# Patient Record
Sex: FEMALE | Race: WHITE | NOT HISPANIC OR LATINO | Employment: OTHER | URBAN - METROPOLITAN AREA
[De-identification: names, ages, dates, MRNs, and addresses within clinical notes are randomized per-mention and may not be internally consistent; named-entity substitution may affect disease eponyms.]

---

## 2017-01-26 ENCOUNTER — TELEPHONE (OUTPATIENT)
Dept: TRANSPLANT | Facility: CLINIC | Age: 77
End: 2017-01-26

## 2017-01-30 ENCOUNTER — TELEPHONE (OUTPATIENT)
Dept: INTERNAL MEDICINE | Facility: CLINIC | Age: 77
End: 2017-01-30

## 2017-01-30 ENCOUNTER — OFFICE VISIT (OUTPATIENT)
Dept: INTERNAL MEDICINE | Facility: CLINIC | Age: 77
End: 2017-01-30
Payer: MEDICARE

## 2017-01-30 ENCOUNTER — HOSPITAL ENCOUNTER (OUTPATIENT)
Dept: RADIOLOGY | Facility: HOSPITAL | Age: 77
Discharge: HOME | End: 2017-01-30
Attending: NURSE PRACTITIONER
Payer: MEDICARE

## 2017-01-30 VITALS
OXYGEN SATURATION: 96 % | TEMPERATURE: 98 F | HEART RATE: 79 BPM | BODY MASS INDEX: 32.17 KG/M2 | SYSTOLIC BLOOD PRESSURE: 118 MMHG | DIASTOLIC BLOOD PRESSURE: 77 MMHG | HEIGHT: 62 IN | WEIGHT: 174.81 LBS | RESPIRATION RATE: 16 BRPM

## 2017-01-30 DIAGNOSIS — D69.6 THROMBOCYTOPENIA: ICD-10-CM

## 2017-01-30 DIAGNOSIS — Z00.00 ENCOUNTER FOR PREVENTIVE HEALTH EXAMINATION: Primary | ICD-10-CM

## 2017-01-30 DIAGNOSIS — M51.36 DDD (DEGENERATIVE DISC DISEASE), LUMBAR: ICD-10-CM

## 2017-01-30 DIAGNOSIS — E78.5 HYPERLIPIDEMIA, UNSPECIFIED HYPERLIPIDEMIA TYPE: ICD-10-CM

## 2017-01-30 DIAGNOSIS — E66.9 OBESITY (BMI 30.0-34.9): ICD-10-CM

## 2017-01-30 DIAGNOSIS — R29.818 NEUROGENIC CLAUDICATION: ICD-10-CM

## 2017-01-30 DIAGNOSIS — E11.9 ENCOUNTER FOR DIABETIC FOOT EXAM: ICD-10-CM

## 2017-01-30 DIAGNOSIS — I10 ESSENTIAL HYPERTENSION: ICD-10-CM

## 2017-01-30 DIAGNOSIS — E08.40 DIABETES MELLITUS DUE TO UNDERLYING CONDITION WITH DIABETIC NEUROPATHY, WITHOUT LONG-TERM CURRENT USE OF INSULIN: ICD-10-CM

## 2017-01-30 DIAGNOSIS — M48.061 SPINAL STENOSIS, LUMBAR: ICD-10-CM

## 2017-01-30 DIAGNOSIS — Z12.31 ENCOUNTER FOR SCREENING MAMMOGRAM FOR MALIGNANT NEOPLASM OF BREAST: ICD-10-CM

## 2017-01-30 DIAGNOSIS — F51.04 CHRONIC INSOMNIA: ICD-10-CM

## 2017-01-30 PROCEDURE — 77063 BREAST TOMOSYNTHESIS BI: CPT | Mod: 26,,, | Performed by: RADIOLOGY

## 2017-01-30 PROCEDURE — G0439 PPPS, SUBSEQ VISIT: HCPCS | Mod: S$GLB,,, | Performed by: NURSE PRACTITIONER

## 2017-01-30 PROCEDURE — 77067 SCR MAMMO BI INCL CAD: CPT | Mod: 26,,, | Performed by: RADIOLOGY

## 2017-01-30 PROCEDURE — 99999 PR PBB SHADOW E&M-EST. PATIENT-LVL III: CPT | Mod: PBBFAC,,, | Performed by: NURSE PRACTITIONER

## 2017-01-30 PROCEDURE — 77067 SCR MAMMO BI INCL CAD: CPT | Mod: TC

## 2017-01-30 PROCEDURE — 3074F SYST BP LT 130 MM HG: CPT | Mod: S$GLB,,, | Performed by: NURSE PRACTITIONER

## 2017-01-30 PROCEDURE — 3078F DIAST BP <80 MM HG: CPT | Mod: S$GLB,,, | Performed by: NURSE PRACTITIONER

## 2017-01-30 NOTE — Clinical Note
Primary Care Providers: Serg Hwang MD, MD (General)  Your patient was seen today for a HRA visit. Gap(s) in care (HEDIS gaps) have been identified during this visit that require additional testing and possible follow up.  Orders Placed This Encounter     Mammo Digital Screening Bilat with CAD         Standing Status: Future         Standing Expiration Date: 7/29/2017         Order Specific Question: Reason for Exam:         Answer: screening for breast cancer   These orders were placed using [facility] approved protocol and any results will be forwarded to your office for appropriate follow up. I have included a copy of my visit note; please review the note and feel free to contact me with any questions.   Thank you for allowing me to participate in the care of your patients. Radha Adams NP

## 2017-01-30 NOTE — PROGRESS NOTES
"Marine Mo presented for a  Medicare AWV and comprehensive Health Risk Assessment today. The following components were reviewed and updated:    · Medical history  · Family History  · Social history  · Allergies and Current Medications  · Health Risk Assessment  · Health Maintenance  · Care Team     ** See Completed Assessments for Annual Wellness Visit within the encounter summary.**       The following assessments were completed:  · Living Situation  · CAGE  · Depression Screening  · Timed Get Up and Go  · Whisper Test  · Cognitive Function Screening  · Nutrition Screening  · ADL Screening  · PAQ Screening    Vitals:    01/30/17 0811   BP: 118/77   BP Method: Manual   Pulse: 79   Resp: 16   Temp: 98.2 °F (36.8 °C)   TempSrc: Oral   SpO2: 96%   Weight: 79.3 kg (174 lb 13.2 oz)   Height: 5' 2" (1.575 m)     Body mass index is 31.98 kg/(m^2).  Physical Exam   Constitutional: She is oriented to person, place, and time. She appears well-developed.   HENT:   Head: Normocephalic.   Right Ear: External ear normal.   Left Ear: External ear normal.   Neck: Normal range of motion.   Cardiovascular: Normal rate, regular rhythm and normal heart sounds.    Pulses:       Dorsalis pedis pulses are 2+ on the right side, and 2+ on the left side.        Posterior tibial pulses are 2+ on the right side, and 2+ on the left side.   Pulmonary/Chest: Effort normal and breath sounds normal.   Abdominal: Soft. Bowel sounds are normal. She exhibits no distension. There is tenderness.   Musculoskeletal: She exhibits no edema, tenderness or deformity.   Feet:   Right Foot:   Protective Sensation: 8 sites tested. 8 sites sensed.   Skin Integrity: Negative for ulcer, blister, skin breakdown, erythema, warmth, callus or dry skin.   Left Foot:   Protective Sensation: 8 sites tested. 8 sites sensed.   Skin Integrity: Negative for ulcer, blister, skin breakdown, erythema, warmth, callus or dry skin.   Neurological: She is alert and oriented to " person, place, and time.   Skin: Skin is warm and dry.   Psychiatric: She has a normal mood and affect. Her behavior is normal. Judgment and thought content normal.   Vitals reviewed.        Diagnoses and health risks identified today and associated recommendations/orders:      1. Encounter for preventive health examination    2. Essential hypertension  Stable, on Lopressor, followed by PCP    3. Diabetes mellitus due to underlying condition with diabetic neuropathy, without long-term current use of insulin  Stable, on metformin, followed by PCP    4. 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 Hyperlipidemia, unspecified hyperlipidemia type  Stable, with diet, followed by PCP    6. Thrombocytopenia  Stable, followed by PCP    7. DDD (degenerative disc disease), lumbar  Stable, followed by PCP    8. Encounter for diabetic foot exam  Sensory exam of the foot is normal, tested with the monofilament. Good pulses, no lesions or ulcers, good peripheral pulses.    9. Chronic insomnia  Stable, followed by PCP    10. Obesity (BMI 30.0-34.9)  Stable with diet, followed by PCP    11. Neurogenic claudication  Stable, followed by PCP    12. Spinal stenosis, lumbar  Stable, followed by PCP    13. Encounter for screening mammogram for malignant neoplasm of breast  - Mammo Digital Screening Bilat with CAD; Future      Pt declined Flu Vaccine, Declined Pneumonia Vaccine, and Declined DEXA scan      Provided Marine with a 5-10 year written screening schedule and personal prevention plan. Recommendations were developed using the USPSTF age appropriate recommendations. Education, counseling, and referrals were provided as needed. After Visit Summary printed and given to patient which includes a list of additional screenings\tests needed.    Return in about 4 months (around 5/30/2017) for Follow up with Primary Care  Provider as instructed, HRA visit in 1 year.    Radha Adams NP

## 2017-01-30 NOTE — PATIENT INSTRUCTIONS
Counseling and Referral of Other Preventative  (Italic type indicates deductible and co-insurance are waived)    Patient Name: Marine Mo  Today's Date: 1/30/2017      SERVICE LIMITATIONS RECOMMENDATION    Vaccines    · Pneumococcal (once after 65)    · Influenza (annually)    · Hepatitis B (if medium/high risk)    · Prevnar 13      Hepatitis B medium/high risk factors:       - End-stage renal disease       - Hemophiliacs who received Factor VII or         IX concentrates       - Clients of institutions for the mentally             retarded       - Persons who live in the same house as          a HepB carrier       - Homosexual men       - Illicit injectable drug abusers     Pneumococcal: declined     Influenza: declined     Hepatitis B: not indicated     Prevnar 13: declined    Mammogram (biennial age 50-74)  Annually (age 40 or over)  done 2/2/2015    Pap (up to age 70 and after 70 if unknown history or abnormal study last 10 years)         not indicated    Colorectal cancer screening (to age 75)    · Fecal occult blood test (annual)  · Flexible sigmoidoscopy (5y)  · Screening colonoscopy (10y)  · Barium enema   done 12/2016 In  [place] by      Diabetes self-management training (no USPSTF recommendations)  Requires referral by treating physician for patient with diabetes or renal disease. 10 hours of initial DSMT sessions of no less than 30 minutes each in a continuous 12-month period. 2 hours of follow-up DSMT in subsequent years.  declined    Bone mass measurements (age 65 & older, biennial)  Requires diagnosis related to osteoporosis or estrogen deficiency. Biennial benefit unless patient has history of long-term glucocorticoid  done 12/10/2012, ordered today    Glaucoma screening (no USPSTF recommendation)  Diabetes mellitus, family history   , age 50 or over    American, age 65 or over  done 10/7/2015, to be scheduled by patient    Medical nutrition therapy for  diabetes or renal disease (no recommended schedule)  Requires referral by treating physician for patient with diabetes or renal disease or kidney transplant within the past 3 years.  Can be provided in same year as diabetes self-management training (DSMT), and CMS recommends medical nutrition therapy take place after DSMT. Up to 3 hours for initial year and 2 hours in subsequent years.  declined    Cardiovascular screening blood tests (every 5 years)  · Fasting lipid panel  Order as a panel if possible  done 11/7/2016    Diabetes screening tests (at least every 3 years, Medicare covers annually or at 6-month intervals for prediabetic patients)  · Fasting blood sugar (FBS) or glucose tolerance test (GTT)  Patient must be diagnosed with one of the following:       - Hypertension       - Dyslipidemia       - Obesity (BMI 30kg/m2)       - Previous elevated impaired FBS or GTT       ... or any two of the following:       - Overweight (BMI 25 but <30)       - Family history of diabetes       - Age 65 or older       - History of gestational diabetes or birth of baby weighing more than 9 pounds  done 8/2016 and yearly by PCP    Abdominal aortic aneurysm screening (once)  · Sonogram   Limited to patients who meet one of the following criteria:       - Men who are 65-75 years old and have smoked more than 100 cigarette in their lifetime       - Anyone with a family history of abdominal aortic aneurysm       - Anyone recommended for screening by the USPSTF  not indicated    HIV screening (annually for increased risk patients)  · HIV-1 and HIV-2 by EIA, or SUE, rapid antibody test or oral mucosa transudate  Patients must be at increased risk for HIV infection per USPSTF guidelines or pregnant. Tests covered annually for patient at increased risk or as requested by the patient. Pregnant patients may receive up to 3 tests during pregnancy.   Not indicated    Smoking cessation counseling (up to 8 sessions per year)  Patients  must be asymptomatic of tobacco-related conditions to receive as a preventative service.  not indicated    Subsequent annual wellness visit  At least 12 months since last AWV  Return in one year     The following information is provided to all patients.  This information is to help you find resources for any of the problems found today that may be affecting your health:                Living healthy guide: www.[facility].[place].[facility]      Understanding Diabetes: www.diabetes.org      Eating healthy: www.cdc.gov/healthyweight      CDC home safety checklist: www.cdc.gov/steadi/patient.html      Agency on Aging: www.goea.[place].[facility]      Alcoholics anonymous (AA): www.aa.org      Physical Activity: www.deysi.nih.gov/uw7rcuh      Tobacco use: www.quitwithusla.org

## 2017-01-30 NOTE — TELEPHONE ENCOUNTER
"----- Message from Radha Adams NP sent at 1/30/2017  8:16 AM CST -----  Hi ,        Mrs. Mo,  Is here today for HRA.  She has seen  (GI) in [place] for stomach discomfort; had a EGD, Colonoscopy and CT scan, and still states "puzzling to ", so he[place] [facility] [place]     She stopped her Metformin on last Thursday after the reading the side-effects to see if the symptoms would ease, and states symptoms still there. She wanted to know if you would like her to restart the Metformin or stay off it a few more days.  I informed her it is probably okay to restart it, but she would like to know from you if it is okay. She can be reached on her cell at [phone].    Radha Adams NP    "

## 2017-01-30 NOTE — TELEPHONE ENCOUNTER
----- Message from Radha Adams NP sent at 1/30/2017  8:59 AM CST -----  HRA visit today.     Mammogram ordered today during visit. Can you please call pt with time and date of exam once scheduled.    Kind Regards,  Radha Adams,YANIP-BC

## 2017-01-30 NOTE — MR AVS SNAPSHOT
[facility]  [address] [facility] [address]  Phone: [phone]  Fax: [phone]                  Marine Mo   2017 8:00 AM   Office Visit    Description:  Female : 1940   Provider:  [facility]   Department:  [facility]           Diagnoses this Visit        Comments    Encounter for preventive health examination    -  Primary     Essential hypertension         Diabetes mellitus due to underlying condition with diabetic neuropathy, without long-term current use of insulin         Hyperlipidemia, unspecified hyperlipidemia type         DDD (degenerative disc disease), lumbar         Thrombocytopenia         Encounter for diabetic foot exam         Encounter for screening mammogram for malignant neoplasm of breast                To Do List           Future Appointments        Provider Department Dept Phone    5/15/2017 8:15 AM NURSE, [facility] [phone]    2017 7:45 AM Serg Hwang MD [facility] [phone]      Goals (5 Years of Data)     None      [facility] On [facility]     [facility] On Call Nurse [facility] - 24/7 Assistance  Registered nurses in the [facility] On Call Center provide clinical advisement, health education, appointment booking, and other advisory services.  Call for this free service at [phone].             Medications           Message regarding Medications     Verify the changes and/or additions to your medication regime listed below are the same as discussed with your clinician today.  If any of these changes or additions are incorrect, please notify your healthcare provider.        STOP taking these medications     omeprazole (PRILOSEC) 40 MG capsule Take 1 tab po twice daily x 2 weeks then back to daily    sucralfate (CARAFATE) 1 gram tablet Take 1 tablet (1 g total) by mouth 4 (four) times daily before meals and nightly.           Verify that the below list of  "medications is an accurate representation of the medications you are currently taking.  If none reported, the list may be blank. If incorrect, please contact your healthcare provider. Carry this list with you in case of emergency.           Current Medications     alprazolam (XANAX) 0.5 MG tablet Take 1 tablet (0.5 mg total) by mouth nightly as needed for Anxiety.    clobetasol (TEMOVATE) 0.05 % cream Apply topically 2 (two) times daily.    metformin (GLUCOPHAGE) 500 MG tablet Take 1 tablet (500 mg total) by mouth 2 (two) times daily with meals.    metoprolol tartrate (LOPRESSOR) 100 MG tablet TAKE 1/2 TABLET TWICE DAILY           Clinical Reference Information           Vital Signs - Last Recorded  Most recent update: 1/30/2017  8:15 AM by Radha Adams NP    BP Pulse Temp Resp Ht Wt    118/77 (BP Method: Manual) 79 98.2 °F (36.8 °C) (Oral) 16 5' 2" (1.575 m) 79.3 kg (174 lb 13.2 oz)    SpO2 BMI             96% 31.98 kg/m2         Blood Pressure          Most Recent Value    BP  118/77      Allergies as of 1/30/2017     No Known Allergies      Immunizations Administered on Date of Encounter - 1/30/2017     None      Orders Placed During Today's Visit     Future Labs/Procedures Expected by Expires    Mammo Digital Screening Bilat with CAD  1/30/2017 (Approximate) 7/29/2017      [facility] Sign-Up     Activating your [facility] account is as easy as 1-2-3!     1) Visit [email or web address], select Sign Up Now, enter this activation code and your date of birth, then select Next.  Activation code not generated  Current Patient Portal Status: Account disabled      2) Create a username and password to use when you visit [facility] in the future and select a security question in case you lose your password and select Next.    3) Enter your e-mail address and click Sign Up!    Additional Information  If you have questions, please e-mail [email or web address] or call [phone] to talk to our [facility] staff. Remember, " [facility] is NOT to be used for urgent needs. For medical emergencies, dial 911.         Instructions      Counseling and Referral of Other Preventative  (Italic type indicates deductible and co-insurance are waived)    Patient Name: Marine Mo  Today's Date: 1/30/2017      SERVICE LIMITATIONS RECOMMENDATION    Vaccines    · Pneumococcal (once after 65)    · Influenza (annually)    · Hepatitis B (if medium/high risk)    · Prevnar 13      Hepatitis B medium/high risk factors:       - End-stage renal disease       - Hemophiliacs who received Factor VII or         IX concentrates       - Clients of institutions for the mentally             retarded       - Persons who live in the same house as          a HepB carrier       - Homosexual men       - Illicit injectable drug abusers     Pneumococcal: declined     Influenza: declined     Hepatitis B: not indicated     Prevnar 13: declined    Mammogram (biennial age 50-74)  Annually (age 40 or over)  done 2/2/2015    Pap (up to age 70 and after 70 if unknown history or abnormal study last 10 years)         not indicated    Colorectal cancer screening (to age 75)    · Fecal occult blood test (annual)  · Flexible sigmoidoscopy (5y)  · Screening colonoscopy (10y)  · Barium enema   done 12/2016 In  [place] by      Diabetes self-management training (no USPSTF recommendations)  Requires referral by treating physician for patient with diabetes or renal disease. 10 hours of initial DSMT sessions of no less than 30 minutes each in a continuous 12-month period. 2 hours of follow-up DSMT in subsequent years.  declined    Bone mass measurements (age 65 & older, biennial)  Requires diagnosis related to osteoporosis or estrogen deficiency. Biennial benefit unless patient has history of long-term glucocorticoid  done 12/10/2012, ordered today    Glaucoma screening (no USPSTF recommendation)  Diabetes mellitus, family history   , age 50 or over     American, age 65 or over  done 10/7/2015, to be scheduled by patient    Medical nutrition therapy for diabetes or renal disease (no recommended schedule)  Requires referral by treating physician for patient with diabetes or renal disease or kidney transplant within the past 3 years.  Can be provided in same year as diabetes self-management training (DSMT), and CMS recommends medical nutrition therapy take place after DSMT. Up to 3 hours for initial year and 2 hours in subsequent years.  declined    Cardiovascular screening blood tests (every 5 years)  · Fasting lipid panel  Order as a panel if possible  done 11/7/2016    Diabetes screening tests (at least every 3 years, Medicare covers annually or at 6-month intervals for prediabetic patients)  · Fasting blood sugar (FBS) or glucose tolerance test (GTT)  Patient must be diagnosed with one of the following:       - Hypertension       - Dyslipidemia       - Obesity (BMI 30kg/m2)       - Previous elevated impaired FBS or GTT       ... or any two of the following:       - Overweight (BMI 25 but <30)       - Family history of diabetes       - Age 65 or older       - History of gestational diabetes or birth of baby weighing more than 9 pounds  done 11/7/2016 and yearly by PCP    Abdominal aortic aneurysm screening (once)  · Sonogram   Limited to patients who meet one of the following criteria:       - Men who are 65-75 years old and have smoked more than 100 cigarette in their lifetime       - Anyone with a family history of abdominal aortic aneurysm       - Anyone recommended for screening by the USPSTF  not indicated    HIV screening (annually for increased risk patients)  · HIV-1 and HIV-2 by EIA, or SUE, rapid antibody test or oral mucosa transudate  Patients must be at increased risk for HIV infection per USPSTF guidelines or pregnant. Tests covered annually for patient at increased risk or as requested by the patient. Pregnant patients may receive up to 3 tests  during pregnancy.   Not indicated    Smoking cessation counseling (up to 8 sessions per year)  Patients must be asymptomatic of tobacco-related conditions to receive as a preventative service.  not indicated    Subsequent annual wellness visit  At least 12 months since last AWV  Return in one year     The following information is provided to all patients.  This information is to help you find resources for any of the problems found today that may be affecting your health:                Living healthy guide: www.[facility].[place].[facility]      Understanding Diabetes: www.diabetes.org      Eating healthy: www.cdc.gov/healthyweight      [facility] home safety checklist: www.cdc.gov/steadi/patient.html      Agency on Aging: www.goea.[place].[facility]      Alcoholics anonymous (AA): www.aa.org      Physical Activity: www.deysi.nih.gov/ht4izje      Tobacco use: www.quitwithusla.org

## 2017-01-31 NOTE — PROGRESS NOTES
Mammogram Results Noted, Please inform the patient of the following:  Your mammogram is normal, which means no signs of cancer can be seen.  You are recommended to repeat this test every 1-2 years until at least age 75.    Radha Adams, NP

## 2017-02-01 ENCOUNTER — TELEPHONE (OUTPATIENT)
Dept: INTERNAL MEDICINE | Facility: CLINIC | Age: 77
End: 2017-02-01

## 2017-02-01 NOTE — TELEPHONE ENCOUNTER
----- Message from Elena Farah sent at 2017  2:20 PM CST -----  Contact: self  Marine Mo  MRN: [id number]  : 1940  PCP: Serg Hwang  Home Phone      [phone]  Work Phone      [phone]  Mobile          [phone]      MESSAGE:   Pt stopped taking her diabetic medicine because she has stomach problems. She is feeling weak right now because she hasn't started her medicine since last Thursday. She is wanting to see if Dr. Hwang can change her medicine.     Phone: [phone]

## 2017-02-01 NOTE — TELEPHONE ENCOUNTER
Patient states that she started metformin again last night and thus far is feeling OK. Will continue with metformin and advise if symptoms return

## 2017-02-01 NOTE — TELEPHONE ENCOUNTER
----- Message from Radha Adams NP sent at 1/31/2017  9:13 AM CST -----  Mammogram Results Noted, Please inform the patient of the following:  Your mammogram is normal, which means no signs of cancer can be seen.  You are recommended to repeat this test every 1-2 years until at least age 75.    Radha Adams NP

## 2017-02-01 NOTE — TELEPHONE ENCOUNTER
NP:    Your mammogram is normal, which means no signs of cancer can be seen. after 75 yrs you may choose to not do them any more

## 2017-02-09 ENCOUNTER — TELEPHONE (OUTPATIENT)
Dept: TRANSPLANT | Facility: CLINIC | Age: 77
End: 2017-02-09

## 2017-02-09 NOTE — TELEPHONE ENCOUNTER
----- Message from Marcus Ruiz sent at 2/9/2017  8:57 AM CST -----  Contact: pt  Pt calling to get an appt please return call at  or  [phone]

## 2017-02-21 ENCOUNTER — LAB VISIT (OUTPATIENT)
Dept: LAB | Facility: HOSPITAL | Age: 77
End: 2017-02-21
Payer: MEDICARE

## 2017-02-21 ENCOUNTER — OFFICE VISIT (OUTPATIENT)
Dept: HEPATOLOGY | Facility: CLINIC | Age: 77
End: 2017-02-21
Payer: MEDICARE

## 2017-02-21 VITALS
TEMPERATURE: 98 F | HEIGHT: 64 IN | DIASTOLIC BLOOD PRESSURE: 63 MMHG | HEART RATE: 71 BPM | OXYGEN SATURATION: 95 % | BODY MASS INDEX: 28.24 KG/M2 | RESPIRATION RATE: 18 BRPM | WEIGHT: 165.38 LBS | SYSTOLIC BLOOD PRESSURE: 130 MMHG

## 2017-02-21 DIAGNOSIS — R16.0 HEPATOMEGALY: ICD-10-CM

## 2017-02-21 DIAGNOSIS — D69.6 THROMBOCYTOPENIA: ICD-10-CM

## 2017-02-21 DIAGNOSIS — K76.6 PORTAL HYPERTENSION: Primary | ICD-10-CM

## 2017-02-21 DIAGNOSIS — K76.6 PORTAL HYPERTENSION: ICD-10-CM

## 2017-02-21 DIAGNOSIS — R16.1 SPLENOMEGALY: ICD-10-CM

## 2017-02-21 LAB
CERULOPLASMIN SERPL-MCNC: 27 MG/DL
FERRITIN SERPL-MCNC: 59 NG/ML
HBV CORE AB SERPL QL IA: NEGATIVE
HBV SURFACE AB SER-ACNC: NEGATIVE M[IU]/ML
HEPATITIS A ANTIBODY, IGG: POSITIVE
IGG SERPL-MCNC: 1252 MG/DL
IRON SERPL-MCNC: 61 UG/DL
SATURATED IRON: 16 %
TOTAL IRON BINDING CAPACITY: 389 UG/DL
TRANSFERRIN SERPL-MCNC: 263 MG/DL

## 2017-02-21 PROCEDURE — 86704 HEP B CORE ANTIBODY TOTAL: CPT

## 2017-02-21 PROCEDURE — 86790 VIRUS ANTIBODY NOS: CPT

## 2017-02-21 PROCEDURE — 86706 HEP B SURFACE ANTIBODY: CPT

## 2017-02-21 PROCEDURE — 1125F AMNT PAIN NOTED PAIN PRSNT: CPT | Mod: S$GLB,,, | Performed by: NURSE PRACTITIONER

## 2017-02-21 PROCEDURE — 86039 ANTINUCLEAR ANTIBODIES (ANA): CPT

## 2017-02-21 PROCEDURE — 82390 ASSAY OF CERULOPLASMIN: CPT

## 2017-02-21 PROCEDURE — 99205 OFFICE O/P NEW HI 60 MIN: CPT | Mod: S$GLB,,, | Performed by: NURSE PRACTITIONER

## 2017-02-21 PROCEDURE — 86038 ANTINUCLEAR ANTIBODIES: CPT

## 2017-02-21 PROCEDURE — 82104 ALPHA-1-ANTITRYPSIN PHENO: CPT

## 2017-02-21 PROCEDURE — 82784 ASSAY IGA/IGD/IGG/IGM EACH: CPT

## 2017-02-21 PROCEDURE — 86235 NUCLEAR ANTIGEN ANTIBODY: CPT | Mod: 59

## 2017-02-21 PROCEDURE — 99499 UNLISTED E&M SERVICE: CPT | Mod: S$GLB,,, | Performed by: NURSE PRACTITIONER

## 2017-02-21 PROCEDURE — 1160F RVW MEDS BY RX/DR IN RCRD: CPT | Mod: S$GLB,,, | Performed by: NURSE PRACTITIONER

## 2017-02-21 PROCEDURE — 1159F MED LIST DOCD IN RCRD: CPT | Mod: S$GLB,,, | Performed by: NURSE PRACTITIONER

## 2017-02-21 PROCEDURE — 36415 COLL VENOUS BLD VENIPUNCTURE: CPT

## 2017-02-21 PROCEDURE — 83540 ASSAY OF IRON: CPT

## 2017-02-21 PROCEDURE — 3078F DIAST BP <80 MM HG: CPT | Mod: S$GLB,,, | Performed by: NURSE PRACTITIONER

## 2017-02-21 PROCEDURE — 82728 ASSAY OF FERRITIN: CPT

## 2017-02-21 PROCEDURE — 99999 PR PBB SHADOW E&M-EST. PATIENT-LVL III: CPT | Mod: PBBFAC,,, | Performed by: NURSE PRACTITIONER

## 2017-02-21 PROCEDURE — 1157F ADVNC CARE PLAN IN RCRD: CPT | Mod: S$GLB,,, | Performed by: NURSE PRACTITIONER

## 2017-02-21 PROCEDURE — 3075F SYST BP GE 130 - 139MM HG: CPT | Mod: S$GLB,,, | Performed by: NURSE PRACTITIONER

## 2017-02-21 PROCEDURE — 99499 UNLISTED E&M SERVICE: CPT | Mod: S$GLB,,, | Performed by: INTERNAL MEDICINE

## 2017-02-21 RX ORDER — HYDROCODONE BITARTRATE AND ACETAMINOPHEN 5; 325 MG/1; MG/1
1 TABLET ORAL EVERY 6 HOURS PRN
COMMUNITY
End: 2017-05-22 | Stop reason: SDUPTHER

## 2017-02-21 NOTE — PROGRESS NOTES
"HEPATOLOGY CONSULTATION    Referring Physician: Zachery Hernandez Jr., MD   Current Corresponding Physician: Zachery Hernandez Jr., MD     Reason for Consultation: Consultation for evaluation of Cirrhosis    History of Present Illness: Marine Mo is a 76 y.o. female who presents for evaluation of   Chief Complaint   Patient presents with    Cirrhosis     Per patient known history of polycystic hepatorenal disease x 20+ years. Recently evaluated for c/o nausea, abdominal fullness which started around October 2016.  Imaging showed diffuse hepatic cysts and evidence for portal hypertension with jose juan esophageal varices and splenomegaly in the setting of thrombocytopenia and "nodular" liver.   Also has additional PMH of HTN, HLD, obesity, DM.  Dx DM in the last year and has been diligent about her diet and has managed to lose about 40 #s.     Denies previously known abnormal serologies  Denies symptoms of decompensation  Family hx of liver disease, father and sister with polycystic liver disease    Review of available records reveal:  Normal LFTs   Thrombocytopenia w/ splenomegaly  Esophageal varices on CT  1/16  AST/ALT 34/36  ALP/TB 38/.6  ALB 3.8  INR 1.1  AFP 3.4    AMA, SMA neg  Ferritin 46  Hep A Igm neg  Hep B sAg, cAb Igm neg,   Hep C neg    EGD 1/2017: normal esophagus, erosive gastritis    CT 1/13/17: no ascites, hepatomegaly, micronodular surface of the liver, diffuse liver cyst, splenomegaly, dilation of umbilical vein, esophageal varices, numerous kidney cysts    Other noted hx: diverticulosis, colon polyps, DM, HLD, obesity, HTN, DDD  Past Medical History   Diagnosis Date    Hypertension      Outpatient Encounter Prescriptions as of 2/21/2017   Medication Sig Dispense Refill    alprazolam (XANAX) 0.5 MG tablet Take 1 tablet (0.5 mg total) by mouth nightly as needed for Anxiety. 30 tablet 3    clobetasol (TEMOVATE) 0.05 % cream Apply topically 2 (two) times daily. 60 g 1    hydrocodone-acetaminophen " 5-325mg (NORCO) 5-325 mg per tablet Take 1 tablet by mouth every 6 (six) hours as needed for Pain.      metformin (GLUCOPHAGE) 500 MG tablet Take 1 tablet (500 mg total) by mouth 2 (two) times daily with meals. 180 tablet 3    metoprolol tartrate (LOPRESSOR) 100 MG tablet TAKE 1/2 TABLET TWICE DAILY 90 tablet 2     No facility-administered encounter medications on file as of 2/21/2017.      Review of patient's allergies indicates:  No Known Allergies  Family History   Problem Relation Age of Onset    Kidney disease Father        Social History     Social History    Marital status:      Spouse name: N/A    Number of children: N/A    Years of education: N/A     Occupational History    Not on file.     Social History Main Topics    Smoking status: Never Smoker    Smokeless tobacco: Never Used    Alcohol use No    Drug use: No    Sexual activity: Yes     Partners: Male     Other Topics Concern    Not on file     Social History Narrative     Review of Systems   Constitutional: Negative for activity change, appetite change, chills, diaphoresis, fatigue, fever and unexpected weight change.   HENT: Negative for facial swelling and nosebleeds.    Respiratory: Negative for cough, chest tightness and shortness of breath.    Cardiovascular: Negative for chest pain, palpitations and leg swelling.   Gastrointestinal: Negative for abdominal distention, abdominal pain, blood in stool, constipation, diarrhea, nausea and vomiting.   Musculoskeletal: Negative for neck pain and neck stiffness.   Skin: Negative for color change, pallor and rash.   Neurological: Negative for dizziness, tremors, syncope, weakness, light-headedness and headaches.   Hematological: Negative for adenopathy. Does not bruise/bleed easily.   Psychiatric/Behavioral: Negative for agitation, behavioral problems, confusion and decreased concentration.     Vitals:    02/21/17 0831   BP: 130/63   Pulse: 71   Resp: 18   Temp: 97.8 °F (36.6 °C)        Physical Exam   Constitutional: She is oriented to person, place, and time. She appears well-developed and well-nourished. No distress.   HENT:   Head: Normocephalic and atraumatic.   Eyes: Conjunctivae are normal. Pupils are equal, round, and reactive to light. No scleral icterus.   Neck: Normal range of motion. Neck supple.   Cardiovascular: Normal rate.    Pulmonary/Chest: Effort normal.   Abdominal: Soft. She exhibits no distension and no mass. There is no tenderness. There is no rebound and no guarding.   Musculoskeletal: Normal range of motion.   Neurological: She is alert and oriented to person, place, and time. No cranial nerve deficit. She exhibits normal muscle tone. Coordination normal.   No asterixis   Skin: Skin is warm and dry. No rash noted. She is not diaphoretic. No erythema. No pallor.   Psychiatric: She has a normal mood and affect. Her behavior is normal. Judgment and thought content normal.       Computed MELD-Na score unavailable. Necessary lab results were not found.  Computed MELD score unavailable. Necessary lab results were not found.    Lab Results   Component Value Date     10/28/2016    BUN 18 10/28/2016    CREATININE 0.9 10/28/2016    CALCIUM 9.7 10/28/2016     10/28/2016    K 4.5 10/28/2016     10/28/2016    PROT 7.1 10/28/2016    CO2 26 10/28/2016    ANIONGAP 11 10/28/2016    WBC 3.12 (L) 10/28/2016    RBC 4.34 10/28/2016    HGB 12.4 10/28/2016    HCT 37.4 10/28/2016    MCV 86 10/28/2016    MCH 28.6 10/28/2016    MCHC 33.2 10/28/2016     Lab Results   Component Value Date    RDW 16.0 (H) 10/28/2016    PLT 75 (L) 10/28/2016    MPV 9.6 10/28/2016    GRAN 1.5 (L) 10/28/2016    GRAN 49.1 10/28/2016    LYMPH 1.2 10/28/2016    LYMPH 37.5 10/28/2016    MONO 0.3 10/28/2016    MONO 10.9 10/28/2016    EOSINOPHIL 2.2 10/28/2016    BASOPHIL 0.3 10/28/2016    EOS 0.1 10/28/2016    BASO 0.01 10/28/2016    APTT 24.9 09/26/2011    GROUPTRH A POS 09/26/2011    CHOL 157  08/01/2016    TRIG 239 (H) 08/01/2016    HDL 26 (L) 08/01/2016    CHOLHDL 16.6 (L) 08/01/2016    TOTALCHOLEST 6.0 (H) 08/01/2016    ALBUMIN 4.0 10/28/2016    AST 30 10/28/2016    ALT 23 10/28/2016    ALKPHOS 32 (L) 10/28/2016    LABPROT 10.9 09/26/2011    INR 1.0 09/26/2011       Assessment and Plan:  Patient seen in collaboration with Dr. Rajput  Polycystic liver disease: w/ possible cirrhosis as evidenced by esophageal varices, splenomegaly w/ thrombocytopenia  -will get remaining liver w/u serologies  -in the setting of polycystic hepatic disease MR elastography is preferred over fibroscan to assess for fibrosis but patient is extremely claustrophobic  -will request copy of imaging CD for review in IR  -recommend Hep A/B vaccine, pending labs    Total duration of visit = 40 min, with > 50% spent counseling  RTC 4-6 weeks    Thank you for allowing me participate in this patient's care.   Patient Active Problem List   Diagnosis    Spinal stenosis, lumbar    DDD (degenerative disc disease), lumbar    Low back pain without sciatica    Lumbar radiculopathy    Neurogenic claudication    Hyperlipidemia    Obesity (BMI 30.0-34.9)    Thrombocytopenia    Type 2 diabetes mellitus, controlled    Chronic insomnia    Hereditary and idiopathic peripheral neuropathy    Essential hypertension    Diabetes mellitus with neuropathy    Type 2 diabetes mellitus, uncontrolled    Encounter for diabetic foot exam    Hepatomegaly    Splenomegaly     Marine Mo is a 76 y.o. female withCirrhosis

## 2017-02-21 NOTE — LETTER
February 21, 2017      Zachery Hernandez Jr., MD  [address] [facility]  [address] [place] [id number]           [facility] - Hepatology  [address]  [place] [address]  Phone: [phone]  Fax: [phone]          Patient: Marine Mo   MR Number: [id number]   YOB: 1940   Date of Visit: 2/21/2017       Dear Dr. Zachery Hernandez Jr.:    Thank you for referring Marine Mo to me for evaluation. Attached you will find relevant portions of my assessment and plan of care.    If you have questions, please do not hesitate to call me. I look forward to following Marine Mo along with you.    Sincerely,    Marli Mcintosh, ANDI    Enclosure  CC:  No Recipients    If you would like to receive this communication electronically, please contact [email or web address] or [phone] to request more information on [employer] Link access.    For providers and/or their staff who would like to refer a patient to [facility], please contact us through our one-stop-shop provider referral line, [facility], at [phone].    If you feel you have received this communication in error or would no longer like to receive these types of communications, please e-mail [email or web address]

## 2017-02-21 NOTE — MR AVS SNAPSHOT
[facility] - Hepatology  [address]  [facility] [address]  Phone: [phone]  Fax: [phone]                  Marine Mo   2017 8:40 AM   Office Visit    Description:  Female : 1940   Provider:  Marli Mcintosh NP   Department:  [facility] - Hepat[facility]           Reason for Visit     Cirrhosis           Diagnoses this Visit        Comments    Portal hypertension    -  Primary     Splenomegaly         Hepatomegaly         Thrombocytopenia                To Do List           Future Appointments        Provider Department Dept Phone    5/15/2017 8:15 AM NURSE, Ghulam [facility] [phone]    2017 7:45 AM Serg Hwang MD [facility] [phone]      Goals (5 Years of Data)     None      [facility] On [facility]     [facility] On Call Nurse Care Line -  Assistance  Registered nurses in the [facility] On Call Center provide clinical advisement, health education, appointment booking, and other advisory services.  Call for this free service at [phone].             Medications           Message regarding Medications     Verify the changes and/or additions to your medication regime listed below are the same as discussed with your clinician today.  If any of these changes or additions are incorrect, please notify your healthcare provider.             Verify that the below list of medications is an accurate representation of the medications you are currently taking.  If none reported, the list may be blank. If incorrect, please contact your healthcare provider. Carry this list with you in case of emergency.           Current Medications     alprazolam (XANAX) 0.5 MG tablet Take 1 tablet (0.5 mg total) by mouth nightly as needed for Anxiety.    clobetasol (TEMOVATE) 0.05 % cream Apply topically 2 (two) times daily.    hydrocodone-acetaminophen 5-325mg (NORCO) 5-325 mg per tablet Take 1 tablet by mouth every 6 (six) hours as needed for Pain.     "metformin (GLUCOPHAGE) 500 MG tablet Take 1 tablet (500 mg total) by mouth 2 (two) times daily with meals.    metoprolol tartrate (LOPRESSOR) 100 MG tablet TAKE 1/2 TABLET TWICE DAILY           Clinical Reference Information           Your Vitals Were     BP Pulse Temp Resp Height Weight    130/63 (BP Location: Left arm, Patient Position: Sitting, BP Method: Automatic) 71 97.8 °F (36.6 °C) (Oral) 18 5' 4" (1.626 m) 75 kg (165 lb 5.5 oz)    SpO2 BMI             95% 28.38 kg/m2         Blood Pressure          Most Recent Value    BP  130/63      Allergies as of 2/21/2017     No Known Allergies      Immunizations Administered on Date of Encounter - 2/21/2017     None      Orders Placed During Today's Visit     Future Labs/Procedures Expected by Expires    Alpha 1 Antitrypsin Phenotype  2/21/2017 4/22/2018    CASI  2/21/2017 4/22/2018    Ceruloplasmin  2/21/2017 4/22/2018    Ferritin  2/21/2017 4/22/2018    Hepatitis A antibody, IgG  2/21/2017 4/22/2018    Hepatitis B core antibody, total  2/21/2017 2/21/2018    Hepatitis B surface antibody  2/21/2017 4/22/2018    IgG  2/21/2017 4/22/2018    Iron and TIBC  2/21/2017 4/22/2018      [facility] Sign-Up     Activating your [facility] account is as easy as 1-2-3!     1) Visit [email or web address], select Sign Up Now, enter this activation code and your date of birth, then select Next.  Activation code not generated  Current Patient Portal Status: Account disabled      2) Create a username and password to use when you visit [facility] in the future and select a security question in case you lose your password and select Next.    3) Enter your e-mail address and click Sign Up!    Additional Information  If you have questions, please e-mail [email or web address] or call [phone] to talk to our [facility] staff. Remember, [facility] is NOT to be used for urgent needs. For medical emergencies, dial 911.         Language Assistance Services     ATTENTION: Language assistance services are " available, free of charge. Please call [phone].      ATENCIÓN: Si habla español, tiene a nj disposición servicios gratuitos de asistencia lingüística. Llame al [phone].     CHÚ Ý: N?u b?n nói Ti?ng Vi?t, có các d?ch v? h? tr? ngôn ng? mi?n phí dành cho b?n. G?i s? [phone].         [facility] complies with applicable Federal civil rights laws and does not discriminate on the basis of race, color, national origin, age, disability, or sex.

## 2017-02-21 NOTE — PROGRESS NOTES
I have personally performed a face to face diagnostic evaluation on this patient. I have reviewed and agree with today's findings and the care plan outlined by Marli Mcintosh NP      My findings are as follows:  Patient presents with polycystic liver and kidney disease.  Normal liver function until now.  Now has nausea, had CT scan which showed varices, splenomegaly, plus cysts. Cirrhosis and splenomegaly mentioned in the report.   EGD 11/28/16 showed erosive gastritis, no varices and esoph normal.  Patient's main problem is burning in the epigastric area, full feeling in the same area.  These symptoms have been since Oct 15, 2016.  Sucralfate and omeprazole was given, she only takes sucralfate.  She does not seem to recall omeprazole being prescribed.  If she does have cirrhosis, it could be due to DILLARD, given DM and hyperlipidemia .  She also has HTN, and given her age of 76, she is a high risk for surgery.      Obtain CD with CT scan, u/s any other imaging available. Review in IR conference.     she will return to Marli Mcintosh NP for follow-up.

## 2017-02-22 LAB
A1AT PHENOTYP SERPL-IMP: NORMAL BANDS
A1AT SERPL NEPH-MCNC: 148 MG/DL
ANA SER QL IF: POSITIVE
ANA TITR SER IF: NORMAL {TITER}

## 2017-02-23 ENCOUNTER — TELEPHONE (OUTPATIENT)
Dept: HEPATOLOGY | Facility: CLINIC | Age: 77
End: 2017-02-23

## 2017-02-23 NOTE — TELEPHONE ENCOUNTER
----- Message from Marli Mcintosh NP sent at 2/22/2017  5:31 PM CST -----  Notify labs do not show any convincing additional underlying liver disease. This can be discussed in more detail at her next visit

## 2017-02-23 NOTE — TELEPHONE ENCOUNTER
Information faxed to [facility] - to get CD mailed to us. [phone] (fax) [phone] (phone).

## 2017-02-23 NOTE — TELEPHONE ENCOUNTER
FYI-   Once cd is received.  send CD to 2nd floor for IR conf, and copy of report to Janet Cedillo.     Also notify Marli cd is here so request can be enter.

## 2017-02-27 LAB
ANTI SM ANTIBODY: 1.06 EU
ANTI SM/RNP ANTIBODY: 1.56 EU
ANTI-SM INTERPRETATION: NEGATIVE
ANTI-SM/RNP INTERPRETATION: NEGATIVE
ANTI-SSA ANTIBODY: 1.5 EU
ANTI-SSA INTERPRETATION: NEGATIVE
ANTI-SSB ANTIBODY: 0.21 EU
ANTI-SSB INTERPRETATION: NEGATIVE
DSDNA AB SER-ACNC: NORMAL [IU]/ML

## 2017-03-07 ENCOUNTER — TELEPHONE (OUTPATIENT)
Dept: HEPATOLOGY | Facility: CLINIC | Age: 77
End: 2017-03-07

## 2017-03-07 NOTE — TELEPHONE ENCOUNTER
"known history of polycystic hepatorenal disease x 20+ years.  Imaging showed diffuse hepatic cysts and evidence for portal hypertension with jose juan esophageal varices and splenomegaly in the setting of thrombocytopenia and "nodular" liver.   Also has additional PMH of HTN, HLD, obesity, DM. Dx      Family hx of liver disease, father and sister with polycystic liver disease    Review in IR per Dr. Rajput  "

## 2017-03-08 ENCOUNTER — TELEPHONE (OUTPATIENT)
Dept: HEPATOLOGY | Facility: CLINIC | Age: 77
End: 2017-03-08

## 2017-03-08 NOTE — TELEPHONE ENCOUNTER
----- Message from Casi Sainz sent at 3/7/2017 10:49 AM CST -----  Contact: pt  Has questions about last visit, please call back at [phone] or [phone]

## 2017-03-08 NOTE — TELEPHONE ENCOUNTER
MA called patient back, patient stated that she want to know why she have to see NP again 3/23/17. Inform patient that her Imaging is been sent to our IR conf to be reviewed and NP will discuss the results on 3/23/17. Inform her also that the conf will be on 3/14. Patient understood.     Patient stated that she don't think that there is something wrong with her liver. He stomach is been hurting she said and she will call Dr. mann office which is her Gastro MD. PAVEL

## 2017-03-14 ENCOUNTER — CONFERENCE (OUTPATIENT)
Dept: TRANSPLANT | Facility: CLINIC | Age: 77
End: 2017-03-14
Payer: MEDICARE

## 2017-03-23 ENCOUNTER — OFFICE VISIT (OUTPATIENT)
Dept: HEPATOLOGY | Facility: CLINIC | Age: 77
End: 2017-03-23
Payer: MEDICARE

## 2017-03-23 VITALS
TEMPERATURE: 97 F | OXYGEN SATURATION: 96 % | BODY MASS INDEX: 29.77 KG/M2 | DIASTOLIC BLOOD PRESSURE: 63 MMHG | HEART RATE: 73 BPM | WEIGHT: 174.38 LBS | SYSTOLIC BLOOD PRESSURE: 142 MMHG | HEIGHT: 64 IN

## 2017-03-23 DIAGNOSIS — R16.1 SPLENOMEGALY: ICD-10-CM

## 2017-03-23 DIAGNOSIS — D69.6 THROMBOCYTOPENIA: ICD-10-CM

## 2017-03-23 DIAGNOSIS — Q44.6 POLYCYSTIC LIVER DISEASE: Primary | ICD-10-CM

## 2017-03-23 PROCEDURE — 99499 UNLISTED E&M SERVICE: CPT | Mod: S$GLB,,, | Performed by: NURSE PRACTITIONER

## 2017-03-23 PROCEDURE — 99999 PR PBB SHADOW E&M-EST. PATIENT-LVL III: CPT | Mod: PBBFAC,,, | Performed by: NURSE PRACTITIONER

## 2017-03-23 PROCEDURE — 1159F MED LIST DOCD IN RCRD: CPT | Mod: S$GLB,,, | Performed by: NURSE PRACTITIONER

## 2017-03-23 PROCEDURE — 3077F SYST BP >= 140 MM HG: CPT | Mod: S$GLB,,, | Performed by: NURSE PRACTITIONER

## 2017-03-23 PROCEDURE — 99214 OFFICE O/P EST MOD 30 MIN: CPT | Mod: S$GLB,,, | Performed by: NURSE PRACTITIONER

## 2017-03-23 PROCEDURE — 1160F RVW MEDS BY RX/DR IN RCRD: CPT | Mod: S$GLB,,, | Performed by: NURSE PRACTITIONER

## 2017-03-23 PROCEDURE — 1157F ADVNC CARE PLAN IN RCRD: CPT | Mod: S$GLB,,, | Performed by: NURSE PRACTITIONER

## 2017-03-23 PROCEDURE — 3078F DIAST BP <80 MM HG: CPT | Mod: S$GLB,,, | Performed by: NURSE PRACTITIONER

## 2017-03-23 NOTE — PROGRESS NOTES
"HEPATOLOGY CONSULTATION    Referring Physician: Zachery Hernandez Jr., MD   Current Corresponding Physician: Zachery Hernandez Jr., MD     Reason for Consultation: Consultation for evaluation of Splenomegaly; Hazel esophageal varices; and Thrombocytopenia    History of Present Illness: Marine Mo is a 77 y.o. female who presents for evaluation of   Chief Complaint   Patient presents with    Splenomegaly    Hazel esophageal varices    Thrombocytopenia     Known history of polycystic hepatorenal disease x 20+ years. Recently evaluated for c/o nausea, abdominal fullness which started around October 2016.  Imaging showed diffuse hepatic cysts and evidence for portal hypertension with hazel esophageal varices and splenomegaly in the setting of thrombocytopenia and "nodular" liver.   Also has additional PMH of HTN, HLD, obesity, DM.   No  C/o jaundice, slowed mentation, abdominal distension, hematemesis, melena, edema.  Liver w/u serologies negative for Leonardo's, A1AT, HH  CASI titer 160 but remaining AI markers negative. No convincing evidence for AIH  Transaminases in the 30s w/ normal synthetic liver function  EGD performed January did not show any EVs or PHTNG although on CT per esophageal varices are noted.     Review of available records reveal:  Essentially normal LFTs   Thrombocytopenia w/ splenomegaly  Esophageal varices on CT  1/16  AST/ALT 34/36  ALP/TB 38/.6  ALB 3.8  INR 1.1  AFP 3.4    AMA, SMA neg  Ferritin 46  Hep A Igm neg  Hep B sAg, cAb Igm neg,   Hep C neg    EGD 1/2017: normal esophagus, erosive gastritis    CT 1/13/17: no ascites, hepatomegaly, micronodular surface of the liver, diffuse liver cyst, splenomegaly, dilation of umbilical vein, esophageal varices, numerous kidney cysts    Other noted hx: diverticulosis, colon polyps, DM, HLD, obesity, HTN, DDD      Family hx :father and sister with polycystic liver disease      Past Medical History:   Diagnosis Date    Hypertension      Outpatient " Encounter Prescriptions as of 3/23/2017   Medication Sig Dispense Refill    alprazolam (XANAX) 0.5 MG tablet Take 1 tablet (0.5 mg total) by mouth nightly as needed for Anxiety. 30 tablet 3    clobetasol (TEMOVATE) 0.05 % cream Apply topically 2 (two) times daily. 60 g 1    hydrocodone-acetaminophen 5-325mg (NORCO) 5-325 mg per tablet Take 1 tablet by mouth every 6 (six) hours as needed for Pain.      metformin (GLUCOPHAGE) 500 MG tablet Take 1 tablet (500 mg total) by mouth 2 (two) times daily with meals. 180 tablet 3    metoprolol tartrate (LOPRESSOR) 100 MG tablet TAKE 1/2 TABLET TWICE DAILY 90 tablet 2     No facility-administered encounter medications on file as of 3/23/2017.      Review of patient's allergies indicates:  No Known Allergies  Family History   Problem Relation Age of Onset    Kidney disease Father        Social History     Social History    Marital status:      Spouse name: N/A    Number of children: N/A    Years of education: N/A     Occupational History    Not on file.     Social History Main Topics    Smoking status: Never Smoker    Smokeless tobacco: Never Used    Alcohol use No    Drug use: No    Sexual activity: Yes     Partners: Male     Other Topics Concern    Not on file     Social History Narrative     Review of Systems   Constitutional: Negative for activity change, appetite change, chills, diaphoresis, fatigue, fever and unexpected weight change.   HENT: Negative for facial swelling and nosebleeds.    Respiratory: Negative for cough, chest tightness and shortness of breath.    Cardiovascular: Negative for chest pain, palpitations and leg swelling.   Gastrointestinal: Negative for abdominal distention, abdominal pain, blood in stool, constipation, diarrhea, nausea and vomiting.   Musculoskeletal: Negative for neck pain and neck stiffness.   Skin: Negative for color change, pallor and rash.   Neurological: Negative for dizziness, tremors, syncope, weakness,  light-headedness and headaches.   Hematological: Negative for adenopathy. Does not bruise/bleed easily.   Psychiatric/Behavioral: Negative for agitation, behavioral problems, confusion and decreased concentration.     Vitals:    03/23/17 0739   BP: (!) 142/63   Pulse: 73   Temp: 97.4 °F (36.3 °C)       Physical Exam   Constitutional: She is oriented to person, place, and time. She appears well-developed and well-nourished. No distress.   HENT:   Head: Normocephalic and atraumatic.   Eyes: Conjunctivae are normal. Pupils are equal, round, and reactive to light. No scleral icterus.   Neck: Normal range of motion. Neck supple.   Cardiovascular: Normal rate.    Pulmonary/Chest: Effort normal.   Abdominal: She exhibits no distension.   Musculoskeletal: Normal range of motion.   Neurological: She is alert and oriented to person, place, and time. No cranial nerve deficit. She exhibits normal muscle tone. Coordination normal.   No asterixis   Skin: Skin is dry. No rash noted. She is not diaphoretic. No erythema. No pallor.   Psychiatric: She has a normal mood and affect. Her behavior is normal. Judgment and thought content normal.       Computed MELD-Na score unavailable. Necessary lab results were not found.  Computed MELD score unavailable. Necessary lab results were not found.    Lab Results   Component Value Date     10/28/2016    BUN 18 10/28/2016    CREATININE 0.9 10/28/2016    CALCIUM 9.7 10/28/2016     10/28/2016    K 4.5 10/28/2016     10/28/2016    PROT 7.1 10/28/2016    CO2 26 10/28/2016    ANIONGAP 11 10/28/2016    WBC 3.12 (L) 10/28/2016    RBC 4.34 10/28/2016    HGB 12.4 10/28/2016    HCT 37.4 10/28/2016    MCV 86 10/28/2016    MCH 28.6 10/28/2016    MCHC 33.2 10/28/2016     Lab Results   Component Value Date    RDW 16.0 (H) 10/28/2016    PLT 75 (L) 10/28/2016    MPV 9.6 10/28/2016    GRAN 1.5 (L) 10/28/2016    GRAN 49.1 10/28/2016    LYMPH 1.2 10/28/2016    LYMPH 37.5 10/28/2016    MONO 0.3  "10/28/2016    MONO 10.9 10/28/2016    EOSINOPHIL 2.2 10/28/2016    BASOPHIL 0.3 10/28/2016    EOS 0.1 10/28/2016    BASO 0.01 10/28/2016    APTT 24.9 09/26/2011    GROUPTRH A POS 09/26/2011    CHOL 157 08/01/2016    TRIG 239 (H) 08/01/2016    HDL 26 (L) 08/01/2016    CHOLHDL 16.6 (L) 08/01/2016    TOTALCHOLEST 6.0 (H) 08/01/2016    ALBUMIN 4.0 10/28/2016    AST 30 10/28/2016    ALT 23 10/28/2016    ALKPHOS 32 (L) 10/28/2016    LABPROT 10.9 09/26/2011    INR 1.0 09/26/2011       Assessment and Plan:  Plan discussed with Dr. Rajput  Polycystic liver disease: r/o cirrhosis in the setting of noted  "periesophageal varices" and "splenomegaly" on outside CT.  Results are confusing because later in the CT report it is stated that "the spleen is of normal size".  No measurements were provided and sometimes these findings are subjective  -images were reviewed in IR conference and per Dr. Rajput, there was no proof of portal hypertension  -liver enzymes are normal with normal liver function  -liver w/u serologies negative for underlying liver disease  -in polycystic hepatic disease MR elastography is preferred over fibroscan to assess for fibrosis but patient is extremely claustrophobic so we are unable to verify staging of liver fibrosis   -recommend Hep B vaccine, + immunity to hep A  Thrombocytopenia: recommend Hematology consult    Her polycystic liver disease can be monitored by her local provider.  No further follow up here with Hepatology is warranted.  We are happy to see patient if there are any new developments  Thank you for allowing me participate in this patient's care.        Patient Active Problem List   Diagnosis    Spinal stenosis, lumbar    DDD (degenerative disc disease), lumbar    Low back pain without sciatica    Lumbar radiculopathy    Neurogenic claudication    Hyperlipidemia    Obesity (BMI 30.0-34.9)    Thrombocytopenia    Type 2 diabetes mellitus, controlled    Chronic insomnia    Hereditary " and idiopathic peripheral neuropathy    Essential hypertension    Diabetes mellitus with neuropathy    Type 2 diabetes mellitus, uncontrolled    Encounter for diabetic foot exam    Hepatomegaly    Splenomegaly     Marine Mo is a 77 y.o. female withSplenomegaly; Hazel esophageal varices; and Thrombocytopenia

## 2017-03-23 NOTE — TELEPHONE ENCOUNTER
Marine Chepe  [id number]    Presenting Provider: Alejandrina Rajput    Presenting Radiologist: Carlos Henderson    Hepatologist: Marli Mcintosh    Indication for review:Polycystic L/K w/nausea    Blood Type: A  Diagnosis: Polycystic liver/kidney    Listing status: Hepatology clinic     Summary:    Additional Information:      Last IR Review:      Labs:  Lab Results for Liver Discussion Latest Ref Rng & Units 10/28/2016 10/24/2016 8/1/2016 1/21/2016 7/23/2015   Creatinine 0.5 - 1.4 mg/dL 0.9 - 0.9 0.9 0.8   Total Bilirubin 0.1 - 1.0 mg/dL 0.6 - 0.7 0.7 0.7   AST 10 - 40 U/L 30 - 36 32 36   ALT 10 - 44 U/L 23 - 31 31 34   Alk Phos 55 - 135 U/L 32(L) - 39(L) 40(L) 41(L)   Platelets 150 - 350 K/uL 75(L) 69(L) 54(L) 76(L) 73(L)   Albumin 3.5 - 5.2 g/dL 4.0 - 3.6 4.1 3.8   Sodium 136 - 145 mmol/L 143 - 139 140 142         Original Imaging:  CT 1/13/17 ([facility]):  Hepatomegaly with predominant left lobe.  Of the liver over the right with significant enlargement of the segment one with micronodular surface of the liver with diffuse liver cyst sign of vent with calcification of the wall on protal hypertension with splenomegaly on significant dilation of the umbilical vein with caput medusa at the level of the umbilicus with esophageal varices consistent with cirrhosis with portal hypertension.        The kidneys are normal size, morphology and excretion.  Numerous bilateral reman cysts are noted the largest is in the lower pole of the left kidney and measuring approximately 2 cm.  7 mm calcified ston is noted in the inferior calices of the lower pole of the left kidney apparently without evidence of hydronephrosis in this limited examination.      Current Imaging:    Interventions:  Neymar:  Date:   Number and size of lesions:  Characteristics:   Nexavar:  N/A    Discussion:  No significant varices.  No PV compression; slightly increased PV at 1.53 cm  Plan:  No additional recommendations.  Surveillance per MD.

## 2017-05-09 ENCOUNTER — PATIENT OUTREACH (OUTPATIENT)
Dept: ADMINISTRATIVE | Facility: HOSPITAL | Age: 77
End: 2017-05-09

## 2017-05-09 NOTE — LETTER
May 9, 2017    Marine Mo  [address] [place] [address] [address] [id number]             [facility]  [address]  [place] [address]  Phone: [phone] Dear Mrs. Mo:    [facility] is committed to your overall health.  To help you get the most out of each of your visits, we will review your information to make sure you are up to date on all of your recommended tests and/or procedures.      Dr. Hwang has found that you may be due for a pneumonia vaccine, a shingles vaccine,a colonoscopy, an annual diabetic eye exam,annual blood test for diabetes, and a dexa scan for osteoporosis.     If you have had any of the above done at another facility, please bring the records or information with you so that your record at [facility] will be complete.  If you would like to schedule any of these, please contact me.    If you are currently taking medication, please bring it with you to your appointment for review.    Also, if you have any type of Advanced Directives, please bring them with you to your office visit so we may scan them into your chart.        If you have any questions or concerns, please don't hesitate to call.    Sincerely,        Judith Cortez LPN Clinical Care Coordinator  [facility]  [phone]

## 2017-05-15 ENCOUNTER — CLINICAL SUPPORT (OUTPATIENT)
Dept: INTERNAL MEDICINE | Facility: CLINIC | Age: 77
End: 2017-05-15
Payer: MEDICARE

## 2017-05-15 DIAGNOSIS — E11.9 CONTROLLED TYPE 2 DIABETES MELLITUS WITHOUT COMPLICATION, WITHOUT LONG-TERM CURRENT USE OF INSULIN: ICD-10-CM

## 2017-05-15 DIAGNOSIS — I10 ESSENTIAL HYPERTENSION: ICD-10-CM

## 2017-05-15 LAB
ALBUMIN SERPL BCP-MCNC: 3.8 G/DL
ALP SERPL-CCNC: 28 U/L
ALT SERPL W/O P-5'-P-CCNC: 21 U/L
ANION GAP SERPL CALC-SCNC: 8 MMOL/L
AST SERPL-CCNC: 24 U/L
BASOPHILS # BLD AUTO: 0.01 K/UL
BASOPHILS NFR BLD: 0.4 %
BILIRUB SERPL-MCNC: 0.7 MG/DL
BUN SERPL-MCNC: 17 MG/DL
CALCIUM SERPL-MCNC: 9.4 MG/DL
CHLORIDE SERPL-SCNC: 108 MMOL/L
CHOLEST/HDLC SERPL: 5.3 {RATIO}
CO2 SERPL-SCNC: 25 MMOL/L
CREAT SERPL-MCNC: 0.8 MG/DL
CREAT UR-MCNC: 101.3 MG/DL
DIFFERENTIAL METHOD: ABNORMAL
EOSINOPHIL # BLD AUTO: 0.1 K/UL
EOSINOPHIL NFR BLD: 3.3 %
ERYTHROCYTE [DISTWIDTH] IN BLOOD BY AUTOMATED COUNT: 16.2 %
EST. GFR  (AFRICAN AMERICAN): >60 ML/MIN/1.73 M^2
EST. GFR  (NON AFRICAN AMERICAN): >60 ML/MIN/1.73 M^2
GLUCOSE SERPL-MCNC: 122 MG/DL
HCT VFR BLD AUTO: 38 %
HDL/CHOLESTEROL RATIO: 18.9 %
HDLC SERPL-MCNC: 190 MG/DL
HDLC SERPL-MCNC: 36 MG/DL
HGB BLD-MCNC: 12.5 G/DL
LDLC SERPL CALC-MCNC: 116.2 MG/DL
LYMPHOCYTES # BLD AUTO: 0.9 K/UL
LYMPHOCYTES NFR BLD: 33 %
MCH RBC QN AUTO: 28.3 PG
MCHC RBC AUTO-ENTMCNC: 32.9 %
MCV RBC AUTO: 86 FL
MICROALBUMIN UR DL<=1MG/L-MCNC: 8 UG/ML
MICROALBUMIN/CREATININE RATIO: 7.9 UG/MG
MONOCYTES # BLD AUTO: 0.2 K/UL
MONOCYTES NFR BLD: 7.8 %
NEUTROPHILS # BLD AUTO: 1.5 K/UL
NEUTROPHILS NFR BLD: 55.5 %
NONHDLC SERPL-MCNC: 154 MG/DL
PLATELET # BLD AUTO: 79 K/UL
PMV BLD AUTO: 10.2 FL
POTASSIUM SERPL-SCNC: 4.2 MMOL/L
PROT SERPL-MCNC: 7.3 G/DL
RBC # BLD AUTO: 4.42 M/UL
SODIUM SERPL-SCNC: 141 MMOL/L
TRIGL SERPL-MCNC: 189 MG/DL
TSH SERPL DL<=0.005 MIU/L-ACNC: 1.45 UIU/ML
WBC # BLD AUTO: 2.7 K/UL

## 2017-05-15 PROCEDURE — 84443 ASSAY THYROID STIM HORMONE: CPT

## 2017-05-15 PROCEDURE — 85025 COMPLETE CBC W/AUTO DIFF WBC: CPT

## 2017-05-15 PROCEDURE — 82570 ASSAY OF URINE CREATININE: CPT

## 2017-05-15 PROCEDURE — 83036 HEMOGLOBIN GLYCOSYLATED A1C: CPT

## 2017-05-15 PROCEDURE — 36415 COLL VENOUS BLD VENIPUNCTURE: CPT | Mod: S$GLB,,, | Performed by: INTERNAL MEDICINE

## 2017-05-15 PROCEDURE — 80053 COMPREHEN METABOLIC PANEL: CPT

## 2017-05-15 PROCEDURE — 80061 LIPID PANEL: CPT

## 2017-05-16 LAB
ESTIMATED AVG GLUCOSE: 108 MG/DL
HBA1C MFR BLD HPLC: 5.4 %

## 2017-05-22 ENCOUNTER — OFFICE VISIT (OUTPATIENT)
Dept: INTERNAL MEDICINE | Facility: CLINIC | Age: 77
End: 2017-05-22
Payer: MEDICARE

## 2017-05-22 VITALS
HEIGHT: 64 IN | BODY MASS INDEX: 28.86 KG/M2 | RESPIRATION RATE: 16 BRPM | HEART RATE: 64 BPM | DIASTOLIC BLOOD PRESSURE: 58 MMHG | SYSTOLIC BLOOD PRESSURE: 110 MMHG | WEIGHT: 169.06 LBS

## 2017-05-22 DIAGNOSIS — M54.16 LUMBAR RADICULOPATHY: ICD-10-CM

## 2017-05-22 DIAGNOSIS — E08.40 DIABETES MELLITUS DUE TO UNDERLYING CONDITION WITH DIABETIC NEUROPATHY, WITHOUT LONG-TERM CURRENT USE OF INSULIN: Primary | ICD-10-CM

## 2017-05-22 DIAGNOSIS — E11.9 CONTROLLED TYPE 2 DIABETES MELLITUS WITHOUT COMPLICATION, WITHOUT LONG-TERM CURRENT USE OF INSULIN: ICD-10-CM

## 2017-05-22 DIAGNOSIS — R16.1 SPLENOMEGALY: ICD-10-CM

## 2017-05-22 DIAGNOSIS — I10 ESSENTIAL HYPERTENSION: ICD-10-CM

## 2017-05-22 DIAGNOSIS — D69.6 THROMBOCYTOPENIA: ICD-10-CM

## 2017-05-22 PROCEDURE — 1126F AMNT PAIN NOTED NONE PRSNT: CPT | Mod: S$GLB,,, | Performed by: INTERNAL MEDICINE

## 2017-05-22 PROCEDURE — 99214 OFFICE O/P EST MOD 30 MIN: CPT | Mod: S$GLB,,, | Performed by: INTERNAL MEDICINE

## 2017-05-22 PROCEDURE — 3074F SYST BP LT 130 MM HG: CPT | Mod: S$GLB,,, | Performed by: INTERNAL MEDICINE

## 2017-05-22 PROCEDURE — 99499 UNLISTED E&M SERVICE: CPT | Mod: S$GLB,,, | Performed by: INTERNAL MEDICINE

## 2017-05-22 PROCEDURE — 1160F RVW MEDS BY RX/DR IN RCRD: CPT | Mod: S$GLB,,, | Performed by: INTERNAL MEDICINE

## 2017-05-22 PROCEDURE — 3078F DIAST BP <80 MM HG: CPT | Mod: S$GLB,,, | Performed by: INTERNAL MEDICINE

## 2017-05-22 PROCEDURE — 1159F MED LIST DOCD IN RCRD: CPT | Mod: S$GLB,,, | Performed by: INTERNAL MEDICINE

## 2017-05-22 PROCEDURE — 99999 PR PBB SHADOW E&M-EST. PATIENT-LVL III: CPT | Mod: PBBFAC,,, | Performed by: INTERNAL MEDICINE

## 2017-05-22 RX ORDER — BACLOFEN 10 MG/1
20 TABLET ORAL 2 TIMES DAILY
COMMUNITY
End: 2017-08-28

## 2017-05-22 RX ORDER — HYDROCODONE BITARTRATE AND ACETAMINOPHEN 5; 325 MG/1; MG/1
1 TABLET ORAL EVERY 6 HOURS PRN
Qty: 30 TABLET | Refills: 0 | Status: SHIPPED | OUTPATIENT
Start: 2017-05-22 | End: 2017-12-20

## 2017-05-22 NOTE — PROGRESS NOTES
Subjective:       Patient ID: Marine Mo is a 77 y.o. female.    Chief Complaint: 6 month checkup; Hypertension; and Diabetes    Marine Mo is a 76.o. female who presents for Type II DM, Hypertension, and Hyperlipidemia follow up. Labs were reviewed with patient today.  Interval history; seen by dr mann ; egd, colonoscopy ; also seen hepatologist for liver cyst disease and r/o cirrhosis         Hypertension   This is a chronic problem. The current episode started more than 1 year ago. The problem has been waxing and waning since onset. The problem is controlled. Associated symptoms include anxiety. Pertinent negatives include no chest pain, neck pain, palpitations or shortness of breath. Risk factors for coronary artery disease include sedentary lifestyle, obesity and dyslipidemia. Past treatments include beta blockers, lifestyle changes and diuretics. The current treatment provides moderate improvement.   Diabetes   She presents for her follow-up diabetic visit. She has type 2 diabetes mellitus. Her disease course has been stable. Hypoglycemia symptoms include nervousness/anxiousness. Pertinent negatives for hypoglycemia include no confusion, dizziness or pallor. Pertinent negatives for diabetes include no chest pain. Diabetic complications include peripheral neuropathy. Current diabetic treatment includes diet. An ACE inhibitor/angiotensin II receptor blocker is not being taken.   Back Pain   Pertinent negatives include no chest pain, dysuria, fever or numbness.   Anxiety   Symptoms include nervous/anxious behavior. Patient reports no chest pain, confusion, dizziness, nausea, palpitations, shortness of breath or suicidal ideas.       Hyperlipidemia   This is a chronic problem. The current episode started more than 1 year ago. The problem is controlled. Recent lipid tests were reviewed and are low. Exacerbating diseases include obesity. Pertinent negatives include no chest pain, myalgias or shortness of  breath. She is currently on no antihyperlipidemic treatment. The current treatment provides mild improvement of lipids. Compliance problems include adherence to diet.    Medication Refill   Pertinent negatives include no arthralgias, chest pain, chills, congestion, coughing, fever, myalgias, nausea, neck pain, numbness, sore throat or vomiting.     Review of Systems   Constitutional: Negative for chills and fever.   HENT: Negative for congestion, hearing loss, sinus pressure and sore throat.    Eyes: Negative for photophobia.   Respiratory: Negative for cough, choking, chest tightness, shortness of breath and wheezing.    Cardiovascular: Negative for chest pain and palpitations.   Gastrointestinal: Negative for blood in stool, nausea and vomiting.   Genitourinary: Negative for dysuria and hematuria.   Musculoskeletal: Negative for arthralgias, myalgias and neck pain.        Seeing dr gunderson ; getting MRI    Skin: Negative for pallor.   Neurological: Negative for dizziness and numbness.   Hematological: Does not bruise/bleed easily.   Psychiatric/Behavioral: Positive for sleep disturbance. Negative for confusion and suicidal ideas. The patient is nervous/anxious.        Objective:      Physical Exam   Constitutional: She is oriented to person, place, and time. She appears well-developed and well-nourished.   HENT:   Head: Normocephalic and atraumatic.   Right Ear: External ear normal.   Left Ear: External ear normal.   Mouth/Throat: Oropharynx is clear and moist.   Eyes: Conjunctivae and EOM are normal. Pupils are equal, round, and reactive to light.   Neck: Normal range of motion. Neck supple. No JVD present. No tracheal deviation present. No thyromegaly present.   Cardiovascular: Normal rate, regular rhythm, normal heart sounds and intact distal pulses.    Pulmonary/Chest: Effort normal and breath sounds normal. No respiratory distress. She has no wheezes. She has no rales. She exhibits no tenderness.   Abdominal:  Soft. Bowel sounds are normal. She exhibits no distension and no mass. There is no tenderness. There is no rebound and no guarding.   Musculoskeletal: Normal range of motion. She exhibits no edema.   Lymphadenopathy:     She has no cervical adenopathy.   Neurological: She is alert and oriented to person, place, and time. She has normal reflexes. No cranial nerve deficit. She exhibits normal muscle tone. Coordination normal.   Gait normal.   Skin: Skin is warm and dry.   Psychiatric: She has a normal mood and affect.   Nursing note and vitals reviewed.      Assessment:       1. Diabetes mellitus due to underlying condition with diabetic neuropathy, without long-term current use of insulin    2. Essential hypertension    3. Controlled type 2 diabetes mellitus without complication, without long-term current use of insulin    4. Thrombocytopenia    5. Splenomegaly    6. Lumbar radiculopathy        Plan:   Marine was seen today for 6 month checkup, hypertension and diabetes.    Diagnoses and all orders for this visit:    Diabetes mellitus due to underlying condition with diabetic neuropathy, without long-term current use of insulin  -     Microalbumin/creatinine urine ratio; Future  -     Hemoglobin A1c; Future  Patient has controlled Diabetes .  We discussed about diet ;low in calories. Avoid sweats, sodas.  Also increasing activity;walking 2-3 miles a day.  I also adjusted medications and gave patient  instructions about adherence to plan.  Goal of  A1c  less than 7 % stressed.  Also goal of LDL less than 70 highlighted to patient.    Essential hypertension  -     CBC auto differential; Future  -     Comprehensive metabolic panel; Future  -     TSH; Future    Well controlled.  Continue same medication and dose.  1. Keep weight close to ideal body weight.   2.   Avoid high salt foods (olives, pickles, smoked meats, salted potato chips, etc.).   Do not add salt to your food at the table.   Use only small amounts of  "salt when cooking.   3. Begin an exercise program. Discuss with your doctor what type of exercise program would be best for you. It doesn't have to be difficult. Even brisk walking for 20 minutes three times a week is a good form of exercise.   4. Avoid medicines which contain heart stimulants. This includes many cold and sinus decongestant pills and sprays as well as diet pills. Check the warnings about hypertension on the label. Stimulants such as amphetamine or cocaine could be lethal for someone with hypertension. Never take these.    Controlled type 2 diabetes mellitus without complication, without long-term current use of insulin  -     Lipid panel; Future    Thrombocytopenia  -     CBC auto differential; Future  Will continue to monitor  i told her to see a hematologist   She declined;  " I am tired of seeing so many doctors "    Splenomegaly  -     CBC auto differential; Future      Lumbar radiculopathy  -     hydrocodone-acetaminophen 5-325mg (NORCO) 5-325 mg per tablet; Take 1 tablet by mouth every 6 (six) hours as needed for Pain.  Dispense: 30 tablet; Refill: 0      "

## 2017-05-30 ENCOUNTER — HOSPITAL ENCOUNTER (OUTPATIENT)
Dept: RADIOLOGY | Facility: HOSPITAL | Age: 77
Discharge: HOME | End: 2017-05-30
Attending: ANESTHESIOLOGY
Payer: MEDICARE

## 2017-05-30 DIAGNOSIS — M96.1 POSTLAMINECTOMY SYNDROME, THORACIC REGION: ICD-10-CM

## 2017-05-30 DIAGNOSIS — M51.9 UNSPECIFIED THORACIC, THORACOLUMBAR AND LUMBOSACRAL INTERVERTEBRAL DISC DISORDER: ICD-10-CM

## 2017-05-30 PROCEDURE — 25500020 PHARM REV CODE 255: Performed by: ANESTHESIOLOGY

## 2017-05-30 PROCEDURE — 72158 MRI LUMBAR SPINE W/O & W/DYE: CPT | Mod: TC

## 2017-05-30 PROCEDURE — A9585 GADOBUTROL INJECTION: HCPCS | Performed by: ANESTHESIOLOGY

## 2017-05-30 PROCEDURE — 72158 MRI LUMBAR SPINE W/O & W/DYE: CPT | Mod: 26,,, | Performed by: RADIOLOGY

## 2017-05-30 RX ORDER — GADOBUTROL 604.72 MG/ML
8 INJECTION INTRAVENOUS
Status: COMPLETED | OUTPATIENT
Start: 2017-05-30 | End: 2017-05-30

## 2017-05-30 RX ADMIN — GADOBUTROL 8 ML: 604.72 INJECTION INTRAVENOUS at 01:05

## 2017-06-07 ENCOUNTER — TELEPHONE (OUTPATIENT)
Dept: INTERNAL MEDICINE | Facility: CLINIC | Age: 77
End: 2017-06-07

## 2017-06-07 DIAGNOSIS — L30.9 DERMATITIS: ICD-10-CM

## 2017-06-07 RX ORDER — CLOBETASOL PROPIONATE 0.5 MG/G
CREAM TOPICAL
Qty: 60 G | Refills: 0 | Status: SHIPPED | OUTPATIENT
Start: 2017-06-07 | End: 2018-02-27

## 2017-07-10 RX ORDER — METFORMIN HYDROCHLORIDE 500 MG/1
TABLET ORAL
Qty: 180 TABLET | Refills: 3 | Status: SHIPPED | OUTPATIENT
Start: 2017-07-10 | End: 2018-02-22 | Stop reason: SDUPTHER

## 2017-07-25 DIAGNOSIS — F51.04 CHRONIC INSOMNIA: ICD-10-CM

## 2017-07-25 RX ORDER — ALPRAZOLAM 0.5 MG/1
0.5 TABLET ORAL NIGHTLY PRN
Qty: 30 TABLET | Refills: 3 | Status: SHIPPED | OUTPATIENT
Start: 2017-07-25 | End: 2017-10-02 | Stop reason: SDUPTHER

## 2017-07-26 ENCOUNTER — TELEPHONE (OUTPATIENT)
Dept: INTERNAL MEDICINE | Facility: CLINIC | Age: 77
End: 2017-07-26

## 2017-07-26 RX ORDER — TRIAMCINOLONE ACETONIDE 1 MG/G
CREAM TOPICAL 2 TIMES DAILY
Qty: 15 G | Refills: 0 | Status: SHIPPED | OUTPATIENT
Start: 2017-07-26 | End: 2018-04-30

## 2017-07-26 NOTE — TELEPHONE ENCOUNTER
----- Message from Jaqueline Huerta sent at 2017  8:52 AM CDT -----  Contact: self  Marine Mo  MRN: [id number]  : 1940  PCP: Serg Hwang  Home Phone      [phone]  Work Phone      [phone]  [employer]          [phone]      MESSAGE: refill [facility]------triamcinolone------[id number]

## 2017-07-26 NOTE — TELEPHONE ENCOUNTER
Patient requesting refill on triamcinolone cream due to clobetasol is too expensive. Please advise. Thanks.

## 2017-08-28 ENCOUNTER — OFFICE VISIT (OUTPATIENT)
Dept: INTERNAL MEDICINE | Facility: CLINIC | Age: 77
End: 2017-08-28
Payer: MEDICARE

## 2017-08-28 VITALS
DIASTOLIC BLOOD PRESSURE: 70 MMHG | RESPIRATION RATE: 18 BRPM | OXYGEN SATURATION: 96 % | HEIGHT: 64 IN | SYSTOLIC BLOOD PRESSURE: 118 MMHG | HEART RATE: 85 BPM | WEIGHT: 175.5 LBS | BODY MASS INDEX: 29.96 KG/M2 | TEMPERATURE: 99 F

## 2017-08-28 DIAGNOSIS — E11.9 CONTROLLED TYPE 2 DIABETES MELLITUS WITHOUT COMPLICATION, WITHOUT LONG-TERM CURRENT USE OF INSULIN: Primary | ICD-10-CM

## 2017-08-28 DIAGNOSIS — J20.9 ACUTE BRONCHITIS, UNSPECIFIED ORGANISM: ICD-10-CM

## 2017-08-28 PROCEDURE — 3078F DIAST BP <80 MM HG: CPT | Mod: S$GLB,,, | Performed by: NURSE PRACTITIONER

## 2017-08-28 PROCEDURE — 99213 OFFICE O/P EST LOW 20 MIN: CPT | Mod: 25,S$GLB,, | Performed by: NURSE PRACTITIONER

## 2017-08-28 PROCEDURE — 1126F AMNT PAIN NOTED NONE PRSNT: CPT | Mod: S$GLB,,, | Performed by: NURSE PRACTITIONER

## 2017-08-28 PROCEDURE — 3008F BODY MASS INDEX DOCD: CPT | Mod: S$GLB,,, | Performed by: NURSE PRACTITIONER

## 2017-08-28 PROCEDURE — 3074F SYST BP LT 130 MM HG: CPT | Mod: S$GLB,,, | Performed by: NURSE PRACTITIONER

## 2017-08-28 PROCEDURE — 1159F MED LIST DOCD IN RCRD: CPT | Mod: S$GLB,,, | Performed by: NURSE PRACTITIONER

## 2017-08-28 PROCEDURE — 99499 UNLISTED E&M SERVICE: CPT | Mod: S$GLB,,, | Performed by: NURSE PRACTITIONER

## 2017-08-28 PROCEDURE — 96372 THER/PROPH/DIAG INJ SC/IM: CPT | Mod: S$GLB,,, | Performed by: NURSE PRACTITIONER

## 2017-08-28 PROCEDURE — 99999 PR PBB SHADOW E&M-EST. PATIENT-LVL IV: CPT | Mod: PBBFAC,,, | Performed by: NURSE PRACTITIONER

## 2017-08-28 RX ORDER — METHYLPREDNISOLONE ACETATE 80 MG/ML
80 INJECTION, SUSPENSION INTRA-ARTICULAR; INTRALESIONAL; INTRAMUSCULAR; SOFT TISSUE
Status: COMPLETED | OUTPATIENT
Start: 2017-08-28 | End: 2017-08-28

## 2017-08-28 RX ADMIN — METHYLPREDNISOLONE ACETATE 80 MG: 80 INJECTION, SUSPENSION INTRA-ARTICULAR; INTRALESIONAL; INTRAMUSCULAR; SOFT TISSUE at 01:08

## 2017-08-28 NOTE — PROGRESS NOTES
Subjective:       Patient ID: Marine Mo is a 77 y.o. female.    Chief Complaint: Cough    HPI: pt known to me presents with c/o coughing, congestion, hoarse voice x 2-3 days. Reports  was sick and got her sick. Running low grade fever. All clear d/c. Feels congestion in her chest, but struggling to get up.     Review of Systems   Constitutional: Positive for fatigue and fever (low grade). Negative for chills and diaphoresis.   HENT: Positive for congestion, postnasal drip, rhinorrhea and sneezing. Negative for sinus pressure.    Eyes: Negative for visual disturbance.   Respiratory: Positive for cough. Negative for shortness of breath and wheezing.    Cardiovascular: Negative for chest pain.   Gastrointestinal: Negative for abdominal distention, abdominal pain, constipation, diarrhea, nausea and vomiting.   Musculoskeletal: Negative for arthralgias, back pain and myalgias.   Neurological: Negative for headaches.   Psychiatric/Behavioral: Negative for sleep disturbance.       Objective:      Physical Exam   Constitutional: She is oriented to person, place, and time. She appears well-developed and well-nourished.   HENT:   Head: Normocephalic and atraumatic.   Right Ear: Hearing, tympanic membrane, external ear and ear canal normal.   Left Ear: Hearing, tympanic membrane, external ear and ear canal normal.   Nose: Mucosal edema and rhinorrhea present. Right sinus exhibits no maxillary sinus tenderness and no frontal sinus tenderness. Left sinus exhibits frontal sinus tenderness. Left sinus exhibits no maxillary sinus tenderness.   Mouth/Throat: Uvula is midline and mucous membranes are normal. Oropharyngeal exudate present.   Neck: No JVD present.   Cardiovascular: Normal rate, regular rhythm and normal heart sounds.    Pulmonary/Chest: Effort normal and breath sounds normal.   Rhonchi to right lower lobe that cleared with coughing.   Abdominal: Soft. Bowel sounds are normal. There is no tenderness.    Neurological: She is alert and oriented to person, place, and time.   Skin: Skin is warm and dry.   Psychiatric: She has a normal mood and affect.       Assessment:       1. Controlled type 2 diabetes mellitus without complication, without long-term current use of insulin    2. Acute bronchitis, unspecified organism        1. Controlled type 2 diabetes mellitus without complication, without long-term current use of insulin  Well controlled.     2. Acute bronchitis, unspecified organism  No need for a/b. Will give shot. Has tessalon perles at home. Rec mucinex.   - methylPREDNISolone acetate injection 80 mg; Inject 1 mL (80 mg total) into the muscle one time.

## 2017-09-05 DIAGNOSIS — I10 ESSENTIAL HYPERTENSION: ICD-10-CM

## 2017-09-05 RX ORDER — METOPROLOL TARTRATE 100 MG/1
TABLET ORAL
Qty: 90 TABLET | Refills: 2 | Status: SHIPPED | OUTPATIENT
Start: 2017-09-05 | End: 2018-05-04 | Stop reason: SDUPTHER

## 2017-10-02 ENCOUNTER — LAB VISIT (OUTPATIENT)
Dept: LAB | Facility: HOSPITAL | Age: 77
End: 2017-10-02
Attending: INTERNAL MEDICINE
Payer: MEDICARE

## 2017-10-02 ENCOUNTER — OFFICE VISIT (OUTPATIENT)
Dept: INTERNAL MEDICINE | Facility: CLINIC | Age: 77
End: 2017-10-02
Payer: MEDICARE

## 2017-10-02 VITALS
WEIGHT: 173.5 LBS | SYSTOLIC BLOOD PRESSURE: 122 MMHG | OXYGEN SATURATION: 95 % | RESPIRATION RATE: 14 BRPM | HEART RATE: 74 BPM | HEIGHT: 64 IN | DIASTOLIC BLOOD PRESSURE: 68 MMHG | BODY MASS INDEX: 29.62 KG/M2

## 2017-10-02 DIAGNOSIS — K92.1 MELENA: Primary | ICD-10-CM

## 2017-10-02 DIAGNOSIS — F51.04 CHRONIC INSOMNIA: ICD-10-CM

## 2017-10-02 DIAGNOSIS — L03.113 CELLULITIS OF RIGHT FOREARM: ICD-10-CM

## 2017-10-02 DIAGNOSIS — K92.1 MELENA: ICD-10-CM

## 2017-10-02 LAB
BASOPHILS # BLD AUTO: 0 K/UL
BASOPHILS NFR BLD: 0 %
DIFFERENTIAL METHOD: ABNORMAL
EOSINOPHIL # BLD AUTO: 0.1 K/UL
EOSINOPHIL NFR BLD: 2.1 %
ERYTHROCYTE [DISTWIDTH] IN BLOOD BY AUTOMATED COUNT: 15.6 %
HCT VFR BLD AUTO: 36.4 %
HGB BLD-MCNC: 11.6 G/DL
LYMPHOCYTES # BLD AUTO: 1 K/UL
LYMPHOCYTES NFR BLD: 31.2 %
MCH RBC QN AUTO: 27.9 PG
MCHC RBC AUTO-ENTMCNC: 31.9 G/DL
MCV RBC AUTO: 88 FL
MONOCYTES # BLD AUTO: 0.3 K/UL
MONOCYTES NFR BLD: 10.1 %
NEUTROPHILS # BLD AUTO: 1.9 K/UL
NEUTROPHILS NFR BLD: 56.6 %
PLATELET # BLD AUTO: 77 K/UL
PMV BLD AUTO: 9.3 FL
RBC # BLD AUTO: 4.16 M/UL
WBC # BLD AUTO: 3.27 K/UL

## 2017-10-02 PROCEDURE — 3078F DIAST BP <80 MM HG: CPT | Mod: S$GLB,,, | Performed by: INTERNAL MEDICINE

## 2017-10-02 PROCEDURE — 85025 COMPLETE CBC W/AUTO DIFF WBC: CPT

## 2017-10-02 PROCEDURE — 99999 PR PBB SHADOW E&M-EST. PATIENT-LVL III: CPT | Mod: PBBFAC,,, | Performed by: INTERNAL MEDICINE

## 2017-10-02 PROCEDURE — 3008F BODY MASS INDEX DOCD: CPT | Mod: S$GLB,,, | Performed by: INTERNAL MEDICINE

## 2017-10-02 PROCEDURE — 1159F MED LIST DOCD IN RCRD: CPT | Mod: S$GLB,,, | Performed by: INTERNAL MEDICINE

## 2017-10-02 PROCEDURE — 36415 COLL VENOUS BLD VENIPUNCTURE: CPT

## 2017-10-02 PROCEDURE — 3074F SYST BP LT 130 MM HG: CPT | Mod: S$GLB,,, | Performed by: INTERNAL MEDICINE

## 2017-10-02 PROCEDURE — 99214 OFFICE O/P EST MOD 30 MIN: CPT | Mod: S$GLB,,, | Performed by: INTERNAL MEDICINE

## 2017-10-02 RX ORDER — CEPHALEXIN 500 MG/1
500 CAPSULE ORAL 3 TIMES DAILY
Qty: 30 CAPSULE | Refills: 0 | Status: SHIPPED | OUTPATIENT
Start: 2017-10-02 | End: 2017-10-12

## 2017-10-02 RX ORDER — ALPRAZOLAM 0.5 MG/1
0.5 TABLET ORAL NIGHTLY PRN
Qty: 30 TABLET | Refills: 3 | Status: SHIPPED | OUTPATIENT
Start: 2017-10-02 | End: 2018-06-10 | Stop reason: SDUPTHER

## 2017-10-02 NOTE — PROGRESS NOTES
Subjective:       Patient ID: Marine Mo is a 77 y.o. female.    Chief Complaint: Cellulitis (right arm) and Rectal Bleeding    Marine Mo is a 77 y.o. female  Here for R forearm cellulitis.  Started about 1 week ago with redness; no trauma; ni insect bite .  Now area is swollen ;tender and red.  No fevers .chills.      C/o passing black stools   For months .        Review of Systems   Constitutional: Negative for chills and diaphoresis. Fever: low grade.   HENT: Negative for sinus pressure.    Eyes: Negative for visual disturbance.   Respiratory: Negative for shortness of breath and wheezing.    Cardiovascular: Negative for chest pain.   Gastrointestinal: Negative for abdominal distention, abdominal pain, constipation, diarrhea, nausea and vomiting.        Melena     Musculoskeletal: Negative for arthralgias, back pain and myalgias.   Neurological: Negative for headaches.   Psychiatric/Behavioral: Negative for sleep disturbance.       Objective:      Physical Exam   Constitutional: She is oriented to person, place, and time. She appears well-developed and well-nourished.   HENT:   Head: Normocephalic and atraumatic.   Right Ear: Hearing, tympanic membrane, external ear and ear canal normal.   Left Ear: Hearing, tympanic membrane, external ear and ear canal normal.   Nose: Mucosal edema and rhinorrhea present. Right sinus exhibits no maxillary sinus tenderness and no frontal sinus tenderness. Left sinus exhibits frontal sinus tenderness. Left sinus exhibits no maxillary sinus tenderness.   Mouth/Throat: Uvula is midline and mucous membranes are normal. Oropharyngeal exudate present.   Neck: No JVD present.   Cardiovascular: Normal rate, regular rhythm and normal heart sounds.    Pulmonary/Chest: Effort normal and breath sounds normal.   Rhonchi to right lower lobe that cleared with coughing.   Abdominal: Soft. Bowel sounds are normal. There is no tenderness.   Neurological: She is alert and oriented to  person, place, and time.   Skin: Skin is warm and dry. Rash noted. There is erythema.        1.5 inches ; circular area of hyperemia ; R forearm outer around elbow; warm and tender to touch ; firm    Psychiatric: She has a normal mood and affect.       Assessment:       1. Melena    2. Cellulitis of right forearm        Plan:   Marine was seen today for cellulitis and rectal bleeding.    Diagnoses and all orders for this visit:    Melena  -     Occult blood x 1, stool; Future  -     Occult blood x 1, stool; Future  -     Occult blood x 1, stool; Future  -     CBC auto differential; Future    Cellulitis of right forearm  -     cephALEXin (KEFLEX) 500 MG capsule; Take 1 capsule (500 mg total) by mouth 3 (three) times daily.    Chronic insomnia  -     alprazolam (XANAX) 0.5 MG tablet; Take 1 tablet (0.5 mg total) by mouth nightly as needed for Anxiety.

## 2017-10-03 PROCEDURE — 82272 OCCULT BLD FECES 1-3 TESTS: CPT | Mod: 91

## 2017-10-05 ENCOUNTER — LAB VISIT (OUTPATIENT)
Dept: LAB | Facility: HOSPITAL | Age: 77
End: 2017-10-05
Attending: INTERNAL MEDICINE
Payer: MEDICARE

## 2017-10-05 ENCOUNTER — TELEPHONE (OUTPATIENT)
Dept: INTERNAL MEDICINE | Facility: CLINIC | Age: 77
End: 2017-10-05

## 2017-10-05 DIAGNOSIS — D64.9 ANEMIA, UNSPECIFIED TYPE: ICD-10-CM

## 2017-10-05 DIAGNOSIS — K92.1 BLOOD IN STOOL: Primary | ICD-10-CM

## 2017-10-05 DIAGNOSIS — K92.1 MELENA: ICD-10-CM

## 2017-10-05 LAB
OB PNL STL: POSITIVE

## 2017-10-05 PROCEDURE — 82272 OCCULT BLD FECES 1-3 TESTS: CPT

## 2017-10-05 NOTE — TELEPHONE ENCOUNTER
Blood in Stool is seen on all stools   Needs to see GI ; may need egd and colonoscopy   ;referral done ;please schedule her

## 2017-10-09 ENCOUNTER — OFFICE VISIT (OUTPATIENT)
Dept: INTERNAL MEDICINE | Facility: CLINIC | Age: 77
End: 2017-10-09
Payer: MEDICARE

## 2017-10-09 VITALS
HEART RATE: 76 BPM | HEIGHT: 64 IN | WEIGHT: 174.38 LBS | SYSTOLIC BLOOD PRESSURE: 126 MMHG | BODY MASS INDEX: 29.77 KG/M2 | RESPIRATION RATE: 14 BRPM | OXYGEN SATURATION: 92 % | DIASTOLIC BLOOD PRESSURE: 68 MMHG

## 2017-10-09 DIAGNOSIS — L03.113 RIGHT ARM CELLULITIS: Primary | ICD-10-CM

## 2017-10-09 DIAGNOSIS — K92.1 MELENA: ICD-10-CM

## 2017-10-09 PROCEDURE — 99212 OFFICE O/P EST SF 10 MIN: CPT | Mod: S$GLB,,, | Performed by: INTERNAL MEDICINE

## 2017-10-09 PROCEDURE — 99999 PR PBB SHADOW E&M-EST. PATIENT-LVL III: CPT | Mod: PBBFAC,,, | Performed by: INTERNAL MEDICINE

## 2017-10-09 NOTE — PROGRESS NOTES
Subjective:       Patient ID: Marine Mo is a 77 y.o. female.    Chief Complaint: Melena (1 week follow up)    Marine Mo is a 77 y.o. female  Here for follow up for arm cellulitis   And melena.  She reports melena stopped . She has seen Dr Hernandez and had work up .    She reports stools are back to normal .          Review of Systems   Constitutional: Negative for chills and diaphoresis. Fever: low grade.   HENT: Negative for sinus pressure.    Eyes: Negative for visual disturbance.   Respiratory: Negative for shortness of breath and wheezing.    Cardiovascular: Negative for chest pain.   Gastrointestinal: Positive for blood in stool. Negative for abdominal distention, abdominal pain, constipation, diarrhea, nausea and vomiting.        Melena resolved ; she is scheduled to see DR Fam.     Musculoskeletal: Negative for arthralgias, back pain and myalgias.   Neurological: Negative for headaches.   Psychiatric/Behavioral: Negative for sleep disturbance.       Objective:      Physical Exam   Constitutional: She is oriented to person, place, and time. She appears well-developed and well-nourished.   HENT:   Head: Normocephalic and atraumatic.   Right Ear: Hearing, tympanic membrane, external ear and ear canal normal.   Left Ear: Hearing, tympanic membrane, external ear and ear canal normal.   Nose: Mucosal edema and rhinorrhea present. Right sinus exhibits no maxillary sinus tenderness and no frontal sinus tenderness. Left sinus exhibits frontal sinus tenderness. Left sinus exhibits no maxillary sinus tenderness.   Mouth/Throat: Uvula is midline and mucous membranes are normal. Oropharyngeal exudate present.   Neck: No JVD present.   Cardiovascular: Normal rate, regular rhythm and normal heart sounds.    Pulmonary/Chest: Effort normal and breath sounds normal.   Rhonchi to right lower lobe that cleared with coughing.   Abdominal: Soft. Bowel sounds are normal. There is no tenderness.   Neurological: She is  alert and oriented to person, place, and time.   Skin: Skin is warm and dry. Rash noted. There is erythema.        1.5 inches ; circular area of hyperemia is resolved ; no tenderness.   Psychiatric: She has a normal mood and affect.       Assessment:       1. Right arm cellulitis    2. Melena        Plan:   Marine was seen today for melena.    Diagnoses and all orders for this visit:    Right arm cellulitis  Resolved    Melena  Resolved.    See DR Fam.

## 2017-10-19 ENCOUNTER — OFFICE VISIT (OUTPATIENT)
Dept: OBSTETRICS AND GYNECOLOGY | Facility: CLINIC | Age: 77
End: 2017-10-19
Payer: MEDICARE

## 2017-10-19 VITALS
BODY MASS INDEX: 29.88 KG/M2 | HEART RATE: 78 BPM | RESPIRATION RATE: 10 BRPM | DIASTOLIC BLOOD PRESSURE: 68 MMHG | WEIGHT: 175 LBS | HEIGHT: 64 IN | SYSTOLIC BLOOD PRESSURE: 110 MMHG

## 2017-10-19 DIAGNOSIS — N90.89 VULVAR IRRITATION: ICD-10-CM

## 2017-10-19 DIAGNOSIS — R21 VULVAR RASH: Primary | ICD-10-CM

## 2017-10-19 PROCEDURE — 99999 PR PBB SHADOW E&M-EST. PATIENT-LVL III: CPT | Mod: PBBFAC,,, | Performed by: OBSTETRICS & GYNECOLOGY

## 2017-10-19 PROCEDURE — 99203 OFFICE O/P NEW LOW 30 MIN: CPT | Mod: S$GLB,,, | Performed by: OBSTETRICS & GYNECOLOGY

## 2017-10-19 NOTE — PROGRESS NOTES
"  Subjective:    Patient ID: Marine Mo is a 77 y.o. y.o. female.     Chief Complaint:   Chief Complaint   Patient presents with    Vaginal Pain       History of Present Illness:  Marine presents today complaining of > 30 year history of vulvar rash with irritation. She reports she was seen many years ago and told "to use some cream but would just have to live with it". She reports being sent to Dr. Abbott a few years back for a biopsy however he recommended Clobetasol cream. She reports she gets minimal relief from cream. Pain and irritation present with burning. Feels "raw".       ROS:   Review of Systems   Constitutional: Negative for chills, diaphoresis, fever, malaise/fatigue and weight loss.   HENT: Negative for congestion, ear discharge, ear pain, hearing loss, nosebleeds, sore throat and tinnitus.    Eyes: Negative for blurred vision, double vision, photophobia, pain, discharge and redness.   Respiratory: Negative for cough, hemoptysis, sputum production, shortness of breath, wheezing and stridor.    Cardiovascular: Negative for chest pain, palpitations, orthopnea and leg swelling.   Gastrointestinal: Negative for abdominal pain, constipation, diarrhea, heartburn, nausea and vomiting.   Genitourinary: Negative for dysuria, frequency, hematuria and urgency.   Musculoskeletal: Negative for back pain, joint pain, myalgias and neck pain.   Skin: Negative for itching and rash.   Neurological: Negative for dizziness, tingling, tremors, sensory change, speech change, weakness and headaches.   Endo/Heme/Allergies: Negative for environmental allergies. Does not bruise/bleed easily.   Psychiatric/Behavioral: Negative for depression, hallucinations, substance abuse and suicidal ideas. The patient is not nervous/anxious.            Objective:    Vital Signs:  Vitals:    10/19/17 1518   BP: 110/68   Pulse: 78   Resp: 10       Physical Exam:  General:  alert, cooperative, no distress   Head Normocephalic, " without obvious abnormality, atraumatic   Neck .supple, symmetrical, trachea midline   Skin:  Skin color, texture, turgor normal. No rashes or lesions   Abdomen:  soft, non-tender; bowel sounds normal   Pelvis: External genitalia: hypopigementation present throughout with some thinning of skin; exocriation present at 6 o'clock poition at vaginal introitus; excoration/ulceration present at 1 o'oclock positon just inferior to clitoral torres     Extremities extremities normal, atraumatic, no cyanosis or edema   Neurologic Grossly normal        Assessment:      1. Vulvar rash    2. Vulvar irritation          Plan:      PLAN:   1. RTC for vulvar biopsy  2. Epsom salt soaks  3. Aquafor to affected area as needed for protectant

## 2017-11-15 ENCOUNTER — PROCEDURE VISIT (OUTPATIENT)
Dept: OBSTETRICS AND GYNECOLOGY | Facility: CLINIC | Age: 77
End: 2017-11-15
Payer: MEDICARE

## 2017-11-15 VITALS
SYSTOLIC BLOOD PRESSURE: 120 MMHG | DIASTOLIC BLOOD PRESSURE: 68 MMHG | WEIGHT: 173 LBS | RESPIRATION RATE: 10 BRPM | HEART RATE: 78 BPM | BODY MASS INDEX: 29.53 KG/M2 | HEIGHT: 64 IN

## 2017-11-15 DIAGNOSIS — N90.89 VULVAR LESION: ICD-10-CM

## 2017-11-15 DIAGNOSIS — R21 VULVAR RASH: Primary | ICD-10-CM

## 2017-11-15 PROCEDURE — 88305 TISSUE EXAM BY PATHOLOGIST: CPT | Performed by: PATHOLOGY

## 2017-11-15 PROCEDURE — 56605 BIOPSY OF VULVA/PERINEUM: CPT | Mod: S$GLB,,, | Performed by: OBSTETRICS & GYNECOLOGY

## 2017-11-15 NOTE — PROCEDURES
Procedures     VULVA BIOPSY:    Marine Mo 77 y.o.  female   presents for a vulva biopsy due to vulva lesions.    PRE VULVA BIOPSY COUNSELING:  The patient was informed of the risk of bleeding, pain and infection. She was counseled on the alternatives to vulva biopsy and agrees to proceed.     EXAM: There is an area of hypopigmentation noted inferior to clitoral torres and at vaginal introitus.  There is not areas of necrosis.  There is not areas of erythema and induration.    PROCEDURE:  TIME OUT PERFORMED.  The base of the lesion was injected with 1 cc of 1% Xylocaine without epinephrine.    Punch Biopsy:  Punch biopsy was done of the lesion.  A disposable punch biopsy was used in a corkscrew fashion to core out a small circular plug of skin with thumb and forefinger holding vulva with a 2x2 guaze and the other hand used to perform the biopsy.  The depth of the biopsy correlated with the epidermal thickness.  The specimen was grasped beneath the epithelium with tissue forceps and the dermal tissue was cut across with small scissors resulting in a clean elliptical defect.  The specimen was placed in formalyn and sent to Pathology for Histopathologic diagnosis.  Silver nitrate was applied for hemostasis. A stitch was not necessary.  The patient tolerated the procedure well.    ASSESSMENT:  1. Vulva Lesion / Biopsy. 624.8.    POST VULVA BIOPSY COUNSELING:  Manage post biopsy pain with NSAIDs, Tylenol or Rx per [employer].  Expect minimal spotting and black discharge from silver nitrite and/or Monsels solution.  Take warm sitz baths TID; dry with hair dryer on cool setting 12 inches away until healed.  Report foul smelling drainage, heavy bleeding or redness at biopsy site, fever > 101.0 F, worsening pain.    Counseling lasted approximately 15 minutes and all her questions were answered.    FOLLOW-UP: pending biopsy results.

## 2017-11-22 ENCOUNTER — CLINICAL SUPPORT (OUTPATIENT)
Dept: INTERNAL MEDICINE | Facility: CLINIC | Age: 77
End: 2017-11-22
Payer: MEDICARE

## 2017-11-22 DIAGNOSIS — R16.1 SPLENOMEGALY: ICD-10-CM

## 2017-11-22 DIAGNOSIS — E11.9 CONTROLLED TYPE 2 DIABETES MELLITUS WITHOUT COMPLICATION, WITHOUT LONG-TERM CURRENT USE OF INSULIN: ICD-10-CM

## 2017-11-22 DIAGNOSIS — D69.6 THROMBOCYTOPENIA: ICD-10-CM

## 2017-11-22 DIAGNOSIS — I10 ESSENTIAL HYPERTENSION: ICD-10-CM

## 2017-11-22 DIAGNOSIS — E08.40 DIABETES MELLITUS DUE TO UNDERLYING CONDITION WITH DIABETIC NEUROPATHY, WITHOUT LONG-TERM CURRENT USE OF INSULIN: ICD-10-CM

## 2017-11-22 LAB
ALBUMIN SERPL BCP-MCNC: 3.6 G/DL
ALP SERPL-CCNC: 30 U/L
ALT SERPL W/O P-5'-P-CCNC: 17 U/L
ANION GAP SERPL CALC-SCNC: 8 MMOL/L
AST SERPL-CCNC: 23 U/L
BASOPHILS # BLD AUTO: 0.02 K/UL
BASOPHILS NFR BLD: 0.7 %
BILIRUB SERPL-MCNC: 0.7 MG/DL
BUN SERPL-MCNC: 16 MG/DL
CALCIUM SERPL-MCNC: 9.8 MG/DL
CHLORIDE SERPL-SCNC: 108 MMOL/L
CHOLEST SERPL-MCNC: 179 MG/DL
CHOLEST/HDLC SERPL: 5.3 {RATIO}
CO2 SERPL-SCNC: 24 MMOL/L
CREAT SERPL-MCNC: 0.8 MG/DL
CREAT UR-MCNC: 157 MG/DL
DIFFERENTIAL METHOD: ABNORMAL
EOSINOPHIL # BLD AUTO: 0.1 K/UL
EOSINOPHIL NFR BLD: 3.4 %
ERYTHROCYTE [DISTWIDTH] IN BLOOD BY AUTOMATED COUNT: 16.2 %
EST. GFR  (AFRICAN AMERICAN): >60 ML/MIN/1.73 M^2
EST. GFR  (NON AFRICAN AMERICAN): >60 ML/MIN/1.73 M^2
ESTIMATED AVG GLUCOSE: 103 MG/DL
GLUCOSE SERPL-MCNC: 113 MG/DL
HBA1C MFR BLD HPLC: 5.2 %
HCT VFR BLD AUTO: 36.8 %
HDLC SERPL-MCNC: 34 MG/DL
HDLC SERPL: 19 %
HGB BLD-MCNC: 11.8 G/DL
LDLC SERPL CALC-MCNC: 111.2 MG/DL
LYMPHOCYTES # BLD AUTO: 0.9 K/UL
LYMPHOCYTES NFR BLD: 30.4 %
MCH RBC QN AUTO: 27 PG
MCHC RBC AUTO-ENTMCNC: 32.1 G/DL
MCV RBC AUTO: 84 FL
MICROALBUMIN UR DL<=1MG/L-MCNC: 19 UG/ML
MICROALBUMIN/CREATININE RATIO: 12.1 UG/MG
MONOCYTES # BLD AUTO: 0.4 K/UL
MONOCYTES NFR BLD: 13.3 %
NEUTROPHILS # BLD AUTO: 1.5 K/UL
NEUTROPHILS NFR BLD: 52.2 %
NONHDLC SERPL-MCNC: 145 MG/DL
PLATELET # BLD AUTO: 93 K/UL
PMV BLD AUTO: 11 FL
POTASSIUM SERPL-SCNC: 4.4 MMOL/L
PROT SERPL-MCNC: 7 G/DL
RBC # BLD AUTO: 4.37 M/UL
SODIUM SERPL-SCNC: 140 MMOL/L
TRIGL SERPL-MCNC: 169 MG/DL
TSH SERPL DL<=0.005 MIU/L-ACNC: 2.15 UIU/ML
WBC # BLD AUTO: 2.93 K/UL

## 2017-11-22 PROCEDURE — 99999 PR PBB SHADOW E&M-EST. PATIENT-LVL I: CPT | Mod: PBBFAC,,,

## 2017-11-22 PROCEDURE — 80061 LIPID PANEL: CPT

## 2017-11-22 PROCEDURE — 83036 HEMOGLOBIN GLYCOSYLATED A1C: CPT

## 2017-11-22 PROCEDURE — 84443 ASSAY THYROID STIM HORMONE: CPT

## 2017-11-22 PROCEDURE — 85025 COMPLETE CBC W/AUTO DIFF WBC: CPT

## 2017-11-22 PROCEDURE — 82570 ASSAY OF URINE CREATININE: CPT

## 2017-11-22 PROCEDURE — 36415 COLL VENOUS BLD VENIPUNCTURE: CPT | Mod: S$GLB,,, | Performed by: INTERNAL MEDICINE

## 2017-11-22 PROCEDURE — 80053 COMPREHEN METABOLIC PANEL: CPT

## 2017-11-22 RX ORDER — BETAMETHASONE DIPROPIONATE 0.5 MG/G
CREAM TOPICAL 2 TIMES DAILY
Qty: 45 G | Refills: 1 | Status: SHIPPED | OUTPATIENT
Start: 2017-11-22 | End: 2019-03-01

## 2017-11-30 NOTE — PROGRESS NOTES
Informed pt of results. Expressed understanding. She will just follow up with you again in 6 months.

## 2017-12-05 ENCOUNTER — LAB VISIT (OUTPATIENT)
Dept: LAB | Facility: HOSPITAL | Age: 77
End: 2017-12-05
Attending: INTERNAL MEDICINE
Payer: MEDICARE

## 2017-12-05 DIAGNOSIS — R19.5 DARK STOOLS: Primary | ICD-10-CM

## 2017-12-05 LAB
ANION GAP SERPL CALC-SCNC: 12 MMOL/L
BASOPHILS # BLD AUTO: 0.01 K/UL
BASOPHILS NFR BLD: 0.3 %
BUN SERPL-MCNC: 21 MG/DL
CALCIUM SERPL-MCNC: 9.6 MG/DL
CHLORIDE SERPL-SCNC: 106 MMOL/L
CO2 SERPL-SCNC: 25 MMOL/L
CREAT SERPL-MCNC: 0.7 MG/DL
DIFFERENTIAL METHOD: ABNORMAL
EOSINOPHIL # BLD AUTO: 0.1 K/UL
EOSINOPHIL NFR BLD: 2.3 %
ERYTHROCYTE [DISTWIDTH] IN BLOOD BY AUTOMATED COUNT: 16.6 %
EST. GFR  (AFRICAN AMERICAN): >60 ML/MIN/1.73 M^2
EST. GFR  (NON AFRICAN AMERICAN): >60 ML/MIN/1.73 M^2
GLUCOSE SERPL-MCNC: 107 MG/DL
HCT VFR BLD AUTO: 32.6 %
HGB BLD-MCNC: 10.4 G/DL
LYMPHOCYTES # BLD AUTO: 0.9 K/UL
LYMPHOCYTES NFR BLD: 25.4 %
MCH RBC QN AUTO: 27.3 PG
MCHC RBC AUTO-ENTMCNC: 31.9 G/DL
MCV RBC AUTO: 86 FL
MONOCYTES # BLD AUTO: 0.5 K/UL
MONOCYTES NFR BLD: 12.8 %
NEUTROPHILS # BLD AUTO: 2.1 K/UL
NEUTROPHILS NFR BLD: 59.2 %
PLATELET # BLD AUTO: 117 K/UL
PMV BLD AUTO: 9.9 FL
POTASSIUM SERPL-SCNC: 4.1 MMOL/L
RBC # BLD AUTO: 3.81 M/UL
SODIUM SERPL-SCNC: 143 MMOL/L
WBC # BLD AUTO: 3.51 K/UL

## 2017-12-05 PROCEDURE — 85025 COMPLETE CBC W/AUTO DIFF WBC: CPT

## 2017-12-05 PROCEDURE — 80048 BASIC METABOLIC PNL TOTAL CA: CPT

## 2017-12-05 PROCEDURE — 36415 COLL VENOUS BLD VENIPUNCTURE: CPT

## 2017-12-20 ENCOUNTER — OFFICE VISIT (OUTPATIENT)
Dept: INTERNAL MEDICINE | Facility: CLINIC | Age: 77
End: 2017-12-20
Payer: MEDICARE

## 2017-12-20 VITALS
SYSTOLIC BLOOD PRESSURE: 110 MMHG | HEIGHT: 64 IN | DIASTOLIC BLOOD PRESSURE: 60 MMHG | TEMPERATURE: 99 F | WEIGHT: 170.19 LBS | RESPIRATION RATE: 16 BRPM | HEART RATE: 83 BPM | BODY MASS INDEX: 29.06 KG/M2

## 2017-12-20 DIAGNOSIS — J01.80 ACUTE NON-RECURRENT SINUSITIS OF OTHER SINUS: Primary | ICD-10-CM

## 2017-12-20 DIAGNOSIS — K29.70 GASTRITIS, PRESENCE OF BLEEDING UNSPECIFIED, UNSPECIFIED CHRONICITY, UNSPECIFIED GASTRITIS TYPE: ICD-10-CM

## 2017-12-20 PROCEDURE — 99214 OFFICE O/P EST MOD 30 MIN: CPT | Mod: 25,S$GLB,, | Performed by: NURSE PRACTITIONER

## 2017-12-20 PROCEDURE — 99999 PR PBB SHADOW E&M-EST. PATIENT-LVL IV: CPT | Mod: PBBFAC,,, | Performed by: NURSE PRACTITIONER

## 2017-12-20 PROCEDURE — 96372 THER/PROPH/DIAG INJ SC/IM: CPT | Mod: S$GLB,,, | Performed by: INTERNAL MEDICINE

## 2017-12-20 RX ORDER — METHYLPREDNISOLONE ACETATE 40 MG/ML
40 INJECTION, SUSPENSION INTRA-ARTICULAR; INTRALESIONAL; INTRAMUSCULAR; SOFT TISSUE
Status: COMPLETED | OUTPATIENT
Start: 2017-12-20 | End: 2017-12-20

## 2017-12-20 RX ORDER — CODEINE PHOSPHATE AND GUAIFENESIN 10; 100 MG/5ML; MG/5ML
5 SOLUTION ORAL 3 TIMES DAILY PRN
Qty: 120 ML | Refills: 0 | Status: SHIPPED | OUTPATIENT
Start: 2017-12-20 | End: 2018-02-27

## 2017-12-20 RX ORDER — AZITHROMYCIN 250 MG/1
TABLET, FILM COATED ORAL
Qty: 6 TABLET | Refills: 0 | Status: SHIPPED | OUTPATIENT
Start: 2017-12-20 | End: 2018-02-27

## 2017-12-20 RX ADMIN — METHYLPREDNISOLONE ACETATE 40 MG: 40 INJECTION, SUSPENSION INTRA-ARTICULAR; INTRALESIONAL; INTRAMUSCULAR; SOFT TISSUE at 08:12

## 2017-12-20 NOTE — PROGRESS NOTES
Subjective:       Patient ID: Marine Mo is a 77 y.o. female.    Chief Complaint: Sinusitis and Cough    HPI: Pt presents to clinic today new to me but known to Dr Hwang with c/o sinus. She reports that I is worse at night. She has a PND. Non productive cough. No fever.  She is taking sudafed OTC with minimal relief.     Last A1C 5.2- on Glucophage  Also has well controlled bp 110/60  Review of Systems   Constitutional: Positive for fatigue. Negative for appetite change, chills and fever.   HENT: Positive for congestion, postnasal drip, rhinorrhea, sinus pressure and sore throat. Negative for ear pain.    Eyes: Negative for pain, discharge, itching and visual disturbance.   Respiratory: Positive for cough. Negative for choking, chest tightness and shortness of breath.    Cardiovascular: Negative for chest pain, palpitations and leg swelling.   Gastrointestinal: Positive for abdominal pain and nausea. Negative for diarrhea and vomiting.        Black stools after abd pain   Musculoskeletal: Negative for arthralgias, myalgias and neck stiffness.   Skin: Negative for rash and wound.   Neurological: Negative for weakness, light-headedness and headaches.       Objective:      Physical Exam   Constitutional: She is oriented to person, place, and time. She appears well-developed and well-nourished.   HENT:   Head: Normocephalic and atraumatic.   Right Ear: External ear normal.   Left Ear: External ear normal.   Mouth/Throat: Posterior oropharyngeal erythema present.   Bilateral with fluid   Eyes: Pupils are equal, round, and reactive to light.   Neck: Normal range of motion. Neck supple.   Cardiovascular: Normal rate, regular rhythm, normal heart sounds and intact distal pulses.    Pulmonary/Chest: Effort normal and breath sounds normal.   Abdominal: Soft. Bowel sounds are normal. She exhibits no distension and no mass. There is no tenderness. There is no rebound and no guarding.   Musculoskeletal: Normal range of  motion.   Neurological: She is alert and oriented to person, place, and time. She has normal reflexes.   Skin: Skin is warm and dry.   Psychiatric: She has a normal mood and affect.   Nursing note and vitals reviewed.      Assessment:       1. Acute non-recurrent sinusitis of other sinus    2. Gastritis, presence of bleeding unspecified, unspecified chronicity, unspecified gastritis type        Plan:   Marine was seen today for sinusitis and cough.    Diagnoses and all orders for this visit:    Acute non-recurrent sinusitis of other sinus  -     methylPREDNISolone acetate injection 40 mg; Inject 1 mL (40 mg total) into the muscle one time.  -     azithromycin (ZITHROMAX Z-AFUA) 250 MG tablet; Take 2 tablets on day 1, then 1 tablet daily on days 2 - 5.    Gastritis, presence of bleeding unspecified, unspecified chronicity, unspecified gastritis type  -     Ambulatory Referral to Gastroenterology  She has seen Mary as well as Dr Fam with no answers she request seeing Dr Estrada at [facility]. O placed referral but encouraged her to get her records prior to that visit. She reports that they were suppose to send her. I checked media and it is all there, scanned in    Other orders  -     guaifenesin-codeine 100-10 mg/5 ml (TUSSI-ORGANIDIN NR)  mg/5 mL syrup; Take 5 mLs by mouth 3 (three) times daily as needed for Cough.

## 2018-02-22 RX ORDER — METFORMIN HYDROCHLORIDE 500 MG/1
500 TABLET ORAL 2 TIMES DAILY WITH MEALS
Qty: 180 TABLET | Refills: 3 | Status: SHIPPED | OUTPATIENT
Start: 2018-02-22 | End: 2019-02-21 | Stop reason: SDUPTHER

## 2018-02-22 NOTE — TELEPHONE ENCOUNTER
----- Message from Mey Mckenzie sent at 2018  8:08 AM CST -----  Contact: pt  Marine Mo  MRN: [id number]  : 1940  PCP: Serg Hwang  Home Phone      [phone]  Work Phone      [phone]  Mobile          [phone]      MESSAGE:  Pt changed insurances and no longer uses [employer]. Asking for her Metformin to be called in to [facility] in [place] because she has about 3 days left. Please advise.  PHONE:  [phone]  PHARMACY:  [facility] in [place]

## 2018-02-27 ENCOUNTER — TELEPHONE (OUTPATIENT)
Dept: GASTROENTEROLOGY | Facility: CLINIC | Age: 78
End: 2018-02-27

## 2018-02-27 ENCOUNTER — TELEPHONE (OUTPATIENT)
Dept: ENDOSCOPY | Facility: HOSPITAL | Age: 78
End: 2018-02-27

## 2018-02-27 ENCOUNTER — HOSPITAL ENCOUNTER (OUTPATIENT)
Facility: HOSPITAL | Age: 78
Discharge: HOME | End: 2018-03-01
Admitting: HOSPITALIST
Payer: MEDICARE

## 2018-02-27 ENCOUNTER — OFFICE VISIT (OUTPATIENT)
Dept: GASTROENTEROLOGY | Facility: CLINIC | Age: 78
End: 2018-02-27
Payer: MEDICARE

## 2018-02-27 VITALS
SYSTOLIC BLOOD PRESSURE: 121 MMHG | HEART RATE: 87 BPM | WEIGHT: 174 LBS | BODY MASS INDEX: 29.71 KG/M2 | DIASTOLIC BLOOD PRESSURE: 65 MMHG | HEIGHT: 64 IN

## 2018-02-27 DIAGNOSIS — E61.1 IRON DEFICIENCY: ICD-10-CM

## 2018-02-27 DIAGNOSIS — I10 ESSENTIAL HYPERTENSION: ICD-10-CM

## 2018-02-27 DIAGNOSIS — D64.9 ANEMIA, UNSPECIFIED TYPE: Primary | ICD-10-CM

## 2018-02-27 DIAGNOSIS — K92.1 MELENA: Primary | ICD-10-CM

## 2018-02-27 DIAGNOSIS — R19.5 DARK STOOLS: ICD-10-CM

## 2018-02-27 DIAGNOSIS — R10.84 GENERALIZED ABDOMINAL PAIN: ICD-10-CM

## 2018-02-27 PROBLEM — Q44.6 POLYCYSTIC LIVER DISEASE: Status: ACTIVE | Noted: 2018-02-27

## 2018-02-27 PROBLEM — K92.2 UPPER GI BLEED: Status: ACTIVE | Noted: 2018-02-27

## 2018-02-27 PROBLEM — I85.00 ESOPHAGEAL VARICES WITHOUT BLEEDING: Status: ACTIVE | Noted: 2018-02-27

## 2018-02-27 PROBLEM — K76.6 PORTAL HYPERTENSION: Status: ACTIVE | Noted: 2018-02-27

## 2018-02-27 PROBLEM — D50.9 IRON DEFICIENCY ANEMIA: Status: ACTIVE | Noted: 2018-02-27

## 2018-02-27 LAB
ABO + RH BLD: NORMAL
BASOPHILS # BLD AUTO: 0.01 K/UL
BASOPHILS NFR BLD: 0.4 %
BLD GP AB SCN CELLS X3 SERPL QL: NORMAL
BUN SERPL-MCNC: 18 MG/DL (ref 6–30)
CHLORIDE SERPL-SCNC: 103 MMOL/L (ref 95–110)
CREAT SERPL-MCNC: 0.8 MG/DL (ref 0.5–1.4)
DIFFERENTIAL METHOD: ABNORMAL
EOSINOPHIL # BLD AUTO: 0.1 K/UL
EOSINOPHIL NFR BLD: 2.9 %
ERYTHROCYTE [DISTWIDTH] IN BLOOD BY AUTOMATED COUNT: 17.2 %
GLUCOSE SERPL-MCNC: 136 MG/DL (ref 70–110)
HCT VFR BLD AUTO: 30.6 %
HCT VFR BLD CALC: 31 %PCV (ref 36–54)
HGB BLD-MCNC: 9.2 G/DL
IMM GRANULOCYTES # BLD AUTO: 0.01 K/UL
IMM GRANULOCYTES NFR BLD AUTO: 0.4 %
LYMPHOCYTES # BLD AUTO: 0.8 K/UL
LYMPHOCYTES NFR BLD: 28.9 %
MCH RBC QN AUTO: 23.2 PG
MCHC RBC AUTO-ENTMCNC: 30.1 G/DL
MCV RBC AUTO: 77 FL
MONOCYTES # BLD AUTO: 0.3 K/UL
MONOCYTES NFR BLD: 10.5 %
NEUTROPHILS # BLD AUTO: 1.6 K/UL
NEUTROPHILS NFR BLD: 56.9 %
NRBC BLD-RTO: 0 /100 WBC
PLATELET # BLD AUTO: 105 K/UL
PMV BLD AUTO: 9.4 FL
POC IONIZED CALCIUM: 1.19 MMOL/L (ref 1.06–1.42)
POC TCO2 (MEASURED): 28 MMOL/L (ref 23–29)
POCT GLUCOSE: 155 MG/DL (ref 70–110)
POTASSIUM BLD-SCNC: 4 MMOL/L (ref 3.5–5.1)
RBC # BLD AUTO: 3.96 M/UL
SAMPLE: ABNORMAL
SODIUM BLD-SCNC: 142 MMOL/L (ref 136–145)
WBC # BLD AUTO: 2.77 K/UL

## 2018-02-27 PROCEDURE — 86850 RBC ANTIBODY SCREEN: CPT

## 2018-02-27 PROCEDURE — 1126F AMNT PAIN NOTED NONE PRSNT: CPT | Mod: S$GLB,,, | Performed by: INTERNAL MEDICINE

## 2018-02-27 PROCEDURE — 25000003 PHARM REV CODE 250: Performed by: HOSPITALIST

## 2018-02-27 PROCEDURE — 85025 COMPLETE CBC W/AUTO DIFF WBC: CPT

## 2018-02-27 PROCEDURE — 99284 EMERGENCY DEPT VISIT MOD MDM: CPT

## 2018-02-27 PROCEDURE — 99220 PR INITIAL OBSERVATION CARE,LEVL III: CPT | Mod: AI,,, | Performed by: HOSPITALIST

## 2018-02-27 PROCEDURE — 3008F BODY MASS INDEX DOCD: CPT | Mod: S$GLB,,, | Performed by: INTERNAL MEDICINE

## 2018-02-27 PROCEDURE — G0378 HOSPITAL OBSERVATION PER HR: HCPCS

## 2018-02-27 PROCEDURE — 1159F MED LIST DOCD IN RCRD: CPT | Mod: S$GLB,,, | Performed by: INTERNAL MEDICINE

## 2018-02-27 PROCEDURE — C9113 INJ PANTOPRAZOLE SODIUM, VIA: HCPCS | Performed by: HOSPITALIST

## 2018-02-27 PROCEDURE — 99999 PR PBB SHADOW E&M-EST. PATIENT-LVL V: CPT | Mod: PBBFAC,,, | Performed by: INTERNAL MEDICINE

## 2018-02-27 PROCEDURE — 99284 EMERGENCY DEPT VISIT MOD MDM: CPT | Mod: ,,, | Performed by: PHYSICIAN ASSISTANT

## 2018-02-27 PROCEDURE — 63600175 PHARM REV CODE 636 W HCPCS: Performed by: HOSPITALIST

## 2018-02-27 PROCEDURE — 99205 OFFICE O/P NEW HI 60 MIN: CPT | Mod: S$GLB,,, | Performed by: INTERNAL MEDICINE

## 2018-02-27 RX ORDER — PANTOPRAZOLE SODIUM 40 MG/10ML
40 INJECTION, POWDER, LYOPHILIZED, FOR SOLUTION INTRAVENOUS 2 TIMES DAILY
Status: DISCONTINUED | OUTPATIENT
Start: 2018-02-27 | End: 2018-03-01 | Stop reason: HOSPADM

## 2018-02-27 RX ORDER — ALPRAZOLAM 0.25 MG/1
0.25 TABLET ORAL NIGHTLY PRN
Status: DISCONTINUED | OUTPATIENT
Start: 2018-02-27 | End: 2018-03-01 | Stop reason: HOSPADM

## 2018-02-27 RX ORDER — FERROUS SULFATE 325(65) MG
325 TABLET ORAL EVERY 12 HOURS
Qty: 60 TABLET | Refills: 4 | Status: SHIPPED | OUTPATIENT
Start: 2018-02-27 | End: 2018-06-06 | Stop reason: SDUPTHER

## 2018-02-27 RX ORDER — PANTOPRAZOLE SODIUM 40 MG/1
40 TABLET, DELAYED RELEASE ORAL DAILY
COMMUNITY
End: 2018-05-22 | Stop reason: SDUPTHER

## 2018-02-27 RX ORDER — HYDROCODONE BITARTRATE AND ACETAMINOPHEN 5; 325 MG/1; MG/1
1 TABLET ORAL EVERY 6 HOURS PRN
COMMUNITY
End: 2018-05-22 | Stop reason: SDUPTHER

## 2018-02-27 RX ORDER — METOPROLOL TARTRATE 50 MG/1
50 TABLET ORAL 2 TIMES DAILY
Status: DISCONTINUED | OUTPATIENT
Start: 2018-02-27 | End: 2018-03-01 | Stop reason: HOSPADM

## 2018-02-27 RX ADMIN — ALPRAZOLAM 0.25 MG: 0.25 TABLET ORAL at 09:02

## 2018-02-27 RX ADMIN — METOPROLOL TARTRATE 50 MG: 50 TABLET ORAL at 09:02

## 2018-02-27 RX ADMIN — PANTOPRAZOLE SODIUM 40 MG: 40 INJECTION, POWDER, FOR SOLUTION INTRAVENOUS at 09:02

## 2018-02-27 NOTE — H&P
"History and Physical  Hospital Medicine       Patient Name: Marine Mo  MRN:  [id number]  [facility] Medicine Team: [facility] MED A Bravo Zapata MD  Date of Admission:  2/27/2018     Principal Problem:  UGIB  Primary Care Physician: Serg Hwang MD      History of Present Illness:     Ms. Marine Mo is a 77 y.o. female with Dm, HTN, HLD, polycystic hepatorenal disease complicated by portal HTN and periesophageal varices who presented to ED 2/26 from GI clinic for drop in hg and tarry stools. Pt has had tarry stools on and off since 2009, but since Friday, pt has had black stools on a daily basis. Pt did take 1 Aleve tablet on Friday and takes baby asa m/w/f. Last bowel movement was today and was "bowman brown." Hg in clinic 8.5 and 2 months ago hg was  10.4.     In Ed, no intervention was done.    Review of Systems   Constitutional: Negative for chills, fatigue, fever.   HENT: Negative for sore throat, trouble swallowing.    Eyes: Negative for photophobia, visual disturbance.   Respiratory: Negative for cough, shortness of breath.    Cardiovascular: Negative for chest pain, palpitations, leg swelling.   Gastrointestinal: Negative for abdominal pain, constipation, diarrhea, nausea, vomiting. Endorses tarry stools   Endocrine: Negative for cold intolerance, heat intolerance.   Genitourinary: Negative for dysuria, frequency.   Musculoskeletal: Negative for arthralgias, myalgias.   Skin: Negative for rash, wound, erythema   Neurological: Negative for dizziness, syncope, weakness, light-headedness.   Psychiatric/Behavioral: Negative for confusion, hallucinations, anxiety  All other systems reviewed and are negative.      Past Medical History: Patient has a past medical history of Anemia (2/27/2018) and Hypertension.    Past Surgical History: Patient has a past surgical history that includes Back surgery; Cholecystectomy; TONSILLECTOMY, ADENOIDECTOMY; Hysterectomy; and Cataract extraction " (Bilateral).    Social History: Patient reports that she has never smoked. She has never used smokeless tobacco. She reports that she does not drink alcohol or use drugs.    Family History: family history includes Kidney disease in her father.    Medications: Scheduled Meds:   metoprolol tartrate  50 mg Oral BID    pantoprazole  40 mg Intravenous BID     Continuous Infusions:  PRN Meds:.ALPRAZolam    Allergies: Patient has No Known Allergies.    Physical Exam:     Vital Signs (Most Recent):  Temp: 98.5 °F (36.9 °C) (02/27/18 1152)  Pulse: 83 (02/27/18 1152)  Resp: 18 (02/27/18 1152)  BP: (!) 179/70 (02/27/18 1152)  SpO2: (!) 94 % (02/27/18 1152) Vital Signs Range (Last 24H):  Temp:  [98.5 °F (36.9 °C)]   Pulse:  [83-87]   Resp:  [18]   BP: (121-179)/(65-70)   SpO2:  [94 %]    Body mass index is 29.18 kg/m².     Physical Exam:  Constitutional: Appears well-developed and well-nourished.   Head: Normocephalic and atraumatic.   Mouth/Throat: Oropharynx is clear and moist.   Eyes: EOM are normal. Pupils are equal, round, and reactive to light. No scleral icterus.   Neck: Normal range of motion. Neck supple.   Cardiovascular: Normal rate and regular rhythm.  No murmur heard.  Pulmonary/Chest: Effort normal and breath sounds normal. No respiratory distress. No wheezes, rales, or rhonchi  Abdominal: Soft. Bowel sounds are normal.  No distension or tenderness   Gu: declined rectal exam since RIDDHI with brown stool in clinic  Musculoskeletal: Normal range of motion. Pitting edema bilaterally.   Neurological: Alert and oriented to person, place, and time.   Skin: Skin is warm and dry.   Psychiatric: Normal mood and affect. Behavior is normal.   Vitals reviewed.      Recent Labs  Lab 02/27/18  1002 02/27/18  1438 02/27/18  1442   WBC 2.99* 2.77*  --    HGB 8.5* 9.2*  --    HCT 29.1* 30.6* 31*   PLT 99* 105*  --          Recent Labs  Lab 02/27/18  1002      K 4.4      CO2 26   BUN 17   CREATININE 0.9  0.9       CALCIUM 9.6       Recent Labs  Lab 02/27/18  1002   ALKPHOS 38*   ALT 16   AST 25   ALBUMIN 3.6   PROT 7.3   BILITOT 0.6   INR 1.1      No results for input(s): POCTGLUCOSE in the last 168 hours.      Assessment and Plan:     Ms. Marine Mo is a 77 y.o. female with Dm, HTN, HLD, polycystic hepatorenal disease complicated by portal HTN and periesophageal varices who presented to ED 2/26 from GI clinic for drop in hg and tarry stools.    Active Hospital Problems    Diagnosis  POA    *Upper GI bleed [K92.2]  Yes     Priority: 1 - High    Polycystic liver disease [Q44.6]  Not Applicable    Portal hypertension [K76.6]  Unknown    Esophageal varices without bleeding [I85.00]  Unknown    Iron deficiency anemia [D50.9]  Unknown    Essential hypertension [I10]  Yes    Chronic insomnia [F51.04]  Yes    Thrombocytopenia [D69.6]  Yes    Type 2 diabetes mellitus, controlled [E11.9]  Yes    Hyperlipidemia [E78.5]  Yes      Resolved Hospital Problems    Diagnosis Date Resolved POA   No resolved problems to display.     #UGIB: Consistently had tarry stools after taking 1 Aleve on Friday. Hg stable. Last bowel movement brown.   - GI consulted and plan to do EGD and colonoscopy on Thursday   - Clear liquid diet   - Type and screen, 2 large bore IVs  - Will hold off on octreotide since not consistent with   - Protonix 40 BID     - Transfuse if <7    #polycystic hepatorenal disease: complicated by esophageal varices and portal hypertension. Dx 20+ years ago  -  FU liver function   - will hold off on ordering ultrasound and CT abdomen     #thrombocytopenia: chronic and likely due to liver disease   - monitor      #iron def anemia    - will continue iron at discharge    #DM: controlled on metformin at home  - accuchecks    - will hold off on ordering sliding scale while on liquid diet     #HTN: controlled   - continue home metoprolol 50 BID     #chronic insomnia   - Xanax prn nightly         Diet:  liquid  GI PPx:   protonix  DVT PPx: held in setting of UGIB    Disposition:  Possible discharge on Thursday      LIYA HAHN MD  [facility] Medicine  Pager: [phone]  Spectralink: [phone]   Cell: [phone]

## 2018-02-27 NOTE — TELEPHONE ENCOUNTER
Spoke to Mr Mo. Informed Mr Mo to bring Ms Mo to the ER, because of her low blood count. Mr Mo will bring pt to the ER.

## 2018-02-27 NOTE — ED PROVIDER NOTES
Encounter Date: 2/27/2018    SCRIBE #1 NOTE: I, Kyra Matute, am scribing for, and in the presence of,  Dr. Calderon. I have scribed the following portions of the note - the APC attestation.       History     Chief Complaint   Patient presents with    Abnormal Lab     Pt told her H & H were low, sent to ED by Dr. Roper.   labs drawn today.       Patient is a 69-year-old female with past medical history of hypertension who presents to the emergency department due to melena.  Patient states that she's had melena on and off since 2009.  Patient states her labs episode of melena was on Friday.  Patient states that she went to see GI today and lab work was obtained that did show a hemoglobin of 8.5 and patient was instructed to report to the emergency department.  Patient denies any fevers, chills, chest pain, shortness of breath, or any other complaints.          Review of patient's allergies indicates:  No Known Allergies  Past Medical History:   Diagnosis Date    Anemia 2/27/2018    Hypertension      Past Surgical History:   Procedure Laterality Date    BACK SURGERY      CATARACT EXTRACTION Bilateral     don't remember date    CHOLECYSTECTOMY      HYSTERECTOMY      TONSILLECTOMY, ADENOIDECTOMY       Family History   Problem Relation Age of Onset    Kidney disease Father      Social History   Substance Use Topics    Smoking status: Never Smoker    Smokeless tobacco: Never Used    Alcohol use No     Review of Systems   Constitutional: Negative for activity change, appetite change, diaphoresis, fatigue and fever.   HENT: Negative for congestion, dental problem, drooling, ear pain, facial swelling, sore throat and trouble swallowing.    Eyes: Negative for pain, discharge and visual disturbance.   Respiratory: Negative for apnea, cough, chest tightness and shortness of breath.    Cardiovascular: Negative for chest pain and palpitations.   Gastrointestinal: Negative for abdominal distention, anal bleeding, blood  in stool, diarrhea, nausea and vomiting.   Endocrine: Negative for cold intolerance and polydipsia.   Genitourinary: Negative for decreased urine volume, difficulty urinating, enuresis, frequency and hematuria.   Musculoskeletal: Negative for arthralgias, gait problem, myalgias and neck stiffness.   Skin: Negative for color change and pallor.   Allergic/Immunologic: Negative for environmental allergies.   Neurological: Negative for dizziness, syncope, numbness and headaches.   Psychiatric/Behavioral: Negative for agitation, confusion and dysphoric mood.       Physical Exam     Initial Vitals [02/27/18 1152]   BP Pulse Resp Temp SpO2   (!) 179/70 83 18 98.5 °F (36.9 °C) (!) 94 %      MAP       106.33         Physical Exam    Nursing note and vitals reviewed.  Constitutional: She appears well-developed and well-nourished. She is not diaphoretic. No distress.   HENT:   Head: Normocephalic and atraumatic.   Neck: Normal range of motion. Neck supple.   Cardiovascular: Normal rate, regular rhythm and normal heart sounds. Exam reveals no gallop and no friction rub.    No murmur heard.  Pulmonary/Chest: Breath sounds normal. She has no wheezes. She has no rhonchi. She has no rales.   Abdominal: Soft. Bowel sounds are normal. There is no tenderness. There is no rebound and no guarding.   Musculoskeletal: Normal range of motion.   Neurological: She is alert and oriented to person, place, and time.   Skin: Skin is warm and dry. No rash noted. No erythema.   Psychiatric: She has a normal mood and affect.         ED Course   Procedures  Labs Reviewed   CBC W/ AUTO DIFFERENTIAL - Abnormal; Notable for the following:        Result Value    WBC 2.77 (*)     RBC 3.96 (*)     Hemoglobin 9.2 (*)     Hematocrit 30.6 (*)     MCV 77 (*)     MCH 23.2 (*)     MCHC 30.1 (*)     RDW 17.2 (*)     Platelets 105 (*)     Gran # (ANC) 1.6 (*)     Lymph # 0.8 (*)     All other components within normal limits   ISTAT PROCEDURE - Abnormal; Notable  for the following:     POC Glucose 136 (*)     POC Hematocrit 31 (*)     All other components within normal limits   TYPE & SCREEN   ISTAT CHEM8   POCT GLUCOSE MONITORING CONTINUOUS             Medical Decision Making:   History:   Old Medical Records: I decided to obtain old medical records.  Clinical Tests:   Lab Tests: Reviewed and Ordered       APC / Resident Notes:   Patient is a 69-year-old female with past medical history of hypertension who presents to the emergency department due to melena.  Physical exam reveals female in no acute distress.  Heart regular rhythm.  Lungs clear to auscultation bilaterally.  Abdomen soft nontender nondistended.  Patient will be admitted to hospital medicine for further workup of GI bleed per GI request.  Treatment discussed sending physician and she is agreeable.       Scribe Attestation:   Scribe #1: I performed the above scribed service and the documentation accurately describes the services I performed. I attest to the accuracy of the note.    Attending Attestation:     Physician Attestation Statement for NP/PA:   I discussed this assessment and plan of this patient with the NP/PA, but I did not personally examine the patient. The face to face encounter was performed by the NP/PA.                     Clinical Impression:   The encounter diagnosis was Anemia, unspecified type.    Disposition:   Disposition: Admitted  Condition: Stable                        Analy Rdoney PA-C  02/27/18 2211       Analy Rodney PA-C  02/27/18 2217

## 2018-02-27 NOTE — LETTER
February 27, 2018      Ami Valentin, ANDI  [address] [facility] [address]           [facility] - Gastroenterology  [address]  [place] [address]  Phone: [phone]  Fax: [phone]          Patient: Marine Mo   MR Number: [id number]   YOB: 1940   Date of Visit: 2/27/2018       Dear Ami Valentin:    Thank you for referring Marine Mo to me for evaluation. Attached you will find relevant portions of my assessment and plan of care.    If you have questions, please do not hesitate to call me. I look forward to following Marine Mo along with you.    Sincerely,    Nick Roper MD    Enclosure  CC:  No Recipients    If you would like to receive this communication electronically, please contact [email or web address] or [phone] to request more information on [email or web address] Link access.    For providers and/or their staff who would like to refer a patient to [facility], please contact us through our one-stop-shop provider referral line, [facility], [place], at [phone].    If you feel you have received this communication in error or would no longer like to receive these types of communications, please e-mail [email or web address]

## 2018-02-27 NOTE — TELEPHONE ENCOUNTER
----- Message from Manuela Gamboa sent at 2/27/2018 11:45 AM CST -----  Contact: spouse  Patient's  needs to speak with the nurse as he's not sure if he was supposed to take his wife to the ER or not she was in the office today & had labs for Anemia.  Mr Mo is taking her to the ER at the [facility] pulling up now,  he just wanted to make sure this is what Dr Roper wanted.  Mr Mo's # is [phone]

## 2018-02-27 NOTE — TREATMENT PLAN
Clear liquid diet starting now.   Prep tomorrow.   EGD/colon on Thursday.     Elie Arias MD  Gastroenterology & Hepatology Fellow  Pager: [phone]

## 2018-02-27 NOTE — PROGRESS NOTES
CHIEF COMPLAINT:  Having seen dark stool since October 2017.    HISTORY OF PRESENT ILLNESS:  This is a 77-year-old female who has a GI doctor   close to home.  She was seen in the Hepatology Clinic.  No evidence of portal   hypertension.  Her last EGD and colonoscopy were not here.  She said she saw   dark stools, 10/03/2017, 11/27/2017, 10/12/2017 and 02/23/2018.  She does take   just a baby aspirin a day.  She is on Protonix 40 mg once daily.  Has not thrown   up any blood, has a bowel movement once a day, says her colonoscopy is   up-to-date.  No additional NSAIDs.  No lightheadedness, dizziness, although she   does kind of feel fatigued.  She has never had a blood transfusion before.    REVIEW OF SYSTEMS:  CONSTITUTIONAL:  No fever, fatigue or weight loss.  ENT:  No difficulty swallowing or sore throat.  CARDIOVASCULAR:  No chest pain or palpitations.  RESPIRATORY:  She has got stable dyspnea on exertion for a year, maybe it has   been getting a little bit worse over the last several weeks.  GENITOURINARY:  No dysuria, urgency, frequency or hematuria.  MUSCULOSKELETAL:  Some chronic arthritis.  SKIN:  No itching or rash.  NEUROLOGIC:  No headache, syncope or stroke.  PSYCHIATRIC:  No uncontrolled depression or anxiety.  ENDOCRINE:  No cold or heat intolerance.  LYMPHATICS:  No lymphadenopathy.  ALLERGIES:  No known drug allergies.  GASTROINTESTINAL:  No nausea or vomiting, no heartburn.  She has been having   epigastric pain for well over a year.  Sometimes related to certain foods, not   all the time.  Does not radiate to her back.  No nausea or vomiting.  No change   in bowel habits.    She says she has had CT scans before; however, we do not have copies of them.    RISK FACTORS FOR LIVER DISEASE:  No blood transfusion.  No IV drugs.  No   tattoos.  No nasal drug use.    PAST MEDICAL HISTORY:  Positive for hypertension, spinal stenosis, type 2   diabetes, hepatosplenomegaly, hepatic and renal cysts.    PAST  SURGICAL HISTORY:  Gallbladder out in 2004, appendectomy, hysterectomy,   bilateral salpingo-oophorectomy, tonsillectomy.    FAMILY HISTORY:  Dad with liver and kidney cyst too.  Nobody with colon cancer.    Nobody with advanced colon adenomatous polyps.  No FAP, no attenuated FAP, no   MAP and no Ramirez syndrome.  Nobody with celiac sprue or inflammatory bowel   disease.  Nobody with chronic pancreatitis or pancreatic cancer.    SOCIAL HISTORY:  Nonsmoker, nondrinker.  She is retired.  She is on her first   marriage for 58 years.  She has two healthy children, and her  is with   her today.    PHYSICAL EXAMINATION:  With chaperone in the room, Yasmin,  VITAL SIGNS:  Blood pressure is 121/65, pulse 87 and regular, 5 feet 4 inches   tall, 174 pounds.  GENERAL APPEARANCE:  Well nourished, well developed, not in any acute distress.  ABDOMEN:  Nondistended.  It is slightly obese.  No guarding or rebound.  She   does have hepatomegaly.  No appreciative splenomegaly.  No bruits or pulsatile   masses.  Mild tenderness in the epigastric area.  No CVA tenderness.  No   guarding or rebound.  Normoactive bowel sounds.  No appreciative ascites or   hernias.  EYES:  Conjunctivae are anicteric.  CARDIOVASCULAR:  S1 and S2 without murmurs.  RESPIRATORY:  Clear to auscultation bilaterally without wheezes, rhonchi or   rales.  SKIN:  No petechiae or rash on exposed skin areas.  NEUROLOGIC:  Alert and oriented x4.  PSYCHIATRIC:  Normal speech, mentation and affect.  LYMPHATICS:  No cervical or supraclavicular lymphadenopathy.  No appreciative   thyromegaly.  RECTAL:  No external lesions.  Good rectal tone.  Light brown stool in the   vault.  Hemoccult negative with good positive control on the Hemoccult card.  No   palpable rectal masses.    MEDICAL DECISION MAKING:  Prior labs have been reviewed.  Hepatology note has   been reviewed.  Stools have been occult positive.  I do not have a copy of her   prior EGD or colonoscopy  reports, which have been done at home.  She did not   bring any of these medical records.    IMPRESSION AND PLAN:  Intermittent epigastric pain off and on for many years in   a patient with hepatic and renal cysts, is followed in the past by Hepatology   here.  She is now saying she is having intermittent dark stools.  Rectal exam   today is normal, although the patient was sent for stat labs.  Her H and H is   8.5 and 29.1, down from 10.4 and 32.6 with an MCV of 77, platelets 99.  The   patient appears to have an iron deficiency anemia with iron sat of 8 and a   ferritin of 16.  Creatinine is good 0.9.  INR is normal at 1.0.  Recommend the   patient go to ER for evaluation and admission with GI consults for GI bleeding.    We will recommend imaging of the belly as well if not done in the hospital as   an outpatient.  Return to GI Clinic in four weeks for followup.      SEC/HN  dd: 02/27/2018 11:23:21 (CST)  td: 02/27/2018 11:51:50 (CST)  Doc ID   #[id number]  Job ID #[id number]    CC:

## 2018-02-28 ENCOUNTER — ANESTHESIA EVENT (OUTPATIENT)
Dept: ENDOSCOPY | Facility: HOSPITAL | Age: 78
End: 2018-02-28
Payer: MEDICARE

## 2018-02-28 DIAGNOSIS — D50.9 IRON DEFICIENCY ANEMIA, UNSPECIFIED IRON DEFICIENCY ANEMIA TYPE: Primary | ICD-10-CM

## 2018-02-28 LAB
ALBUMIN SERPL BCP-MCNC: 3.4 G/DL
ALP SERPL-CCNC: 33 U/L
ALT SERPL W/O P-5'-P-CCNC: 15 U/L
ANION GAP SERPL CALC-SCNC: 8 MMOL/L
AST SERPL-CCNC: 23 U/L
BASOPHILS # BLD AUTO: 0 K/UL
BASOPHILS NFR BLD: 0 %
BILIRUB SERPL-MCNC: 0.6 MG/DL
BUN SERPL-MCNC: 15 MG/DL
CALCIUM SERPL-MCNC: 9.2 MG/DL
CHLORIDE SERPL-SCNC: 106 MMOL/L
CO2 SERPL-SCNC: 27 MMOL/L
CREAT SERPL-MCNC: 0.7 MG/DL
DIFFERENTIAL METHOD: ABNORMAL
EOSINOPHIL # BLD AUTO: 0.1 K/UL
EOSINOPHIL NFR BLD: 2.3 %
ERYTHROCYTE [DISTWIDTH] IN BLOOD BY AUTOMATED COUNT: 17 %
EST. GFR  (AFRICAN AMERICAN): >60 ML/MIN/1.73 M^2
EST. GFR  (NON AFRICAN AMERICAN): >60 ML/MIN/1.73 M^2
GLUCOSE SERPL-MCNC: 90 MG/DL
HCT VFR BLD AUTO: 27.6 %
HGB BLD-MCNC: 8.2 G/DL
IMM GRANULOCYTES # BLD AUTO: 0.01 K/UL
IMM GRANULOCYTES NFR BLD AUTO: 0.5 %
LYMPHOCYTES # BLD AUTO: 0.7 K/UL
LYMPHOCYTES NFR BLD: 33.6 %
MAGNESIUM SERPL-MCNC: 1.9 MG/DL
MCH RBC QN AUTO: 22.4 PG
MCHC RBC AUTO-ENTMCNC: 29.7 G/DL
MCV RBC AUTO: 75 FL
MONOCYTES # BLD AUTO: 0.3 K/UL
MONOCYTES NFR BLD: 13.1 %
NEUTROPHILS # BLD AUTO: 1.1 K/UL
NEUTROPHILS NFR BLD: 50.5 %
NRBC BLD-RTO: 0 /100 WBC
PHOSPHATE SERPL-MCNC: 3.9 MG/DL
PLATELET # BLD AUTO: 82 K/UL
PMV BLD AUTO: 9.4 FL
POCT GLUCOSE: 100 MG/DL (ref 70–110)
POCT GLUCOSE: 165 MG/DL (ref 70–110)
POCT GLUCOSE: 83 MG/DL (ref 70–110)
POTASSIUM SERPL-SCNC: 3.8 MMOL/L
PROT SERPL-MCNC: 6.7 G/DL
RBC # BLD AUTO: 3.66 M/UL
SODIUM SERPL-SCNC: 141 MMOL/L
WBC # BLD AUTO: 2.14 K/UL

## 2018-02-28 PROCEDURE — 80053 COMPREHEN METABOLIC PANEL: CPT

## 2018-02-28 PROCEDURE — 25000003 PHARM REV CODE 250: Performed by: HOSPITALIST

## 2018-02-28 PROCEDURE — 99204 OFFICE O/P NEW MOD 45 MIN: CPT | Mod: ,,, | Performed by: INTERNAL MEDICINE

## 2018-02-28 PROCEDURE — 99225 PR SUBSEQUENT OBSERVATION CARE,LEVEL II: CPT | Mod: ,,, | Performed by: HOSPITALIST

## 2018-02-28 PROCEDURE — 84100 ASSAY OF PHOSPHORUS: CPT

## 2018-02-28 PROCEDURE — 36415 COLL VENOUS BLD VENIPUNCTURE: CPT

## 2018-02-28 PROCEDURE — C9113 INJ PANTOPRAZOLE SODIUM, VIA: HCPCS | Performed by: HOSPITALIST

## 2018-02-28 PROCEDURE — 25000003 PHARM REV CODE 250: Performed by: INTERNAL MEDICINE

## 2018-02-28 PROCEDURE — 83735 ASSAY OF MAGNESIUM: CPT

## 2018-02-28 PROCEDURE — 25000003 PHARM REV CODE 250: Performed by: PHYSICIAN ASSISTANT

## 2018-02-28 PROCEDURE — 63600175 PHARM REV CODE 636 W HCPCS: Performed by: HOSPITALIST

## 2018-02-28 PROCEDURE — G0378 HOSPITAL OBSERVATION PER HR: HCPCS

## 2018-02-28 PROCEDURE — 85025 COMPLETE CBC W/AUTO DIFF WBC: CPT

## 2018-02-28 RX ORDER — FERROUS SULFATE 325(65) MG
325 TABLET ORAL EVERY 12 HOURS
Qty: 60 TABLET | Refills: 4 | Status: SHIPPED | OUTPATIENT
Start: 2018-02-28 | End: 2018-03-01 | Stop reason: HOSPADM

## 2018-02-28 RX ORDER — POLYETHYLENE GLYCOL 3350, SODIUM SULFATE ANHYDROUS, SODIUM BICARBONATE, SODIUM CHLORIDE, POTASSIUM CHLORIDE 236; 22.74; 6.74; 5.86; 2.97 G/4L; G/4L; G/4L; G/4L; G/4L
4000 POWDER, FOR SOLUTION ORAL ONCE
Status: COMPLETED | OUTPATIENT
Start: 2018-02-28 | End: 2018-02-28

## 2018-02-28 RX ORDER — PROMETHAZINE HYDROCHLORIDE 25 MG/1
25 TABLET ORAL EVERY 6 HOURS PRN
Status: DISCONTINUED | OUTPATIENT
Start: 2018-02-28 | End: 2018-03-01 | Stop reason: HOSPADM

## 2018-02-28 RX ORDER — ONDANSETRON 4 MG/1
4 TABLET, ORALLY DISINTEGRATING ORAL EVERY 8 HOURS PRN
Status: DISCONTINUED | OUTPATIENT
Start: 2018-02-28 | End: 2018-03-01 | Stop reason: HOSPADM

## 2018-02-28 RX ADMIN — PANTOPRAZOLE SODIUM 40 MG: 40 INJECTION, POWDER, FOR SOLUTION INTRAVENOUS at 08:02

## 2018-02-28 RX ADMIN — POLYETHYLENE GLYCOL 3350, SODIUM SULFATE ANHYDROUS, SODIUM BICARBONATE, SODIUM CHLORIDE, POTASSIUM CHLORIDE 4000 ML: 236; 22.74; 6.74; 5.86; 2.97 POWDER, FOR SOLUTION ORAL at 06:02

## 2018-02-28 RX ADMIN — METOPROLOL TARTRATE 50 MG: 50 TABLET ORAL at 08:02

## 2018-02-28 RX ADMIN — ONDANSETRON 4 MG: 4 TABLET, ORALLY DISINTEGRATING ORAL at 09:02

## 2018-02-28 NOTE — HPI
Marine Mo is a 77 y.o. female with DM, HTN, HLD, polycystic hepatorenal disease complicated by ?portal HTN and periesophageal varices who presented to ED 2/26 from GI clinic - Dr. Roper for evaluation for drop in hg and tarry stools. Pt has had tarry stools on and off since 2009, but since Friday, pt has had black stools on a daily basis. In 2016, had small polyps and normal EGD. Reports taking Aleve tablet on Friday and takes baby asa m/w/f. Last bowel movement was today and was brown. Hg in clinic 8.5 and 2 months ago hg was  10.4. No blood transfusions in the past.

## 2018-02-28 NOTE — PROGRESS NOTES
Progress Note   Hospital Medicine         Patient Name: Marine Mo  MRN:  [id number]  [facility] Medicine Team: [facility]OSP MED A Bravo Zapata MD  Date of Admission:  2/27/2018     Length of Stay:  LOS: 0 days   Expected Discharge Date: 3/1/2018  Principal Problem:  Upper GI bleed       Subjective:     Interval History/Overnight Events:     No complaints this morning, but frustrated she had to presented to the ED and now be a direct admit to the floor.    Review of Systems   Constitutional: Negative for chills, fatigue, fever.   HENT: Negative for sore throat, trouble swallowing.    Eyes: Negative for photophobia, visual disturbance.   Respiratory: Negative for cough, shortness of breath.    Cardiovascular: Negative for chest pain, palpitations, leg swelling.   Gastrointestinal: Negative for abdominal pain, constipation, diarrhea, nausea, vomiting.   Endocrine: Negative for cold intolerance, heat intolerance.   Genitourinary: Negative for dysuria, frequency.   Musculoskeletal: Negative for arthralgias, myalgias.   Skin: Negative for rash, wound, erythema   Neurological: Negative for dizziness, syncope, weakness, light-headedness.   Psychiatric/Behavioral: Negative for confusion, hallucinations, anxiety  All other systems reviewed and are negative.    Objective:     Temp:  [97.3 °F (36.3 °C)-98.4 °F (36.9 °C)]   Pulse:  [63-78]   Resp:  [16-20]   BP: (119-141)/(56-66)   SpO2:  [93 %-96 %]       Physical Exam:  Constitutional: Appears well-developed and well-nourished.   Head: Normocephalic and atraumatic.   Mouth/Throat: Oropharynx is clear and moist.   Eyes: EOM are normal. Pupils are equal, round, and reactive to light. No scleral icterus.   Neck: Normal range of motion. Neck supple.   Cardiovascular: Normal rate and regular rhythm.  No murmur heard.  Pulmonary/Chest: Effort normal and breath sounds normal. No respiratory distress. No wheezes, rales, or rhonchi  Abdominal: Soft. Bowel sounds are normal.  No  distension or tenderness  Musculoskeletal: Normal range of motion. No edema.   Neurological: Alert and oriented to person, place, and time.   Skin: Skin is warm and dry.   Psychiatric: Normal mood and affect. Behavior is normal.       Recent Labs  Lab 02/27/18  1002 02/27/18  1438 02/27/18  1442 02/28/18  0348   WBC 2.99* 2.77*  --  2.14*   HGB 8.5* 9.2*  --  8.2*   HCT 29.1* 30.6* 31* 27.6*   PLT 99* 105*  --  82*       Recent Labs  Lab 02/27/18  1002 02/28/18  0348    141   K 4.4 3.8    106   CO2 26 27   BUN 17 15   CREATININE 0.9  0.9 0.7    90   CALCIUM 9.6 9.2   MG  --  1.9   PHOS  --  3.9       Recent Labs  Lab 02/27/18  1002 02/28/18  0348   ALKPHOS 38* 33*   ALT 16 15   AST 25 23   ALBUMIN 3.6 3.4*   PROT 7.3 6.7   BILITOT 0.6 0.6   INR 1.1  --        Recent Labs  Lab 02/27/18  2139 02/28/18  0900 02/28/18  1303   POCTGLUCOSE 155* 165* 100        metoprolol tartrate  50 mg Oral BID    pantoprazole  40 mg Intravenous BID    polyethylene glycol  4,000 mL Oral Once       Assessment and Plan     Ms. Marine Mo is a 77 y.o. female with Dm, HTN, HLD, polycystic hepatorenal disease complicated by portal HTN and periesophageal varices who presented to ED 2/26 from GI clinic for drop in hg and tarry stools.    Active Hospital Problems    Diagnosis  POA    *Upper GI bleed [K92.2]  Yes     Priority: 1 - High    Polycystic liver disease [Q44.6]  Not Applicable    Portal hypertension [K76.6]  Unknown    Esophageal varices without bleeding [I85.00]  Unknown    Iron deficiency anemia [D50.9]  Unknown    Essential hypertension [I10]  Yes    Chronic insomnia [F51.04]  Yes    Thrombocytopenia [D69.6]  Yes    Type 2 diabetes mellitus, controlled [E11.9]  Yes    Hyperlipidemia [E78.5]  Yes      Resolved Hospital Problems    Diagnosis Date Resolved POA   No resolved problems to display.     #UGIB: Consistently had tarry stools after taking 1 Aleve on Friday. Hg stable. Last bowel movement  brown.   -  EGD and colonoscopy on Thursday   - Clear liquid diet, prep ordered, npo at midnight   - Type and screen, 2 large bore IVs  - Will hold off on octreotide since not consistent with   - Protonix 40 BID     - Transfuse if <7     #polycystic hepatorenal disease: complicated by esophageal varices and portal hypertension. Dx 20+ years ago  -  FU liver function   - will hold off on ordering ultrasound and CT abdomen      #thrombocytopenia: chronic and likely due to liver disease   - monitor       #iron def anemia    - will continue iron at discharge     #DM: controlled on metformin at home  - accuchecks    - will hold off on ordering sliding scale while on liquid diet      #HTN: controlled   - continue home metoprolol 50 BID      #chronic insomnia   - Xanax prn nightly      Diet:  Liquid until midnight  DVT PPx:  none        Disposition:  Possible discharge tomorrow or Friday    LIYA HAHN MD   [facility]  Pager: [phone]  Spectralink: [phone]   Cell: [phone]

## 2018-02-28 NOTE — CONSULTS
[facility]  Gastroenterology  Consult Note    Patient Name: Marine Mo  MRN: [id number]  Admission Date: 2/27/2018  Hospital Length of Stay: 0 days  Code Status: Full Code   Attending Provider: Bravo Hahn MD   Consulting Provider: Delisa Benito MD  Primary Care Physician: Serg Hwang MD  Principal Problem:Upper GI bleed    Inpatient consult to Gastroenterology  Consult performed by: DELISA BENITO  Consult ordered by: BRAVO HAHN  Reason for consult: anemia        Subjective:     HPI:  Marine Mo is a 77 y.o. female with DM, HTN, HLD, polycystic hepatorenal disease complicated by ?portal HTN and periesophageal varices who presented to ED 2/26 from GI clinic - Dr. Roper for evaluation for drop in hg and tarry stools. Pt has had tarry stools on and off since 2009, but since Friday, pt has had black stools on a daily basis. In 2016, had small polyps and normal EGD. Reports taking Aleve tablet on Friday and takes baby asa m/w/f. Last bowel movement was today and was brown. Hg in clinic 8.5 and 2 months ago hg was  10.4.  No blood transfusions in the past.     Past Medical History:   Diagnosis Date    Anemia 2/27/2018    Hypertension        Past Surgical History:   Procedure Laterality Date    BACK SURGERY      CATARACT EXTRACTION Bilateral     don't remember date    CHOLECYSTECTOMY      HYSTERECTOMY      TONSILLECTOMY, ADENOIDECTOMY         Review of patient's allergies indicates:  No Known Allergies  Family History     Problem Relation (Age of Onset)    Kidney disease Father        Social History Main Topics    Smoking status: Never Smoker    Smokeless tobacco: Never Used    Alcohol use No    Drug use: No    Sexual activity: Yes     Partners: Male     Review of Systems   Constitutional: Positive for fatigue. Negative for activity change and appetite change.   HENT: Negative for ear discharge and facial swelling.    Eyes: Negative for  photophobia and redness.   Respiratory: Negative for wheezing and stridor.    Cardiovascular: Negative for chest pain and palpitations.   Endocrine: Negative for cold intolerance and heat intolerance.   Genitourinary: Negative for dysuria and frequency.   Skin: Negative for color change and rash.   Allergic/Immunologic: Negative for food allergies.   Neurological: Negative for seizures and numbness.   Hematological: Does not bruise/bleed easily.   Psychiatric/Behavioral: Negative for agitation.     Objective:     Vital Signs (Most Recent):  Temp: 97.4 °F (36.3 °C) (02/28/18 1234)  Pulse: 63 (02/28/18 1234)  Resp: 20 (02/28/18 1234)  BP: (!) 141/64 (02/28/18 1234)  SpO2: 96 % (02/28/18 1234) Vital Signs (24h Range):  Temp:  [97.3 °F (36.3 °C)-98.4 °F (36.9 °C)] 97.4 °F (36.3 °C)  Pulse:  [63-78] 63  Resp:  [16-20] 20  SpO2:  [93 %-96 %] 96 %  BP: (123-141)/(56-66) 141/64     Weight: 77.1 kg (170 lb) (02/27/18 1152)  Body mass index is 29.18 kg/m².      Intake/Output Summary (Last 24 hours) at 02/28/18 1312  Last data filed at 02/28/18 0500   Gross per 24 hour   Intake              240 ml   Output                0 ml   Net              240 ml       Lines/Drains/Airways     Peripheral Intravenous Line                 Peripheral IV - Single Lumen 02/27/18 1438 Right Antecubital less than 1 day         Peripheral IV - Single Lumen 02/27/18 1454 Left Antecubital less than 1 day                Physical Exam   Constitutional: She is oriented to person, place, and time. She appears well-developed and well-nourished.   Eyes: No scleral icterus.   Neck: Neck supple.   Cardiovascular: Normal rate.  Exam reveals no gallop.    Pulmonary/Chest: Effort normal. No respiratory distress.   Abdominal: Soft. Bowel sounds are normal. She exhibits no distension and no mass. There is no tenderness. There is no rebound and no guarding. No hernia.   Musculoskeletal: She exhibits no edema.   Neurological: She is alert and oriented to person,  place, and time.   Skin: Skin is warm.   Psychiatric: She has a normal mood and affect.       Lab Results   Component Value Date    WBC 2.14 (L) 02/28/2018    HGB 8.2 (L) 02/28/2018    HCT 27.6 (L) 02/28/2018    MCV 75 (L) 02/28/2018    PLT 82 (L) 02/28/2018     Lab Results   Component Value Date    INR 1.1 02/27/2018     Lab Results   Component Value Date     02/28/2018    K 3.8 02/28/2018    CREATININE 0.7 02/28/2018     Lab Results   Component Value Date    ALBUMIN 3.4 (L) 02/28/2018    ALT 15 02/28/2018    AST 23 02/28/2018    ALKPHOS 33 (L) 02/28/2018    BILITOT 0.6 02/28/2018     No results found for: AFP  Lab Results   Component Value Date    AMYLASE 65 10/28/2016     No results found for: TACROLIMUS    Imaging:  Reviewed and as noted in HPI.         Assessment/Plan:     Iron deficiency anemia    Chronic JUAN and black stools.   Investigation in 2016 with E/C reportedly- unremarkable.   Likely slow intermittent bleeding from AVM.     Plan for EGD/colonoscopy tomorrow.   Prep tonight.   IF above negative, outpatient capsule.                 Thank you for your consult. I will follow-up with patient. Please contact us if you have any additional questions.    Elie Arias MD  Gastroenterology  [facility]

## 2018-02-28 NOTE — ANESTHESIA PREPROCEDURE EVALUATION
[facility]  Anesthesia Pre-Operative Evaluation         Patient Name: Marine Mo  YOB: 1940  MRN: [id number]    SUBJECTIVE:     Pre-operative evaluation for Procedure(s) (LRB):  ESOPHAGOGASTRODUODENOSCOPY (EGD) (N/A)  COLONOSCOPY (N/A)     02/28/2018    Marine Mo is a 77 y.o. female w/ a significant PMHx of DM, HTN, HLD, polycystic hepatorenal disease complicated by portal HTN and periesophageal varices who presented to ED 2/26 from GI clinic for drop in hg and tarry stools. Patient took an aleve on Friday and takes baby aspirin MWF.    Hb 8.2 today.     Patient now presents for the above procedure(s).      LDA:        Peripheral IV - Single Lumen 02/27/18 1438 Right Antecubital (Active)   Site Assessment Dry;Intact 2/27/2018  8:23 PM   Line Status Flushed;Saline locked 2/27/2018  8:23 PM   Dressing Status Clean;Dry;Intact 2/27/2018  2:38 PM   Number of days: 0            Peripheral IV - Single Lumen 02/27/18 1454 Left Antecubital (Active)   Site Assessment Dry;Intact 2/27/2018  8:23 PM   Line Status Flushed;Saline locked 2/27/2018  8:23 PM   Dressing Status Clean;Dry;Intact 2/27/2018  2:54 PM   Number of days: 0       Prev airway: None documented.    Drips: None documented.      Patient Active Problem List   Diagnosis    Spinal stenosis, lumbar    DDD (degenerative disc disease), lumbar    Low back pain without sciatica    Lumbar radiculopathy    Neurogenic claudication    Hyperlipidemia    Obesity (BMI 30.0-34.9)    Thrombocytopenia    Type 2 diabetes mellitus, controlled    Chronic insomnia    Hereditary and idiopathic peripheral neuropathy    Essential hypertension    Diabetes mellitus with neuropathy    Encounter for diabetic foot exam    Hepatomegaly    Splenomegaly    Anemia    Upper GI bleed    Polycystic liver disease    Portal hypertension    Esophageal varices without bleeding    Iron deficiency anemia       Review of patient's allergies  indicates:  No Known Allergies    Current Inpatient Medications:   metoprolol tartrate  50 mg Oral BID    pantoprazole  40 mg Intravenous BID    polyethylene glycol  4,000 mL Oral Once       No current facility-administered medications on file prior to encounter.      Current Outpatient Prescriptions on File Prior to Encounter   Medication Sig Dispense Refill    alprazolam (XANAX) 0.5 MG tablet Take 1 tablet (0.5 mg total) by mouth nightly as needed for Anxiety. 30 tablet 3    ferrous sulfate 325 mg (65 mg iron) Tab tablet Take 1 tablet (325 mg total) by mouth every 12 (twelve) hours. 60 tablet 4    hydrocodone-acetaminophen 5-325mg (NORCO) 5-325 mg per tablet Take 1 tablet by mouth every 6 (six) hours as needed for Pain.      metFORMIN (GLUCOPHAGE) 500 MG tablet Take 1 tablet (500 mg total) by mouth 2 (two) times daily with meals. 180 tablet 3    metoprolol tartrate (LOPRESSOR) 100 MG tablet TAKE 1/2 TABLET TWICE DAILY 90 tablet 2    pantoprazole (PROTONIX) 40 MG tablet Take 40 mg by mouth once daily.      betamethasone dipropionate (DIPROLENE) 0.05 % cream Apply topically 2 (two) times daily. 45 g 1    triamcinolone acetonide 0.1% (KENALOG) 0.1 % cream Apply topically 2 (two) times daily. 15 g 0       Past Surgical History:   Procedure Laterality Date    BACK SURGERY      CATARACT EXTRACTION Bilateral     don't remember date    CHOLECYSTECTOMY      HYSTERECTOMY      TONSILLECTOMY, ADENOIDECTOMY         Social History     Social History    Marital status:      Spouse name: N/A    Number of children: N/A    Years of education: N/A     Occupational History    Not on file.     Social History Main Topics    Smoking status: Never Smoker    Smokeless tobacco: Never Used    Alcohol use No    Drug use: No    Sexual activity: Yes     Partners: Male     Other Topics Concern    Not on file     Social History Narrative    No narrative on file       OBJECTIVE:     Vital Signs Range (Last  24H):  Temp:  [36.3 °C (97.3 °F)-36.9 °C (98.5 °F)]   Pulse:  [70-83]   Resp:  [16-20]   BP: (123-179)/(56-70)   SpO2:  [93 %-96 %]       CBC:   Recent Labs      02/27/18   1438  02/27/18   1442  02/28/18   0348   WBC  2.77*   --   2.14*   RBC  3.96*   --   3.66*   HGB  9.2*   --   8.2*   HCT  30.6*  31*  27.6*   PLT  105*   --   82*   MCV  77*   --   75*   MCH  23.2*   --   22.4*   MCHC  30.1*   --   29.7*       CMP:   Recent Labs      02/27/18   1002  02/28/18   0348   NA  142  141   K  4.4  3.8   CL  106  106   CO2  26  27   BUN  17  15   CREATININE  0.9  0.9  0.7   GLU  110  90   MG   --   1.9   PHOS   --   3.9   CALCIUM  9.6  9.2   ALBUMIN  3.6  3.4*   PROT  7.3  6.7   ALKPHOS  38*  33*   ALT  16  15   AST  25  23   BILITOT  0.6  0.6       INR:  Recent Labs      02/27/18   1002   INR  1.1       Diagnostic Studies: No relevant studies.    EKG: No recent studies available.    2D ECHO:  No results found for this or any previous visit.      ASSESSMENT/PLAN:         Anesthesia Evaluation    I have reviewed the Patient Summary Reports.     I have reviewed the Medications.     Review of Systems  Anesthesia Hx:  No problems with previous Anesthesia  History of prior surgery of interest to airway management or planning: Previous anesthesia: General   Cardiovascular:   Hypertension    Renal/:   polycystic Hepatorenal    Hepatic/GI:   Liver Disease, Portal HTN  Hazel-esophageal varices   Musculoskeletal:   Arthritis     Endocrine:   Diabetes        Physical Exam  General:  Well nourished    Airway/Jaw/Neck:  Airway Findings: Mouth Opening: Normal Tongue: Normal  Mallampati: II     Eyes/Ears/Nose:  EYES/EARS/NOSE FINDINGS: Normal   Dental:  Dental Findings: In tact   Chest/Lungs:  Chest/Lungs Findings: Clear to auscultation, Normal Respiratory Rate     Heart/Vascular:  Heart Findings: Rate: Normal  Rhythm: Regular Rhythm  Sounds: Normal        Mental Status:  Mental Status Findings:  Cooperative, Alert and Oriented          Anesthesia Plan  Type of Anesthesia, risks & benefits discussed:  Anesthesia Type:  MAC, general  Patient's Preference:   Intra-op Monitoring Plan: standard ASA monitors  Intra-op Monitoring Plan Comments:   Post Op Pain Control Plan: per primary service following discharge from PACU  Post Op Pain Control Plan Comments:   Induction:   IV  Beta Blocker:  Patient is on a Beta-Blocker and has received one dose within the past 24 hours (No further documentation required).       Informed Consent: Patient understands risks and agrees with Anesthesia plan.  Questions answered. Anesthesia consent signed with patient.  ASA Score: 4     Day of Surgery Review of History & Physical:  There are no significant changes.  H&P update referred to the provider.         Ready For Surgery From Anesthesia Perspective.

## 2018-02-28 NOTE — PLAN OF CARE
Problem: Patient Care Overview  Goal: Plan of Care Review  Outcome: Ongoing (interventions implemented as appropriate)  POC reviewed,questions addressed,pt remains on clearliquids,no fall/injury this shift,denies BM this shift so far.

## 2018-02-28 NOTE — PLAN OF CARE
Future Appointments  Date Time Provider Department Center   3/3/2018 12:15 PM [facility]-CT1 500 LB LIMIT [facility] IC [address]   3/10/2018 10:15 AM [facility]-US1 MASTER [facility] IC [address]   3/27/2018 11:30 AM Nick Roper MD [facility] GASTRO [address]   pt lives w spouse , uses RW to ambulate longer distances, no hh needs.   Takes baby asa  Has ride home     02/28/18 0845   Discharge Assessment   Assessment Type Discharge Planning Assessment   Confirmed/corrected address and phone number on facesheet? Yes   Assessment information obtained from? Patient;Caregiver   Expected Length of Stay (days) 2   Communicated expected length of stay with patient/caregiver yes   Prior to hospitilization cognitive status: Alert/Oriented   Prior to hospitalization functional status: Independent;Assistive Equipment   Current cognitive status: Alert/Oriented   Current Functional Status: Independent;Assistive Equipment   Lives With spouse   Able to Return to Prior Arrangements yes   Is patient able to care for self after discharge? Yes   Who are your caregiver(s) and their phone number(s)? brown paris   [phone]      Patient's perception of discharge disposition home or selfcare   Readmission Within The Last 30 Days no previous admission in last 30 days   Patient currently being followed by outpatient case management? No   Patient currently receives any other outside agency services? No   Equipment Currently Used at Home walker, rolling   Do you have any problems affording any of your prescribed medications? No   Is the patient taking medications as prescribed? yes   Does the patient have transportation home? Yes   Transportation Available family or friend will provide   Does the patient receive services at the Coumadin Clinic? No  (baby asa)   Discharge Plan A Home with family   Discharge Plan B Home with family   Patient/Family In Agreement With Plan yes        02/28/18 0845   Discharge Assessment   Assessment Type  Discharge Planning Assessment   Confirmed/corrected address and phone number on facesheet? Yes   Assessment information obtained from? Patient;Caregiver   Expected Length of Stay (days) 2   Communicated expected length of stay with patient/caregiver yes   Prior to hospitilization cognitive status: Alert/Oriented   Prior to hospitalization functional status: Independent;Assistive Equipment   Current cognitive status: Alert/Oriented   Current Functional Status: Independent;Assistive Equipment   Lives With spouse   Able to Return to Prior Arrangements yes   Is patient able to care for self after discharge? Yes   Who are your caregiver(s) and their phone number(s)? brown [place]   [phone]      Patient's perception of discharge disposition home or selfcare   Readmission Within The Last 30 Days no previous admission in last 30 days   Patient currently being followed by outpatient case management? No   Patient currently receives any other outside agency services? No   Equipment Currently Used at Home walker, rolling   Do you have any problems affording any of your prescribed medications? No   Is the patient taking medications as prescribed? yes   Does the patient have transportation home? Yes   Transportation Available family or friend will provide   Does the patient receive services at the Coumadin Clinic? No  (baby asa)   Discharge Plan A Home with family   Discharge Plan B Home with family   Patient/Family In Agreement With Plan yes

## 2018-02-28 NOTE — SUBJECTIVE & OBJECTIVE
Past Medical History:   Diagnosis Date    Anemia 2/27/2018    Hypertension        Past Surgical History:   Procedure Laterality Date    BACK SURGERY      CATARACT EXTRACTION Bilateral     don't remember date    CHOLECYSTECTOMY      HYSTERECTOMY      TONSILLECTOMY, ADENOIDECTOMY         Review of patient's allergies indicates:  No Known Allergies  Family History     Problem Relation (Age of Onset)    Kidney disease Father        Social History Main Topics    Smoking status: Never Smoker    Smokeless tobacco: Never Used    Alcohol use No    Drug use: No    Sexual activity: Yes     Partners: Male     Review of Systems   Constitutional: Positive for fatigue. Negative for activity change and appetite change.   HENT: Negative for ear discharge and facial swelling.    Eyes: Negative for photophobia and redness.   Respiratory: Negative for wheezing and stridor.    Cardiovascular: Negative for chest pain and palpitations.   Endocrine: Negative for cold intolerance and heat intolerance.   Genitourinary: Negative for dysuria and frequency.   Skin: Negative for color change and rash.   Allergic/Immunologic: Negative for food allergies.   Neurological: Negative for seizures and numbness.   Hematological: Does not bruise/bleed easily.   Psychiatric/Behavioral: Negative for agitation.     Objective:     Vital Signs (Most Recent):  Temp: 97.4 °F (36.3 °C) (02/28/18 1234)  Pulse: 63 (02/28/18 1234)  Resp: 20 (02/28/18 1234)  BP: (!) 141/64 (02/28/18 1234)  SpO2: 96 % (02/28/18 1234) Vital Signs (24h Range):  Temp:  [97.3 °F (36.3 °C)-98.4 °F (36.9 °C)] 97.4 °F (36.3 °C)  Pulse:  [63-78] 63  Resp:  [16-20] 20  SpO2:  [93 %-96 %] 96 %  BP: (123-141)/(56-66) 141/64     Weight: 77.1 kg (170 lb) (02/27/18 1152)  Body mass index is 29.18 kg/m².      Intake/Output Summary (Last 24 hours) at 02/28/18 1312  Last data filed at 02/28/18 0500   Gross per 24 hour   Intake              240 ml   Output                0 ml   Net               240 ml       Lines/Drains/Airways     Peripheral Intravenous Line                 Peripheral IV - Single Lumen 02/27/18 1438 Right Antecubital less than 1 day         Peripheral IV - Single Lumen 02/27/18 1454 Left Antecubital less than 1 day                Physical Exam   Constitutional: She is oriented to person, place, and time. She appears well-developed and well-nourished.   Eyes: No scleral icterus.   Neck: Neck supple.   Cardiovascular: Normal rate.  Exam reveals no gallop.    Pulmonary/Chest: Effort normal. No respiratory distress.   Abdominal: Soft. Bowel sounds are normal. She exhibits no distension and no mass. There is no tenderness. There is no rebound and no guarding. No hernia.   Musculoskeletal: She exhibits no edema.   Neurological: She is alert and oriented to person, place, and time.   Skin: Skin is warm.   Psychiatric: She has a normal mood and affect.       Lab Results   Component Value Date    WBC 2.14 (L) 02/28/2018    HGB 8.2 (L) 02/28/2018    HCT 27.6 (L) 02/28/2018    MCV 75 (L) 02/28/2018    PLT 82 (L) 02/28/2018     Lab Results   Component Value Date    INR 1.1 02/27/2018     Lab Results   Component Value Date     02/28/2018    K 3.8 02/28/2018    CREATININE 0.7 02/28/2018     Lab Results   Component Value Date    ALBUMIN 3.4 (L) 02/28/2018    ALT 15 02/28/2018    AST 23 02/28/2018    ALKPHOS 33 (L) 02/28/2018    BILITOT 0.6 02/28/2018     No results found for: AFP  Lab Results   Component Value Date    AMYLASE 65 10/28/2016     No results found for: TACROLIMUS    Imaging:  Reviewed and as noted in HPI.

## 2018-02-28 NOTE — ASSESSMENT & PLAN NOTE
Chronic JUAN and black stools.   Investigation in 2016 with E/C reportedly- unremarkable.   Likely slow intermittent bleeding from AVM.     Plan for EGD/colonoscopy tomorrow.   Prep tonight.   IF above negative, outpatient capsule.

## 2018-02-28 NOTE — PROGRESS NOTES
"Pt's  voicing that he is "disgusted" and upset that pt had to come to ED yesterday and that no testing has been done yet.POC reviewed with both pt and  ,however ,he remains disgruntled.Support provided.Dr Galo notified of above and she stated they will be seen on rounds,relayed this to pt and . Pt in NAD,ctm.                                 "

## 2018-03-01 ENCOUNTER — TELEPHONE (OUTPATIENT)
Dept: GASTROENTEROLOGY | Facility: CLINIC | Age: 78
End: 2018-03-01

## 2018-03-01 ENCOUNTER — ANESTHESIA (OUTPATIENT)
Dept: ENDOSCOPY | Facility: HOSPITAL | Age: 78
End: 2018-03-01
Payer: MEDICARE

## 2018-03-01 VITALS
TEMPERATURE: 99 F | BODY MASS INDEX: 29.02 KG/M2 | SYSTOLIC BLOOD PRESSURE: 137 MMHG | OXYGEN SATURATION: 95 % | RESPIRATION RATE: 16 BRPM | WEIGHT: 170 LBS | HEIGHT: 64 IN | DIASTOLIC BLOOD PRESSURE: 65 MMHG | HEART RATE: 62 BPM

## 2018-03-01 LAB
ALBUMIN SERPL BCP-MCNC: 4 G/DL
ALP SERPL-CCNC: 38 U/L
ALT SERPL W/O P-5'-P-CCNC: 19 U/L
ANION GAP SERPL CALC-SCNC: 8 MMOL/L
AST SERPL-CCNC: 30 U/L
BASOPHILS # BLD AUTO: 0.02 K/UL
BASOPHILS NFR BLD: 0.6 %
BILIRUB SERPL-MCNC: 0.8 MG/DL
BUN SERPL-MCNC: 11 MG/DL
CALCIUM SERPL-MCNC: 9.6 MG/DL
CHLORIDE SERPL-SCNC: 103 MMOL/L
CO2 SERPL-SCNC: 29 MMOL/L
CREAT SERPL-MCNC: 0.8 MG/DL
DIFFERENTIAL METHOD: ABNORMAL
EOSINOPHIL # BLD AUTO: 0.1 K/UL
EOSINOPHIL NFR BLD: 3.3 %
ERYTHROCYTE [DISTWIDTH] IN BLOOD BY AUTOMATED COUNT: 17 %
EST. GFR  (AFRICAN AMERICAN): >60 ML/MIN/1.73 M^2
EST. GFR  (NON AFRICAN AMERICAN): >60 ML/MIN/1.73 M^2
GLUCOSE SERPL-MCNC: 114 MG/DL
HCT VFR BLD AUTO: 30.7 %
HGB BLD-MCNC: 9.3 G/DL
IMM GRANULOCYTES # BLD AUTO: 0.01 K/UL
IMM GRANULOCYTES NFR BLD AUTO: 0.3 %
LYMPHOCYTES # BLD AUTO: 1 K/UL
LYMPHOCYTES NFR BLD: 27.5 %
MAGNESIUM SERPL-MCNC: 2 MG/DL
MCH RBC QN AUTO: 23.1 PG
MCHC RBC AUTO-ENTMCNC: 30.3 G/DL
MCV RBC AUTO: 76 FL
MONOCYTES # BLD AUTO: 0.4 K/UL
MONOCYTES NFR BLD: 11.7 %
NEUTROPHILS # BLD AUTO: 2 K/UL
NEUTROPHILS NFR BLD: 56.6 %
NRBC BLD-RTO: 0 /100 WBC
PHOSPHATE SERPL-MCNC: 3.7 MG/DL
PLATELET # BLD AUTO: 102 K/UL
PMV BLD AUTO: 8.8 FL
POCT GLUCOSE: 105 MG/DL (ref 70–110)
POCT GLUCOSE: 108 MG/DL (ref 70–110)
POCT GLUCOSE: 122 MG/DL (ref 70–110)
POCT GLUCOSE: 128 MG/DL (ref 70–110)
POTASSIUM SERPL-SCNC: 3.8 MMOL/L
PROT SERPL-MCNC: 7.5 G/DL
RBC # BLD AUTO: 4.02 M/UL
SODIUM SERPL-SCNC: 140 MMOL/L
WBC # BLD AUTO: 3.6 K/UL

## 2018-03-01 PROCEDURE — G0378 HOSPITAL OBSERVATION PER HR: HCPCS

## 2018-03-01 PROCEDURE — D9220A PRA ANESTHESIA: Mod: ANES,,, | Performed by: ANESTHESIOLOGY

## 2018-03-01 PROCEDURE — C9113 INJ PANTOPRAZOLE SODIUM, VIA: HCPCS | Performed by: HOSPITALIST

## 2018-03-01 PROCEDURE — 45388 COLONOSCOPY W/ABLATION: CPT | Mod: ,,, | Performed by: INTERNAL MEDICINE

## 2018-03-01 PROCEDURE — 85025 COMPLETE CBC W/AUTO DIFF WBC: CPT

## 2018-03-01 PROCEDURE — 83735 ASSAY OF MAGNESIUM: CPT

## 2018-03-01 PROCEDURE — 43239 EGD BIOPSY SINGLE/MULTIPLE: CPT | Mod: 51,,, | Performed by: INTERNAL MEDICINE

## 2018-03-01 PROCEDURE — 82962 GLUCOSE BLOOD TEST: CPT | Performed by: INTERNAL MEDICINE

## 2018-03-01 PROCEDURE — 37000008 HC ANESTHESIA 1ST 15 MINUTES: Performed by: INTERNAL MEDICINE

## 2018-03-01 PROCEDURE — 80053 COMPREHEN METABOLIC PANEL: CPT

## 2018-03-01 PROCEDURE — 43239 EGD BIOPSY SINGLE/MULTIPLE: CPT | Performed by: INTERNAL MEDICINE

## 2018-03-01 PROCEDURE — 63600175 PHARM REV CODE 636 W HCPCS: Performed by: NURSE ANESTHETIST, CERTIFIED REGISTERED

## 2018-03-01 PROCEDURE — 88342 IMHCHEM/IMCYTCHM 1ST ANTB: CPT | Performed by: PATHOLOGY

## 2018-03-01 PROCEDURE — 88305 TISSUE EXAM BY PATHOLOGIST: CPT | Mod: 59 | Performed by: PATHOLOGY

## 2018-03-01 PROCEDURE — 25000003 PHARM REV CODE 250: Performed by: NURSE ANESTHETIST, CERTIFIED REGISTERED

## 2018-03-01 PROCEDURE — 37000009 HC ANESTHESIA EA ADD 15 MINS: Performed by: INTERNAL MEDICINE

## 2018-03-01 PROCEDURE — 36415 COLL VENOUS BLD VENIPUNCTURE: CPT

## 2018-03-01 PROCEDURE — 45380 COLONOSCOPY AND BIOPSY: CPT | Mod: 59,,, | Performed by: INTERNAL MEDICINE

## 2018-03-01 PROCEDURE — 45380 COLONOSCOPY AND BIOPSY: CPT | Performed by: INTERNAL MEDICINE

## 2018-03-01 PROCEDURE — 84100 ASSAY OF PHOSPHORUS: CPT

## 2018-03-01 PROCEDURE — 88342 IMHCHEM/IMCYTCHM 1ST ANTB: CPT | Mod: 26,,, | Performed by: PATHOLOGY

## 2018-03-01 PROCEDURE — 27202087 HC PROBE, APC: Performed by: INTERNAL MEDICINE

## 2018-03-01 PROCEDURE — 45388 COLONOSCOPY W/ABLATION: CPT | Performed by: INTERNAL MEDICINE

## 2018-03-01 PROCEDURE — 27201012 HC FORCEPS, HOT/COLD, DISP: Performed by: INTERNAL MEDICINE

## 2018-03-01 PROCEDURE — 99217 PR OBSERVATION CARE DISCHARGE: CPT | Mod: ,,, | Performed by: HOSPITALIST

## 2018-03-01 PROCEDURE — D9220A PRA ANESTHESIA: Mod: CRNA,,, | Performed by: NURSE ANESTHETIST, CERTIFIED REGISTERED

## 2018-03-01 PROCEDURE — 63600175 PHARM REV CODE 636 W HCPCS: Performed by: HOSPITALIST

## 2018-03-01 PROCEDURE — 25000003 PHARM REV CODE 250: Performed by: HOSPITALIST

## 2018-03-01 PROCEDURE — 88305 TISSUE EXAM BY PATHOLOGIST: CPT | Mod: 26,,, | Performed by: PATHOLOGY

## 2018-03-01 RX ORDER — FENTANYL CITRATE 50 UG/ML
INJECTION, SOLUTION INTRAMUSCULAR; INTRAVENOUS
Status: DISCONTINUED | OUTPATIENT
Start: 2018-03-01 | End: 2018-03-01

## 2018-03-01 RX ORDER — MIDAZOLAM HYDROCHLORIDE 5 MG/ML
INJECTION INTRAMUSCULAR; INTRAVENOUS
Status: DISCONTINUED | OUTPATIENT
Start: 2018-03-01 | End: 2018-03-01

## 2018-03-01 RX ORDER — PROPOFOL 10 MG/ML
VIAL (ML) INTRAVENOUS CONTINUOUS PRN
Status: DISCONTINUED | OUTPATIENT
Start: 2018-03-01 | End: 2018-03-01

## 2018-03-01 RX ORDER — FENTANYL CITRATE 50 UG/ML
25 INJECTION, SOLUTION INTRAMUSCULAR; INTRAVENOUS EVERY 5 MIN PRN
Status: DISCONTINUED | OUTPATIENT
Start: 2018-03-01 | End: 2018-03-01 | Stop reason: HOSPADM

## 2018-03-01 RX ORDER — PHENYLEPHRINE HYDROCHLORIDE 10 MG/ML
INJECTION INTRAVENOUS
Status: DISCONTINUED | OUTPATIENT
Start: 2018-03-01 | End: 2018-03-01

## 2018-03-01 RX ORDER — SODIUM CHLORIDE 0.9 % (FLUSH) 0.9 %
3 SYRINGE (ML) INJECTION
Status: DISCONTINUED | OUTPATIENT
Start: 2018-03-01 | End: 2018-03-01 | Stop reason: HOSPADM

## 2018-03-01 RX ORDER — PROPOFOL 10 MG/ML
VIAL (ML) INTRAVENOUS
Status: DISCONTINUED | OUTPATIENT
Start: 2018-03-01 | End: 2018-03-01

## 2018-03-01 RX ORDER — SODIUM CHLORIDE 9 MG/ML
INJECTION, SOLUTION INTRAVENOUS CONTINUOUS PRN
Status: DISCONTINUED | OUTPATIENT
Start: 2018-03-01 | End: 2018-03-01

## 2018-03-01 RX ADMIN — MIDAZOLAM HYDROCHLORIDE 1 MG: 5 INJECTION, SOLUTION INTRAMUSCULAR; INTRAVENOUS at 07:03

## 2018-03-01 RX ADMIN — PANTOPRAZOLE SODIUM 40 MG: 40 INJECTION, POWDER, FOR SOLUTION INTRAVENOUS at 10:03

## 2018-03-01 RX ADMIN — PHENYLEPHRINE HYDROCHLORIDE 100 MCG: 10 INJECTION INTRAVENOUS at 07:03

## 2018-03-01 RX ADMIN — FENTANYL CITRATE 50 MCG: 50 INJECTION, SOLUTION INTRAMUSCULAR; INTRAVENOUS at 07:03

## 2018-03-01 RX ADMIN — METOPROLOL TARTRATE 50 MG: 50 TABLET ORAL at 10:03

## 2018-03-01 RX ADMIN — PROPOFOL 20 MG: 10 INJECTION, EMULSION INTRAVENOUS at 07:03

## 2018-03-01 RX ADMIN — SODIUM CHLORIDE: 0.9 INJECTION, SOLUTION INTRAVENOUS at 07:03

## 2018-03-01 RX ADMIN — PROPOFOL 100 MCG/KG/MIN: 10 INJECTION, EMULSION INTRAVENOUS at 07:03

## 2018-03-01 NOTE — PROVATION PATIENT INSTRUCTIONS
Discharge Summary/Instructions after an Endoscopic Procedure  Patient Name: Marine Mo  Patient MRN: [id number]  Patient YOB: 1940  Thursday, March 01, 2018  Ren Newton MD  RESTRICTIONS:  During your procedure today, you received medications for sedation.  These   medications may affect your judgment, balance and coordination.  Therefore,   for 24 hours, you have the following restrictions:   - DO NOT drive a car, operate machinery, make legal/financial decisions,   sign important papers or drink alcohol.    ACTIVITY:  The following day: return to full activity including work, except no heavy   lifting, straining or running for 3 days if polyps were removed.  DIET:  Eat and drink normally unless instructed otherwise.     TREATMENT FOR COMMON SIDE EFFECTS:  - Mild abdominal pain, nausea, belching, bloating or excessive gas:  rest,   eat lightly and use a heating pad.  - Sore Throat: treat with throat lozenges and/or gargle with warm salt   water.  - Because air was used during the procedure, expelling large amounts of air   from your rectum or belching is normal.  - If a bowel prep was taken, you may not have a bowel movement for 1-3 days.    This is normal.  SYMPTOMS TO WATCH FOR AND REPORT TO YOUR PHYSICIAN:  1. Abdominal pain or bloating, other than gas cramps.  2. Chest pain.  3. Back pain.  4. Signs of infection such as: chills or fever occurring within 24 hours   after the procedure.  5. Rectal bleeding, which would show as bright red, maroon, or black stools.   (A tablespoon of blood from the rectum is not serious, especially if   hemorrhoids are present.)  6. Vomiting.  7. Weakness or dizziness.  GO DIRECTLY TO THE NEAREST EMERGENCY ROOM IF YOU HAVE ANY OF THE FOLLOWING:      Difficulty breathing  Chills and/or fever over 101 F   Persistent vomiting and/or vomiting blood   Severe abdominal pain   Severe chest pain   Black, tarry stools   Bleeding- more than one tablespoon   Any other symptom  or condition that you feel may need urgent attention  Your doctor recommends these additional instructions:  If any biopsies were taken, your doctors clinic will contact you in 1 to 2   weeks with any results.  Advance your diet as tolerated.   Continue your present medications.   We are waiting for your pathology results.   Return to your referring physician after studies are complete.  For questions, problems or results please call your physician - Ren Newton MD at Work:  [phone].  [facility], EMERGENCY ROOM PHONE NUMBER: [phone]  IF A COMPLICATION OR EMERGENCY SITUATION ARISES AND YOU ARE UNABLE TO REACH   YOUR PHYSICIAN - GO DIRECTLY TO THE EMERGENCY ROOM.  Ren Newton MD  3/1/2018 7:42:16 AM  This report has been verified and signed electronically.

## 2018-03-01 NOTE — NURSING TRANSFER
Pt transferred via wheelchair from post-op to room 959.  Transported by: YANELIS Haas  Report given to Last EPSTEIN PER Handoff on Doc Flowsheet  VSS per Doc Flowsheet  Medicines sent: No  Chart sent with patient: Yes    Dr. Newton spoke to pt at bedside regarding resilts of procedures, pt copy of reports given to pt by RN

## 2018-03-01 NOTE — PLAN OF CARE
Go     Mar3 Ct Abd Pel W Contrast 12:15 PM   [facility]   [address]   [place] [address]   [phone]         03/01/18 1145   Final Note   Assessment Type Final Discharge Note   Discharge Disposition Home   Hospital Follow Up  Appt(s) scheduled? Yes   Discharge plans and expectations educations in teach back method with documentation complete? Yes   Right Care Referral Info   Post Acute Recommendation No Care     Future Appointments  Date Time Provider Department Center   3/3/2018 12:15 PM [facility]-CT1 500 LB LIMIT [facility] CTSC IC [address]   3/10/2018 10:15 AM [facility]-US1 MASTER [facility] ULTR [address]   3/27/2018 11:30 AM Nick Roper MD [facility] GASTRO [address]     What's next     What's next          Go to     Mar3 Ct Abd Pel W Contrast   Saturday Mar 3, 2018 12:15 PM  [facility]   [address]  [facility] [address]   [phone]    Mar7 ESOPHAGOGASTRODUODENOSCOPY (EGD) with Nick Roper MD   Wednesday Mar 7, 2018  [facility] ENDO (4TH FLR)   [address] [address] [address]    Mar10 Us Abdomen / Liver   Saturday Mar 10, 2018 10:15 AM  [facility]   [address]  [facility] [address]   [phone]    Mar27 GASTROENTEROLOGY ESTABLISHED PATIENT with Nick Roper MD   Tuesday Mar 27, 2018 11:30 AM  [facility]   [address]  [place] [address]   [phone]           Follow up with GI. Dr. Roper   Tuesday Mar 27, 2018

## 2018-03-01 NOTE — TELEPHONE ENCOUNTER
----- Message from Svetlana Pitts sent at 3/1/2018  2:28 PM CST -----  Contact: pt's spouse Josue jamila [phone] or cell [phone]  Judson    Pt's  is asking to have pt's procedures scheduled on the same day due to their travel time. Please call Josue

## 2018-03-01 NOTE — ANESTHESIA POSTPROCEDURE EVALUATION
"Anesthesia Post Evaluation    Patient: Marine Mo    Procedure(s) Performed: Procedure(s) (LRB):  ESOPHAGOGASTRODUODENOSCOPY (EGD) (N/A)  COLONOSCOPY (N/A)    Final Anesthesia Type: general  Patient location during evaluation: PACU  Patient participation: Yes- Able to Participate  Level of consciousness: awake and alert  Post-procedure vital signs: reviewed and stable  Pain management: adequate  Airway patency: patent  PONV status at discharge: No PONV  Anesthetic complications: no      Cardiovascular status: blood pressure returned to baseline  Respiratory status: unassisted  Hydration status: euvolemic  Follow-up not needed.        Visit Vitals  BP (!) 118/52   Pulse 62   Temp 36.6 °C (97.9 °F) (Temporal)   Resp 16   Ht 5' 4" (1.626 m)   Wt 77.1 kg (170 lb)   SpO2 97%   Breastfeeding? No   BMI 29.18 kg/m²       Pain/Ion Score: Pain Assessment Performed: Yes (3/1/2018  8:58 AM)  Presence of Pain: denies (3/1/2018  8:58 AM)  Ion Score: 10 (3/1/2018  8:58 AM)      "

## 2018-03-01 NOTE — PROVATION PATIENT INSTRUCTIONS
Discharge Summary/Instructions after an Endoscopic Procedure  Patient Name: Marine Mo  Patient MRN: [id number]  Patient YOB: 1940  Thursday, March 01, 2018  Ren Newton MD  RESTRICTIONS:  During your procedure today, you received medications for sedation.  These   medications may affect your judgment, balance and coordination.  Therefore,   for 24 hours, you have the following restrictions:   - DO NOT drive a car, operate machinery, make legal/financial decisions,   sign important papers or drink alcohol.    ACTIVITY:  The following day: return to full activity including work, except no heavy   lifting, straining or running for 3 days if polyps were removed.  DIET:  Eat and drink normally unless instructed otherwise.     TREATMENT FOR COMMON SIDE EFFECTS:  - Mild abdominal pain, nausea, belching, bloating or excessive gas:  rest,   eat lightly and use a heating pad.  - Sore Throat: treat with throat lozenges and/or gargle with warm salt   water.  - Because air was used during the procedure, expelling large amounts of air   from your rectum or belching is normal.  - If a bowel prep was taken, you may not have a bowel movement for 1-3 days.    This is normal.  SYMPTOMS TO WATCH FOR AND REPORT TO YOUR PHYSICIAN:  1. Abdominal pain or bloating, other than gas cramps.  2. Chest pain.  3. Back pain.  4. Signs of infection such as: chills or fever occurring within 24 hours   after the procedure.  5. Rectal bleeding, which would show as bright red, maroon, or black stools.   (A tablespoon of blood from the rectum is not serious, especially if   hemorrhoids are present.)  6. Vomiting.  7. Weakness or dizziness.  GO DIRECTLY TO THE NEAREST EMERGENCY ROOM IF YOU HAVE ANY OF THE FOLLOWING:      Difficulty breathing  Chills and/or fever over 101 F   Persistent vomiting and/or vomiting blood   Severe abdominal pain   Severe chest pain   Black, tarry stools   Bleeding- more than one tablespoon   Any other symptom  or condition that you feel may need urgent attention  Your doctor recommends these additional instructions:  If any biopsies were taken, your doctors clinic will contact you in 1 to 2   weeks with any results.  Advance your diet as tolerated.   Continue your present medications.   We are waiting for your pathology results.   Return to your referring physician as previously scheduled.  For questions, problems or results please call your physician - Ren Newton MD at Work:  [phone].  [facility], EMERGENCY ROOM PHONE NUMBER: [phone]  IF A COMPLICATION OR EMERGENCY SITUATION ARISES AND YOU ARE UNABLE TO REACH   YOUR PHYSICIAN - GO DIRECTLY TO THE EMERGENCY ROOM.  Ren Newton MD  3/1/2018 8:09:35 AM  This report has been verified and signed electronically.

## 2018-03-01 NOTE — NURSING
CARE ASSUMED FROM PRIMARY NURSE, EBENEZER. PATIENT IS NOT IN ROOM. PATIENT IS IN ENDOSCOPY FOR EGD AND COLONOSCOPY.

## 2018-03-01 NOTE — DISCHARGE INSTRUCTIONS
Upper GI Endoscopy     During endoscopy, a long, flexible tube is used to view the inside of your upper GI tract.      Upper GI endoscopy allows your healthcare provider to look directly into the beginning of your gastrointestinal (GI) tract. The esophagus, stomach, and duodenum (the first part of the small intestine) make up the upper GI tract.   Before the exam  Follow these and any other instructions you are given before your endoscopy. If you dont follow the healthcare providers instructions carefully, the test may need to be canceled or done over:  · Don't eat or drink anything after midnight the night before your exam. If your exam is in the afternoon, drink only clear liquids in the morning. Don't eat or drink anything for 8 hours before the exam. In some cases, you may be able to take medicines with sips of water until 2 hours before the procedure. Speak with your healthcare provider about this.   · Bring your X-rays and any other test results you have.  · Because you will be sedated, arrange for an adult to drive you home after the exam.  · Tell your healthcare provider before the exam if you are taking any medicines or have any medical problems.  The procedure  Here is what to expect:  · You will lie on the endoscopy table. Usually patients lie on the left side.  · You will be monitored and given oxygen.  · Your throat may be numbed with a spray or gargle. You are given medicine through an intravenous (IV) line that will help you relax and remain comfortable. You may be awake or asleep during the procedure.  · The healthcare provider will put the endoscope in your mouth and down your esophagus. It is thinner than most pieces of food that you swallow. It will not affect your breathing. The medicine helps keep you from gagging.  · Air is put into your GI tract to expand it. It can make you burp.  · During the procedure, the healthcare provider can take biopsies (tissue samples), remove abnormalities,  such as polyps, or treat abnormalities through a variety of devices placed through the endoscope. You will not feel this.   · The endoscope carries images of your upper GI tract to a video screen. If you are awake, you may be able to look at the images.  · After the procedure is done, you will rest for a time. An adult must drive you home.  When to call your healthcare provider  Contact your healthcare provider if you have:  · Black or tarry stools, or blood in your stool  · Fever  · Pain in your belly that does not go away  · Nausea and vomiting, or vomiting blood   Date Last Reviewed: 7/1/2016 © 2000-2017 [employer]. [address] [place] [address], [place] [address]. All rights reserved. This information is not intended as a substitute for professional medical care. Always follow your healthcare professional's instructions.      Colonoscopy     A camera attached to a flexible tube with a viewing lens is used to take video pictures.     Colonoscopy is a test to view the inside of your lower digestive tract (colon and rectum). Sometimes it can show the last part of the small intestine (ileum). During the test, small pieces of tissue may be removed for testing. This is called a biopsy. Small growths, such as polyps, may also be removed.   Why is colonoscopy done?  The test is done to help look for colon cancer. And it can help find the source of abdominal pain, bleeding, and changes in bowel habits. It may be needed once a year, depending on factors such as your:  · Age  · Health history  · Family health history  · Symptoms  · Results from any prior colonoscopy  Risks and possible complications  These include:  · Bleeding               · A puncture or tear in the colon   · Risks of anesthesia  · A cancer lesion not being seen  Getting ready   To prepare for the test:  · Talk with your healthcare provider about the risks of the test (see below). Also ask your healthcare provider about alternatives to the  test.  · Tell your healthcare provider about any medicines you take. Also tell him or her about any health conditions you may have.  · Make sure your rectum and colon are empty for the test. Follow the diet and bowel prep instructions exactly. If you dont, the test may need to be rescheduled.  · Plan for a friend or family member to drive you home after the test.     Colonoscopy provides an inside view of the entire colon.     You may discuss the results with your doctor right away or at a future visit.  During the test   The test is usually done in the hospital on an outpatient basis. This means you go home the same day. The procedure takes about 30 minutes. During that time:  · You are given relaxing (sedating) medicine through an IV line. You may be drowsy, or fully asleep.  · The healthcare provider will first give you a physical exam to check for anal and rectal problems.  · Then the anus is lubricated and the scope inserted.  · If you are awake, you may have a feeling similar to needing to have a bowel movement. You may also feel pressure as air is pumped into the colon. Its OK to pass gas during the procedure.  · Biopsy, polyp removal, or other treatments may be done during the test.  After the test   You may have gas right after the test. It can help to try to pass it to help prevent later bloating. Your healthcare provider may discuss the results with you right away. Or you may need to schedule a follow-up visit to talk about the results. After the test, you can go back to your normal eating and other activities. You may be tired from the sedation and need to rest for a few hours.  Date Last Reviewed: 11/1/2016 © 2000-2017 [employer]. [address] [place] [address]. All rights reserved. This information is not intended as a substitute for professional medical care. Always follow your healthcare professional's instructions.    Home Care Instructions  Upper EGD & Colonoscopy      ACTIVITY LEVEL:  If you received sedation or an anesthetic, you may feel sleepy for several hours. Resume your normal activity. No heavy lifting, straining, or running for 3 days after your procedure.    DIET:  At home, if there is no nausea, eat a soft diet. Avoid any greasy or spicy foods. Gradually resume your normal diet. You may experience a sore throat for 24-48 hours. You may use throat lozenges or gargle with warm salt water to relieve the discomfort. Because air was put into your stomach during the procedure, you may also experience some belching.    BATHING:  No restrictions.    TREATMENT FOR COMMON SIDE EFFECTS:  Mild abdominal pain and bloating or excessive gas: rest, eat lightly, and use a heating pad.  Go directly to the emergency room if you notice any of the following:   Fever and/or chills greater than 101°F (38.4ºC) within 24 hours after a procedure.   Persistent vomiting or vomiting with blood   Severe abdominal pain, other than gas cramps   Severe chest pain   Black, tarry stools   Any bleeding- exceeding one tablespoon  (with biopsy's, there may be a few little streaks on blood on initial BM)    FOR EMERGENCIES:  If any unusual problems or difficulties occur, contact Dr. Ren Newton or the resident at [phone] (page ) or at the Clinic office, [phone].

## 2018-03-01 NOTE — TRANSFER OF CARE
"Anesthesia Transfer of Care Note    Patient: Marine Mo    Procedure(s) Performed: Procedure(s) (LRB):  ESOPHAGOGASTRODUODENOSCOPY (EGD) (N/A)  COLONOSCOPY (N/A)    Patient location: PACU    Anesthesia Type: general    Transport from OR: Transported from OR on room air with adequate spontaneous ventilation    Post pain: adequate analgesia    Post assessment: no apparent anesthetic complications and tolerated procedure well    Post vital signs: stable    Level of consciousness: awake, alert and oriented    Nausea/Vomiting: no nausea/vomiting    Complications: none    Transfer of care protocol was followed      Last vitals:   Visit Vitals  BP (!) 122/57 (Patient Position: Lying)   Pulse 68   Temp 36.6 °C (97.9 °F) (Temporal)   Resp 16   Ht 5' 4" (1.626 m)   Wt 77.1 kg (170 lb)   SpO2 (!) 94%   Breastfeeding? No   BMI 29.18 kg/m²     "

## 2018-03-01 NOTE — NURSING
PATIENT AND SPOUSE MADE AWARE OF DISCHARGE ORDERS. DISCHARGE INSTRUCTIONS REVIEWED WITH AND PROVIDED TO PATIENT AND SPOUSE. EDUCATION PROVIDED. UNDERSTANDING VERBALIZED. IV REMOVED. SITE WITHIN NORMAL LIMITS. PATIENT'S  WILL PROVIDED TRANSPORTATION HOME. PATIENT STATED THAT SHE PREFERS A WHEELCHAIR FOR TRANSPORT DOWNSTAIRS TO PERSONAL VEHICLE. TIME GIVEN FOR QUESTIONS, COMMENTS AND CONCERNS.

## 2018-03-02 ENCOUNTER — TELEPHONE (OUTPATIENT)
Dept: GASTROENTEROLOGY | Facility: CLINIC | Age: 78
End: 2018-03-02

## 2018-03-02 NOTE — TELEPHONE ENCOUNTER
----- Message from Nick Roper MD sent at 2/28/2018  6:33 PM CST -----  Yasmin - please tell patient that they are iron deficient and anaemic and recommend that they take ferrous sulfate one 325mg pill every 12 hours for next 4 months and repeat fasting hemoglobin and Ferritin in 8 weeks - Orders placed.    She has EGD and colonoscopy schedule for tomorrow with Dr. Ren Newton.

## 2018-03-03 ENCOUNTER — HOSPITAL ENCOUNTER (OUTPATIENT)
Dept: RADIOLOGY | Facility: HOSPITAL | Age: 78
Discharge: HOME | End: 2018-03-03
Attending: INTERNAL MEDICINE
Payer: MEDICARE

## 2018-03-03 DIAGNOSIS — K92.1 MELENA: ICD-10-CM

## 2018-03-03 DIAGNOSIS — E61.1 IRON DEFICIENCY: ICD-10-CM

## 2018-03-03 DIAGNOSIS — R19.5 DARK STOOLS: ICD-10-CM

## 2018-03-03 DIAGNOSIS — R10.84 GENERALIZED ABDOMINAL PAIN: ICD-10-CM

## 2018-03-03 PROCEDURE — 25500020 PHARM REV CODE 255: Performed by: INTERNAL MEDICINE

## 2018-03-03 PROCEDURE — 74177 CT ABD & PELVIS W/CONTRAST: CPT | Mod: TC

## 2018-03-03 PROCEDURE — 74177 CT ABD & PELVIS W/CONTRAST: CPT | Mod: 26,,, | Performed by: RADIOLOGY

## 2018-03-03 RX ADMIN — IOHEXOL 75 ML: 350 INJECTION, SOLUTION INTRAVENOUS at 11:03

## 2018-03-03 NOTE — DISCHARGE SUMMARY
Discharge Summary  Hospital Medicine    Patient Name: Marine Mo  MRN:  [id number]  [place]ospital Medicine [place] [facility] [place] Bravo Zapata MD  Date of Admission:  2/27/2018     Date of Discharge:  03/03/2018  Length of Stay:  LOS: 0 days   Principal Problem:  Upper GI bleed     Active Hospital Problems    Diagnosis  POA    *Upper GI bleed [K92.2]  Yes     Priority: 1 - High    Polycystic liver disease [Q44.6]  Not Applicable    Portal hypertension [K76.6]  Unknown    Esophageal varices without bleeding [I85.00]  Unknown    Iron deficiency anemia [D50.9]  Unknown    Essential hypertension [I10]  Yes    Chronic insomnia [F51.04]  Yes    Thrombocytopenia [D69.6]  Yes    Type 2 diabetes mellitus, controlled [E11.9]  Yes    Hyperlipidemia [E78.5]  Yes      Resolved Hospital Problems    Diagnosis Date Resolved POA   No resolved problems to display.     Ms. Marine Mo is a 77 y.o. 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 He remained stable throughout stay. Never required blood transfusion. EGD with erythematous mucosa. Varices not visualized on scope. Colonscopy with non-bleeding angiodyplastic lesion treated with APC          Labs:       Recent Labs  Lab 02/27/18  1002 02/27/18  1438 02/27/18  1442 02/28/18  0348 03/01/18  0341   WBC 2.99* 2.77*  --  2.14* 3.60*   HGB 8.5* 9.2*  --  8.2* 9.3*   HCT 29.1* 30.6* 31* 27.6* 30.7*   PLT 99* 105*  --  82* 102*       Recent Labs  Lab 02/27/18  1002 02/28/18  0348 03/01/18  0342    141 140   K 4.4 3.8 3.8    106 103   CO2 26 27 29   BUN 17 15 11   CREATININE 0.9  0.9 0.7 0.8   CALCIUM 9.6 9.2 9.6   MG  --  1.9 2.0   PHOS  --  3.9 3.7   PROT 7.3 6.7 7.5   BILITOT 0.6 0.6 0.8   ALKPHOS 38* 33* 38*   ALT 16 15 19   AST 25 23 30         Vitals/PE     Vital Signs (Most Recent):  Temp: 98.6 °F (37 °C)  (03/01/18 1030)  Pulse: 62 (03/01/18 1030)  Resp: 16 (03/01/18 1030)  BP: 137/65 (03/01/18 1030)  SpO2: 95 % (03/01/18 1030) Vital Signs Range (Last 24H):      Body mass index is 29.18 kg/m².     Physical Exam:  Constitutional: Appears well-developed and well-nourished.   Head: Normocephalic and atraumatic.   Mouth/Throat: Oropharynx is clear and moist.   Eyes: EOM are normal. Pupils are equal, round, and reactive to light. No scleral icterus.   Neck: Normal range of motion. Neck supple.   Cardiovascular: Normal rate and regular rhythm.  No murmur heard.  Pulmonary/Chest: Effort normal and breath sounds normal. No respiratory distress. No wheezes, rales, or rhonchi  Abdominal: Soft. Bowel sounds are normal.  No distension or tenderness  Musculoskeletal: Normal range of motion. No edema.   Neurological: Alert and oriented to person, place, and time.   Skin: Skin is warm and dry.   Psychiatric: Normal mood and affect. Behavior is normal.   Vitals reviewed.      Discharge Medications:      Discharge Medication List as of 3/1/2018 11:30 AM      CONTINUE these medications which have NOT CHANGED    Details   alprazolam (XANAX) 0.5 MG tablet Take 1 tablet (0.5 mg total) by mouth nightly as needed for Anxiety., Starting Mon 10/2/2017, Print      ferrous sulfate 325 mg (65 mg iron) Tab tablet Take 1 tablet (325 mg total) by mouth every 12 (twelve) hours., Starting Tue 2/27/2018, Normal      hydrocodone-acetaminophen 5-325mg (NORCO) 5-325 mg per tablet Take 1 tablet by mouth every 6 (six) hours as needed for Pain., Historical Med      metFORMIN (GLUCOPHAGE) 500 MG tablet Take 1 tablet (500 mg total) by mouth 2 (two) times daily with meals., Starting Thu 2/22/2018, Normal      metoprolol tartrate (LOPRESSOR) 100 MG tablet TAKE 1/2 TABLET TWICE DAILY, Normal      pantoprazole (PROTONIX) 40 MG tablet Take 40 mg by mouth once daily., Historical Med      betamethasone dipropionate (DIPROLENE) 0.05 % cream Apply topically 2 (two)  times daily., Starting Wed 11/22/2017, Until Sat 12/2/2017, Normal      triamcinolone acetonide 0.1% (KENALOG) 0.1 % cream Apply topically 2 (two) times daily., Starting Wed 7/26/2017, Until Sat 8/5/2017, Normal             Discharge Diet:    Disposition:     Time spent on the discharge of the patient including review of hospital course with the patient. reviewing discharge medications and arranging follow-up care >30 minutes.  Patient was seen and examined on the date of discharge and determined to be suitable for discharge.    Future Appointments  Date Time Provider Department Center   3/3/2018 12:15 PM [facility] OIC-CT1 500 LB LIMIT [facility] IC [address]   3/8/2018 7:30 AM [address]   3/27/2018 11:30 AM Nick Roper MD [facility] GASTRO [address]   4/20/2018 9:00 AM LAB, [facility] LAB [facility]       LIYA HAHN MD   [facility]  Pager: [phone]  Spectralink: [phone]   Cell: [phone]

## 2018-03-04 DIAGNOSIS — K76.6 PORTAL HYPERTENSION: Primary | ICD-10-CM

## 2018-03-08 ENCOUNTER — DOCUMENTATION ONLY (OUTPATIENT)
Dept: TRANSPLANT | Facility: CLINIC | Age: 78
End: 2018-03-08

## 2018-03-08 ENCOUNTER — HOSPITAL ENCOUNTER (OUTPATIENT)
Dept: RADIOLOGY | Facility: HOSPITAL | Age: 78
Discharge: HOME | End: 2018-03-08
Attending: INTERNAL MEDICINE
Payer: MEDICARE

## 2018-03-08 DIAGNOSIS — E61.1 IRON DEFICIENCY: ICD-10-CM

## 2018-03-08 DIAGNOSIS — K92.1 MELENA: ICD-10-CM

## 2018-03-08 PROCEDURE — 76700 US EXAM ABDOM COMPLETE: CPT | Mod: 26,59,, | Performed by: RADIOLOGY

## 2018-03-08 PROCEDURE — 93975 VASCULAR STUDY: CPT | Mod: TC

## 2018-03-08 PROCEDURE — 93975 VASCULAR STUDY: CPT | Mod: 26,,, | Performed by: RADIOLOGY

## 2018-03-08 NOTE — LETTER
March 8, 2018    Marine Mo  [address] [place] [address] [address] [place] [id number]      Dear Marine Mo:    Your doctor has referred you to the [facility]. You will be contacted by our office and an initial appointment will then be scheduled for you.    We look forward to seeing you soon. If you have any further questions, please contact us at [phone].       Sincerely,        [facility]   [address] [place] [id number]  [phone]

## 2018-03-09 ENCOUNTER — TELEPHONE (OUTPATIENT)
Dept: GASTROENTEROLOGY | Facility: CLINIC | Age: 78
End: 2018-03-09

## 2018-03-09 NOTE — TELEPHONE ENCOUNTER
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    Referral to hepatology placed with Dr. Garcia in Hepatology.    Please tell her that her CT scan was read as follows and to keep follow up GI clinic apt to discuss small bowel video capsule endoscopy.    Impression     Innumerable hepatic and renal hypodensities which likely represent cysts.    Recanalization of the umbilical vein consistent with portal hypertension.    Enlarged spleen    Left-sided nonobstructive renal stone.

## 2018-03-09 NOTE — TELEPHONE ENCOUNTER
----- Message from Nick Roper MD sent at 3/9/2018  9:58 AM CST -----  Hepatosplenomegaly with innumerable cysts seen throughout the liver.  Doppler evaluation demonstrates patent arterial and venous structures with appropriate directional flow.    Bilateral renal cysts.  No new findings  ----- Message -----  From: Yasmin Andrews MA  Sent: 3/9/2018   8:58 AM  To: MD Dr Judson Goodman,  Pt calling for bx and ultrasound results.  Yasmin

## 2018-03-09 NOTE — NURSING
Pt records reviewed.   Pt will be referred to Hepatology.    Initial referral received  from the workque.   Referring Provider/diagnosis  [facility] HEPATOLOGY Referred To Provider: MARLO COATS (Internal)    Comment: Please evalute for anemia and suspected portal HTN rule out cirrhosis.     Innumerable hepatic and renal hypodensities which likely represent cysts.  Recanalization of the umbilical vein consistent with portal hypertension.  Enlarged spleen         Referral letter sent to provider and patient.

## 2018-03-13 ENCOUNTER — TELEPHONE (OUTPATIENT)
Dept: GASTROENTEROLOGY | Facility: CLINIC | Age: 78
End: 2018-03-13

## 2018-03-20 ENCOUNTER — TELEPHONE (OUTPATIENT)
Dept: GASTROENTEROLOGY | Facility: CLINIC | Age: 78
End: 2018-03-20

## 2018-03-20 NOTE — TELEPHONE ENCOUNTER
----- Message from Nick Roper MD sent at 3/18/2018  2:50 PM CDT -----  Yasmin - please tell Marine that her US Abdomen Complete with Doppler (xpd) was read as follows:    The main portal vein, right portal vein, left portal vein, middle hepatic vein, right hepatic vein, left hepatic vein, and IVC are patent (open) with proper directional flow.  The main hepatic artery is patent (open with good blood flow).

## 2018-03-27 ENCOUNTER — TELEPHONE (OUTPATIENT)
Dept: GASTROENTEROLOGY | Facility: CLINIC | Age: 78
End: 2018-03-27

## 2018-03-27 ENCOUNTER — OFFICE VISIT (OUTPATIENT)
Dept: HEPATOLOGY | Facility: CLINIC | Age: 78
End: 2018-03-27
Payer: MEDICARE

## 2018-03-27 ENCOUNTER — LAB VISIT (OUTPATIENT)
Dept: LAB | Facility: HOSPITAL | Age: 78
End: 2018-03-27
Attending: INTERNAL MEDICINE
Payer: MEDICARE

## 2018-03-27 ENCOUNTER — OFFICE VISIT (OUTPATIENT)
Dept: GASTROENTEROLOGY | Facility: CLINIC | Age: 78
End: 2018-03-27
Payer: MEDICARE

## 2018-03-27 VITALS
SYSTOLIC BLOOD PRESSURE: 152 MMHG | OXYGEN SATURATION: 95 % | TEMPERATURE: 96 F | RESPIRATION RATE: 18 BRPM | BODY MASS INDEX: 30.11 KG/M2 | WEIGHT: 176.38 LBS | DIASTOLIC BLOOD PRESSURE: 65 MMHG | HEART RATE: 75 BPM | HEIGHT: 64 IN

## 2018-03-27 VITALS
SYSTOLIC BLOOD PRESSURE: 121 MMHG | DIASTOLIC BLOOD PRESSURE: 65 MMHG | HEIGHT: 64 IN | BODY MASS INDEX: 29.99 KG/M2 | WEIGHT: 175.69 LBS | HEART RATE: 78 BPM

## 2018-03-27 DIAGNOSIS — Q44.6 POLYCYSTIC LIVER DISEASE: ICD-10-CM

## 2018-03-27 DIAGNOSIS — N28.1 RENAL CYST: ICD-10-CM

## 2018-03-27 DIAGNOSIS — D12.6 TUBULAR ADENOMA OF COLON: ICD-10-CM

## 2018-03-27 DIAGNOSIS — K76.89 LIVER CYST: ICD-10-CM

## 2018-03-27 DIAGNOSIS — K76.6 PORTAL HYPERTENSION: Primary | ICD-10-CM

## 2018-03-27 DIAGNOSIS — K92.2 CHRONIC GI BLEEDING: Primary | ICD-10-CM

## 2018-03-27 DIAGNOSIS — K92.2 CHRONIC GI BLEEDING: ICD-10-CM

## 2018-03-27 DIAGNOSIS — I85.00 ESOPHAGEAL VARICES WITHOUT BLEEDING, UNSPECIFIED ESOPHAGEAL VARICES TYPE: ICD-10-CM

## 2018-03-27 LAB
FERRITIN SERPL-MCNC: 21 NG/ML
HGB BLD-MCNC: 9.2 G/DL
IRON SERPL-MCNC: 30 UG/DL
SATURATED IRON: 7 %
TOTAL IRON BINDING CAPACITY: 454 UG/DL
TRANSFERRIN SERPL-MCNC: 307 MG/DL

## 2018-03-27 PROCEDURE — 83540 ASSAY OF IRON: CPT

## 2018-03-27 PROCEDURE — 3078F DIAST BP <80 MM HG: CPT | Mod: CPTII,S$GLB,, | Performed by: INTERNAL MEDICINE

## 2018-03-27 PROCEDURE — 99499 UNLISTED E&M SERVICE: CPT | Mod: S$GLB,,, | Performed by: INTERNAL MEDICINE

## 2018-03-27 PROCEDURE — 85018 HEMOGLOBIN: CPT

## 2018-03-27 PROCEDURE — 99204 OFFICE O/P NEW MOD 45 MIN: CPT | Mod: S$GLB,,, | Performed by: INTERNAL MEDICINE

## 2018-03-27 PROCEDURE — 99214 OFFICE O/P EST MOD 30 MIN: CPT | Mod: S$GLB,,, | Performed by: INTERNAL MEDICINE

## 2018-03-27 PROCEDURE — 99999 PR PBB SHADOW E&M-EST. PATIENT-LVL II: CPT | Mod: PBBFAC,,, | Performed by: INTERNAL MEDICINE

## 2018-03-27 PROCEDURE — 3074F SYST BP LT 130 MM HG: CPT | Mod: CPTII,S$GLB,, | Performed by: INTERNAL MEDICINE

## 2018-03-27 PROCEDURE — 99999 PR PBB SHADOW E&M-EST. PATIENT-LVL IV: CPT | Mod: PBBFAC,,, | Performed by: INTERNAL MEDICINE

## 2018-03-27 PROCEDURE — 82728 ASSAY OF FERRITIN: CPT

## 2018-03-27 PROCEDURE — 36415 COLL VENOUS BLD VENIPUNCTURE: CPT

## 2018-03-27 RX ORDER — NAPROXEN SODIUM 220 MG/1
81 TABLET, FILM COATED ORAL DAILY
COMMUNITY

## 2018-03-27 NOTE — TELEPHONE ENCOUNTER
----- Message from Nick Roper MD sent at 3/27/2018  1:51 PM CDT -----  Yasmin - please tell Marine that her hemoglobin is stable at 9.2 g/dL (so she is still anemia normal range is 12-16 g/dL)    So still take her iron and follow up her anemia with her local GI MD or primary Care MD back home and let us know if she changes her mind and would like to have a small bowel VCE study done.

## 2018-03-27 NOTE — PROGRESS NOTES
CHIEF COMPLAINT:  Followup for GI bleeding and dark stools.    HISTORY OF PRESENT ILLNESS:  This is a 78-year-old white female who is from   [place].  She is here with her  again.  She was first seen by   me in GI Clinic on 02/28/2018 for having dark stools since October 2017.  She   has a GI doctor back home.  She was recently evaluated in the hospital, had an   EGD and colonoscopy by Dr. Ren Newton on 03/01/2018.  EGD showed no cause for   bleeding.  Stomach biopsies for H. pylori was negative.  Colonoscopy was a   complete colonoscopy all the way to the terminal ileum.  Bowel prep was good.    She had a 4 mm ascending colon adenoma that was removed by Dr. Newton.  Since   then patient has not had any more bleeding.  Her hemoglobin is stable at 9.  She   is not lightheaded or dizzy.  She was brought back to clinic today to recommend   a small bowel video capsule.  Risks, benefits and alternatives including a   retained video capsule needing endoscopic or surgical removal, although very   unlikely is a possibility.  We did talk about the bowel prep ideally for it and   the limitations of a small bowel video capsule and the advantages of it and its   ability to find small bowel bleeding.  The patient is currently not interested   in video capsule.  She says she has not been bleeding and she is not interested   in that.  She does have a Hepatology Clinic followup for her abnormal CT showing   multiple hepatic and renal hypodensities likely cysts and recanalization of her   umbilical vein consistent with portal hypertension and enlarged spleen.    REVIEW OF SYSTEMS:  CONSTITUTIONAL:  No fever, fatigue or weight loss.  ENT:  No difficulty swallowing or sore throat.  CARDIOVASCULAR:  No chest pain or palpitations.  RESPIRATORY:  No shortness of breath or cough.  GENITOURINARY:  No dysuria, urgency, frequency or hematuria.  MUSCULOSKELETAL:  She has some chronic arthritis, but not taking NSAIDs other    than her aspirin.  SKIN:  No itching or rash.  NEUROLOGIC:  No headache, syncope or stroke.  PSYCHIATRIC:  No uncontrolled depression or anxiety.  ENDOCRINE:  No cold or heat intolerance.  LYMPHATICS:  No lymphadenopathy.  ALLERGIES:  No known drug allergies.  GASTROINTESTINAL:  No nausea or vomiting, no heartburn, no diarrhea, no   constipation, no change in her bowel habits.  Normal bowel movements.    RISK FACTORS FOR LIVER DISEASE:  No blood transfusion.  No IV drugs.  No nasal   drug use.  No tattoos.    PAST MEDICAL HISTORY:  Positive for hypertension, spinal stenosis, type 2   diabetes, hepatosplenomegaly, hepatic and renal cysts, kidney stone, colonic   AVM.    PAST SURGICAL HISTORY:  Gallbladder out in 2004, appendectomy, hysterectomy,   bilateral salpingo-oophorectomy, tonsillectomy and a recent colonoscopy on   03/01/2018 that a single nonbleeding medium size ascending colon AVM was   cauterized by Dr. Newton, no further bleeding since then.    FAMILY HISTORY:  Dad with liver and kidney cysts.  Nobody with colon cancer.    Nobody with advanced colon adenomatous polyps.  No FAP, no attenuated FAP, no   MAP and no Ramirez syndrome.  Nobody with celiac sprue or inflammatory bowel   disease.  Nobody with chronic hepatitis, pancreatitis or pancreatic cancer.    SOCIAL HISTORY:  Nonsmoker, nondrinker.  She is retired.  She is on her first   marriage for 58 years.  She has two healthy children.  Her  is with her   today in clinic.    PHYSICAL EXAMINATION:  VITAL SIGNS:  With chaperone in the room, Yasmin, blood pressure is 121/65,   heart rate 78, 5 feet 4 inches tall, 176 pounds, BMI is 30.27.  GENERAL:  She is alert and oriented x4, not in any acute distress.  ABDOMEN:  Soft, no guarding, no rebound, no tenderness.  She does have   hepatosplenomegaly.  No pulsatile masses.  No bruits.  No guarding or rebound.    No appreciative ascites or hernias.  No Harrison's sign, no McBurney point   tenderness.  No  CVA tenderness.  EYES:  Conjunctivae are anicteric.  CARDIOVASCULAR:  S1 and S2 without murmurs, gallops or rubs.  RESPIRATORY:  Clear to auscultation bilaterally.  SKIN:  No itch, no petechiae or rash on exposed skin areas.  NEUROLOGIC:  Alert and oriented x4.  PSYCHIATRIC:  Normal speech, mentation and affect.  LYMPHATICS:  No cervical or supraclavicular lymphadenopathy.  No appreciative   thyromegaly.    MEDICAL DECISION MAKING:  She has video capsule talk given.  Risks, benefits and   alternative talk given.  Consent talk given.  The patient is not interested in   video capsule endoscopy.  Risks, benefits and alternatives explained to her.    She will call us back if she is interested in doing it.    IMPRESSION AND PLAN:  1.  GI bleeding, melena with history of heme-negative stools in the past with   the patient with an AVM of the colon, no further bleeding.  She is not   interested in small bowel video capsule endoscopy was offered her and   recommended to her today.  2.  Hepatosplenomegaly with some findings of portal hypertension on her CT scan   with liver and renal cyst.  She is following up with Hepatology today.  She has   a GI doctor back home.  She could return to GI Clinic here as needed.      SEC/HN  dd: 03/27/2018 14:05:36 (CDT)  td: 03/28/2018 11:03:33 (CDT)  Doc ID   #[id number]  Job ID #[id number]    CC:

## 2018-03-27 NOTE — PROGRESS NOTES
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 Hemoglobin remained stable throughout stay- Never required blood transfusion. EGD with erythematous mucosa-biopsied. Varices not visualized on scope. Colonscopy with non-bleeding angiodyplastic lesion treated with APC    Dx >20 years ago with polycystic liver and kidney disease. Her father also had the disease. The patient is essentially asymptomatic. She occasionally has some RUQ discomfort. She does not take any analgesia for this. She does not have fevers or chills or early satiety or N/V from the polycystic liver disease.    Chief Complaint   Patient presents with    Polycystic liver disease       Past Medical History:   Diagnosis Date    Anemia 2/27/2018    Hypertension      Outpatient Encounter Prescriptions as of 3/27/2018   Medication Sig Dispense Refill    alprazolam (XANAX) 0.5 MG tablet Take 1 tablet (0.5 mg total) by mouth nightly as needed for Anxiety. 30 tablet 3    aspirin 81 MG Chew Take 81 mg by mouth once daily.      ferrous sulfate 325 mg (65 mg iron) Tab tablet Take 1 tablet (325 mg total) by mouth every 12 (twelve) hours. 60 tablet 4    hydrocodone-acetaminophen 5-325mg (NORCO) 5-325 mg per tablet Take 1 tablet by mouth every 6 (six) hours as needed for Pain.      metFORMIN (GLUCOPHAGE) 500 MG tablet Take 1 tablet (500 mg total) by mouth 2 (two) times daily with meals. 180 tablet 3    metoprolol tartrate (LOPRESSOR) 100 MG tablet TAKE 1/2 TABLET TWICE DAILY 90 tablet 2    pantoprazole (PROTONIX) 40 MG tablet Take 40 mg by mouth once  daily.      betamethasone dipropionate (DIPROLENE) 0.05 % cream Apply topically 2 (two) times daily. 45 g 1    triamcinolone acetonide 0.1% (KENALOG) 0.1 % cream Apply topically 2 (two) times daily. 15 g 0     No facility-administered encounter medications on file as of 3/27/2018.      Review of patient's allergies indicates:  No Known Allergies  Family History   Problem Relation Age of Onset    Kidney disease Father        Social History     Social History    Marital status:      Spouse name: N/A    Number of children: N/A    Years of education: N/A     Occupational History    Not on file.     Social History Main Topics    Smoking status: Never Smoker    Smokeless tobacco: Never Used    Alcohol use No    Drug use: No    Sexual activity: Yes     Partners: Male     Other Topics Concern    Not on file     Social History Narrative    No narrative on file     Review of Systems   Constitutional: Negative.    HENT: Negative.    Eyes: Negative.    Respiratory: Negative.    Cardiovascular: Negative.    Gastrointestinal: Negative.    Genitourinary: Negative.    Musculoskeletal: Negative.    Skin: Negative.    Neurological: Negative.    Psychiatric/Behavioral: Negative.      Vitals:    03/27/18 1356   BP: (!) 152/65   Pulse: 75   Resp: 18   Temp: 96.1 °F (35.6 °C)       Physical Exam   Constitutional: She is oriented to person, place, and time. She appears well-nourished.   HENT:   Head: Normocephalic.   Eyes: Pupils are equal, round, and reactive to light. No scleral icterus.   Neck: Neck supple. No thyromegaly present.   Cardiovascular: Normal rate, regular rhythm and normal heart sounds.    Pulmonary/Chest: Effort normal and breath sounds normal. She has no wheezes.   Abdominal: Soft. She exhibits no distension and no mass. There is no tenderness.   Musculoskeletal: Normal range of motion.   Neurological: She is alert and oriented to person, place, and time.   Skin: Skin is warm and dry. No rash noted.    Psychiatric: She has a normal mood and affect.       MELD-Na score: 7 at 3/1/2018  3:42 AM  MELD score: 7 at 3/1/2018  3:42 AM  Calculated from:  Serum Creatinine: 0.8 mg/dL (Rounded to 1) at 3/1/2018  3:42 AM  Serum Sodium: 140 mmol/L (Rounded to 137) at 3/1/2018  3:42 AM  Total Bilirubin: 0.8 mg/dL (Rounded to 1) at 3/1/2018  3:42 AM  INR(ratio): 1.1 at 2/27/2018 10:02 AM  Age: 77 years    Lab Results   Component Value Date     (H) 03/01/2018    BUN 11 03/01/2018    CREATININE 0.8 03/01/2018    CALCIUM 9.6 03/01/2018     03/01/2018    K 3.8 03/01/2018     03/01/2018    PROT 7.5 03/01/2018    CO2 29 03/01/2018    ANIONGAP 8 03/01/2018    WBC 3.60 (L) 03/01/2018    RBC 4.02 03/01/2018    HGB 9.2 (L) 03/27/2018    HCT 30.7 (L) 03/01/2018    HCT 31 (L) 02/27/2018    MCV 76 (L) 03/01/2018    MCH 23.1 (L) 03/01/2018    MCHC 30.3 (L) 03/01/2018     Lab Results   Component Value Date    RDW 17.0 (H) 03/01/2018     (L) 03/01/2018    MPV 8.8 (L) 03/01/2018    GRAN 2.0 03/01/2018    GRAN 56.6 03/01/2018    LYMPH 1.0 03/01/2018    LYMPH 27.5 03/01/2018    MONO 0.4 03/01/2018    MONO 11.7 03/01/2018    EOSINOPHIL 3.3 03/01/2018    BASOPHIL 0.6 03/01/2018    EOS 0.1 03/01/2018    BASO 0.02 03/01/2018    APTT 24.9 09/26/2011    GROUPTRH A POS 02/27/2018    GROUPTRH A POS 09/26/2011    CHOL 179 11/22/2017    TRIG 169 (H) 11/22/2017    HDL 34 (L) 11/22/2017    CHOLHDL 19.0 (L) 11/22/2017    TOTALCHOLEST 5.3 (H) 11/22/2017    ALBUMIN 4.0 03/01/2018    BILIDIR 0.2 02/27/2018    AST 30 03/01/2018    ALT 19 03/01/2018    ALKPHOS 38 (L) 03/01/2018    MG 2.0 03/01/2018    LABPROT 11.6 02/27/2018    INR 1.1 02/27/2018       Assessment and Plan:    Marine Mo is a 78 y.o. female withPolycystic liver disease  I suspect that her portal htn is related to the polycystic liver disease. She could have some underlying liver disease since AST>ALT (30>19). I am recommending that we monitor her liver function every  3 months with labs and US every 6 months to screen for any liver lesions. She should repeat an EGD in 2 years and return in 6 months.

## 2018-03-27 NOTE — LETTER
April 1, 2018      Nick Roper MD  [address]  [place] [address]           [facility] - Hepatology  [address]  [place] [address]  Phone: [phone]  Fax: [phone]          Patient: Marine Mo   MR Number: [id number]   YOB: 1940   Date of Visit: 3/27/2018       Dear Dr. Nick Roper:    Thank you for referring Marine Mo to me for evaluation. Attached you will find relevant portions of my assessment and plan of care.    If you have questions, please do not hesitate to call me. I look forward to following Marine Mo along with you.    Sincerely,    Jaclyn Garcia MD    Enclosure  CC:  No Recipients    If you would like to receive this communication electronically, please contact [email or web address] or [phone] to request more information on [employer] Link access.    For providers and/or their staff who would like to refer a patient to [facility], please contact us through our one-stop-shop provider referral line, [facility], at [phone].    If you feel you have received this communication in error or would no longer like to receive these types of communications, please e-mail [email or web address]

## 2018-04-05 ENCOUNTER — TELEPHONE (OUTPATIENT)
Dept: GASTROENTEROLOGY | Facility: CLINIC | Age: 78
End: 2018-04-05

## 2018-04-05 NOTE — TELEPHONE ENCOUNTER
----- Message from Nick Roper MD sent at 3/30/2018  3:57 PM CDT -----  Yasmin - please tell Marine that her hemoglobin is stable at 9.2 g/dL (so she is still anemia normal range is 12-16 g/dL)    So still take her iron and follow up her anemia with her local GI MD or primary Care MD back home and let us know if she changes her mind and would like to have a small bowel VCE study done.    Yasmin - please tell patient that they are iron deficient and anaemic and recommend that they take ferrous sulfate one 325mg pill every 12 hours for next 4 months and repeat fasting hemoglobin and Ferritin in 8 weeks with her primary care me

## 2018-04-30 ENCOUNTER — HOSPITAL ENCOUNTER (OUTPATIENT)
Dept: PULMONOLOGY | Facility: HOSPITAL | Age: 78
Discharge: HOME | End: 2018-04-30
Attending: NURSE PRACTITIONER
Payer: MEDICARE

## 2018-04-30 ENCOUNTER — HOSPITAL ENCOUNTER (OUTPATIENT)
Dept: RADIOLOGY | Facility: HOSPITAL | Age: 78
Discharge: HOME | End: 2018-04-30
Attending: NURSE PRACTITIONER
Payer: MEDICARE

## 2018-04-30 ENCOUNTER — OFFICE VISIT (OUTPATIENT)
Dept: INTERNAL MEDICINE | Facility: CLINIC | Age: 78
End: 2018-04-30
Payer: MEDICARE

## 2018-04-30 VITALS
SYSTOLIC BLOOD PRESSURE: 121 MMHG | RESPIRATION RATE: 18 BRPM | HEART RATE: 72 BPM | BODY MASS INDEX: 29.35 KG/M2 | DIASTOLIC BLOOD PRESSURE: 68 MMHG | HEIGHT: 64 IN | WEIGHT: 171.94 LBS

## 2018-04-30 DIAGNOSIS — R06.02 SHORTNESS OF BREATH: ICD-10-CM

## 2018-04-30 DIAGNOSIS — Z12.39 SCREENING FOR BREAST CANCER: ICD-10-CM

## 2018-04-30 DIAGNOSIS — N32.81 OVERACTIVE BLADDER: ICD-10-CM

## 2018-04-30 DIAGNOSIS — R10.13 EPIGASTRIC PAIN: Primary | ICD-10-CM

## 2018-04-30 DIAGNOSIS — F41.9 ANXIETY: ICD-10-CM

## 2018-04-30 PROCEDURE — 93005 ELECTROCARDIOGRAM TRACING: CPT

## 2018-04-30 PROCEDURE — 3074F SYST BP LT 130 MM HG: CPT | Mod: CPTII,S$GLB,, | Performed by: NURSE PRACTITIONER

## 2018-04-30 PROCEDURE — 99999 PR PBB SHADOW E&M-EST. PATIENT-LVL IV: CPT | Mod: PBBFAC,,, | Performed by: NURSE PRACTITIONER

## 2018-04-30 PROCEDURE — 3078F DIAST BP <80 MM HG: CPT | Mod: CPTII,S$GLB,, | Performed by: NURSE PRACTITIONER

## 2018-04-30 PROCEDURE — 71046 X-RAY EXAM CHEST 2 VIEWS: CPT | Mod: 26,,, | Performed by: RADIOLOGY

## 2018-04-30 PROCEDURE — 99214 OFFICE O/P EST MOD 30 MIN: CPT | Mod: S$GLB,,, | Performed by: NURSE PRACTITIONER

## 2018-04-30 PROCEDURE — 71046 X-RAY EXAM CHEST 2 VIEWS: CPT | Mod: TC

## 2018-04-30 PROCEDURE — 93010 ELECTROCARDIOGRAM REPORT: CPT | Mod: ,,, | Performed by: INTERNAL MEDICINE

## 2018-04-30 RX ORDER — SUCRALFATE 1 G/10ML
SUSPENSION ORAL
COMMUNITY
Start: 2018-01-29 | End: 2018-04-30

## 2018-04-30 RX ORDER — SUCRALFATE 1 G/10ML
1 SUSPENSION ORAL
Qty: 414 ML | Refills: 0 | Status: SHIPPED | OUTPATIENT
Start: 2018-04-30 | End: 2018-05-01

## 2018-04-30 NOTE — PROGRESS NOTES
Subjective:       Patient ID: Marine Mo is a 78 y.o. female.    Chief Complaint: GI Problem    HPI: Pt presents to clinic today with complaints of  abd pain. She has been dealing with this for the last few months, but it is getting progressively worse. Her pain was very severe this past weekend. She is also having tightness in her chest substernal with shortness of breath walking short distances. She states the SOB is not new for her. Pain does not radiate.  She is concerned that is may be related to anxiety. She takes Xanax to help her sleep at night. Symptoms are worse at night, she uses the heating pad at night for pain in her stomach. She had abdominal and chest pain this weekend. She was nauseated and very bloated. She denies ever seeing a cardiologist in the past. She would like to be seen by Dr Oconnor who saw her  in the past.   Denies any lung problems in the past. Never smoker, never lived with a smoker.     Reviewed patients chart, GI work up in 3/2018 negative. EGD and Colonoscopy at that time. On Protonix does not recall ever taking carafate     Denies vomiting, diahrrea. No longer having black stools. . She states they found a very small tear on her small intestines but were not concerned.     Review of Systems   Constitutional: Negative for chills, fatigue, fever and unexpected weight change.   HENT: Negative for congestion, ear pain, sore throat and trouble swallowing.    Eyes: Negative for pain and visual disturbance.   Respiratory: Positive for shortness of breath. Negative for cough and chest tightness.    Cardiovascular: Positive for chest pain (tightness) and leg swelling (since back sx in 2004). Negative for palpitations.   Gastrointestinal: Positive for abdominal pain. Negative for abdominal distention, constipation, diarrhea and vomiting.   Genitourinary: Positive for frequency (at night ). Negative for difficulty urinating, dysuria, flank pain and hematuria.   Musculoskeletal:  Negative for back pain, gait problem, joint swelling, neck pain and neck stiffness.   Skin: Negative for rash and wound.   Neurological: Negative for dizziness, seizures, speech difficulty, light-headedness and headaches.       Objective:      Physical Exam   Constitutional: She is oriented to person, place, and time. She appears well-developed and well-nourished.   HENT:   Head: Normocephalic and atraumatic.   Right Ear: External ear normal.   Left Ear: External ear normal.   Nose: Nose normal.   Mouth/Throat: Oropharynx is clear and moist.   Eyes: EOM are normal. Pupils are equal, round, and reactive to light.   Neck: Normal range of motion.   Cardiovascular: Normal rate, regular rhythm and normal heart sounds.    Pulmonary/Chest: Effort normal and breath sounds normal.   Abdominal: Soft. Bowel sounds are normal. There is no tenderness.   Musculoskeletal: Normal range of motion.   Neurological: She is alert and oriented to person, place, and time.   Skin: Skin is warm and dry.   Nursing note and vitals reviewed.      Assessment:       1. Epigastric pain    2. Shortness of breath    3. Anxiety        Plan:   Marine was seen today for gi problem.    Diagnoses and all orders for this visit:    Epigastric pain  -     sucralfate (CARAFATE) 100 mg/mL suspension; Take 10 mLs (1 g total) by mouth 4 (four) times daily with meals and nightly.    Shortness of breath  -     Ambulatory Referral to Cardiology  -     EKG 12-lead; Future  -     X-Ray Chest PA And Lateral; Future  -     CBC auto differential; Future  -     Comprehensive metabolic panel; Future  -     TSH; Future  -     D dimer, quantitative; Future  -     Troponin I; Future  -     Brain natriuretic peptide; Future  -     CK; Future  -     CK-MB; Future   Ambulating pulse ox in office today 100% at rest - 92% after ambulating     Anxiety   -Cont Xanax at night as needed for anxiety   -Ok to take twice daily if needed, pending cardiac work up     Overactive  Bladder    Consider starting medication, after cardiac work up complete     Discussed with patient that if cardiac work up is negative, can consider starting anxiety medication . Discussed importance of follow up with EKG, labs, and cardiology.    Instructed patient to report to ER with worsening shortness of breath, or chest pain.      Review of chart. No recent CXR. CT abd and pelvis shows significant dependent atelectatic versus fibrotic change. The visualized portion of the heart is significant for calcification of the aortic annulus and coronary arteries. She needs cardiac eval now, and pulm eval after. Last CXR 2013 shows clear lungs

## 2018-05-01 ENCOUNTER — HOSPITAL ENCOUNTER (OUTPATIENT)
Dept: RADIOLOGY | Facility: HOSPITAL | Age: 78
Discharge: HOME | End: 2018-05-01
Attending: NURSE PRACTITIONER
Payer: MEDICARE

## 2018-05-01 ENCOUNTER — TELEPHONE (OUTPATIENT)
Dept: INTERNAL MEDICINE | Facility: CLINIC | Age: 78
End: 2018-05-01

## 2018-05-01 DIAGNOSIS — R79.89 POSITIVE D DIMER: ICD-10-CM

## 2018-05-01 DIAGNOSIS — R60.9 EDEMA, UNSPECIFIED TYPE: ICD-10-CM

## 2018-05-01 DIAGNOSIS — R07.9 ACUTE CHEST PAIN: ICD-10-CM

## 2018-05-01 DIAGNOSIS — R79.89 POSITIVE D DIMER: Primary | ICD-10-CM

## 2018-05-01 DIAGNOSIS — R10.13 EPIGASTRIC PAIN: ICD-10-CM

## 2018-05-01 PROCEDURE — 71275 CT ANGIOGRAPHY CHEST: CPT | Mod: 26,,, | Performed by: RADIOLOGY

## 2018-05-01 PROCEDURE — 93970 EXTREMITY STUDY: CPT | Mod: 26,,, | Performed by: RADIOLOGY

## 2018-05-01 PROCEDURE — 25500020 PHARM REV CODE 255: Performed by: NURSE PRACTITIONER

## 2018-05-01 PROCEDURE — 71275 CT ANGIOGRAPHY CHEST: CPT | Mod: TC

## 2018-05-01 PROCEDURE — 93970 EXTREMITY STUDY: CPT | Mod: TC

## 2018-05-01 RX ORDER — SUCRALFATE 1 G/1
1 TABLET ORAL
Qty: 60 TABLET | Refills: 1 | Status: SHIPPED | OUTPATIENT
Start: 2018-05-01 | End: 2018-07-12 | Stop reason: SDUPTHER

## 2018-05-01 RX ADMIN — IOHEXOL 75 ML: 350 INJECTION, SOLUTION INTRAVENOUS at 10:05

## 2018-05-01 NOTE — TELEPHONE ENCOUNTER
----- Message from Patti Hannon MA sent at 2018  9:01 AM CDT -----  Contact: Self  Marine Mo  MRN: [id number]  : 1940  PCP: Serg Hwang  Home Phone      [phone]  Work Phone      [phone]  Mobile          [phone]      MESSAGE: Patient was prescribed Sucralfate yesterday and her insurance is not paying for it. She would like something else called in that her insurance will cover.  Pharmacy: [facility]  Phone: [phone]

## 2018-05-03 ENCOUNTER — HOSPITAL ENCOUNTER (OUTPATIENT)
Dept: RADIOLOGY | Facility: HOSPITAL | Age: 78
Discharge: HOME | End: 2018-05-03
Attending: NURSE PRACTITIONER
Payer: MEDICARE

## 2018-05-03 VITALS — BODY MASS INDEX: 29.19 KG/M2 | WEIGHT: 171 LBS | HEIGHT: 64 IN

## 2018-05-03 PROCEDURE — 77067 SCR MAMMO BI INCL CAD: CPT | Mod: TC

## 2018-05-03 PROCEDURE — 77063 BREAST TOMOSYNTHESIS BI: CPT | Mod: 26,,, | Performed by: RADIOLOGY

## 2018-05-03 PROCEDURE — 77067 SCR MAMMO BI INCL CAD: CPT | Mod: 26,,, | Performed by: RADIOLOGY

## 2018-05-04 DIAGNOSIS — I10 ESSENTIAL HYPERTENSION: ICD-10-CM

## 2018-05-04 NOTE — TELEPHONE ENCOUNTER
----- Message from Mey Mckenzie sent at 2018  8:56 AM CDT -----  Contact: pt  Marine Mo  MRN: [id number]  : 1940  PCP: Serg Hwang  Home Phone      [phone]  Work Phone      [phone]  Mobile          [phone]      MESSAGE:   Pt requesting refill or new Rx.   Is this a refill or new RX:  Refill  RX name and strength: Metoprolol 100  Last office visit: 18  Is this a 30-day or 90-day RX:  90  Pharmacy name and location:  [facility]  Comments:  Pt no longer has [employer] so she is requesting all her medication be sent to [facility] from now on. Please advise.    Phone:  [phone]

## 2018-05-07 RX ORDER — METOPROLOL TARTRATE 100 MG/1
50 TABLET ORAL 2 TIMES DAILY
Qty: 90 TABLET | Refills: 2 | Status: SHIPPED | OUTPATIENT
Start: 2018-05-07 | End: 2018-11-16 | Stop reason: SDUPTHER

## 2018-05-08 ENCOUNTER — OFFICE VISIT (OUTPATIENT)
Dept: CARDIOLOGY | Facility: CLINIC | Age: 78
End: 2018-05-08
Payer: MEDICARE

## 2018-05-08 VITALS
DIASTOLIC BLOOD PRESSURE: 66 MMHG | HEART RATE: 79 BPM | SYSTOLIC BLOOD PRESSURE: 126 MMHG | WEIGHT: 176.13 LBS | BODY MASS INDEX: 30.07 KG/M2 | HEIGHT: 64 IN

## 2018-05-08 DIAGNOSIS — E78.2 MIXED HYPERLIPIDEMIA: ICD-10-CM

## 2018-05-08 DIAGNOSIS — I10 ESSENTIAL HYPERTENSION: Primary | ICD-10-CM

## 2018-05-08 DIAGNOSIS — E08.40 DIABETES MELLITUS DUE TO UNDERLYING CONDITION WITH DIABETIC NEUROPATHY, WITHOUT LONG-TERM CURRENT USE OF INSULIN: ICD-10-CM

## 2018-05-08 DIAGNOSIS — R07.89 OTHER CHEST PAIN: ICD-10-CM

## 2018-05-08 DIAGNOSIS — D50.0 IRON DEFICIENCY ANEMIA DUE TO CHRONIC BLOOD LOSS: ICD-10-CM

## 2018-05-08 DIAGNOSIS — R06.09 DYSPNEA ON EXERTION: ICD-10-CM

## 2018-05-08 PROCEDURE — 99999 PR PBB SHADOW E&M-EST. PATIENT-LVL III: CPT | Mod: PBBFAC,,, | Performed by: INTERNAL MEDICINE

## 2018-05-08 PROCEDURE — 3078F DIAST BP <80 MM HG: CPT | Mod: CPTII,S$GLB,, | Performed by: INTERNAL MEDICINE

## 2018-05-08 PROCEDURE — 3074F SYST BP LT 130 MM HG: CPT | Mod: CPTII,S$GLB,, | Performed by: INTERNAL MEDICINE

## 2018-05-08 PROCEDURE — 99499 UNLISTED E&M SERVICE: CPT | Mod: S$GLB,,, | Performed by: INTERNAL MEDICINE

## 2018-05-08 PROCEDURE — 99204 OFFICE O/P NEW MOD 45 MIN: CPT | Mod: S$GLB,,, | Performed by: INTERNAL MEDICINE

## 2018-05-08 NOTE — LETTER
May 8, 2018      Ami Valentin, ANDI  [address] [facility] [address]           [facility] - Cardiology  [address]  [place] [address]  Phone: [phone]          Patient: Marine Mo   MR Number: [id number]   YOB: 1940   Date of Visit: 5/8/2018       Dear Ami Valentin:    Thank you for referring Marine Mo to me for evaluation. Attached you will find relevant portions of my assessment and plan of care.    If you have questions, please do not hesitate to call me. I look forward to following Marine Mo along with you.    Sincerely,    Ronda Oconnor MD    Enclosure  CC:  No Recipients    If you would like to receive this communication electronically, please contact [email or web address] or [phone] to request more information on [employer] Link access.    For providers and/or their staff who would like to refer a patient to [facility], please contact us through our one-stop-shop provider referral line, [facility], at [phone].    If you feel you have received this communication in error or would no longer like to receive these types of communications, please e-mail [email or web address]

## 2018-05-08 NOTE — PROGRESS NOTES
Subjective:   Patient ID:  Marine Mo is a 78 y.o. female who presents for evaluation of Shortness of Breath (with exertion ) and Irregular Heart Beat      HPI: She is accompanied by her . She reports that she has been having exertional dyspnea for the past few years and recently began feeling tightness across her chest. It comes on with exertion and at rest. It does not radiate and lasts about 5 minutes. She was recently seen in urgent care and had a LE US and CTA done which were negative for thromboembollic disease. Notation was made of coronary artery calcification on her CT scan. She also reports blood in her stool and a microcytic anemia. She recently had a colonoscopy done with cauterization of a vessel.  She has not had any previous cardiac testing. She denies leg edema, pnd or orthopnea. A recent BNP was 85.    ECG ((4/30/18): NSR RBBB    Past Medical History:   Diagnosis Date    Anemia 2/27/2018    Breast cyst     Diabetes mellitus     Hypertension        Past Surgical History:   Procedure Laterality Date    BACK SURGERY      BREAST CYST ASPIRATION  1982    CATARACT EXTRACTION Bilateral     don't remember date    CHOLECYSTECTOMY      COLONOSCOPY N/A 3/1/2018    Procedure: COLONOSCOPY;  Surgeon: Ren Newton MD;  Location: [facility] ([address] [place] [address]);  Service: Endoscopy;  Laterality: N/A;    HYSTERECTOMY      OOPHORECTOMY      TONSILLECTOMY, ADENOIDECTOMY         Social History     Social History    Marital status:      Spouse name: N/A    Number of children: N/A    Years of education: N/A     Social History Main Topics    Smoking status: Never Smoker    Smokeless tobacco: Never Used    Alcohol use No    Drug use: No    Sexual activity: Yes     Partners: Male     Other Topics Concern    None     Social History Narrative    None       Family History   Problem Relation Age of Onset    Kidney disease Father     Heart attack Neg Hx     Heart disease Neg Hx        Patient's  Medications   New Prescriptions    No medications on file   Previous Medications    ALPRAZOLAM (XANAX) 0.5 MG TABLET    Take 1 tablet (0.5 mg total) by mouth nightly as needed for Anxiety.    ASPIRIN 81 MG CHEW    Take 81 mg by mouth once daily.    BETAMETHASONE DIPROPIONATE (DIPROLENE) 0.05 % CREAM    Apply topically 2 (two) times daily.    FERROUS SULFATE 325 MG (65 MG IRON) TAB TABLET    Take 1 tablet (325 mg total) by mouth every 12 (twelve) hours.    HYDROCODONE-ACETAMINOPHEN 5-325MG (NORCO) 5-325 MG PER TABLET    Take 1 tablet by mouth every 6 (six) hours as needed for Pain.    METFORMIN (GLUCOPHAGE) 500 MG TABLET    Take 1 tablet (500 mg total) by mouth 2 (two) times daily with meals.    METOPROLOL TARTRATE (LOPRESSOR) 100 MG TABLET    Take 0.5 tablets (50 mg total) by mouth 2 (two) times daily.    PANTOPRAZOLE (PROTONIX) 40 MG TABLET    Take 40 mg by mouth once daily.    SUCRALFATE (CARAFATE) 1 GRAM TABLET    Take 1 tablet (1 g total) by mouth 4 (four) times daily before meals and nightly.   Modified Medications    No medications on file   Discontinued Medications    No medications on file       Review of Systems   Constitution: Negative for weakness, malaise/fatigue and weight gain.   HENT: Negative for hearing loss.    Eyes: Negative for visual disturbance.   Cardiovascular: Positive for chest pain and dyspnea on exertion. Negative for claudication, leg swelling, near-syncope, orthopnea, palpitations, paroxysmal nocturnal dyspnea and syncope.   Respiratory: Negative for cough, shortness of breath, sleep disturbances due to breathing, snoring and wheezing.    Endocrine: Negative for cold intolerance, heat intolerance, polydipsia, polyphagia and polyuria.   Hematologic/Lymphatic: Negative for bleeding problem. Does not bruise/bleed easily.   Skin: Negative for rash and suspicious lesions.   Musculoskeletal: Negative for arthritis, falls, joint pain, muscle weakness and myalgias.   Gastrointestinal: Positive  "for bloating and hematochezia. Negative for change in bowel habit, constipation, diarrhea, heartburn, melena and nausea.   Genitourinary: Negative for hematuria and nocturia.   Neurological: Negative for excessive daytime sleepiness, dizziness, headaches, light-headedness and loss of balance.   Psychiatric/Behavioral: Negative for depression. The patient is not nervous/anxious.    Allergic/Immunologic: Negative for environmental allergies.       /66 (BP Location: Left arm, Patient Position: Sitting, BP Method: Large (Automatic))   Pulse 79   Ht 5' 4" (1.626 m)   Wt 79.9 kg (176 lb 2.4 oz)   BMI 30.24 kg/m²     Objective:   Physical Exam   Constitutional: She is oriented to person, place, and time. She appears well-developed and well-nourished.        HENT:   Head: Normocephalic and atraumatic.   Mouth/Throat: Oropharynx is clear and moist.   Eyes: Conjunctivae and EOM are normal. Pupils are equal, round, and reactive to light. No scleral icterus.   Neck: Normal range of motion. Neck supple. No hepatojugular reflux and no JVD present. No tracheal deviation present. No thyromegaly present.   Cardiovascular: Normal rate, regular rhythm, normal heart sounds and intact distal pulses.  PMI is not displaced.    Pulses:       Carotid pulses are 2+ on the right side, and 2+ on the left side.       Radial pulses are 2+ on the right side, and 2+ on the left side.        Dorsalis pedis pulses are 2+ on the right side, and 2+ on the left side.        Posterior tibial pulses are 2+ on the right side, and 2+ on the left side.   Pulmonary/Chest: Effort normal and breath sounds normal.   Abdominal: Soft. Bowel sounds are normal. She exhibits no distension and no mass. There is no hepatosplenomegaly. There is no tenderness.   Musculoskeletal: She exhibits no edema or tenderness.   Lymphadenopathy:     She has no cervical adenopathy.   Neurological: She is alert and oriented to person, place, and time.   Skin: Skin is warm " and dry. No rash noted. No cyanosis or erythema. Nails show no clubbing.   Psychiatric: She has a normal mood and affect. Her speech is normal and behavior is normal.       Lab Results   Component Value Date     04/30/2018    K 4.6 04/30/2018     04/30/2018    CO2 27 04/30/2018    BUN 15 04/30/2018    CREATININE 0.8 04/30/2018     04/30/2018    HGBA1C 5.2 11/22/2017    MG 2.0 03/01/2018    AST 40 04/30/2018    ALT 30 04/30/2018    ALBUMIN 4.1 04/30/2018    PROT 7.6 04/30/2018    BILITOT 0.4 04/30/2018    WBC 2.30 (L) 04/30/2018    HGB 11.2 (L) 04/30/2018    HCT 36.2 (L) 04/30/2018    HCT 31 (L) 02/27/2018    MCV 83 04/30/2018    PLT 86 (L) 04/30/2018    INR 1.1 02/27/2018    TSH 2.090 04/30/2018    CHOL 179 11/22/2017    HDL 34 (L) 11/22/2017    LDLCALC 111.2 11/22/2017    TRIG 169 (H) 11/22/2017    BNP 85 04/30/2018       Assessment:     1. Essential hypertension : Blood pressure is at goal. I have made no changes. Continue current regimen.   2. Mixed hyperlipidemia    3. Iron deficiency anemia due to chronic blood loss    4. Diabetes mellitus due to underlying condition with diabetic neuropathy, without long-term current use of insulin    5. Other chest pain : She has no evidence of volume overload. She had coronary artery calcifications seen on her chest CT. I have ordered a DSE for further evaluation.   6. Dyspnea on exertion        Plan:     Marine was seen today for shortness of breath and irregular heart beat.    Diagnoses and all orders for this visit:    Essential hypertension  -     Pharmacological stress test w/ Color Tyler; Future  -     EKG 12-LEAD - Muse; Future    Mixed hyperlipidemia  -     Pharmacological stress test w/ Color Tyler; Future  -     EKG 12-LEAD - Muse; Future    Iron deficiency anemia due to chronic blood loss    Diabetes mellitus due to underlying condition with diabetic neuropathy, without long-term current use of insulin  -     Pharmacological stress test w/ Color  Tyler; Future  -     EKG 12-LEAD - Muse; Future    Other chest pain  -     Pharmacological stress test w/ Color Tyler; Future  -     EKG 12-LEAD - Muse; Future    Dyspnea on exertion  -     Pharmacological stress test w/ Color Tyler; Future  -     EKG 12-LEAD - Muse; Future        Thank you for allowing me to participate in this patient's care. Please do not hesitate to contact me with any questions or concerns.

## 2018-05-22 RX ORDER — PANTOPRAZOLE SODIUM 40 MG/1
40 TABLET, DELAYED RELEASE ORAL DAILY
Qty: 30 TABLET | Refills: 3 | Status: SHIPPED | OUTPATIENT
Start: 2018-05-22 | End: 2019-01-30

## 2018-05-22 RX ORDER — HYDROCODONE BITARTRATE AND ACETAMINOPHEN 5; 325 MG/1; MG/1
1 TABLET ORAL EVERY 6 HOURS PRN
Qty: 60 TABLET | Refills: 0 | Status: SHIPPED | OUTPATIENT
Start: 2018-05-22 | End: 2019-03-01

## 2018-05-22 NOTE — TELEPHONE ENCOUNTER
----- Message from Mey Mckenzie sent at 2018  8:24 AM CDT -----  Contact: pt  Marine Mo  MRN: [id number]  : 1940  PCP: Serg Hwang  Home Phone      [phone]  Work Phone      [phone]  Mobile          [phone]      MESSAGE:   Pt requesting refill or new Rx.   Is this a refill or new RX:  refill  RX name and strength: Norco and Pantoprazole 40 mg  Last office visit: 18  Is this a 30-day or 90-day RX:  30  Pharmacy name and location:  [facility]  Comments:      Phone:  [phone]

## 2018-05-24 ENCOUNTER — HOSPITAL ENCOUNTER (OUTPATIENT)
Dept: CARDIOLOGY | Facility: CLINIC | Age: 78
Discharge: HOME | End: 2018-05-24
Attending: INTERNAL MEDICINE
Payer: MEDICARE

## 2018-05-24 ENCOUNTER — TELEPHONE (OUTPATIENT)
Dept: CARDIOLOGY | Facility: CLINIC | Age: 78
End: 2018-05-24

## 2018-05-24 ENCOUNTER — DOCUMENTATION ONLY (OUTPATIENT)
Dept: CARDIOLOGY | Facility: CLINIC | Age: 78
End: 2018-05-24

## 2018-05-24 DIAGNOSIS — R07.89 OTHER CHEST PAIN: ICD-10-CM

## 2018-05-24 DIAGNOSIS — R06.09 DYSPNEA ON EXERTION: ICD-10-CM

## 2018-05-24 DIAGNOSIS — E08.40 DIABETES MELLITUS DUE TO UNDERLYING CONDITION WITH DIABETIC NEUROPATHY, WITHOUT LONG-TERM CURRENT USE OF INSULIN: ICD-10-CM

## 2018-05-24 DIAGNOSIS — E78.2 MIXED HYPERLIPIDEMIA: ICD-10-CM

## 2018-05-24 DIAGNOSIS — I10 ESSENTIAL HYPERTENSION: ICD-10-CM

## 2018-05-24 LAB
AORTIC VALVE REGURGITATION: NORMAL
DIASTOLIC DYSFUNCTION: NO
ESTIMATED PA SYSTOLIC PRESSURE: 27.25
RETIRED EF AND QEF - SEE NOTES: 60 (ref 55–65)
TRICUSPID VALVE REGURGITATION: NORMAL

## 2018-05-24 PROCEDURE — 96372 THER/PROPH/DIAG INJ SC/IM: CPT | Mod: 59,S$GLB,, | Performed by: INTERNAL MEDICINE

## 2018-05-24 PROCEDURE — 93325 DOPPLER ECHO COLOR FLOW MAPG: CPT | Mod: S$GLB,,, | Performed by: INTERNAL MEDICINE

## 2018-05-24 PROCEDURE — 93351 STRESS TTE COMPLETE: CPT | Mod: S$GLB,,, | Performed by: INTERNAL MEDICINE

## 2018-05-24 PROCEDURE — 93320 DOPPLER ECHO COMPLETE: CPT | Mod: S$GLB,,, | Performed by: INTERNAL MEDICINE

## 2018-05-24 NOTE — TELEPHONE ENCOUNTER
----- Message from Ronda Oconnor MD sent at 5/24/2018  1:47 PM CDT -----  The stress test was normal. The heart function is strong and there was no evidence of any significant blockages in the coronary arteries.

## 2018-05-24 NOTE — PROGRESS NOTES
Patient verified by 2 identifiers and allergies reviewed.  22g IV placed to Lt AC.  DSE explained to patient, consent obtained & testing completed.  Pt tolerated testing well. IV removed post testing, small skin tear from tegaderm noted.  Skin tear dressed w/ guaze & coban.  Post study discharge instructions reviewed with patient, patient verbalized understanding.  Patient discharged to home in stable condition.

## 2018-05-28 ENCOUNTER — TELEPHONE (OUTPATIENT)
Dept: INTERNAL MEDICINE | Facility: CLINIC | Age: 78
End: 2018-05-28

## 2018-05-28 NOTE — TELEPHONE ENCOUNTER
----- Message from Mey Mckenzie sent at 2018 11:08 AM CDT -----  Contact: Jim Sifuentes  Marine Mo  MRN: [id number]  : 1940  PCP: Serg Hwang  Home Phone      [phone]  Work Phone      [phone]  Mobile          [phone]      MESSAGE:  Jim with [facility] called to let us know that since pt hasn't taken Weston before, that he can only fill 7 days worth of medication as a state law. He told pt to call us if she needs more medication. Please advise.  PHONE:  [phone]

## 2018-05-31 ENCOUNTER — LAB VISIT (OUTPATIENT)
Dept: LAB | Facility: HOSPITAL | Age: 78
End: 2018-05-31
Attending: INTERNAL MEDICINE
Payer: MEDICARE

## 2018-05-31 DIAGNOSIS — D50.9 IRON DEFICIENCY ANEMIA, UNSPECIFIED IRON DEFICIENCY ANEMIA TYPE: ICD-10-CM

## 2018-05-31 LAB
FERRITIN SERPL-MCNC: 27 NG/ML
HGB BLD-MCNC: 11.4 G/DL
IRON SERPL-MCNC: 50 UG/DL
SATURATED IRON: 12 %
TOTAL IRON BINDING CAPACITY: 401 UG/DL
TRANSFERRIN SERPL-MCNC: 271 MG/DL

## 2018-05-31 PROCEDURE — 82728 ASSAY OF FERRITIN: CPT

## 2018-05-31 PROCEDURE — 85018 HEMOGLOBIN: CPT

## 2018-05-31 PROCEDURE — 83540 ASSAY OF IRON: CPT

## 2018-05-31 PROCEDURE — 36415 COLL VENOUS BLD VENIPUNCTURE: CPT

## 2018-06-06 ENCOUNTER — TELEPHONE (OUTPATIENT)
Dept: GASTROENTEROLOGY | Facility: CLINIC | Age: 78
End: 2018-06-06

## 2018-06-06 DIAGNOSIS — D50.9 IRON DEFICIENCY ANEMIA, UNSPECIFIED IRON DEFICIENCY ANEMIA TYPE: ICD-10-CM

## 2018-06-06 DIAGNOSIS — D50.8 IRON DEFICIENCY ANEMIA SECONDARY TO INADEQUATE DIETARY IRON INTAKE: Primary | ICD-10-CM

## 2018-06-06 RX ORDER — FERROUS SULFATE 325(65) MG
325 TABLET ORAL EVERY 12 HOURS
Qty: 60 TABLET | Refills: 4 | Status: SHIPPED | OUTPATIENT
Start: 2018-06-06 | End: 2018-08-01 | Stop reason: SDUPTHER

## 2018-06-06 NOTE — TELEPHONE ENCOUNTER
----- Message from Nick Roper MD sent at 6/6/2018  6:54 AM CDT -----  Yasmin - please tell patient that they are iron deficient and anaemic and recommend that they take ferrous sulfate one 325mg pill every 12 hours for next 4 months and repeat fasting hemoglobin and Ferritin in 8 weeks - Orders placed.

## 2018-06-10 DIAGNOSIS — F51.04 CHRONIC INSOMNIA: ICD-10-CM

## 2018-06-11 RX ORDER — ALPRAZOLAM 0.5 MG/1
TABLET ORAL
Qty: 30 TABLET | Refills: 3 | Status: SHIPPED | OUTPATIENT
Start: 2018-06-11 | End: 2018-12-11 | Stop reason: SDUPTHER

## 2018-07-12 RX ORDER — SUCRALFATE 1 G/1
1 TABLET ORAL
Qty: 60 TABLET | Refills: 1 | Status: SHIPPED | OUTPATIENT
Start: 2018-07-12 | End: 2019-01-30

## 2018-07-12 NOTE — TELEPHONE ENCOUNTER
----- Message from Shira Barlow sent at 2018  8:16 AM CDT -----  Contact: SELF  Marine Mo  MRN: [id number]  : 1940  PCP: Serg Hwang  Home Phone      [phone]  Work Phone      [phone]  Mobile          [phone]      MESSAGE: NEEDS REFILL ON SUCRALFATE    PHONE: [phone]    PHARMACY: [facility]

## 2018-07-31 ENCOUNTER — TELEPHONE (OUTPATIENT)
Dept: INTERNAL MEDICINE | Facility: CLINIC | Age: 78
End: 2018-07-31

## 2018-07-31 DIAGNOSIS — I10 ESSENTIAL HYPERTENSION: Primary | ICD-10-CM

## 2018-07-31 DIAGNOSIS — E11.9 CONTROLLED TYPE 2 DIABETES MELLITUS WITHOUT COMPLICATION, WITHOUT LONG-TERM CURRENT USE OF INSULIN: ICD-10-CM

## 2018-07-31 DIAGNOSIS — E78.2 MIXED HYPERLIPIDEMIA: ICD-10-CM

## 2018-07-31 NOTE — TELEPHONE ENCOUNTER
Notified patient that Dr. Hwang did add lab orders to lab appointment scheduled tomorrow at [facility].

## 2018-07-31 NOTE — TELEPHONE ENCOUNTER
----- Message from Shira Barlow sent at 2018  9:30 AM CDT -----  Contact: SELF  Marine Mo  MRN: [id number]  : 1940  PCP: Serg Hwang  Home Phone      [phone]  Work Phone      [phone]  Mobile          [phone]      MESSAGE: PT IS GOING TO MAIN CAMPUS TO GET BW DONE AND WANTS TO KNOW IF SHE'S DUE FOR HER ROUTINE BW HERE. IF SO, SHE WANTS TO GET IT DONE AT THE SAME TIME. PLEASE CALL    PHONE: [phone]

## 2018-07-31 NOTE — TELEPHONE ENCOUNTER
Patient is having labs done today for another provider and wants to know if you would like to add any labs. Please review labs and advise. Thanks.

## 2018-08-01 ENCOUNTER — LAB VISIT (OUTPATIENT)
Dept: LAB | Facility: HOSPITAL | Age: 78
End: 2018-08-01
Attending: INTERNAL MEDICINE
Payer: MEDICARE

## 2018-08-01 DIAGNOSIS — E11.9 CONTROLLED TYPE 2 DIABETES MELLITUS WITHOUT COMPLICATION, WITHOUT LONG-TERM CURRENT USE OF INSULIN: ICD-10-CM

## 2018-08-01 DIAGNOSIS — D50.9 IRON DEFICIENCY ANEMIA, UNSPECIFIED IRON DEFICIENCY ANEMIA TYPE: ICD-10-CM

## 2018-08-01 DIAGNOSIS — D50.8 IRON DEFICIENCY ANEMIA SECONDARY TO INADEQUATE DIETARY IRON INTAKE: ICD-10-CM

## 2018-08-01 DIAGNOSIS — E78.2 MIXED HYPERLIPIDEMIA: ICD-10-CM

## 2018-08-01 DIAGNOSIS — I10 ESSENTIAL HYPERTENSION: ICD-10-CM

## 2018-08-01 LAB
ALBUMIN/CREAT UR: 12 UG/MG
ANION GAP SERPL CALC-SCNC: 9 MMOL/L
BUN SERPL-MCNC: 13 MG/DL
CALCIUM SERPL-MCNC: 9.4 MG/DL
CHLORIDE SERPL-SCNC: 110 MMOL/L
CHOLEST SERPL-MCNC: 179 MG/DL
CHOLEST/HDLC SERPL: 5.4 {RATIO}
CO2 SERPL-SCNC: 24 MMOL/L
CREAT SERPL-MCNC: 0.8 MG/DL
CREAT UR-MCNC: 74.9 MG/DL
EST. GFR  (AFRICAN AMERICAN): >60 ML/MIN/1.73 M^2
EST. GFR  (NON AFRICAN AMERICAN): >60 ML/MIN/1.73 M^2
ESTIMATED AVG GLUCOSE: 105 MG/DL
FERRITIN SERPL-MCNC: 38 NG/ML
GLUCOSE SERPL-MCNC: 127 MG/DL
HBA1C MFR BLD HPLC: 5.3 %
HDLC SERPL-MCNC: 33 MG/DL
HDLC SERPL: 18.4 %
HGB BLD-MCNC: 12.3 G/DL
IRON SERPL-MCNC: 66 UG/DL
LDLC SERPL CALC-MCNC: 96.8 MG/DL
MICROALBUMIN UR DL<=1MG/L-MCNC: 9 UG/ML
NONHDLC SERPL-MCNC: 146 MG/DL
POTASSIUM SERPL-SCNC: 4.1 MMOL/L
SATURATED IRON: 18 %
SODIUM SERPL-SCNC: 143 MMOL/L
TOTAL IRON BINDING CAPACITY: 376 UG/DL
TRANSFERRIN SERPL-MCNC: 254 MG/DL
TRIGL SERPL-MCNC: 246 MG/DL
TSH SERPL DL<=0.005 MIU/L-ACNC: 1.36 UIU/ML

## 2018-08-01 PROCEDURE — 36415 COLL VENOUS BLD VENIPUNCTURE: CPT

## 2018-08-01 PROCEDURE — 83036 HEMOGLOBIN GLYCOSYLATED A1C: CPT

## 2018-08-01 PROCEDURE — 83540 ASSAY OF IRON: CPT

## 2018-08-01 PROCEDURE — 82043 UR ALBUMIN QUANTITATIVE: CPT

## 2018-08-01 PROCEDURE — 82728 ASSAY OF FERRITIN: CPT

## 2018-08-01 PROCEDURE — 80048 BASIC METABOLIC PNL TOTAL CA: CPT

## 2018-08-01 PROCEDURE — 85018 HEMOGLOBIN: CPT

## 2018-08-01 PROCEDURE — 80061 LIPID PANEL: CPT

## 2018-08-01 PROCEDURE — 84443 ASSAY THYROID STIM HORMONE: CPT

## 2018-08-01 RX ORDER — FERROUS SULFATE 325(65) MG
325 TABLET ORAL EVERY 12 HOURS
Qty: 60 TABLET | Refills: 4 | Status: SHIPPED | OUTPATIENT
Start: 2018-08-01 | End: 2019-01-30

## 2018-08-02 ENCOUNTER — TELEPHONE (OUTPATIENT)
Dept: GASTROENTEROLOGY | Facility: CLINIC | Age: 78
End: 2018-08-02

## 2018-08-02 NOTE — TELEPHONE ENCOUNTER
----- Message from Nick Roper MD sent at 8/1/2018  5:27 PM CDT -----  Yasmin - please tell patient that they are iron deficient and anaemic and recommend that they take ferrous sulfate one 325mg pill every 12 hours for next 4 months and repeat fasting hemoglobin and Ferritin in 12 weeks - Orders placed.

## 2018-08-02 NOTE — TELEPHONE ENCOUNTER
----- Message from Joyce Trujillo sent at 8/2/2018  1:44 PM CDT -----  Contact: Joe Hoffman,    Pt is returning your call    Pt contact number [phone]  Thanks

## 2018-08-16 ENCOUNTER — OFFICE VISIT (OUTPATIENT)
Dept: INTERNAL MEDICINE | Facility: CLINIC | Age: 78
End: 2018-08-16
Payer: MEDICARE

## 2018-08-16 VITALS
RESPIRATION RATE: 16 BRPM | SYSTOLIC BLOOD PRESSURE: 110 MMHG | HEIGHT: 64 IN | DIASTOLIC BLOOD PRESSURE: 60 MMHG | BODY MASS INDEX: 29.62 KG/M2 | WEIGHT: 173.5 LBS | OXYGEN SATURATION: 95 % | HEART RATE: 72 BPM

## 2018-08-16 DIAGNOSIS — E78.2 MIXED HYPERLIPIDEMIA: Primary | ICD-10-CM

## 2018-08-16 DIAGNOSIS — F51.04 CHRONIC INSOMNIA: ICD-10-CM

## 2018-08-16 DIAGNOSIS — E66.9 OBESITY (BMI 30.0-34.9): ICD-10-CM

## 2018-08-16 DIAGNOSIS — D69.6 THROMBOCYTOPENIA: ICD-10-CM

## 2018-08-16 DIAGNOSIS — I10 ESSENTIAL HYPERTENSION: ICD-10-CM

## 2018-08-16 DIAGNOSIS — E11.9 CONTROLLED TYPE 2 DIABETES MELLITUS WITHOUT COMPLICATION, WITHOUT LONG-TERM CURRENT USE OF INSULIN: ICD-10-CM

## 2018-08-16 PROCEDURE — 3078F DIAST BP <80 MM HG: CPT | Mod: CPTII,S$GLB,, | Performed by: INTERNAL MEDICINE

## 2018-08-16 PROCEDURE — 99499 UNLISTED E&M SERVICE: CPT | Mod: S$GLB,,, | Performed by: INTERNAL MEDICINE

## 2018-08-16 PROCEDURE — 99999 PR PBB SHADOW E&M-EST. PATIENT-LVL III: CPT | Mod: PBBFAC,,, | Performed by: INTERNAL MEDICINE

## 2018-08-16 PROCEDURE — 99214 OFFICE O/P EST MOD 30 MIN: CPT | Mod: S$GLB,,, | Performed by: INTERNAL MEDICINE

## 2018-08-16 PROCEDURE — 3074F SYST BP LT 130 MM HG: CPT | Mod: CPTII,S$GLB,, | Performed by: INTERNAL MEDICINE

## 2018-08-16 RX ORDER — LATANOPROST 50 UG/ML
SOLUTION/ DROPS OPHTHALMIC
COMMUNITY
Start: 2018-08-15 | End: 2022-02-23 | Stop reason: ALTCHOICE

## 2018-08-16 NOTE — PROGRESS NOTES
Subjective:       Patient ID: Marine Mo is a 78 y.o. female.    Chief Complaint: Follow-up; Diabetes; Hyperlipidemia; and Hypertension    Marine Mo is a 78  female who presents for Type II DM, Hypertension, and Hyperlipidemia follow up. Labs were reviewed with patient today.        Hypertension   This is a chronic problem. The current episode started more than 1 year ago. The problem has been waxing and waning since onset. The problem is controlled. Associated symptoms include anxiety. Pertinent negatives include no chest pain, neck pain, palpitations or shortness of breath. Risk factors for coronary artery disease include sedentary lifestyle, obesity and dyslipidemia. Past treatments include beta blockers, lifestyle changes and diuretics. The current treatment provides moderate improvement.   Diabetes   She presents for her follow-up diabetic visit. She has type 2 diabetes mellitus. Her disease course has been stable. Hypoglycemia symptoms include nervousness/anxiousness. Pertinent negatives for hypoglycemia include no confusion, dizziness or pallor. Pertinent negatives for diabetes include no chest pain. Diabetic complications include peripheral neuropathy. Current diabetic treatment includes diet. An ACE inhibitor/angiotensin II receptor blocker is not being taken.   Anxiety   Symptoms include nervous/anxious behavior. Patient reports no chest pain, confusion, dizziness, nausea, palpitations, shortness of breath or suicidal ideas.       Hyperlipidemia   This is a chronic problem. The current episode started more than 1 year ago. The problem is controlled. Recent lipid tests were reviewed and are low. Exacerbating diseases include obesity. Pertinent negatives include no chest pain, myalgias or shortness of breath. She is currently on no antihyperlipidemic treatment. The current treatment provides mild improvement of lipids. Compliance problems include adherence to diet.    Medication Refill    Pertinent negatives include no arthralgias, chest pain, chills, congestion, coughing, fever, myalgias, nausea, neck pain, numbness, sore throat or vomiting.     Review of Systems   Constitutional: Negative for chills and fever.   HENT: Negative for congestion, hearing loss, sinus pressure and sore throat.    Eyes: Negative for photophobia.   Respiratory: Negative for cough, choking, chest tightness, shortness of breath and wheezing.    Cardiovascular: Negative for chest pain and palpitations.   Gastrointestinal: Negative for blood in stool, nausea and vomiting.   Genitourinary: Negative for dysuria and hematuria.   Musculoskeletal: Positive for back pain. Negative for arthralgias, myalgias and neck pain.   Skin: Negative for pallor.   Neurological: Negative for dizziness and numbness.   Hematological: Does not bruise/bleed easily.   Psychiatric/Behavioral: Positive for sleep disturbance. Negative for confusion and suicidal ideas. The patient is nervous/anxious.        Objective:      Physical Exam   Constitutional: She is oriented to person, place, and time. She appears well-developed and well-nourished.   HENT:   Head: Normocephalic and atraumatic.   Right Ear: External ear normal.   Left Ear: External ear normal.   Mouth/Throat: Oropharynx is clear and moist.   Eyes: Conjunctivae and EOM are normal. Pupils are equal, round, and reactive to light.   Neck: Normal range of motion. Neck supple. No JVD present. No tracheal deviation present. No thyromegaly present.   Cardiovascular: Normal rate, regular rhythm, normal heart sounds and intact distal pulses.   Pulses:       Dorsalis pedis pulses are 2+ on the right side, and 2+ on the left side.        Posterior tibial pulses are 2+ on the right side, and 2+ on the left side.   Pulmonary/Chest: Effort normal and breath sounds normal. No respiratory distress. She has no wheezes. She has no rales. She exhibits no tenderness.   Abdominal: Soft. Bowel sounds are normal.  She exhibits no distension and no mass. There is no tenderness. There is no rebound and no guarding.   Musculoskeletal: Normal range of motion. She exhibits no edema.   Feet:   Right Foot:   Protective Sensation: 7 sites tested. 7 sites sensed.   Skin Integrity: Negative for ulcer, erythema or dry skin.   Left Foot:   Protective Sensation: 7 sites tested. 7 sites sensed.   Skin Integrity: Negative for ulcer, erythema or dry skin.   Lymphadenopathy:     She has no cervical adenopathy.   Neurological: She is alert and oriented to person, place, and time. She has normal reflexes. No cranial nerve deficit. She exhibits normal muscle tone. Coordination normal.   Gait normal.   Skin: Skin is warm and dry.   Psychiatric: She has a normal mood and affect.   Nursing note and vitals reviewed.      Assessment:       1. Mixed hyperlipidemia    2. Controlled type 2 diabetes mellitus without complication, without long-term current use of insulin    3. Essential hypertension    4. Obesity (BMI 30.0-34.9)    5. Chronic insomnia    6. Thrombocytopenia        Plan:   Marine was seen today for follow-up, diabetes, hyperlipidemia and hypertension.    Diagnoses and all orders for this visit:    Mixed hyperlipidemia  -     Lipid panel; Future  -     TSH; Future  Limit the cholesterol in your diet to less than 300 mg per day.   Fats should contribute no more than 20 to 35% of your daily calories.   Less than 7 to 10% of your calories should come from saturated fat.   Avoid saturated fat products e.g., Butter, some oils, meat, and poultry fat contain a lot of saturated fat.   Check food labels for fat and cholesterol content. Choose the foods with less fat per serving.   Limit the amount of butter and margarine you eat.   Use salad dressings and margarine made with polyunsaturated and monounsaturated fats.   Use egg whites or egg substitutes rather than whole eggs.   Replace whole-milk dairy products with nonfat or low-fat milk, cheese,  spreads, and yogurt.   Eat skinless chicken, turkey, fish, and meatless entrees more often than red meat.   Choose lean cuts of meat and trim off all visible fat. Keep portion sizes moderate.   Avoid fatty desserts such as ice cream, cream-filled cakes, and cheesecakes. Choose fresh fruits, nonfat frozen yogurt, Popsicles, etc.   Reduce the amount of fried foods, vending machine food, and fast food you eat.   Eat fruits and vegetables (especially fresh fruits and leafy vegetables), beans, and whole grains daily. The fiber in these foods helps lower cholesterol.   Look for low-fat or nonfat varieties of the foods you like to eat, or look for substitutes.   You may need to exercise 60 minutes a day to prevent weight gain and 90 minutes a day to lose weight.  Controlled type 2 diabetes mellitus without complication, without long-term current use of insulin  Diet controlled.    -     Hemoglobin A1c; Future  -     Microalbumin/creatinine urine ratio; Future  Patient has controlled Diabetes .  We discussed about diet ;low in calories. Avoid sweats, sodas.  Also increasing activity;walking 2-3 miles a day.  I also adjusted medications and gave patient  instructions about adherence to plan.  Goal of  A1c  less than 7 % stressed.  Also goal of LDL less than 70 highlighted to patient.  Essential hypertension  -     CBC auto differential; Future  -     Comprehensive metabolic panel; Future  -     TSH; Future    Well controlled.  Continue same medication and dose.  1. Keep weight close to ideal body weight.   2.   Avoid high salt foods (olives, pickles, smoked meats, salted potato chips, etc.).   Do not add salt to your food at the table.   Use only small amounts of salt when cooking.   3. Begin an exercise program. Discuss with your doctor what type of exercise program would be best for you. It doesn't have to be difficult. Even brisk walking for 20 minutes three times a week is a good form of exercise.   4. Avoid medicines  which contain heart stimulants. This includes many cold and sinus decongestant pills and sprays as well as diet pills. Check the warnings about hypertension on the label. Stimulants such as amphetamine or cocaine could be lethal for someone with hypertension. Never take these.    Obesity (BMI 30.0-34.9)  -     TSH; Future    # The patient is asked to make an attempt to improve diet and exercise patterns to aid in medical management of this problem.     # Eat  5 small meals a day.     # Cut out high carbohydrate  foods : bread, rice, pasta, potatoes.     # Exercise/walk 5x/week for at least 30-45  minutes.        Chronic insomnia  -     TSH; Future  Alprazolam helps    Thrombocytopenia  -     CBC auto differential; Future  Splenomegaly

## 2018-10-25 ENCOUNTER — LAB VISIT (OUTPATIENT)
Dept: LAB | Facility: HOSPITAL | Age: 78
End: 2018-10-25
Attending: INTERNAL MEDICINE
Payer: MEDICARE

## 2018-10-25 DIAGNOSIS — D50.8 IRON DEFICIENCY ANEMIA SECONDARY TO INADEQUATE DIETARY IRON INTAKE: ICD-10-CM

## 2018-10-25 DIAGNOSIS — D50.9 IRON DEFICIENCY ANEMIA, UNSPECIFIED IRON DEFICIENCY ANEMIA TYPE: ICD-10-CM

## 2018-10-25 LAB
FERRITIN SERPL-MCNC: 73 NG/ML
HGB BLD-MCNC: 13.2 G/DL
IRON SERPL-MCNC: 83 UG/DL
SATURATED IRON: 23 %
TOTAL IRON BINDING CAPACITY: 364 UG/DL
TRANSFERRIN SERPL-MCNC: 246 MG/DL

## 2018-10-25 PROCEDURE — 85018 HEMOGLOBIN: CPT

## 2018-10-25 PROCEDURE — 36415 COLL VENOUS BLD VENIPUNCTURE: CPT

## 2018-10-25 PROCEDURE — 83540 ASSAY OF IRON: CPT

## 2018-10-25 PROCEDURE — 82728 ASSAY OF FERRITIN: CPT

## 2018-11-16 DIAGNOSIS — I10 ESSENTIAL HYPERTENSION: ICD-10-CM

## 2018-11-20 ENCOUNTER — TELEPHONE (OUTPATIENT)
Dept: GASTROENTEROLOGY | Facility: CLINIC | Age: 78
End: 2018-11-20

## 2018-11-20 RX ORDER — METOPROLOL TARTRATE 100 MG/1
TABLET ORAL
Qty: 90 TABLET | Refills: 2 | Status: SHIPPED | OUTPATIENT
Start: 2018-11-20 | End: 2019-11-02 | Stop reason: SDUPTHER

## 2018-11-20 NOTE — TELEPHONE ENCOUNTER
----- Message from Nick Roper MD sent at 11/20/2018  2:06 PM CST -----  No just recommend she follow up fasting anemia and iron labs with her primary care MD in 4-6 weeks for recheck and then periodically.  ----- Message -----  From: Yasmin Andrews MA  Sent: 11/20/2018  10:26 AM  To: MD Dr Judson Goomdan,  Pt wants to know if she still needs to take her iron pills. She is still having problems with her stomach. Wants to know what the next step is.  Yasmin

## 2018-11-26 ENCOUNTER — TELEPHONE (OUTPATIENT)
Dept: GASTROENTEROLOGY | Facility: CLINIC | Age: 78
End: 2018-11-26

## 2018-11-26 NOTE — TELEPHONE ENCOUNTER
Spoke with patient.  Aware  said she can stop the iron.  Just recommend she follow up with her PCP in 4-6 weeks for fasting anemia and iron labs and then periodically.

## 2018-11-26 NOTE — TELEPHONE ENCOUNTER
----- Message from Tiff Claudio sent at 11/26/2018  8:55 AM CST -----  Contact: pt  Pt missed a call and would the nurse to return their call.    Pt can be reached at [phone]

## 2018-12-11 DIAGNOSIS — F51.04 CHRONIC INSOMNIA: ICD-10-CM

## 2018-12-11 RX ORDER — ALPRAZOLAM 0.5 MG/1
TABLET ORAL
Qty: 30 TABLET | Refills: 3 | Status: SHIPPED | OUTPATIENT
Start: 2018-12-11 | End: 2019-06-29 | Stop reason: SDUPTHER

## 2019-01-30 ENCOUNTER — OFFICE VISIT (OUTPATIENT)
Dept: INTERNAL MEDICINE | Facility: CLINIC | Age: 79
End: 2019-01-30
Payer: MEDICARE

## 2019-01-30 VITALS
HEIGHT: 64 IN | SYSTOLIC BLOOD PRESSURE: 130 MMHG | BODY MASS INDEX: 30.07 KG/M2 | DIASTOLIC BLOOD PRESSURE: 62 MMHG | OXYGEN SATURATION: 95 % | WEIGHT: 176.13 LBS | RESPIRATION RATE: 14 BRPM | HEART RATE: 73 BPM

## 2019-01-30 DIAGNOSIS — F51.04 CHRONIC INSOMNIA: Primary | ICD-10-CM

## 2019-01-30 DIAGNOSIS — K92.2 UPPER GI BLEED: ICD-10-CM

## 2019-01-30 DIAGNOSIS — F32.A DEPRESSION, UNSPECIFIED DEPRESSION TYPE: ICD-10-CM

## 2019-01-30 PROCEDURE — 99999 PR PBB SHADOW E&M-EST. PATIENT-LVL III: CPT | Mod: PBBFAC,,, | Performed by: INTERNAL MEDICINE

## 2019-01-30 PROCEDURE — 99999 PR PBB SHADOW E&M-EST. PATIENT-LVL III: ICD-10-PCS | Mod: PBBFAC,,, | Performed by: INTERNAL MEDICINE

## 2019-01-30 PROCEDURE — 99214 OFFICE O/P EST MOD 30 MIN: CPT | Mod: S$GLB,,, | Performed by: INTERNAL MEDICINE

## 2019-01-30 PROCEDURE — 3078F PR MOST RECENT DIASTOLIC BLOOD PRESSURE < 80 MM HG: ICD-10-PCS | Mod: CPTII,S$GLB,, | Performed by: INTERNAL MEDICINE

## 2019-01-30 PROCEDURE — 1101F PR PT FALLS ASSESS DOC 0-1 FALLS W/OUT INJ PAST YR: ICD-10-PCS | Mod: CPTII,S$GLB,, | Performed by: INTERNAL MEDICINE

## 2019-01-30 PROCEDURE — 3078F DIAST BP <80 MM HG: CPT | Mod: CPTII,S$GLB,, | Performed by: INTERNAL MEDICINE

## 2019-01-30 PROCEDURE — 3075F PR MOST RECENT SYSTOLIC BLOOD PRESS GE 130-139MM HG: ICD-10-PCS | Mod: CPTII,S$GLB,, | Performed by: INTERNAL MEDICINE

## 2019-01-30 PROCEDURE — 99214 PR OFFICE/OUTPT VISIT, EST, LEVL IV, 30-39 MIN: ICD-10-PCS | Mod: S$GLB,,, | Performed by: INTERNAL MEDICINE

## 2019-01-30 PROCEDURE — 1101F PT FALLS ASSESS-DOCD LE1/YR: CPT | Mod: CPTII,S$GLB,, | Performed by: INTERNAL MEDICINE

## 2019-01-30 PROCEDURE — 3075F SYST BP GE 130 - 139MM HG: CPT | Mod: CPTII,S$GLB,, | Performed by: INTERNAL MEDICINE

## 2019-01-30 RX ORDER — FLUOXETINE 10 MG/1
10 CAPSULE ORAL DAILY
Qty: 30 CAPSULE | Refills: 5 | Status: SHIPPED | OUTPATIENT
Start: 2019-01-30 | End: 2019-07-28 | Stop reason: SDUPTHER

## 2019-01-30 NOTE — PROGRESS NOTES
Subjective:       Patient ID: Marine Mo is a 78 y.o. female.    Chief Complaint: Abdominal Pain    Marine Mo is a 78  female who presents for Type II DM, Hypertension, and Hyperlipidemia follow up. Labs were reviewed with patient today. Patient with c/o recurrent stomach pain and increased anxiety and depression.         Hypertension   This is a chronic problem. The current episode started more than 1 year ago. The problem has been waxing and waning since onset. The problem is controlled. Associated symptoms include anxiety. Pertinent negatives include no chest pain, neck pain, palpitations or shortness of breath. Risk factors for coronary artery disease include sedentary lifestyle, obesity and dyslipidemia. Past treatments include beta blockers, lifestyle changes and diuretics. The current treatment provides moderate improvement.   Diabetes   She presents for her follow-up diabetic visit. She has type 2 diabetes mellitus. Her disease course has been stable. Hypoglycemia symptoms include nervousness/anxiousness. Pertinent negatives for hypoglycemia include no confusion, dizziness or pallor. Pertinent negatives for diabetes include no chest pain. Diabetic complications include peripheral neuropathy. Current diabetic treatment includes diet. An ACE inhibitor/angiotensin II receptor blocker is not being taken.   Anxiety   Presents for initial visit. Onset was 1 to 6 months ago. The problem has been gradually worsening. Symptoms include insomnia and nervous/anxious behavior. Patient reports no chest pain, confusion, dizziness, nausea, palpitations or shortness of breath. Symptoms occur most days. The severity of symptoms is moderate. Exacerbated by: xanax at night. The quality of sleep is poor. Nighttime awakenings: one to two.     There are no known risk factors. Past treatments include benzodiazephines. The treatment provided mild relief.   Hyperlipidemia   This is a chronic problem. The current  episode started more than 1 year ago. The problem is controlled. Recent lipid tests were reviewed and are low. Exacerbating diseases include obesity. Pertinent negatives include no chest pain, myalgias or shortness of breath. She is currently on no antihyperlipidemic treatment. The current treatment provides mild improvement of lipids. Compliance problems include adherence to diet.    Medication Refill   Pertinent negatives include no arthralgias, chest pain, chills, congestion, coughing, fever, myalgias, nausea, neck pain, numbness, sore throat or vomiting.     Review of Systems   Constitutional: Negative for chills and fever.   HENT: Negative for congestion and sore throat.    Respiratory: Negative for cough and shortness of breath.    Cardiovascular: Negative for chest pain and palpitations.   Gastrointestinal: Negative for nausea and vomiting.   Musculoskeletal: Negative for arthralgias, myalgias and neck pain.   Skin: Negative for pallor.   Neurological: Negative for dizziness and numbness.   Psychiatric/Behavioral: Negative for confusion. The patient is nervous/anxious and has insomnia.        Objective:      Physical Exam   Constitutional: She is oriented to person, place, and time. She appears well-developed and well-nourished.   HENT:   Head: Normocephalic and atraumatic.   Right Ear: External ear normal.   Left Ear: External ear normal.   Mouth/Throat: Oropharynx is clear and moist.   Eyes: Conjunctivae and EOM are normal. Pupils are equal, round, and reactive to light.   Neck: Normal range of motion. Neck supple. No JVD present. No tracheal deviation present. No thyromegaly present.   Cardiovascular: Normal rate, regular rhythm, normal heart sounds and intact distal pulses.   Pulmonary/Chest: Effort normal and breath sounds normal. No respiratory distress. She has no wheezes. She has no rales. She exhibits no tenderness.   Abdominal: Soft. Bowel sounds are normal. She exhibits no distension and no mass.  There is no tenderness. There is no rebound and no guarding.   Musculoskeletal: Normal range of motion. She exhibits no edema.   Lymphadenopathy:     She has no cervical adenopathy.   Neurological: She is alert and oriented to person, place, and time. She has normal reflexes. She displays normal reflexes. No cranial nerve deficit. She exhibits normal muscle tone. Coordination normal.   CN: Optic discs are flat with normal vasculature, PERRL, Extraoccular movements and visual fields are full. Normal facial sensation and strength, Hearing symmetric, Tongue and Palate are midline and strong. Shoulder Shrug symmetric and strong.   Skin: Skin is warm and dry.   Psychiatric: She has a normal mood and affect.   Nursing note and vitals reviewed.      Assessment:       1. Chronic insomnia    2. Depression, unspecified depression type        Plan:   Marine was seen today for abdominal pain.    Diagnoses and all orders for this visit:    Chronic insomnia    Will add SSRI to improve overall mood.    Problem List Items Addressed This Visit     Chronic insomnia - Primary

## 2019-02-25 RX ORDER — METFORMIN HYDROCHLORIDE 500 MG/1
TABLET ORAL
Qty: 180 TABLET | Refills: 3 | Status: SHIPPED | OUTPATIENT
Start: 2019-02-25 | End: 2020-03-02

## 2019-02-27 ENCOUNTER — LAB VISIT (OUTPATIENT)
Dept: LAB | Facility: HOSPITAL | Age: 79
End: 2019-02-27
Attending: INTERNAL MEDICINE
Payer: MEDICARE

## 2019-02-27 DIAGNOSIS — F51.04 CHRONIC INSOMNIA: ICD-10-CM

## 2019-02-27 DIAGNOSIS — I10 ESSENTIAL HYPERTENSION: ICD-10-CM

## 2019-02-27 DIAGNOSIS — E11.9 CONTROLLED TYPE 2 DIABETES MELLITUS WITHOUT COMPLICATION, WITHOUT LONG-TERM CURRENT USE OF INSULIN: ICD-10-CM

## 2019-02-27 DIAGNOSIS — E78.2 MIXED HYPERLIPIDEMIA: ICD-10-CM

## 2019-02-27 DIAGNOSIS — E66.9 OBESITY (BMI 30.0-34.9): ICD-10-CM

## 2019-02-27 DIAGNOSIS — D69.6 THROMBOCYTOPENIA: ICD-10-CM

## 2019-02-27 LAB
ALBUMIN SERPL BCP-MCNC: 3.8 G/DL
ALBUMIN/CREAT UR: 42.1 UG/MG
ALP SERPL-CCNC: 29 U/L
ALT SERPL W/O P-5'-P-CCNC: 17 U/L
ANION GAP SERPL CALC-SCNC: 8 MMOL/L
AST SERPL-CCNC: 25 U/L
BASOPHILS # BLD AUTO: 0.01 K/UL
BASOPHILS NFR BLD: 0.4 %
BILIRUB SERPL-MCNC: 0.8 MG/DL
BUN SERPL-MCNC: 16 MG/DL
CALCIUM SERPL-MCNC: 9.7 MG/DL
CHLORIDE SERPL-SCNC: 104 MMOL/L
CHOLEST SERPL-MCNC: 184 MG/DL
CHOLEST/HDLC SERPL: 5.4 {RATIO}
CO2 SERPL-SCNC: 28 MMOL/L
CREAT SERPL-MCNC: 0.8 MG/DL
CREAT UR-MCNC: 90.3 MG/DL
DIFFERENTIAL METHOD: ABNORMAL
EOSINOPHIL # BLD AUTO: 0.1 K/UL
EOSINOPHIL NFR BLD: 3.7 %
ERYTHROCYTE [DISTWIDTH] IN BLOOD BY AUTOMATED COUNT: 15.3 %
EST. GFR  (AFRICAN AMERICAN): >60 ML/MIN/1.73 M^2
EST. GFR  (NON AFRICAN AMERICAN): >60 ML/MIN/1.73 M^2
ESTIMATED AVG GLUCOSE: 108 MG/DL
GLUCOSE SERPL-MCNC: 112 MG/DL
HBA1C MFR BLD HPLC: 5.4 %
HCT VFR BLD AUTO: 37.7 %
HDLC SERPL-MCNC: 34 MG/DL
HDLC SERPL: 18.5 %
HGB BLD-MCNC: 12.3 G/DL
LDLC SERPL CALC-MCNC: 104.4 MG/DL
LYMPHOCYTES # BLD AUTO: 1 K/UL
LYMPHOCYTES NFR BLD: 36.3 %
MCH RBC QN AUTO: 28.8 PG
MCHC RBC AUTO-ENTMCNC: 32.6 G/DL
MCV RBC AUTO: 88 FL
MICROALBUMIN UR DL<=1MG/L-MCNC: 38 UG/ML
MONOCYTES # BLD AUTO: 0.2 K/UL
MONOCYTES NFR BLD: 9 %
NEUTROPHILS # BLD AUTO: 1.4 K/UL
NEUTROPHILS NFR BLD: 50.6 %
NONHDLC SERPL-MCNC: 150 MG/DL
PLATELET # BLD AUTO: 68 K/UL
PMV BLD AUTO: 9 FL
POTASSIUM SERPL-SCNC: 4.3 MMOL/L
PROT SERPL-MCNC: 6.8 G/DL
RBC # BLD AUTO: 4.27 M/UL
SODIUM SERPL-SCNC: 140 MMOL/L
TRIGL SERPL-MCNC: 228 MG/DL
TSH SERPL DL<=0.005 MIU/L-ACNC: 1.76 UIU/ML
WBC # BLD AUTO: 2.67 K/UL

## 2019-02-27 PROCEDURE — 80061 LIPID PANEL: CPT

## 2019-02-27 PROCEDURE — 36415 COLL VENOUS BLD VENIPUNCTURE: CPT

## 2019-02-27 PROCEDURE — 80053 COMPREHEN METABOLIC PANEL: CPT

## 2019-02-27 PROCEDURE — 84443 ASSAY THYROID STIM HORMONE: CPT

## 2019-02-27 PROCEDURE — 82043 UR ALBUMIN QUANTITATIVE: CPT

## 2019-02-27 PROCEDURE — 85025 COMPLETE CBC W/AUTO DIFF WBC: CPT

## 2019-02-27 PROCEDURE — 83036 HEMOGLOBIN GLYCOSYLATED A1C: CPT

## 2019-02-28 ENCOUNTER — TELEPHONE (OUTPATIENT)
Dept: OBSTETRICS AND GYNECOLOGY | Facility: CLINIC | Age: 79
End: 2019-02-28

## 2019-02-28 NOTE — TELEPHONE ENCOUNTER
----- Message from Marcy Valera sent at 2/28/2019  9:55 AM CST -----  Contact: Self  Marine Mo  MRN: [id number]  Home Phone      [phone]  Work Phone      [phone]  Mobile          [phone]    Patient Care Team:  Serg Hwang MD as PCP - General (Internal Medicine)  OB? No  What phone number can you be reached at? [phone]  Message:   Patient states she is having light bleeding and her vaginal area is hurting. Please return call.

## 2019-03-01 ENCOUNTER — OFFICE VISIT (OUTPATIENT)
Dept: OBSTETRICS AND GYNECOLOGY | Facility: CLINIC | Age: 79
End: 2019-03-01
Payer: MEDICARE

## 2019-03-01 VITALS
DIASTOLIC BLOOD PRESSURE: 70 MMHG | WEIGHT: 170 LBS | HEART RATE: 80 BPM | RESPIRATION RATE: 14 BRPM | HEIGHT: 64 IN | SYSTOLIC BLOOD PRESSURE: 122 MMHG | BODY MASS INDEX: 29.02 KG/M2

## 2019-03-01 DIAGNOSIS — N39.41 URGE INCONTINENCE OF URINE: ICD-10-CM

## 2019-03-01 DIAGNOSIS — B37.2 SKIN YEAST INFECTION: ICD-10-CM

## 2019-03-01 DIAGNOSIS — N89.8 VAGINAL IRRITATION: Primary | ICD-10-CM

## 2019-03-01 PROCEDURE — 87510 GARDNER VAG DNA DIR PROBE: CPT

## 2019-03-01 PROCEDURE — 1101F PR PT FALLS ASSESS DOC 0-1 FALLS W/OUT INJ PAST YR: ICD-10-PCS | Mod: CPTII,S$GLB,, | Performed by: OBSTETRICS & GYNECOLOGY

## 2019-03-01 PROCEDURE — 3078F PR MOST RECENT DIASTOLIC BLOOD PRESSURE < 80 MM HG: ICD-10-PCS | Mod: CPTII,S$GLB,, | Performed by: OBSTETRICS & GYNECOLOGY

## 2019-03-01 PROCEDURE — 99213 PR OFFICE/OUTPT VISIT, EST, LEVL III, 20-29 MIN: ICD-10-PCS | Mod: S$GLB,,, | Performed by: OBSTETRICS & GYNECOLOGY

## 2019-03-01 PROCEDURE — 3074F SYST BP LT 130 MM HG: CPT | Mod: CPTII,S$GLB,, | Performed by: OBSTETRICS & GYNECOLOGY

## 2019-03-01 PROCEDURE — 99213 OFFICE O/P EST LOW 20 MIN: CPT | Mod: S$GLB,,, | Performed by: OBSTETRICS & GYNECOLOGY

## 2019-03-01 PROCEDURE — 87480 CANDIDA DNA DIR PROBE: CPT

## 2019-03-01 PROCEDURE — 3074F PR MOST RECENT SYSTOLIC BLOOD PRESSURE < 130 MM HG: ICD-10-PCS | Mod: CPTII,S$GLB,, | Performed by: OBSTETRICS & GYNECOLOGY

## 2019-03-01 PROCEDURE — 1101F PT FALLS ASSESS-DOCD LE1/YR: CPT | Mod: CPTII,S$GLB,, | Performed by: OBSTETRICS & GYNECOLOGY

## 2019-03-01 PROCEDURE — 99999 PR PBB SHADOW E&M-EST. PATIENT-LVL III: CPT | Mod: PBBFAC,,, | Performed by: OBSTETRICS & GYNECOLOGY

## 2019-03-01 PROCEDURE — 3078F DIAST BP <80 MM HG: CPT | Mod: CPTII,S$GLB,, | Performed by: OBSTETRICS & GYNECOLOGY

## 2019-03-01 PROCEDURE — 99999 PR PBB SHADOW E&M-EST. PATIENT-LVL III: ICD-10-PCS | Mod: PBBFAC,,, | Performed by: OBSTETRICS & GYNECOLOGY

## 2019-03-01 RX ORDER — NYSTATIN AND TRIAMCINOLONE ACETONIDE 100000; 1 [USP'U]/G; MG/G
CREAM TOPICAL
Qty: 30 G | Refills: 1 | Status: SHIPPED | OUTPATIENT
Start: 2019-03-01 | End: 2019-09-19 | Stop reason: SDUPTHER

## 2019-03-01 RX ORDER — OXYBUTYNIN CHLORIDE 5 MG/1
5 TABLET ORAL 2 TIMES DAILY
Qty: 60 TABLET | Refills: 2 | Status: SHIPPED | OUTPATIENT
Start: 2019-03-01 | End: 2019-05-30 | Stop reason: SDUPTHER

## 2019-03-01 NOTE — PROGRESS NOTES
Subjective:       Patient ID: Marine Mo is a 78 y.o. female.    Chief Complaint:  Vaginal Bleeding and Vaginal Pain      History of Present Illness  Patient complaining of some vaginal bleeding.  She is also complaining of some vaginal pain which she describes as intense irritation.  Patient has had an intense itch off and on for many years.  She states that she has used a steroid cream which has helped in the past but does not appear to be controlling it now.  Upon further questioning patient admits to incontinence of urine with urgency.  She is not treated this with any medication.  Patient is status post hysterectomy.    Menstrual History:  OB History      Para Term  AB Living    2 2 2          SAB TAB Ectopic Multiple Live Births                      Menarche age:  No LMP recorded. Patient has had a hysterectomy.         Review of Systems  Review of Systems   Constitutional: Negative for activity change, appetite change, chills, diaphoresis, fatigue, fever and unexpected weight change.   HENT: Negative for congestion, dental problem, drooling, ear discharge, ear pain, facial swelling, hearing loss, mouth sores, nosebleeds, postnasal drip, rhinorrhea, sinus pressure, sneezing, sore throat, tinnitus, trouble swallowing and voice change.    Eyes: Negative for photophobia, pain, discharge, redness, itching and visual disturbance.   Respiratory: Negative for apnea, cough, choking, chest tightness, shortness of breath, wheezing and stridor.    Cardiovascular: Positive for leg swelling. Negative for chest pain and palpitations.   Gastrointestinal: Positive for abdominal pain, blood in stool, diarrhea and nausea. Negative for abdominal distention, anal bleeding, constipation, rectal pain and vomiting.   Endocrine: Positive for polyuria. Negative for cold intolerance, heat intolerance, polydipsia and polyphagia.   Genitourinary: Positive for difficulty urinating, hematuria and vaginal  bleeding. Negative for decreased urine volume, dyspareunia, dysuria, enuresis, flank pain, frequency, genital sores, menstrual problem, pelvic pain, urgency, vaginal discharge and vaginal pain.   Musculoskeletal: Positive for arthralgias and back pain. Negative for gait problem, joint swelling, myalgias, neck pain and neck stiffness.   Skin: Negative for color change, pallor, rash and wound.   Allergic/Immunologic: Negative for environmental allergies, food allergies and immunocompromised state.   Neurological: Positive for numbness. Negative for dizziness, tremors, seizures, syncope, facial asymmetry, speech difficulty, weakness, light-headedness and headaches.   Hematological: Negative for adenopathy. Does not bruise/bleed easily.   Psychiatric/Behavioral: Negative for agitation, behavioral problems, confusion, decreased concentration, dysphoric mood, hallucinations, self-injury, sleep disturbance and suicidal ideas. The patient is nervous/anxious. The patient is not hyperactive.            Objective:      Physical Exam   Genitourinary:       There is no rash, tenderness, lesion or injury on the right labia. There is no rash, tenderness, lesion or injury on the left labia. Deviated: Surgically absent. Cervical motion tenderness:  Surgically absent.   Nursing note and vitals reviewed.          Assessment:        1. Vaginal irritation    2. Skin yeast infection    3. Urge incontinence of urine                Plan:         Marine was seen today for vaginal bleeding and vaginal pain.    Diagnoses and all orders for this visit:    Vaginal irritation    Skin yeast infection    Urge incontinence of urine

## 2019-03-04 ENCOUNTER — TELEPHONE (OUTPATIENT)
Dept: OBSTETRICS AND GYNECOLOGY | Facility: CLINIC | Age: 79
End: 2019-03-04

## 2019-03-04 RX ORDER — NYSTATIN 100000 U/G
CREAM TOPICAL 2 TIMES DAILY
Qty: 30 G | Refills: 0 | Status: SHIPPED | OUTPATIENT
Start: 2019-03-04 | End: 2019-07-16 | Stop reason: SDUPTHER

## 2019-03-04 RX ORDER — TRIAMCINOLONE ACETONIDE 1 MG/G
CREAM TOPICAL 2 TIMES DAILY
Qty: 28.4 G | Refills: 0 | Status: SHIPPED | OUTPATIENT
Start: 2019-03-04 | End: 2019-07-16 | Stop reason: SDUPTHER

## 2019-03-04 NOTE — TELEPHONE ENCOUNTER
Debbie with [employer](patients insurance) called stating the nystatin-triamcinolone(mycolog) cream is not covered. States that if the 2 creams are sent in separately, then it is covered and much cheaper than getting the mycolog approved. Both creams pended. Please advise.

## 2019-03-04 NOTE — TELEPHONE ENCOUNTER
----- Message from Marcy Valera sent at 3/4/2019  1:08 PM CST -----  Contact: Debbie-Zuvvu  Marine Mo  MRN: [id number]  Home Phone      [phone]  Work Phone      [phone]  Mobile          [phone]    Patient Care Team:  Serg Hwang MD as PCP - General (Internal Medicine)  OB? No  What phone number can you be reached at? [phone]  Message:   Debbie [facility] [employer] called in regards to the patient's PA. Please return call @ [phone]. Thanks.

## 2019-03-08 LAB
BACTERIAL VAGINOSIS DNA: NEGATIVE
CANDIDA GLABRATA DNA: NEGATIVE
CANDIDA KRUSEI DNA: NEGATIVE
CANDIDA RRNA VAG QL PROBE: NEGATIVE
T VAGINALIS RRNA GENITAL QL PROBE: NEGATIVE

## 2019-03-13 ENCOUNTER — OFFICE VISIT (OUTPATIENT)
Dept: INTERNAL MEDICINE | Facility: CLINIC | Age: 79
End: 2019-03-13
Payer: MEDICARE

## 2019-03-13 VITALS
RESPIRATION RATE: 16 BRPM | WEIGHT: 170.44 LBS | BODY MASS INDEX: 29.1 KG/M2 | HEIGHT: 64 IN | HEART RATE: 63 BPM | DIASTOLIC BLOOD PRESSURE: 70 MMHG | SYSTOLIC BLOOD PRESSURE: 124 MMHG

## 2019-03-13 DIAGNOSIS — E78.2 MIXED HYPERLIPIDEMIA: ICD-10-CM

## 2019-03-13 DIAGNOSIS — E08.40 DIABETES MELLITUS DUE TO UNDERLYING CONDITION WITH DIABETIC NEUROPATHY, WITHOUT LONG-TERM CURRENT USE OF INSULIN: ICD-10-CM

## 2019-03-13 DIAGNOSIS — F32.A DEPRESSION, UNSPECIFIED DEPRESSION TYPE: Primary | ICD-10-CM

## 2019-03-13 PROCEDURE — 99499 UNLISTED E&M SERVICE: CPT | Mod: S$GLB,,, | Performed by: INTERNAL MEDICINE

## 2019-03-13 PROCEDURE — 99499 RISK ADDL DX/OHS AUDIT: ICD-10-PCS | Mod: S$GLB,,, | Performed by: INTERNAL MEDICINE

## 2019-03-13 PROCEDURE — 3078F DIAST BP <80 MM HG: CPT | Mod: CPTII,S$GLB,, | Performed by: INTERNAL MEDICINE

## 2019-03-13 PROCEDURE — 99999 PR PBB SHADOW E&M-EST. PATIENT-LVL III: CPT | Mod: PBBFAC,,, | Performed by: INTERNAL MEDICINE

## 2019-03-13 PROCEDURE — 99213 OFFICE O/P EST LOW 20 MIN: CPT | Mod: S$GLB,,, | Performed by: INTERNAL MEDICINE

## 2019-03-13 PROCEDURE — 3074F PR MOST RECENT SYSTOLIC BLOOD PRESSURE < 130 MM HG: ICD-10-PCS | Mod: CPTII,S$GLB,, | Performed by: INTERNAL MEDICINE

## 2019-03-13 PROCEDURE — 1101F PT FALLS ASSESS-DOCD LE1/YR: CPT | Mod: CPTII,S$GLB,, | Performed by: INTERNAL MEDICINE

## 2019-03-13 PROCEDURE — 1101F PR PT FALLS ASSESS DOC 0-1 FALLS W/OUT INJ PAST YR: ICD-10-PCS | Mod: CPTII,S$GLB,, | Performed by: INTERNAL MEDICINE

## 2019-03-13 PROCEDURE — 3078F PR MOST RECENT DIASTOLIC BLOOD PRESSURE < 80 MM HG: ICD-10-PCS | Mod: CPTII,S$GLB,, | Performed by: INTERNAL MEDICINE

## 2019-03-13 PROCEDURE — 3074F SYST BP LT 130 MM HG: CPT | Mod: CPTII,S$GLB,, | Performed by: INTERNAL MEDICINE

## 2019-03-13 PROCEDURE — 99213 PR OFFICE/OUTPT VISIT, EST, LEVL III, 20-29 MIN: ICD-10-PCS | Mod: S$GLB,,, | Performed by: INTERNAL MEDICINE

## 2019-03-13 PROCEDURE — 99999 PR PBB SHADOW E&M-EST. PATIENT-LVL III: ICD-10-PCS | Mod: PBBFAC,,, | Performed by: INTERNAL MEDICINE

## 2019-03-13 NOTE — PROGRESS NOTES
Subjective:       Patient ID: Marine Mo is a 79 y.o. female.    Chief Complaint: Follow-up (4 wk )    Marine Mo is a 80yo  female who presents for  4 week follow-up of her recurrent stomach pain and increased anxiety and depression.  After starting fluoxetine at last visit; stomach has greatly improved and anxiety seems better as well.        Anxiety   Presents for initial visit. Onset was 1 to 6 months ago. The problem has been gradually worsening. Symptoms include insomnia and nervous/anxious behavior. Patient reports no chest pain, confusion, dizziness, nausea, palpitations or shortness of breath. Symptoms occur most days. The severity of symptoms is moderate. Exacerbated by: xanax at night. The quality of sleep is poor. Nighttime awakenings: one to two.     There are no known risk factors. Her past medical history is significant for anxiety/panic attacks and depression. Past treatments include benzodiazephines and SSRIs. The treatment provided significant relief. Compliance with prior treatments has been good.     Review of Systems   Constitutional: Negative for chills and fever.   HENT: Negative for congestion and sore throat.    Respiratory: Negative for cough and shortness of breath.    Cardiovascular: Negative for chest pain and palpitations.   Gastrointestinal: Negative for nausea and vomiting.   Musculoskeletal: Negative for arthralgias, myalgias and neck pain.   Skin: Negative for pallor.   Neurological: Negative for dizziness and numbness.   Psychiatric/Behavioral: Negative for confusion. The patient is nervous/anxious and has insomnia.        Objective:      Physical Exam   Constitutional: She is oriented to person, place, and time. She appears well-developed and well-nourished.   HENT:   Head: Normocephalic and atraumatic.   Right Ear: External ear normal.   Left Ear: External ear normal.   Mouth/Throat: Oropharynx is clear and moist.   Eyes: Conjunctivae and EOM are normal. Pupils  are equal, round, and reactive to light.   Neck: Normal range of motion. Neck supple. No JVD present. No tracheal deviation present. No thyromegaly present.   Cardiovascular: Normal rate, regular rhythm, normal heart sounds and intact distal pulses.   Pulmonary/Chest: Effort normal and breath sounds normal. No respiratory distress. She has no wheezes. She has no rales. She exhibits no tenderness.   Abdominal: Soft. Bowel sounds are normal. She exhibits no distension and no mass. There is no tenderness. There is no rebound and no guarding.   Musculoskeletal: Normal range of motion. She exhibits no edema.   Lymphadenopathy:     She has no cervical adenopathy.   Neurological: She is alert and oriented to person, place, and time. She has normal reflexes. She displays normal reflexes. No cranial nerve deficit. She exhibits normal muscle tone. Coordination normal.   CN: Optic discs are flat with normal vasculature, PERRL, Extraoccular movements and visual fields are full. Normal facial sensation and strength, Hearing symmetric, Tongue and Palate are midline and strong. Shoulder Shrug symmetric and strong.   Skin: Skin is warm and dry.   Psychiatric: She has a normal mood and affect.   Nursing note and vitals reviewed.      Assessment:       1. Depression, unspecified depression type    2. Diabetes mellitus due to underlying condition with diabetic neuropathy, without long-term current use of insulin    3. Mixed hyperlipidemia        Plan:   Marine was seen today for follow-up.    Diagnoses and all orders for this visit:    Depression, unspecified depression type  -     TSH; Future    Diabetes mellitus due to underlying condition with diabetic neuropathy, without long-term current use of insulin  -     Hemoglobin A1c; Future  -     CBC auto differential; Future  -     Comprehensive metabolic panel; Future  -     Lipid panel; Future  -     Microalbumin/creatinine urine ratio; Future    Mixed hyperlipidemia  -     CBC auto  differential; Future  -     Comprehensive metabolic panel; Future  -     Lipid panel; Future    I've explained to her that drugs of the SSRI class can have side effects such as weight gain, sexual dysfunction, insomnia, headache, nausea. These medications are generally effective at alleviating symptoms of anxiety and/or depression. Let me know if significant side effects do occur.  Continue fluoxetine        Problem List Items Addressed This Visit     None

## 2019-03-13 NOTE — PROGRESS NOTES
Patient, Marine Mo (MRN #[id number]), presented with a recent Platelet count less than 150 K/uL consistent with the definition of thrombocytopenia (ICD10 - D69.6).    Platelets   Date Value Ref Range Status   02/27/2019 68 (L) 150 - 350 K/uL Final     The patient's thrombocytopenia was monitored, evaluated, addressed and/or treated. This addendum to the medical record is made on 03/13/2019.

## 2019-03-29 ENCOUNTER — OFFICE VISIT (OUTPATIENT)
Dept: OBSTETRICS AND GYNECOLOGY | Facility: CLINIC | Age: 79
End: 2019-03-29
Payer: MEDICARE

## 2019-03-29 VITALS
HEART RATE: 70 BPM | SYSTOLIC BLOOD PRESSURE: 126 MMHG | DIASTOLIC BLOOD PRESSURE: 79 MMHG | BODY MASS INDEX: 29.18 KG/M2 | RESPIRATION RATE: 12 BRPM | WEIGHT: 170 LBS

## 2019-03-29 DIAGNOSIS — N89.8 VAGINAL IRRITATION: Primary | ICD-10-CM

## 2019-03-29 PROCEDURE — 99213 PR OFFICE/OUTPT VISIT, EST, LEVL III, 20-29 MIN: ICD-10-PCS | Mod: S$GLB,,, | Performed by: OBSTETRICS & GYNECOLOGY

## 2019-03-29 PROCEDURE — 3074F SYST BP LT 130 MM HG: CPT | Mod: CPTII,S$GLB,, | Performed by: OBSTETRICS & GYNECOLOGY

## 2019-03-29 PROCEDURE — 3078F DIAST BP <80 MM HG: CPT | Mod: CPTII,S$GLB,, | Performed by: OBSTETRICS & GYNECOLOGY

## 2019-03-29 PROCEDURE — 1101F PT FALLS ASSESS-DOCD LE1/YR: CPT | Mod: CPTII,S$GLB,, | Performed by: OBSTETRICS & GYNECOLOGY

## 2019-03-29 PROCEDURE — 1101F PR PT FALLS ASSESS DOC 0-1 FALLS W/OUT INJ PAST YR: ICD-10-PCS | Mod: CPTII,S$GLB,, | Performed by: OBSTETRICS & GYNECOLOGY

## 2019-03-29 PROCEDURE — 3078F PR MOST RECENT DIASTOLIC BLOOD PRESSURE < 80 MM HG: ICD-10-PCS | Mod: CPTII,S$GLB,, | Performed by: OBSTETRICS & GYNECOLOGY

## 2019-03-29 PROCEDURE — 99999 PR PBB SHADOW E&M-EST. PATIENT-LVL III: ICD-10-PCS | Mod: PBBFAC,,, | Performed by: OBSTETRICS & GYNECOLOGY

## 2019-03-29 PROCEDURE — 99213 OFFICE O/P EST LOW 20 MIN: CPT | Mod: S$GLB,,, | Performed by: OBSTETRICS & GYNECOLOGY

## 2019-03-29 PROCEDURE — 3074F PR MOST RECENT SYSTOLIC BLOOD PRESSURE < 130 MM HG: ICD-10-PCS | Mod: CPTII,S$GLB,, | Performed by: OBSTETRICS & GYNECOLOGY

## 2019-03-29 PROCEDURE — 99999 PR PBB SHADOW E&M-EST. PATIENT-LVL III: CPT | Mod: PBBFAC,,, | Performed by: OBSTETRICS & GYNECOLOGY

## 2019-03-29 NOTE — PROGRESS NOTES
Subjective:       Patient ID: Marine Mo is a 79 y.o. female.    Chief Complaint:  Follow-up (vag irritation. states its better)      History of Present Illness  Patient presents for follow-up of vaginal irritation with an area of skin breakdown from persistent yeast.  Patient has been treating daily with nystatin cream.  Patient reports area is much better and feeling back to normal. She has been using the cream as directed but lately has been using it every other day .    Menstrual History:  OB History        2    Para   2    Term   2            AB        Living           SAB        TAB        Ectopic        Multiple        Live Births                    Menarche age:  No LMP recorded. Patient has had a hysterectomy.         Review of Systems  Review of Systems   Constitutional: Negative for activity change, appetite change, chills, diaphoresis, fatigue, fever and unexpected weight change.   HENT: Negative for congestion, dental problem, drooling, ear discharge, ear pain, facial swelling, hearing loss, mouth sores, nosebleeds, postnasal drip, rhinorrhea, sinus pressure, sneezing, sore throat, tinnitus, trouble swallowing and voice change.    Eyes: Negative for photophobia, pain, discharge, redness, itching and visual disturbance.   Respiratory: Negative for apnea, cough, choking, chest tightness, shortness of breath, wheezing and stridor.    Cardiovascular: Negative for chest pain, palpitations and leg swelling.   Gastrointestinal: Negative for abdominal distention, abdominal pain, anal bleeding, blood in stool, constipation, diarrhea, nausea, rectal pain and vomiting.   Endocrine: Negative for cold intolerance, heat intolerance, polydipsia, polyphagia and polyuria.   Genitourinary: Negative for decreased urine volume, difficulty urinating, dyspareunia, dysuria, enuresis, flank pain, frequency, genital sores, hematuria, menstrual problem, pelvic pain, urgency, vaginal bleeding, vaginal  discharge and vaginal pain.   Musculoskeletal: Positive for back pain. Negative for arthralgias, gait problem, joint swelling, myalgias, neck pain and neck stiffness.   Skin: Negative for color change, pallor, rash and wound.   Allergic/Immunologic: Negative for environmental allergies, food allergies and immunocompromised state.   Neurological: Negative for dizziness, tremors, seizures, syncope, facial asymmetry, speech difficulty, weakness, light-headedness, numbness and headaches.   Hematological: Negative for adenopathy. Does not bruise/bleed easily.   Psychiatric/Behavioral: Negative for agitation, behavioral problems, confusion, decreased concentration, dysphoric mood, hallucinations, self-injury, sleep disturbance and suicidal ideas. The patient is not nervous/anxious and is not hyperactive.            Objective:      Physical Exam   Genitourinary:                 Assessment:        1. Vaginal irritation                Plan:       Patient instructed to continue to use medication every day.  She will follow up in 4 weeks to see if hypopigmented mucosa has resolved  Marine was seen today for follow-up.    Diagnoses and all orders for this visit:    Vaginal irritation

## 2019-05-09 ENCOUNTER — OFFICE VISIT (OUTPATIENT)
Dept: OBSTETRICS AND GYNECOLOGY | Facility: CLINIC | Age: 79
End: 2019-05-09
Payer: MEDICARE

## 2019-05-09 VITALS
DIASTOLIC BLOOD PRESSURE: 70 MMHG | RESPIRATION RATE: 18 BRPM | HEART RATE: 78 BPM | SYSTOLIC BLOOD PRESSURE: 132 MMHG | WEIGHT: 167 LBS | HEIGHT: 64 IN | BODY MASS INDEX: 28.51 KG/M2

## 2019-05-09 DIAGNOSIS — R21 VULVAR RASH: Primary | ICD-10-CM

## 2019-05-09 PROCEDURE — 3078F PR MOST RECENT DIASTOLIC BLOOD PRESSURE < 80 MM HG: ICD-10-PCS | Mod: CPTII,S$GLB,, | Performed by: OBSTETRICS & GYNECOLOGY

## 2019-05-09 PROCEDURE — 3075F PR MOST RECENT SYSTOLIC BLOOD PRESS GE 130-139MM HG: ICD-10-PCS | Mod: CPTII,S$GLB,, | Performed by: OBSTETRICS & GYNECOLOGY

## 2019-05-09 PROCEDURE — 1101F PT FALLS ASSESS-DOCD LE1/YR: CPT | Mod: CPTII,S$GLB,, | Performed by: OBSTETRICS & GYNECOLOGY

## 2019-05-09 PROCEDURE — 99213 PR OFFICE/OUTPT VISIT, EST, LEVL III, 20-29 MIN: ICD-10-PCS | Mod: S$GLB,,, | Performed by: OBSTETRICS & GYNECOLOGY

## 2019-05-09 PROCEDURE — 3075F SYST BP GE 130 - 139MM HG: CPT | Mod: CPTII,S$GLB,, | Performed by: OBSTETRICS & GYNECOLOGY

## 2019-05-09 PROCEDURE — 99213 OFFICE O/P EST LOW 20 MIN: CPT | Mod: S$GLB,,, | Performed by: OBSTETRICS & GYNECOLOGY

## 2019-05-09 PROCEDURE — 99999 PR PBB SHADOW E&M-EST. PATIENT-LVL III: ICD-10-PCS | Mod: PBBFAC,,, | Performed by: OBSTETRICS & GYNECOLOGY

## 2019-05-09 PROCEDURE — 1101F PR PT FALLS ASSESS DOC 0-1 FALLS W/OUT INJ PAST YR: ICD-10-PCS | Mod: CPTII,S$GLB,, | Performed by: OBSTETRICS & GYNECOLOGY

## 2019-05-09 PROCEDURE — 3078F DIAST BP <80 MM HG: CPT | Mod: CPTII,S$GLB,, | Performed by: OBSTETRICS & GYNECOLOGY

## 2019-05-09 PROCEDURE — 99999 PR PBB SHADOW E&M-EST. PATIENT-LVL III: CPT | Mod: PBBFAC,,, | Performed by: OBSTETRICS & GYNECOLOGY

## 2019-05-09 NOTE — PROGRESS NOTES
Subjective:       Patient ID: Marine Mo is a 79 y.o. female.    Chief Complaint:  Follow-up (vaginal irritation. states she is feeling much better.)      History of Present Illness  Patient presents for follow-up of vaginal irritation.  Patient had previously had a skin breakdown and began using Mycolog.  Patient states the area healed well and she stop.  Patient started to notice irritation again and began using her medication again approximately 3 weeks ago.  Patient states she feels like it is completely healed.  She is otherwise without complaints.  Patient reports marginal improvement with bladder medication    Menstrual History:  OB History        2    Para   2    Term   2            AB        Living           SAB        TAB        Ectopic        Multiple        Live Births                    Menarche age:  No LMP recorded. Patient has had a hysterectomy.         Review of Systems  Review of Systems   Constitutional: Negative for activity change, appetite change, chills, diaphoresis, fatigue, fever and unexpected weight change.   HENT: Negative for congestion, dental problem, drooling, ear discharge, ear pain, facial swelling, hearing loss, mouth sores, nosebleeds, postnasal drip, rhinorrhea, sinus pressure, sneezing, sore throat, tinnitus, trouble swallowing and voice change.    Eyes: Positive for itching. Negative for photophobia, pain, discharge, redness and visual disturbance.   Respiratory: Negative for apnea, cough, choking, chest tightness, shortness of breath, wheezing and stridor.    Cardiovascular: Positive for leg swelling. Negative for chest pain and palpitations.   Gastrointestinal: Negative for abdominal distention, abdominal pain, anal bleeding, blood in stool, constipation, diarrhea, nausea, rectal pain and vomiting.   Endocrine: Negative for cold intolerance, heat intolerance, polydipsia, polyphagia and polyuria.   Genitourinary: Negative for decreased urine volume,  difficulty urinating, dyspareunia, dysuria, enuresis, flank pain, frequency, genital sores, hematuria, menstrual problem, pelvic pain, urgency, vaginal bleeding, vaginal discharge and vaginal pain.   Musculoskeletal: Positive for back pain. Negative for arthralgias, gait problem, joint swelling, myalgias, neck pain and neck stiffness.   Skin: Negative for color change, pallor, rash and wound.   Allergic/Immunologic: Negative for environmental allergies, food allergies and immunocompromised state.   Neurological: Negative for dizziness, tremors, seizures, syncope, facial asymmetry, speech difficulty, weakness, light-headedness, numbness and headaches.   Hematological: Negative for adenopathy. Does not bruise/bleed easily.   Psychiatric/Behavioral: Negative for agitation, behavioral problems, confusion, decreased concentration, dysphoric mood, hallucinations, self-injury, sleep disturbance and suicidal ideas. The patient is not nervous/anxious and is not hyperactive.            Objective:      Physical Exam   Genitourinary: There is no rash, tenderness, lesion or injury on the right labia. There is no rash, tenderness, lesion or injury on the left labia.   Nursing note and vitals reviewed.          Assessment:        1. Vulvar rash                Plan:         Marine was seen today for follow-up.    Diagnoses and all orders for this visit:    Vulvar rash

## 2019-05-23 ENCOUNTER — TELEPHONE (OUTPATIENT)
Dept: INTERNAL MEDICINE | Facility: CLINIC | Age: 79
End: 2019-05-23

## 2019-05-23 DIAGNOSIS — Z12.31 ENCOUNTER FOR SCREENING MAMMOGRAM FOR MALIGNANT NEOPLASM OF BREAST: Primary | ICD-10-CM

## 2019-05-23 DIAGNOSIS — M54.16 LUMBAR RADICULOPATHY: ICD-10-CM

## 2019-05-23 RX ORDER — HYDROCODONE BITARTRATE AND ACETAMINOPHEN 5; 325 MG/1; MG/1
1 TABLET ORAL EVERY 6 HOURS PRN
Qty: 30 TABLET | Refills: 0 | Status: SHIPPED | OUTPATIENT
Start: 2019-05-23 | End: 2019-06-02

## 2019-05-23 NOTE — TELEPHONE ENCOUNTER
----- Message from Vivienne Morejon sent at 2019 10:03 AM CDT -----  Contact: Self  Marine Mo  MRN: [id number]  : 1940  PCP: Serg Hwang  Home Phone      [phone]  Work Phone      [phone]  Mobile          [phone]    MESSAGE:     Would like to speak to nurse to schedule a mammogram.  She would like this scheduled for an early morning appointment if possible. Please call.    Phone: [phone]

## 2019-05-23 NOTE — TELEPHONE ENCOUNTER
----- Message from Vivienne Morejon sent at 2019  3:25 PM CDT -----  Contact: Lucien   Marine Mo  MRN: [id number]  : 1940  PCP: Serg Hwang  Home Phone      [phone]  Work Phone      [phone]  Mobile          [phone]    MESSAGE:     Calling to speak to nurse about drug interactions between RX Norco and RX Xanax.  Please call to advise.    Pharmacy: [facility] in [place]    Phone: [phone]

## 2019-05-23 NOTE — TELEPHONE ENCOUNTER
----- Message from Vivienne Morejon sent at 2019  8:06 AM CDT -----  Contact: Self  Marine Mo  MRN: [id number]  : 1940  PCP: Serg Hwang  Home Phone      [phone]  Work Phone      [phone]  Mobile          [phone]      MESSAGE:   Needs RX  hydrocodone-acetaminophen 7.5-325mg (NORCO) 7.5-325 mg per tablet    Phone: [phone]

## 2019-05-23 NOTE — TELEPHONE ENCOUNTER
----- Message from Vivienne Morejon sent at 2019  8:06 AM CDT -----  Contact: Self  Marine Mo  MRN: [id number]  : 1940  PCP: Serg Hwang  Home Phone      [phone]  Work Phone      [phone]  Mobile          [phone]      MESSAGE:   Needs RX  hydrocodone-acetaminophen 7.5-325mg (NORCO) 7.5-325 mg per tablet    Phone: [phone]

## 2019-05-23 NOTE — TELEPHONE ENCOUNTER
Patient requesting refill on Norco. She states that she takes it only as needed. It was last filled over 1 year ago. She does have a signed pain contract. Please advise. Thanks.

## 2019-05-31 ENCOUNTER — HOSPITAL ENCOUNTER (OUTPATIENT)
Dept: RADIOLOGY | Facility: HOSPITAL | Age: 79
Discharge: HOME | End: 2019-05-31
Attending: INTERNAL MEDICINE
Payer: MEDICARE

## 2019-05-31 ENCOUNTER — TELEPHONE (OUTPATIENT)
Dept: INTERNAL MEDICINE | Facility: CLINIC | Age: 79
End: 2019-05-31

## 2019-05-31 ENCOUNTER — OFFICE VISIT (OUTPATIENT)
Dept: INTERNAL MEDICINE | Facility: CLINIC | Age: 79
End: 2019-05-31
Payer: MEDICARE

## 2019-05-31 VITALS
HEIGHT: 64 IN | BODY MASS INDEX: 28.07 KG/M2 | WEIGHT: 164.44 LBS | HEART RATE: 68 BPM | SYSTOLIC BLOOD PRESSURE: 120 MMHG | RESPIRATION RATE: 16 BRPM | DIASTOLIC BLOOD PRESSURE: 80 MMHG

## 2019-05-31 DIAGNOSIS — M53.3 COCCYX PAIN: ICD-10-CM

## 2019-05-31 DIAGNOSIS — M53.3 COCCYX PAIN: Primary | ICD-10-CM

## 2019-05-31 PROCEDURE — 3079F DIAST BP 80-89 MM HG: CPT | Mod: CPTII,S$GLB,, | Performed by: INTERNAL MEDICINE

## 2019-05-31 PROCEDURE — 99999 PR PBB SHADOW E&M-EST. PATIENT-LVL III: CPT | Mod: PBBFAC,,, | Performed by: INTERNAL MEDICINE

## 2019-05-31 PROCEDURE — 3079F PR MOST RECENT DIASTOLIC BLOOD PRESSURE 80-89 MM HG: ICD-10-PCS | Mod: CPTII,S$GLB,, | Performed by: INTERNAL MEDICINE

## 2019-05-31 PROCEDURE — 72220 X-RAY EXAM SACRUM TAILBONE: CPT | Mod: 26,,, | Performed by: RADIOLOGY

## 2019-05-31 PROCEDURE — 99213 OFFICE O/P EST LOW 20 MIN: CPT | Mod: S$GLB,,, | Performed by: INTERNAL MEDICINE

## 2019-05-31 PROCEDURE — 1101F PR PT FALLS ASSESS DOC 0-1 FALLS W/OUT INJ PAST YR: ICD-10-PCS | Mod: CPTII,S$GLB,, | Performed by: INTERNAL MEDICINE

## 2019-05-31 PROCEDURE — 72220 X-RAY EXAM SACRUM TAILBONE: CPT | Mod: TC

## 2019-05-31 PROCEDURE — 99999 PR PBB SHADOW E&M-EST. PATIENT-LVL III: ICD-10-PCS | Mod: PBBFAC,,, | Performed by: INTERNAL MEDICINE

## 2019-05-31 PROCEDURE — 3074F SYST BP LT 130 MM HG: CPT | Mod: CPTII,S$GLB,, | Performed by: INTERNAL MEDICINE

## 2019-05-31 PROCEDURE — 3074F PR MOST RECENT SYSTOLIC BLOOD PRESSURE < 130 MM HG: ICD-10-PCS | Mod: CPTII,S$GLB,, | Performed by: INTERNAL MEDICINE

## 2019-05-31 PROCEDURE — 72220 XR SACRUM AND COCCYX: ICD-10-PCS | Mod: 26,,, | Performed by: RADIOLOGY

## 2019-05-31 PROCEDURE — 1101F PT FALLS ASSESS-DOCD LE1/YR: CPT | Mod: CPTII,S$GLB,, | Performed by: INTERNAL MEDICINE

## 2019-05-31 PROCEDURE — 99213 PR OFFICE/OUTPT VISIT, EST, LEVL III, 20-29 MIN: ICD-10-PCS | Mod: S$GLB,,, | Performed by: INTERNAL MEDICINE

## 2019-05-31 RX ORDER — OXYBUTYNIN CHLORIDE 5 MG/1
TABLET ORAL
Qty: 60 TABLET | Refills: 2 | Status: SHIPPED | OUTPATIENT
Start: 2019-05-31 | End: 2019-10-01 | Stop reason: SDUPTHER

## 2019-05-31 NOTE — PROGRESS NOTES
Subjective:       Patient ID: Marine Mo is a 79 y.o. female.    Chief Complaint: Tailbone Pain      HPI:  Patient is new to me but known to clinic and presents with pain of the tailbone. Sx started about 1 month ago. Denies injuries falls. Pain is central. Pain is described as throbbing pain. Worse with sitting for prolonged time. Better with standing. Pain is intermittent. She is on [place] per PCP but has not taken for this new pain. She is not taking anything else OTC.     Past Medical History:   Diagnosis Date    Anemia 2/27/2018    Breast cyst     Depression 1/30/2019    Diabetes mellitus     Hypertension        Family History   Problem Relation Age of Onset    Kidney disease Father     Heart attack Neg Hx     Heart disease Neg Hx     Breast cancer Neg Hx     Colon cancer Neg Hx     Ovarian cancer Neg Hx        Social History     Socioeconomic History    Marital status:      Spouse name: Not on file    Number of children: Not on file    Years of education: Not on file    Highest education level: Not on file   Occupational History    Not on file   Social Needs    Financial resource strain: Not on file    Food insecurity:     Worry: Not on file     Inability: Not on file    Transportation needs:     Medical: Not on file     Non-medical: Not on file   Tobacco Use    Smoking status: Never Smoker    Smokeless tobacco: Never Used   Substance and Sexual Activity    Alcohol use: No    Drug use: No    Sexual activity: Not Currently     Comment:    Lifestyle    Physical activity:     Days per week: Not on file     Minutes per session: Not on file    Stress: Not on file   Relationships    Social connections:     Talks on phone: Not on file     Gets together: Not on file     Attends Muslim service: Not on file     Active member of club or organization: Not on file     Attends meetings of clubs or organizations: Not on file     Relationship status: Not on file   Other  Topics Concern    Not on file   Social History Narrative    Not on file       Review of Systems   Constitutional: Negative for activity change, fatigue, fever and unexpected weight change.   HENT: Negative for congestion, ear pain, hearing loss, rhinorrhea, sore throat and tinnitus.    Eyes: Negative for redness and visual disturbance.   Respiratory: Negative for cough, shortness of breath and wheezing.    Cardiovascular: Negative for chest pain, palpitations and leg swelling.   Gastrointestinal: Negative for abdominal pain, constipation, diarrhea, nausea and vomiting.   Genitourinary: Negative for dysuria, frequency, pelvic pain and urgency.   Musculoskeletal: Negative for back pain, joint swelling and neck pain.        Tailbone pain     Skin: Negative for color change, rash and wound.   Neurological: Negative for dizziness, tremors, weakness, light-headedness and headaches.         Objective:      Physical Exam   Constitutional: She is oriented to person, place, and time. She appears well-developed and well-nourished. No distress.   HENT:   Head: Normocephalic and atraumatic.   Right Ear: External ear normal.   Left Ear: External ear normal.   Eyes: Pupils are equal, round, and reactive to light. Conjunctivae and EOM are normal. Right eye exhibits no discharge. Left eye exhibits no discharge.   Neck: Neck supple. No tracheal deviation present.   Cardiovascular: Normal rate and regular rhythm.   No murmur heard.  Pulmonary/Chest: Effort normal and breath sounds normal. No respiratory distress. She has no wheezes.   Abdominal: Soft. Bowel sounds are normal. She exhibits no distension. There is no tenderness.   Musculoskeletal: She exhibits tenderness (coccyx).   Neurological: She is alert and oriented to person, place, and time. No cranial nerve deficit.   Skin: Skin is warm and dry.   noabscess or rash in area of pain   Psychiatric: She has a normal mood and affect. Her behavior is normal.   Vitals reviewed.       Assessment:       1. Coccyx pain        Plan:       Marine was seen today for tailbone pain.    Diagnoses and all orders for this visit:    Coccyx pain  -     X-Ray Sacrum And Coccyx; Future    check xray, possible fracture though she can't remember any specific injury  tyeltram PRN  Has PRN Norco if pain gets severe  Sit with donut pillow

## 2019-06-03 LAB
LEFT EYE DM RETINOPATHY: NEGATIVE
RIGHT EYE DM RETINOPATHY: NEGATIVE

## 2019-06-12 ENCOUNTER — HOSPITAL ENCOUNTER (OUTPATIENT)
Dept: RADIOLOGY | Facility: HOSPITAL | Age: 79
Discharge: HOME | End: 2019-06-12
Attending: INTERNAL MEDICINE
Payer: MEDICARE

## 2019-06-12 VITALS — WEIGHT: 164 LBS | HEIGHT: 64 IN | BODY MASS INDEX: 28 KG/M2

## 2019-06-12 DIAGNOSIS — Z12.31 ENCOUNTER FOR SCREENING MAMMOGRAM FOR MALIGNANT NEOPLASM OF BREAST: ICD-10-CM

## 2019-06-12 PROCEDURE — 77063 MAMMO DIGITAL SCREENING BILAT WITH TOMOSYNTHESIS_CAD: ICD-10-PCS | Mod: 26,,, | Performed by: RADIOLOGY

## 2019-06-12 PROCEDURE — 77063 BREAST TOMOSYNTHESIS BI: CPT | Mod: 26,,, | Performed by: RADIOLOGY

## 2019-06-12 PROCEDURE — 77067 SCR MAMMO BI INCL CAD: CPT | Mod: 26,,, | Performed by: RADIOLOGY

## 2019-06-12 PROCEDURE — 77067 SCR MAMMO BI INCL CAD: CPT | Mod: TC

## 2019-06-12 PROCEDURE — 77067 MAMMO DIGITAL SCREENING BILAT WITH TOMOSYNTHESIS_CAD: ICD-10-PCS | Mod: 26,,, | Performed by: RADIOLOGY

## 2019-06-19 ENCOUNTER — TELEPHONE (OUTPATIENT)
Dept: ADMINISTRATIVE | Facility: HOSPITAL | Age: 79
End: 2019-06-19

## 2019-06-29 DIAGNOSIS — F51.04 CHRONIC INSOMNIA: ICD-10-CM

## 2019-07-01 RX ORDER — ALPRAZOLAM 0.5 MG/1
TABLET ORAL
Qty: 30 TABLET | Refills: 3 | Status: SHIPPED | OUTPATIENT
Start: 2019-07-01 | End: 2020-07-28 | Stop reason: SDUPTHER

## 2019-07-16 RX ORDER — NYSTATIN 100000 U/G
CREAM TOPICAL 2 TIMES DAILY
Qty: 30 G | Refills: 0 | Status: SHIPPED | OUTPATIENT
Start: 2019-07-16 | End: 2021-01-21 | Stop reason: SDUPTHER

## 2019-07-16 RX ORDER — TRIAMCINOLONE ACETONIDE 1 MG/G
CREAM TOPICAL 2 TIMES DAILY
Qty: 28.4 G | Refills: 0 | Status: SHIPPED | OUTPATIENT
Start: 2019-07-16 | End: 2021-01-21 | Stop reason: SDUPTHER

## 2019-07-16 NOTE — TELEPHONE ENCOUNTER
----- Message from Meghann Funez MA sent at 7/16/2019  7:51 AM CDT -----  Contact: Self      ----- Message -----  From: Bonnie Bear  Sent: 7/15/2019   4:18 PM  To: Jake DAVIS Staff    Marine Mo  MRN: [id number]  Home Phone      [phone]  Work Phone      [phone]  Mobile          [phone]    Patient Care Team:  Serg Hwang MD as PCP - General (Internal Medicine)  Judith Cortez LPN as Care Coordinator  Wilton Joseph MD as Obstetrician (Obstetrics)  OB? No  What phone number can you be reached at? [phone]  Message:   Pt requesting refills of the following creams: [facility] in [place]  triamcinolone acetonide 0.1% (KENALOG) 0.1 % cream  nystatin (MYCOSTATIN) cream

## 2019-07-16 NOTE — TELEPHONE ENCOUNTER
Marine desire refill of Kenalog and Mycostatin. Last office visit 5/2019. Please advise.   Patient Active Problem List   Diagnosis    Spinal stenosis, lumbar    DDD (degenerative disc disease), lumbar    Low back pain without sciatica    Lumbar radiculopathy    Neurogenic claudication    Hyperlipidemia    Obesity (BMI 30.0-34.9)    Thrombocytopenia    Type 2 diabetes mellitus, controlled    Chronic insomnia    Hereditary and idiopathic peripheral neuropathy    Essential hypertension    Diabetes mellitus with neuropathy    Encounter for diabetic foot exam    Hepatomegaly    Splenomegaly    Anemia    Upper GI bleed    Polycystic liver disease    Portal hypertension    Iron deficiency anemia    Other chest pain    Dyspnea on exertion    Depression     Prior to Admission medications    Medication Sig Start Date End Date Taking? Authorizing Provider   ALPRAZolam (XANAX) 0.5 MG tablet TAKE 1 TABLET BY MOUTH ONCE DAILY AT BEDTIME AS NEEDED FOR ANXIETY 7/1/19   Serg Hwang MD   aspirin 81 MG Chew Take 81 mg by mouth once daily.    Historical Provider, MD   FLUoxetine 10 MG capsule Take 1 capsule (10 mg total) by mouth once daily. 1/30/19 1/30/20  Serg Hwang MD   latanoprost 0.005 % ophthalmic solution  8/15/18   Historical Provider, MD   metFORMIN (GLUCOPHAGE) 500 MG tablet TAKE 1 TABLET BY MOUTH TWICE DAILY WITH MEALS 2/25/19   Serg Hwang MD   metoprolol tartrate (LOPRESSOR) 100 MG tablet TAKE 1/2 (ONE-HALF) TABLET BY MOUTH TWICE DAILY 11/20/18   Ami Valentin, ANDI   nystatin (MYCOSTATIN) cream Apply topically 2 (two) times daily. 3/4/19   Wilton Joseph MD   nystatin-triamcinolone (MYCOLOG II) cream Apply to affected area 2 times daily 3/1/19 2/29/20  Wilton Joseph MD   oxybutynin (DITROPAN) 5 MG Tab TAKE 1 TABLET BY MOUTH TWICE DAILY 5/31/19   Wilton Joseph MD   triamcinolone acetonide 0.1% (KENALOG) 0.1 % cream Apply topically 2 (two) times daily. 3/4/19    Wilton Joseph MD

## 2019-07-29 RX ORDER — FLUOXETINE 10 MG/1
CAPSULE ORAL
Qty: 30 CAPSULE | Refills: 5 | Status: SHIPPED | OUTPATIENT
Start: 2019-07-29 | End: 2020-02-10

## 2019-09-05 ENCOUNTER — PATIENT OUTREACH (OUTPATIENT)
Dept: ADMINISTRATIVE | Facility: HOSPITAL | Age: 79
End: 2019-09-05

## 2019-09-05 DIAGNOSIS — M89.9 DISORDER OF BONE AND ARTICULAR CARTILAGE: Primary | ICD-10-CM

## 2019-09-05 DIAGNOSIS — M94.9 DISORDER OF BONE AND ARTICULAR CARTILAGE: Primary | ICD-10-CM

## 2019-09-12 ENCOUNTER — LAB VISIT (OUTPATIENT)
Dept: LAB | Facility: HOSPITAL | Age: 79
End: 2019-09-12
Attending: INTERNAL MEDICINE
Payer: MEDICARE

## 2019-09-12 DIAGNOSIS — E08.40 DIABETES MELLITUS DUE TO UNDERLYING CONDITION WITH DIABETIC NEUROPATHY, WITHOUT LONG-TERM CURRENT USE OF INSULIN: ICD-10-CM

## 2019-09-12 DIAGNOSIS — E78.2 MIXED HYPERLIPIDEMIA: ICD-10-CM

## 2019-09-12 DIAGNOSIS — F32.A DEPRESSION, UNSPECIFIED DEPRESSION TYPE: ICD-10-CM

## 2019-09-12 LAB
ALBUMIN SERPL BCP-MCNC: 3.8 G/DL (ref 3.5–5.2)
ALBUMIN/CREAT UR: 17.8 UG/MG (ref 0–30)
ALP SERPL-CCNC: 29 U/L (ref 55–135)
ALT SERPL W/O P-5'-P-CCNC: 14 U/L (ref 10–44)
ANION GAP SERPL CALC-SCNC: 7 MMOL/L (ref 8–16)
AST SERPL-CCNC: 23 U/L (ref 10–40)
BASOPHILS # BLD AUTO: 0.01 K/UL (ref 0–0.2)
BASOPHILS NFR BLD: 0.4 % (ref 0–1.9)
BILIRUB SERPL-MCNC: 0.7 MG/DL (ref 0.1–1)
BUN SERPL-MCNC: 17 MG/DL (ref 8–23)
CALCIUM SERPL-MCNC: 9.5 MG/DL (ref 8.7–10.5)
CHLORIDE SERPL-SCNC: 107 MMOL/L (ref 95–110)
CHOLEST SERPL-MCNC: 190 MG/DL (ref 120–199)
CHOLEST/HDLC SERPL: 5.8 {RATIO} (ref 2–5)
CO2 SERPL-SCNC: 28 MMOL/L (ref 23–29)
CREAT SERPL-MCNC: 0.8 MG/DL (ref 0.5–1.4)
CREAT UR-MCNC: 67.6 MG/DL (ref 15–325)
DIFFERENTIAL METHOD: ABNORMAL
EOSINOPHIL # BLD AUTO: 0.1 K/UL (ref 0–0.5)
EOSINOPHIL NFR BLD: 3.3 % (ref 0–8)
ERYTHROCYTE [DISTWIDTH] IN BLOOD BY AUTOMATED COUNT: 15.2 % (ref 11.5–14.5)
EST. GFR  (AFRICAN AMERICAN): >60 ML/MIN/1.73 M^2
EST. GFR  (NON AFRICAN AMERICAN): >60 ML/MIN/1.73 M^2
ESTIMATED AVG GLUCOSE: 103 MG/DL (ref 68–131)
GLUCOSE SERPL-MCNC: 95 MG/DL (ref 70–110)
HBA1C MFR BLD HPLC: 5.2 % (ref 4–5.6)
HCT VFR BLD AUTO: 38.6 % (ref 37–48.5)
HDLC SERPL-MCNC: 33 MG/DL (ref 40–75)
HDLC SERPL: 17.4 % (ref 20–50)
HGB BLD-MCNC: 12.3 G/DL (ref 12–16)
IMM GRANULOCYTES # BLD AUTO: 0.01 K/UL (ref 0–0.04)
IMM GRANULOCYTES NFR BLD AUTO: 0.4 % (ref 0–0.5)
LDLC SERPL CALC-MCNC: 117.4 MG/DL (ref 63–159)
LYMPHOCYTES # BLD AUTO: 0.8 K/UL (ref 1–4.8)
LYMPHOCYTES NFR BLD: 33.2 % (ref 18–48)
MCH RBC QN AUTO: 28.4 PG (ref 27–31)
MCHC RBC AUTO-ENTMCNC: 31.9 G/DL (ref 32–36)
MCV RBC AUTO: 89 FL (ref 82–98)
MICROALBUMIN UR DL<=1MG/L-MCNC: 12 UG/ML
MONOCYTES # BLD AUTO: 0.3 K/UL (ref 0.3–1)
MONOCYTES NFR BLD: 10.7 % (ref 4–15)
NEUTROPHILS # BLD AUTO: 1.3 K/UL (ref 1.8–7.7)
NEUTROPHILS NFR BLD: 52 % (ref 38–73)
NONHDLC SERPL-MCNC: 157 MG/DL
NRBC BLD-RTO: 0 /100 WBC
PLATELET # BLD AUTO: 73 K/UL (ref 150–350)
PMV BLD AUTO: 9.2 FL (ref 9.2–12.9)
POTASSIUM SERPL-SCNC: 4.3 MMOL/L (ref 3.5–5.1)
PROT SERPL-MCNC: 6.9 G/DL (ref 6–8.4)
RBC # BLD AUTO: 4.33 M/UL (ref 4–5.4)
SODIUM SERPL-SCNC: 142 MMOL/L (ref 136–145)
TRIGL SERPL-MCNC: 198 MG/DL (ref 30–150)
TSH SERPL DL<=0.005 MIU/L-ACNC: 1.97 UIU/ML (ref 0.4–4)
WBC # BLD AUTO: 2.44 K/UL (ref 3.9–12.7)

## 2019-09-12 PROCEDURE — 84443 ASSAY THYROID STIM HORMONE: CPT

## 2019-09-12 PROCEDURE — 80061 LIPID PANEL: CPT

## 2019-09-12 PROCEDURE — 83036 HEMOGLOBIN GLYCOSYLATED A1C: CPT

## 2019-09-12 PROCEDURE — 80053 COMPREHEN METABOLIC PANEL: CPT

## 2019-09-12 PROCEDURE — 82043 UR ALBUMIN QUANTITATIVE: CPT

## 2019-09-12 PROCEDURE — 36415 COLL VENOUS BLD VENIPUNCTURE: CPT

## 2019-09-12 PROCEDURE — 85025 COMPLETE CBC W/AUTO DIFF WBC: CPT

## 2019-09-19 ENCOUNTER — OFFICE VISIT (OUTPATIENT)
Dept: INTERNAL MEDICINE | Facility: CLINIC | Age: 79
End: 2019-09-19
Payer: MEDICARE

## 2019-09-19 VITALS
BODY MASS INDEX: 28.34 KG/M2 | RESPIRATION RATE: 16 BRPM | SYSTOLIC BLOOD PRESSURE: 110 MMHG | HEIGHT: 64 IN | DIASTOLIC BLOOD PRESSURE: 60 MMHG | HEART RATE: 68 BPM | WEIGHT: 166 LBS

## 2019-09-19 DIAGNOSIS — E11.9 CONTROLLED TYPE 2 DIABETES MELLITUS WITHOUT COMPLICATION, WITHOUT LONG-TERM CURRENT USE OF INSULIN: ICD-10-CM

## 2019-09-19 DIAGNOSIS — D69.6 THROMBOCYTOPENIA: ICD-10-CM

## 2019-09-19 DIAGNOSIS — I10 ESSENTIAL HYPERTENSION: ICD-10-CM

## 2019-09-19 DIAGNOSIS — E78.2 MIXED HYPERLIPIDEMIA: ICD-10-CM

## 2019-09-19 DIAGNOSIS — E11.40 TYPE 2 DIABETES MELLITUS WITH DIABETIC NEUROPATHY, WITHOUT LONG-TERM CURRENT USE OF INSULIN: Primary | ICD-10-CM

## 2019-09-19 DIAGNOSIS — K76.6 PORTAL HYPERTENSION: ICD-10-CM

## 2019-09-19 DIAGNOSIS — R16.1 SPLENOMEGALY: ICD-10-CM

## 2019-09-19 PROCEDURE — 99499 UNLISTED E&M SERVICE: CPT | Mod: S$GLB,,, | Performed by: INTERNAL MEDICINE

## 2019-09-19 PROCEDURE — 3078F DIAST BP <80 MM HG: CPT | Mod: CPTII,S$GLB,, | Performed by: INTERNAL MEDICINE

## 2019-09-19 PROCEDURE — 1101F PT FALLS ASSESS-DOCD LE1/YR: CPT | Mod: CPTII,S$GLB,, | Performed by: INTERNAL MEDICINE

## 2019-09-19 PROCEDURE — 99214 PR OFFICE/OUTPT VISIT, EST, LEVL IV, 30-39 MIN: ICD-10-PCS | Mod: S$GLB,,, | Performed by: INTERNAL MEDICINE

## 2019-09-19 PROCEDURE — 3074F SYST BP LT 130 MM HG: CPT | Mod: CPTII,S$GLB,, | Performed by: INTERNAL MEDICINE

## 2019-09-19 PROCEDURE — 3074F PR MOST RECENT SYSTOLIC BLOOD PRESSURE < 130 MM HG: ICD-10-PCS | Mod: CPTII,S$GLB,, | Performed by: INTERNAL MEDICINE

## 2019-09-19 PROCEDURE — 99499 RISK ADDL DX/OHS AUDIT: ICD-10-PCS | Mod: S$GLB,,, | Performed by: INTERNAL MEDICINE

## 2019-09-19 PROCEDURE — 3078F PR MOST RECENT DIASTOLIC BLOOD PRESSURE < 80 MM HG: ICD-10-PCS | Mod: CPTII,S$GLB,, | Performed by: INTERNAL MEDICINE

## 2019-09-19 PROCEDURE — 99214 OFFICE O/P EST MOD 30 MIN: CPT | Mod: S$GLB,,, | Performed by: INTERNAL MEDICINE

## 2019-09-19 PROCEDURE — 1101F PR PT FALLS ASSESS DOC 0-1 FALLS W/OUT INJ PAST YR: ICD-10-PCS | Mod: CPTII,S$GLB,, | Performed by: INTERNAL MEDICINE

## 2019-09-19 PROCEDURE — 99999 PR PBB SHADOW E&M-EST. PATIENT-LVL III: ICD-10-PCS | Mod: PBBFAC,,, | Performed by: INTERNAL MEDICINE

## 2019-09-19 PROCEDURE — 99999 PR PBB SHADOW E&M-EST. PATIENT-LVL III: CPT | Mod: PBBFAC,,, | Performed by: INTERNAL MEDICINE

## 2019-09-19 RX ORDER — POLYMYXIN B SULFATE AND TRIMETHOPRIM 1; 10000 MG/ML; [USP'U]/ML
SOLUTION OPHTHALMIC
Refills: 0 | COMMUNITY
Start: 2019-06-13 | End: 2022-02-23

## 2019-09-19 RX ORDER — OFLOXACIN 3 MG/ML
SOLUTION/ DROPS OPHTHALMIC
COMMUNITY
Start: 2019-08-02 | End: 2019-09-19 | Stop reason: SDUPTHER

## 2019-09-19 NOTE — PROGRESS NOTES
Subjective:       Patient ID: Marine Mo is a 79 y.o. female.    Chief Complaint: Follow-up; Hyperlipidemia; and Diabetes    Marine Mo is a 79  female who presents for Type II DM, Hypertension, and Hyperlipidemia follow up. Labs were reviewed with patient today. Patient with c/o recurrent stomach pain and increased anxiety and depression.       Hypertension   This is a chronic problem. The current episode started more than 1 year ago. The problem has been waxing and waning since onset. The problem is controlled. Associated symptoms include anxiety. Pertinent negatives include no chest pain, neck pain, palpitations or shortness of breath. Risk factors for coronary artery disease include sedentary lifestyle, obesity and dyslipidemia. Past treatments include beta blockers, lifestyle changes and diuretics. The current treatment provides moderate improvement.   Diabetes   She presents for her follow-up diabetic visit. She has type 2 diabetes mellitus. Her disease course has been stable. Hypoglycemia symptoms include nervousness/anxiousness. Pertinent negatives for hypoglycemia include no confusion, dizziness or pallor. Pertinent negatives for diabetes include no chest pain. Diabetic complications include peripheral neuropathy. Current diabetic treatment includes diet. An ACE inhibitor/angiotensin II receptor blocker is not being taken.   Anxiety   Presents for initial visit. Onset was 1 to 6 months ago. The problem has been gradually worsening. Symptoms include insomnia and nervous/anxious behavior. Patient reports no chest pain, confusion, dizziness, nausea, palpitations or shortness of breath. Symptoms occur most days. The severity of symptoms is moderate. Exacerbated by: xanax at night. The quality of sleep is poor. Nighttime awakenings: one to two.     There are no known risk factors. Past treatments include benzodiazephines. The treatment provided mild relief.   Hyperlipidemia   This is a chronic  problem. The current episode started more than 1 year ago. The problem is controlled. Recent lipid tests were reviewed and are low. Exacerbating diseases include obesity. Pertinent negatives include no chest pain, myalgias or shortness of breath. She is currently on no antihyperlipidemic treatment. The current treatment provides mild improvement of lipids. Compliance problems include adherence to diet.    Medication Refill   Pertinent negatives include no arthralgias, chest pain, chills, congestion, coughing, fever, myalgias, nausea, neck pain, numbness, sore throat or vomiting.     Review of Systems   Constitutional: Negative for chills and fever.   HENT: Negative for congestion and sore throat.    Respiratory: Negative for cough and shortness of breath.    Cardiovascular: Negative for chest pain and palpitations.   Gastrointestinal: Negative for nausea and vomiting.   Musculoskeletal: Negative for arthralgias, myalgias and neck pain.   Skin: Negative for pallor.   Neurological: Negative for dizziness and numbness.   Psychiatric/Behavioral: Negative for confusion. The patient is nervous/anxious and has insomnia.        Objective:      Physical Exam   Constitutional: She is oriented to person, place, and time. She appears well-developed and well-nourished.   HENT:   Head: Normocephalic and atraumatic.   Right Ear: External ear normal.   Left Ear: External ear normal.   Mouth/Throat: Oropharynx is clear and moist.   Eyes: Pupils are equal, round, and reactive to light. Conjunctivae and EOM are normal.   Neck: Normal range of motion. Neck supple. No JVD present. No tracheal deviation present. No thyromegaly present.   Cardiovascular: Normal rate, regular rhythm, normal heart sounds and intact distal pulses.   Pulmonary/Chest: Effort normal and breath sounds normal. No respiratory distress. She has no wheezes. She has no rales. She exhibits no tenderness.   Abdominal: Soft. Bowel sounds are normal. She exhibits no  distension and no mass. There is no tenderness. There is no rebound and no guarding.   Musculoskeletal: Normal range of motion. She exhibits no edema.   Lymphadenopathy:     She has no cervical adenopathy.   Neurological: She is alert and oriented to person, place, and time. She has normal reflexes. She displays normal reflexes. No cranial nerve deficit. She exhibits normal muscle tone. Coordination normal.   CN: Optic discs are flat with normal vasculature, PERRL, Extraoccular movements and visual fields are full. Normal facial sensation and strength, Hearing symmetric, Tongue and Palate are midline and strong. Shoulder Shrug symmetric and strong.   Skin: Skin is warm and dry.   Psychiatric: She has a normal mood and affect.   Nursing note and vitals reviewed.      Assessment:       1. Type 2 diabetes mellitus with diabetic neuropathy, without long-term current use of insulin    2. Essential hypertension    3. Mixed hyperlipidemia    4. Controlled type 2 diabetes mellitus without complication, without long-term current use of insulin    5. Splenomegaly    6. Thrombocytopenia    7. Portal hypertension        Plan:   Marine was seen today for follow-up, hyperlipidemia and diabetes.    Diagnoses and all orders for this visit:    Type 2 diabetes mellitus with diabetic neuropathy, without long-term current use of insulin  -     Hemoglobin A1c; Future  -     Microalbumin/creatinine urine ratio; Future  Patient has controlled Diabetes .  We discussed about diet ;low in calories. Avoid sweats, sodas.  Also increasing activity;walking 2-3 miles a day.  I also adjusted medications and gave patient  instructions about adherence to plan.  Goal of  A1c  less than 7 % stressed.  Also goal of LDL less than 70 highlighted to patient.  Essential hypertension  -     TSH; Future    Well controlled.  Continue same medication and dose.  1. Keep weight close to ideal body weight.   2.   Avoid high salt foods (olives, pickles, smoked  meats, salted potato chips, etc.).   Do not add salt to your food at the table.   Use only small amounts of salt when cooking.   3. Begin an exercise program. Discuss with your doctor what type of exercise program would be best for you. It doesn't have to be difficult. Even brisk walking for 20 minutes three times a week is a good form of exercise.   4. Avoid medicines which contain heart stimulants. This includes many cold and sinus decongestant pills and sprays as well as diet pills. Check the warnings about hypertension on the label. Stimulants such as amphetamine or cocaine could be lethal for someone with hypertension. Never take these.    Mixed hyperlipidemia  -     Lipid panel; Future  -     TSH; Future  Lab Results   Component Value Date    LDLCALC 117.4 09/12/2019             Controlled type 2 diabetes mellitus without complication, without long-term current use of insulin  Well controllled     Splenomegaly  -     CBC auto differential; Future  Will follow labs     Thrombocytopenia  -     CBC auto differential; Future  Lab Results   Component Value Date    WBC 2.44 (L) 09/12/2019    HGB 12.3 09/12/2019    HCT 38.6 09/12/2019    MCV 89 09/12/2019    PLT 73 (L) 09/12/2019       Portal hypertension  -     Comprehensive metabolic panel; Future    Stable.    Problem List Items Addressed This Visit     Hyperlipidemia    Relevant Orders    Lipid panel    TSH    Thrombocytopenia    Relevant Orders    CBC auto differential    Type 2 diabetes mellitus, controlled    Essential hypertension    Relevant Orders    TSH    Diabetes mellitus with neuropathy - Primary    Relevant Orders    Hemoglobin A1c    Microalbumin/creatinine urine ratio    Splenomegaly    Relevant Orders    CBC auto differential    Portal hypertension    Relevant Orders    Comprehensive metabolic panel

## 2019-10-01 RX ORDER — OXYBUTYNIN CHLORIDE 5 MG/1
5 TABLET ORAL 2 TIMES DAILY
Qty: 60 TABLET | Refills: 2 | Status: SHIPPED | OUTPATIENT
Start: 2019-10-01 | End: 2019-11-08

## 2019-10-01 NOTE — TELEPHONE ENCOUNTER
----- Message from Meghann Funez MA sent at 10/1/2019  9:52 AM CDT -----  Contact: Self      ----- Message -----  From: Bonnie [place]  Sent: 10/1/2019   9:25 AM CDT  To: Jake DAVIS Staff    Marine Mo  MRN: [id number]  Home Phone      [phone]  Work Phone      [phone]  Mobile          [phone]    Patient Care Team:  Serg Hwang MD as PCP - General (Internal Medicine)  Judith Cortez LPN as Care Coordinator  Wilton Joseph MD as Obstetrician (Obstetrics)  OB? No  What phone number can you be reached at? [phone]  Message:   Pt requesting refill of oxybutynin (DITROPAN) 5 MG Tab to [facility] in [place].

## 2019-11-02 DIAGNOSIS — I10 ESSENTIAL HYPERTENSION: ICD-10-CM

## 2019-11-04 RX ORDER — METOPROLOL TARTRATE 100 MG/1
TABLET ORAL
Qty: 90 TABLET | Refills: 2 | Status: SHIPPED | OUTPATIENT
Start: 2019-11-04 | End: 2020-08-06 | Stop reason: SDUPTHER

## 2019-11-06 ENCOUNTER — PATIENT OUTREACH (OUTPATIENT)
Dept: ADMINISTRATIVE | Facility: OTHER | Age: 79
End: 2019-11-06

## 2019-11-08 ENCOUNTER — OFFICE VISIT (OUTPATIENT)
Dept: OBSTETRICS AND GYNECOLOGY | Facility: CLINIC | Age: 79
End: 2019-11-08
Payer: MEDICARE

## 2019-11-08 VITALS
HEIGHT: 61 IN | WEIGHT: 164 LBS | HEART RATE: 68 BPM | SYSTOLIC BLOOD PRESSURE: 126 MMHG | BODY MASS INDEX: 30.96 KG/M2 | DIASTOLIC BLOOD PRESSURE: 72 MMHG | RESPIRATION RATE: 12 BRPM

## 2019-11-08 DIAGNOSIS — Z01.419 ENCOUNTER FOR GYNECOLOGICAL EXAMINATION WITHOUT ABNORMAL FINDING: Primary | ICD-10-CM

## 2019-11-08 DIAGNOSIS — N39.41 URGE INCONTINENCE OF URINE: ICD-10-CM

## 2019-11-08 DIAGNOSIS — Z78.0 POSTMENOPAUSAL STATE: ICD-10-CM

## 2019-11-08 PROCEDURE — G0101 CA SCREEN;PELVIC/BREAST EXAM: HCPCS | Mod: S$GLB,,, | Performed by: OBSTETRICS & GYNECOLOGY

## 2019-11-08 PROCEDURE — 99999 PR PBB SHADOW E&M-EST. PATIENT-LVL III: ICD-10-PCS | Mod: PBBFAC,,, | Performed by: OBSTETRICS & GYNECOLOGY

## 2019-11-08 PROCEDURE — 99999 PR PBB SHADOW E&M-EST. PATIENT-LVL III: CPT | Mod: PBBFAC,,, | Performed by: OBSTETRICS & GYNECOLOGY

## 2019-11-08 PROCEDURE — G0101 PR CA SCREEN;PELVIC/BREAST EXAM: ICD-10-PCS | Mod: S$GLB,,, | Performed by: OBSTETRICS & GYNECOLOGY

## 2019-11-08 RX ORDER — SOLIFENACIN SUCCINATE 5 MG/1
5 TABLET, FILM COATED ORAL DAILY
Qty: 30 TABLET | Refills: 12 | Status: SHIPPED | OUTPATIENT
Start: 2019-11-08 | End: 2020-10-13

## 2019-11-08 NOTE — PROGRESS NOTES
Subjective:       Patient ID: Marine Mo is a 79 y.o. female.    Chief Complaint:  Well Woman      History of Present Illness  Patient presents for annual exam.  Patient has been on to Triptan for urge incontinence of urine and nocturia.  She admits to some improvement but states she is still having some issues with urgency.  Patient's last mammogram in  was within normal limits.  Patient has a DEXA bone scan ordered by Internal Medicine.  Patient has occasional recurrence of external itching for which she uses her Lotrisone cream and symptoms resolved.  She is otherwise without gyn complaints.    Menstrual History:  OB History        2    Para   2    Term   2            AB        Living   2       SAB        TAB        Ectopic        Multiple        Live Births                    Menarche age:  No LMP recorded. Patient has had a hysterectomy.         Review of Systems  Review of Systems   Constitutional: Negative for activity change, appetite change, chills, diaphoresis, fatigue, fever and unexpected weight change.   HENT: Negative for congestion, dental problem, drooling, ear discharge, ear pain, facial swelling, hearing loss, mouth sores, nosebleeds, postnasal drip, rhinorrhea, sinus pressure, sneezing, sore throat, tinnitus, trouble swallowing and voice change.    Eyes: Positive for pain, discharge and itching. Negative for photophobia, redness and visual disturbance.   Respiratory: Negative for apnea, cough, choking, chest tightness, shortness of breath, wheezing and stridor.    Cardiovascular: Positive for leg swelling. Negative for chest pain and palpitations.   Gastrointestinal: Positive for abdominal pain. Negative for abdominal distention, anal bleeding, blood in stool, constipation, diarrhea, nausea, rectal pain and vomiting.   Endocrine: Positive for polyuria. Negative for cold intolerance, heat intolerance, polydipsia and polyphagia.   Genitourinary: Positive for difficulty  urinating. Negative for decreased urine volume, dyspareunia, dysuria, enuresis, flank pain, frequency, genital sores, hematuria, menstrual problem, pelvic pain, urgency, vaginal bleeding, vaginal discharge and vaginal pain.   Musculoskeletal: Positive for back pain. Negative for arthralgias, gait problem, joint swelling, myalgias, neck pain and neck stiffness.   Skin: Negative for color change, pallor, rash and wound.   Allergic/Immunologic: Negative for environmental allergies, food allergies and immunocompromised state.   Neurological: Negative for dizziness, tremors, seizures, syncope, facial asymmetry, speech difficulty, weakness, light-headedness, numbness and headaches.   Hematological: Negative for adenopathy. Does not bruise/bleed easily.   Psychiatric/Behavioral: Negative for agitation, behavioral problems, confusion, decreased concentration, dysphoric mood, hallucinations, self-injury, sleep disturbance and suicidal ideas. The patient is not nervous/anxious and is not hyperactive.            Objective:      Physical Exam   Constitutional: She is oriented to person, place, and time. She appears well-developed and well-nourished.   Neck: No thyromegaly present.   Cardiovascular: Normal rate and regular rhythm.   Pulmonary/Chest: Effort normal and breath sounds normal. Right breast exhibits no inverted nipple, no mass, no nipple discharge, no skin change and no tenderness. Left breast exhibits no inverted nipple, no mass, no nipple discharge, no skin change and no tenderness. No breast tenderness or discharge. Breasts are symmetrical.   Abdominal: Soft. Bowel sounds are normal. She exhibits no mass. There is no tenderness. Hernia confirmed negative in the right inguinal area and confirmed negative in the left inguinal area.   Genitourinary: Vagina normal. Rectal exam shows no external hemorrhoid. No breast tenderness or discharge. Uterus is deviated ( surgically absent). Cervix exhibits motion tenderness (  surgically absent). Right adnexum displays no mass, no tenderness and no fullness. Left adnexum displays no mass, no tenderness and no fullness. No tenderness in the vagina. No foreign body in the vagina. No vaginal discharge found.   Musculoskeletal: Normal range of motion.   Lymphadenopathy:        Right: No inguinal adenopathy present.        Left: No inguinal adenopathy present.   Neurological: She is alert and oriented to person, place, and time. She has normal reflexes.   Skin: Skin is dry.   Psychiatric: She has a normal mood and affect. Her behavior is normal. Judgment and thought content normal.   Nursing note and vitals reviewed.          Assessment:        1. Encounter for gynecological examination without abnormal finding    2. Postmenopausal state    3. Urge incontinence of urine                Plan:         Marine was seen today for well woman.    Diagnoses and all orders for this visit:    Encounter for gynecological examination without abnormal finding    Postmenopausal state    Urge incontinence of urine    Other orders  -     solifenacin (VESICARE) 5 MG tablet; Take 1 tablet (5 mg total) by mouth once daily.

## 2019-12-30 ENCOUNTER — TELEPHONE (OUTPATIENT)
Dept: OBSTETRICS AND GYNECOLOGY | Facility: CLINIC | Age: 79
End: 2019-12-30

## 2019-12-30 NOTE — TELEPHONE ENCOUNTER
Patient requesting refills of Nystatin cream and Kenalog cream. Verbal orders from Dr Joseph to call in refills to pharmacy. Refills called into [facility] in [place] per [facility] prescription directions with 1 refill each. Patient notified.

## 2020-02-10 RX ORDER — FLUOXETINE 10 MG/1
CAPSULE ORAL
Qty: 30 CAPSULE | Refills: 2 | Status: SHIPPED | OUTPATIENT
Start: 2020-02-10 | End: 2020-02-12

## 2020-02-12 RX ORDER — FLUOXETINE 10 MG/1
CAPSULE ORAL
Qty: 90 CAPSULE | Refills: 0 | Status: SHIPPED | OUTPATIENT
Start: 2020-02-12 | End: 2024-01-10

## 2020-03-02 RX ORDER — METFORMIN HYDROCHLORIDE 500 MG/1
TABLET ORAL
Qty: 180 TABLET | Refills: 0 | Status: SHIPPED | OUTPATIENT
Start: 2020-03-02 | End: 2020-05-25

## 2020-03-12 ENCOUNTER — PATIENT OUTREACH (OUTPATIENT)
Dept: ADMINISTRATIVE | Facility: HOSPITAL | Age: 80
End: 2020-03-12

## 2020-05-11 RX ORDER — OXYBUTYNIN CHLORIDE 5 MG/1
TABLET ORAL
Qty: 60 TABLET | Refills: 0 | Status: SHIPPED | OUTPATIENT
Start: 2020-05-11 | End: 2020-06-16

## 2020-05-18 ENCOUNTER — PATIENT OUTREACH (OUTPATIENT)
Dept: ADMINISTRATIVE | Facility: OTHER | Age: 80
End: 2020-05-18

## 2020-05-18 VITALS — DIASTOLIC BLOOD PRESSURE: 60 MMHG | HEART RATE: 72 BPM | SYSTOLIC BLOOD PRESSURE: 126 MMHG

## 2020-05-18 VITALS — SYSTOLIC BLOOD PRESSURE: 130 MMHG | OXYGEN SATURATION: 94 % | DIASTOLIC BLOOD PRESSURE: 60 MMHG | HEART RATE: 76 BPM

## 2020-05-19 ENCOUNTER — PATIENT OUTREACH (OUTPATIENT)
Dept: ADMINISTRATIVE | Facility: OTHER | Age: 80
End: 2020-05-19

## 2020-05-19 VITALS — OXYGEN SATURATION: 95 % | HEART RATE: 84 BPM | SYSTOLIC BLOOD PRESSURE: 124 MMHG | DIASTOLIC BLOOD PRESSURE: 60 MMHG

## 2020-05-25 RX ORDER — METFORMIN HYDROCHLORIDE 500 MG/1
TABLET ORAL
Qty: 180 TABLET | Refills: 0 | Status: SHIPPED | OUTPATIENT
Start: 2020-05-25 | End: 2020-08-04

## 2020-07-06 ENCOUNTER — TELEPHONE (OUTPATIENT)
Dept: INTERNAL MEDICINE | Facility: CLINIC | Age: 80
End: 2020-07-06

## 2020-07-06 DIAGNOSIS — Z12.31 ENCOUNTER FOR SCREENING MAMMOGRAM FOR BREAST CANCER: Primary | ICD-10-CM

## 2020-07-06 NOTE — TELEPHONE ENCOUNTER
----- Message from Vivienne Morejon sent at 2020  8:34 AM CDT -----  Contact: Self  Marine Mo  MRN: [id number]  : 1940  PCP: Serg Hwang  Home Phone      [phone]  Work Phone      [phone]  Mobile          [phone]    MESSAGE:     Would like to schedule mammogram and labs for the same day, preferably a morning appointment. Please call to assist in scheduling.    Phone: [phone]

## 2020-07-13 LAB
LEFT EYE DM RETINOPATHY: NEGATIVE
RIGHT EYE DM RETINOPATHY: NEGATIVE

## 2020-07-17 ENCOUNTER — HOSPITAL ENCOUNTER (OUTPATIENT)
Dept: RADIOLOGY | Facility: HOSPITAL | Age: 80
Discharge: HOME | End: 2020-07-17
Attending: INTERNAL MEDICINE
Payer: MEDICARE

## 2020-07-17 VITALS — HEIGHT: 61 IN | BODY MASS INDEX: 30.96 KG/M2 | WEIGHT: 164 LBS

## 2020-07-17 DIAGNOSIS — Z12.31 ENCOUNTER FOR SCREENING MAMMOGRAM FOR BREAST CANCER: ICD-10-CM

## 2020-07-17 PROCEDURE — 77063 MAMMO DIGITAL SCREENING BILAT WITH TOMOSYNTHESIS_CAD: ICD-10-PCS | Mod: 26,,, | Performed by: RADIOLOGY

## 2020-07-17 PROCEDURE — 77067 MAMMO DIGITAL SCREENING BILAT WITH TOMOSYNTHESIS_CAD: ICD-10-PCS | Mod: 26,,, | Performed by: RADIOLOGY

## 2020-07-17 PROCEDURE — 77067 SCR MAMMO BI INCL CAD: CPT | Mod: 26,,, | Performed by: RADIOLOGY

## 2020-07-17 PROCEDURE — 77063 BREAST TOMOSYNTHESIS BI: CPT | Mod: 26,,, | Performed by: RADIOLOGY

## 2020-07-17 PROCEDURE — 77067 SCR MAMMO BI INCL CAD: CPT | Mod: TC

## 2020-07-23 ENCOUNTER — PATIENT OUTREACH (OUTPATIENT)
Dept: ADMINISTRATIVE | Facility: HOSPITAL | Age: 80
End: 2020-07-23

## 2020-07-28 ENCOUNTER — OFFICE VISIT (OUTPATIENT)
Dept: INTERNAL MEDICINE | Facility: CLINIC | Age: 80
End: 2020-07-28
Payer: MEDICARE

## 2020-07-28 VITALS
HEART RATE: 77 BPM | OXYGEN SATURATION: 94 % | BODY MASS INDEX: 31.72 KG/M2 | DIASTOLIC BLOOD PRESSURE: 60 MMHG | WEIGHT: 168 LBS | SYSTOLIC BLOOD PRESSURE: 136 MMHG | RESPIRATION RATE: 16 BRPM | HEIGHT: 61 IN

## 2020-07-28 DIAGNOSIS — K76.6 PORTAL HYPERTENSION: ICD-10-CM

## 2020-07-28 DIAGNOSIS — D69.6 THROMBOCYTOPENIA: ICD-10-CM

## 2020-07-28 DIAGNOSIS — E11.40 TYPE 2 DIABETES MELLITUS WITH DIABETIC NEUROPATHY, WITHOUT LONG-TERM CURRENT USE OF INSULIN: ICD-10-CM

## 2020-07-28 DIAGNOSIS — I10 ESSENTIAL HYPERTENSION: ICD-10-CM

## 2020-07-28 DIAGNOSIS — F51.04 CHRONIC INSOMNIA: ICD-10-CM

## 2020-07-28 DIAGNOSIS — E78.2 MIXED HYPERLIPIDEMIA: ICD-10-CM

## 2020-07-28 DIAGNOSIS — E11.9 CONTROLLED TYPE 2 DIABETES MELLITUS WITHOUT COMPLICATION, WITHOUT LONG-TERM CURRENT USE OF INSULIN: Primary | ICD-10-CM

## 2020-07-28 PROCEDURE — 1159F MED LIST DOCD IN RCRD: CPT | Mod: S$GLB,,, | Performed by: INTERNAL MEDICINE

## 2020-07-28 PROCEDURE — 1126F AMNT PAIN NOTED NONE PRSNT: CPT | Mod: S$GLB,,, | Performed by: INTERNAL MEDICINE

## 2020-07-28 PROCEDURE — 99214 PR OFFICE/OUTPT VISIT, EST, LEVL IV, 30-39 MIN: ICD-10-PCS | Mod: S$GLB,,, | Performed by: INTERNAL MEDICINE

## 2020-07-28 PROCEDURE — 99999 PR PBB SHADOW E&M-EST. PATIENT-LVL IV: CPT | Mod: PBBFAC,,, | Performed by: INTERNAL MEDICINE

## 2020-07-28 PROCEDURE — 3075F SYST BP GE 130 - 139MM HG: CPT | Mod: CPTII,S$GLB,, | Performed by: INTERNAL MEDICINE

## 2020-07-28 PROCEDURE — 1159F PR MEDICATION LIST DOCUMENTED IN MEDICAL RECORD: ICD-10-PCS | Mod: S$GLB,,, | Performed by: INTERNAL MEDICINE

## 2020-07-28 PROCEDURE — 1126F PR PAIN SEVERITY QUANTIFIED, NO PAIN PRESENT: ICD-10-PCS | Mod: S$GLB,,, | Performed by: INTERNAL MEDICINE

## 2020-07-28 PROCEDURE — 1101F PT FALLS ASSESS-DOCD LE1/YR: CPT | Mod: CPTII,S$GLB,, | Performed by: INTERNAL MEDICINE

## 2020-07-28 PROCEDURE — 99499 RISK ADDL DX/OHS AUDIT: ICD-10-PCS | Mod: S$GLB,,, | Performed by: INTERNAL MEDICINE

## 2020-07-28 PROCEDURE — 3078F PR MOST RECENT DIASTOLIC BLOOD PRESSURE < 80 MM HG: ICD-10-PCS | Mod: CPTII,S$GLB,, | Performed by: INTERNAL MEDICINE

## 2020-07-28 PROCEDURE — 3075F PR MOST RECENT SYSTOLIC BLOOD PRESS GE 130-139MM HG: ICD-10-PCS | Mod: CPTII,S$GLB,, | Performed by: INTERNAL MEDICINE

## 2020-07-28 PROCEDURE — 1101F PR PT FALLS ASSESS DOC 0-1 FALLS W/OUT INJ PAST YR: ICD-10-PCS | Mod: CPTII,S$GLB,, | Performed by: INTERNAL MEDICINE

## 2020-07-28 PROCEDURE — 99999 PR PBB SHADOW E&M-EST. PATIENT-LVL IV: ICD-10-PCS | Mod: PBBFAC,,, | Performed by: INTERNAL MEDICINE

## 2020-07-28 PROCEDURE — 3078F DIAST BP <80 MM HG: CPT | Mod: CPTII,S$GLB,, | Performed by: INTERNAL MEDICINE

## 2020-07-28 PROCEDURE — 99214 OFFICE O/P EST MOD 30 MIN: CPT | Mod: S$GLB,,, | Performed by: INTERNAL MEDICINE

## 2020-07-28 PROCEDURE — 99499 UNLISTED E&M SERVICE: CPT | Mod: S$GLB,,, | Performed by: INTERNAL MEDICINE

## 2020-07-28 RX ORDER — ALPRAZOLAM 0.5 MG/1
0.5 TABLET ORAL NIGHTLY PRN
Qty: 30 TABLET | Refills: 3 | Status: SHIPPED | OUTPATIENT
Start: 2020-07-28 | End: 2021-04-12

## 2020-07-28 NOTE — PROGRESS NOTES
Subjective:       Patient ID: Marine Mo is a 80 y.o. female.    Chief Complaint: Follow-up, Diabetes, Hyperlipidemia, Hypertension, and Anxiety    Marine Mo is a 79  female who presents for Type II DM,  Chronic liver disease , Hypertension, and Hyperlipidemia follow up. Labs were reviewed with patient today. Patient with c/o recurrent stomach pain and increased anxiety and depression. requesting refill  For alprazolam   Labs are reviewed.    Hypertension  This is a chronic problem. The current episode started more than 1 year ago. The problem has been waxing and waning since onset. The problem is controlled. Associated symptoms include anxiety. Pertinent negatives include no chest pain, neck pain, palpitations or shortness of breath. Risk factors for coronary artery disease include sedentary lifestyle, obesity and dyslipidemia. Past treatments include beta blockers, lifestyle changes and diuretics. The current treatment provides moderate improvement.   Diabetes  She presents for her follow-up diabetic visit. She has type 2 diabetes mellitus. Her disease course has been stable. Hypoglycemia symptoms include nervousness/anxiousness. Pertinent negatives for hypoglycemia include no confusion, dizziness or pallor. Pertinent negatives for diabetes include no chest pain. Diabetic complications include peripheral neuropathy. Current diabetic treatment includes diet. An ACE inhibitor/angiotensin II receptor blocker is not being taken.   Anxiety  Presents for initial visit. Onset was 1 to 6 months ago. The problem has been gradually worsening. Symptoms include insomnia and nervous/anxious behavior. Patient reports no chest pain, confusion, dizziness, nausea, palpitations or shortness of breath. Symptoms occur most days. The severity of symptoms is moderate. Exacerbated by: xanax at night. The quality of sleep is poor. Nighttime awakenings: one to two.     There are no known risk factors. Past treatments  include benzodiazephines. The treatment provided mild relief.   Hyperlipidemia  This is a chronic problem. The current episode started more than 1 year ago. The problem is controlled. Recent lipid tests were reviewed and are low. Exacerbating diseases include obesity. Pertinent negatives include no chest pain, myalgias or shortness of breath. She is currently on no antihyperlipidemic treatment. The current treatment provides mild improvement of lipids. Compliance problems include adherence to diet.    Medication Refill  Pertinent negatives include no arthralgias, chest pain, chills, congestion, coughing, fever, myalgias, nausea, neck pain, numbness, sore throat or vomiting.     Review of Systems   Constitutional: Negative for chills and fever.   HENT: Negative for congestion and sore throat.    Respiratory: Negative for cough and shortness of breath.    Cardiovascular: Negative for chest pain and palpitations.   Gastrointestinal: Negative for nausea and vomiting.   Musculoskeletal: Negative for arthralgias, myalgias and neck pain.   Skin: Negative for pallor.   Neurological: Negative for dizziness and numbness.   Psychiatric/Behavioral: Negative for confusion. The patient is nervous/anxious and has insomnia.        Objective:      Physical Exam  Vitals signs and nursing note reviewed.   Constitutional:       Appearance: She is well-developed.   HENT:      Head: Normocephalic and atraumatic.      Right Ear: External ear normal.      Left Ear: External ear normal.   Eyes:      Conjunctiva/sclera: Conjunctivae normal.      Pupils: Pupils are equal, round, and reactive to light.   Neck:      Musculoskeletal: Normal range of motion and neck supple.      Thyroid: No thyromegaly.      Vascular: No JVD.      Trachea: No tracheal deviation.   Cardiovascular:      Rate and Rhythm: Normal rate and regular rhythm.      Heart sounds: Normal heart sounds.   Pulmonary:      Effort: Pulmonary effort is normal. No respiratory  distress.      Breath sounds: Normal breath sounds. No wheezing or rales.   Chest:      Chest wall: No tenderness.   Abdominal:      General: Bowel sounds are normal. There is no distension.      Palpations: Abdomen is soft. There is no mass.      Tenderness: There is no abdominal tenderness. There is no guarding or rebound.   Musculoskeletal: Normal range of motion.   Lymphadenopathy:      Cervical: No cervical adenopathy.   Skin:     General: Skin is warm and dry.   Neurological:      Mental Status: She is alert and oriented to person, place, and time.      Cranial Nerves: No cranial nerve deficit.      Motor: No abnormal muscle tone.      Coordination: Coordination normal.      Deep Tendon Reflexes: Reflexes are normal and symmetric. Reflexes normal.      Comments: CN: Optic discs are flat with normal vasculature, PERRL, Extraoccular movements and visual fields are full. Normal facial sensation and strength, Hearing symmetric, Tongue and Palate are midline and strong. Shoulder Shrug symmetric and strong.         Assessment:       1. Controlled type 2 diabetes mellitus without complication, without long-term current use of insulin    2. Chronic insomnia    3. Type 2 diabetes mellitus with diabetic neuropathy, without long-term current use of insulin    4. Portal hypertension    5. Thrombocytopenia    6. Mixed hyperlipidemia    7. Essential hypertension        Plan:   Marine was seen today for follow-up, diabetes, hyperlipidemia, hypertension and anxiety.    Diagnoses and all orders for this visit:    Controlled type 2 diabetes mellitus without complication, without long-term current use of insulin  -     Lipid Panel; Future  Patient has controlled Diabetes .  We discussed about diet ;low in calories. Avoid sweats, sodas.  Also increasing activity;walking 2-3 miles a day.  I also adjusted medications and gave patient  instructions about adherence to plan.  Goal of  A1c  less than 7 % stressed.  Also goal of LDL  less than 70 highlighted to patient.  Chronic insomnia  -     ALPRAZolam (XANAX) 0.5 MG tablet; Take 1 tablet (0.5 mg total) by mouth nightly as needed for Insomnia or Anxiety.  -     TSH; Future    Type 2 diabetes mellitus with diabetic neuropathy, without long-term current use of insulin  -     TSH; Future  -     Hemoglobin A1C; Future  -     Microalbumin/creatinine urine ratio; Future    Portal hypertension  -     Comprehensive metabolic panel; Future  Stable labs     Thrombocytopenia  -     CBC auto differential; Future  Will continue to monitor     Mixed hyperlipidemia  -     Lipid Panel; Future  -     TSH; Future  Lab Results   Component Value Date    LDLCALC 106.2 07/17/2020       Essential hypertension  -     CBC auto differential; Future  -     Comprehensive metabolic panel; Future  -     TSH; Future    Well controlled.  Continue same medication and dose.  1. Keep weight close to ideal body weight.   2.   Avoid high salt foods (olives, pickles, smoked meats, salted potato chips, etc.).   Do not add salt to your food at the table.   Use only small amounts of salt when cooking.   3. Begin an exercise program. Discuss with your doctor what type of exercise program would be best for you. It doesn't have to be difficult. Even brisk walking for 20 minutes three times a week is a good form of exercise.   4. Avoid medicines which contain heart stimulants. This includes many cold and sinus decongestant pills and sprays as well as diet pills. Check the warnings about hypertension on the label. Stimulants such as amphetamine or cocaine could be lethal for someone with hypertension. Never take these.      Problem List Items Addressed This Visit     Hyperlipidemia    Thrombocytopenia    Type 2 diabetes mellitus, controlled - Primary    Chronic insomnia    Relevant Medications    ALPRAZolam (XANAX) 0.5 MG tablet    Essential hypertension    Diabetes mellitus with neuropathy    Portal hypertension

## 2020-08-06 DIAGNOSIS — I10 ESSENTIAL HYPERTENSION: ICD-10-CM

## 2020-08-06 RX ORDER — METOPROLOL TARTRATE 100 MG/1
TABLET ORAL
Qty: 90 TABLET | Refills: 2 | Status: SHIPPED | OUTPATIENT
Start: 2020-08-06 | End: 2021-07-19

## 2020-08-06 RX ORDER — METFORMIN HYDROCHLORIDE 500 MG/1
500 TABLET ORAL 2 TIMES DAILY WITH MEALS
Qty: 180 TABLET | Refills: 0 | Status: SHIPPED | OUTPATIENT
Start: 2020-08-06 | End: 2020-11-27 | Stop reason: SDUPTHER

## 2020-08-06 NOTE — TELEPHONE ENCOUNTER
----- Message from Vivienne Morejon sent at 2020 10:31 AM CDT -----  Contact: Self  Marine Mo  MRN: [id number]  : 1940  PCP: Serg Hwang  Home Phone      [phone]  Work Phone      [phone]  Mobile          [phone]      MESSAGE:   Refill  metoprolol tartrate (LOPRESSOR) 100 MG tablet  metFORMIN (GLUCOPHAGE) 500 MG tablet    [place] [id number]    [phone]

## 2020-09-11 ENCOUNTER — OFFICE VISIT (OUTPATIENT)
Dept: INTERNAL MEDICINE | Facility: CLINIC | Age: 80
End: 2020-09-11
Payer: MEDICARE

## 2020-09-11 VITALS
RESPIRATION RATE: 16 BRPM | HEIGHT: 61 IN | OXYGEN SATURATION: 94 % | WEIGHT: 168 LBS | HEART RATE: 74 BPM | BODY MASS INDEX: 31.72 KG/M2 | DIASTOLIC BLOOD PRESSURE: 70 MMHG | SYSTOLIC BLOOD PRESSURE: 110 MMHG

## 2020-09-11 DIAGNOSIS — D69.6 THROMBOCYTOPENIA: ICD-10-CM

## 2020-09-11 DIAGNOSIS — E11.9 CONTROLLED TYPE 2 DIABETES MELLITUS WITHOUT COMPLICATION, WITHOUT LONG-TERM CURRENT USE OF INSULIN: ICD-10-CM

## 2020-09-11 DIAGNOSIS — R16.1 SPLENOMEGALY: ICD-10-CM

## 2020-09-11 DIAGNOSIS — R14.0 ABDOMINAL BLOATING: ICD-10-CM

## 2020-09-11 DIAGNOSIS — K21.9 GASTROESOPHAGEAL REFLUX DISEASE WITHOUT ESOPHAGITIS: ICD-10-CM

## 2020-09-11 DIAGNOSIS — K76.6 PORTAL HYPERTENSION: ICD-10-CM

## 2020-09-11 DIAGNOSIS — R10.13 EPIGASTRIC PAIN: ICD-10-CM

## 2020-09-11 DIAGNOSIS — I10 ESSENTIAL HYPERTENSION: ICD-10-CM

## 2020-09-11 DIAGNOSIS — E11.40 TYPE 2 DIABETES MELLITUS WITH DIABETIC NEUROPATHY, WITHOUT LONG-TERM CURRENT USE OF INSULIN: Primary | ICD-10-CM

## 2020-09-11 PROCEDURE — 3078F PR MOST RECENT DIASTOLIC BLOOD PRESSURE < 80 MM HG: ICD-10-PCS | Mod: CPTII,S$GLB,, | Performed by: NURSE PRACTITIONER

## 2020-09-11 PROCEDURE — 99999 PR PBB SHADOW E&M-EST. PATIENT-LVL IV: ICD-10-PCS | Mod: PBBFAC,,, | Performed by: NURSE PRACTITIONER

## 2020-09-11 PROCEDURE — 1101F PT FALLS ASSESS-DOCD LE1/YR: CPT | Mod: CPTII,S$GLB,, | Performed by: NURSE PRACTITIONER

## 2020-09-11 PROCEDURE — 1126F PR PAIN SEVERITY QUANTIFIED, NO PAIN PRESENT: ICD-10-PCS | Mod: S$GLB,,, | Performed by: NURSE PRACTITIONER

## 2020-09-11 PROCEDURE — 3074F PR MOST RECENT SYSTOLIC BLOOD PRESSURE < 130 MM HG: ICD-10-PCS | Mod: CPTII,S$GLB,, | Performed by: NURSE PRACTITIONER

## 2020-09-11 PROCEDURE — 99214 PR OFFICE/OUTPT VISIT, EST, LEVL IV, 30-39 MIN: ICD-10-PCS | Mod: S$GLB,,, | Performed by: NURSE PRACTITIONER

## 2020-09-11 PROCEDURE — 1101F PR PT FALLS ASSESS DOC 0-1 FALLS W/OUT INJ PAST YR: ICD-10-PCS | Mod: CPTII,S$GLB,, | Performed by: NURSE PRACTITIONER

## 2020-09-11 PROCEDURE — 99999 PR PBB SHADOW E&M-EST. PATIENT-LVL IV: CPT | Mod: PBBFAC,,, | Performed by: NURSE PRACTITIONER

## 2020-09-11 PROCEDURE — 3078F DIAST BP <80 MM HG: CPT | Mod: CPTII,S$GLB,, | Performed by: NURSE PRACTITIONER

## 2020-09-11 PROCEDURE — 1159F MED LIST DOCD IN RCRD: CPT | Mod: S$GLB,,, | Performed by: NURSE PRACTITIONER

## 2020-09-11 PROCEDURE — 99214 OFFICE O/P EST MOD 30 MIN: CPT | Mod: S$GLB,,, | Performed by: NURSE PRACTITIONER

## 2020-09-11 PROCEDURE — 1126F AMNT PAIN NOTED NONE PRSNT: CPT | Mod: S$GLB,,, | Performed by: NURSE PRACTITIONER

## 2020-09-11 PROCEDURE — 3074F SYST BP LT 130 MM HG: CPT | Mod: CPTII,S$GLB,, | Performed by: NURSE PRACTITIONER

## 2020-09-11 PROCEDURE — 1159F PR MEDICATION LIST DOCUMENTED IN MEDICAL RECORD: ICD-10-PCS | Mod: S$GLB,,, | Performed by: NURSE PRACTITIONER

## 2020-09-11 RX ORDER — DICYCLOMINE HYDROCHLORIDE 10 MG/1
10 CAPSULE ORAL 2 TIMES DAILY PRN
Qty: 30 CAPSULE | Refills: 0 | Status: SHIPPED | OUTPATIENT
Start: 2020-09-11 | End: 2020-11-24 | Stop reason: SDUPTHER

## 2020-09-11 RX ORDER — PANTOPRAZOLE SODIUM 40 MG/1
40 TABLET, DELAYED RELEASE ORAL DAILY
Qty: 30 TABLET | Refills: 0 | Status: SHIPPED | OUTPATIENT
Start: 2020-09-11 | End: 2020-10-05 | Stop reason: SDUPTHER

## 2020-09-11 NOTE — PROGRESS NOTES
Subjective:           Patient ID: Marine Mo is a 80 y.o. female.    Chief Complaint: Chest Pain, Abdominal Pain, Bloated, and Nausea    Marine Mo is a 80 y.o. female, new to me; known to fellow providers; PMHX of Diabetes, Hyperlipidemia, Hypertension, and chronic liver disease, here with c/o Chest Pain, Abdominal Pain, Bloated, and Nausea; She notes that she has been having some epigastric pain for some time but worse of late. Notes she gets some relief when she presses on abd and also notes heating pad helps   Lots of belching. + Nausea of late, no vomiting. Has been having formed BM daily. No fever.  Prior cholecystectomy              Recent LFTs normal in July 2/27/2018 US Abd-Hepatosplenomegaly with innumerable cysts seen throughout the liver.  Doppler evaluation demonstrates patent arterial and venous structures with appropriate directional flow.     Bilateral renal cysts.       Abdominal Pain  This is a recurrent problem. The onset quality is gradual. The problem occurs intermittently. The problem has been gradually worsening. The pain is located in the epigastric region. The quality of the pain is burning. The abdominal pain radiates to the epigastric region. Associated symptoms include belching and nausea. Pertinent negatives include no anorexia, arthralgias, constipation, diarrhea, dysuria, fever, hematochezia, myalgias or vomiting. Relieved by: heating pad, applying pressure      Review of Systems   Constitutional: Negative for chills and fever.   HENT: Negative for congestion and sore throat.    Respiratory: Negative for cough and shortness of breath.    Cardiovascular: Negative for chest pain and palpitations.   Gastrointestinal: Positive for abdominal distention, abdominal pain and nausea. Negative for anorexia, constipation, diarrhea, hematochezia and vomiting.   Genitourinary: Negative for dysuria.   Musculoskeletal: Negative for arthralgias, myalgias and neck pain.    Skin: Negative for pallor.   Neurological: Negative for dizziness and numbness.   Psychiatric/Behavioral: Negative for confusion. The patient is nervous/anxious.          C.o chronic spots to left LE   Objective:      Physical Exam  Vitals signs and nursing note reviewed.   Constitutional:       Appearance: She is well-developed.   HENT:      Head: Normocephalic and atraumatic.      Right Ear: External ear normal.      Left Ear: External ear normal.   Eyes:      Conjunctiva/sclera: Conjunctivae normal.      Pupils: Pupils are equal, round, and reactive to light.   Neck:      Musculoskeletal: Normal range of motion and neck supple.      Thyroid: No thyromegaly.      Vascular: No JVD.      Trachea: No tracheal deviation.   Cardiovascular:      Rate and Rhythm: Normal rate and regular rhythm.      Heart sounds: Normal heart sounds.   Pulmonary:      Effort: Pulmonary effort is normal. No respiratory distress.      Breath sounds: Normal breath sounds. No wheezing or rales.   Chest:      Chest wall: No tenderness.   Abdominal:      General: Bowel sounds are normal. There is no distension.      Palpations: Abdomen is soft. There is no mass.      Tenderness: There is no abdominal tenderness. There is no guarding or rebound.   Musculoskeletal: Normal range of motion.   Lymphadenopathy:      Cervical: No cervical adenopathy.   Skin:     General: Skin is warm and dry.   Neurological:      Mental Status: She is alert and oriented to person, place, and time.      Cranial Nerves: No cranial nerve deficit.      Motor: No abnormal muscle tone.      Coordination: Coordination normal.      Deep Tendon Reflexes: Reflexes are normal and symmetric. Reflexes normal.      Comments: CN: Optic discs are flat with normal vasculature, PERRL, Extraoccular movements and visual fields are full. Normal facial sensation and strength, Hearing symmetric, Tongue and Palate are midline and strong. Shoulder Shrug symmetric and strong.          Assessment:       1. Type 2 diabetes mellitus with diabetic neuropathy, without long-term current use of insulin    2. Portal hypertension    3. Controlled type 2 diabetes mellitus without complication, without long-term current use of insulin    4. Thrombocytopenia    5. Essential hypertension    6. Splenomegaly    7. Epigastric pain    8. Abdominal bloating    9. Gastroesophageal reflux disease without esophagitis        Plan:   Marine was seen today for chest pain, abdominal pain, bloated and nausea.    Diagnoses and all orders for this visit:    Type 2 diabetes mellitus with diabetic neuropathy, without long-term current use of insulin    Portal hypertension    Controlled type 2 diabetes mellitus without complication, without long-term current use of insulin    Thrombocytopenia    Essential hypertension    Splenomegaly    Epigastric pain  -     pantoprazole (PROTONIX) 40 MG tablet; Take 1 tablet (40 mg total) by mouth once daily.    Abdominal bloating  -     pantoprazole (PROTONIX) 40 MG tablet; Take 1 tablet (40 mg total) by mouth once daily.  -     dicyclomine (BENTYL) 10 MG capsule; Take 1 capsule (10 mg total) by mouth 2 (two) times daily as needed (abdominal pain).    Gastroesophageal reflux disease without esophagitis  -     pantoprazole (PROTONIX) 40 MG tablet; Take 1 tablet (40 mg total) by mouth once daily.      Problem List Items Addressed This Visit        Cardiac/Vascular    Essential hypertension       Hematology    Thrombocytopenia       Endocrine    Type 2 diabetes mellitus, controlled    Diabetes mellitus with neuropathy - Primary       GI    Splenomegaly    Portal hypertension      Other Visit Diagnoses     Epigastric pain        Relevant Medications    pantoprazole (PROTONIX) 40 MG tablet    Abdominal bloating        Relevant Medications    pantoprazole (PROTONIX) 40 MG tablet    dicyclomine (BENTYL) 10 MG capsule    Gastroesophageal reflux disease without esophagitis        Relevant  Medications    pantoprazole (PROTONIX) 40 MG tablet        Tips to prevent GERD reviewed  F/U with PCP in 2 weeks

## 2020-09-11 NOTE — PATIENT INSTRUCTIONS
"  GERD (Adult)    The esophagus is a tube that carries food from the mouth to the stomach. A valve at the lower end of the esophagus prevents stomach acid from flowing upward. When this valve doesn't work properly, stomach contents may repeatedly flow back up (reflux) into the esophagus. This is called gastroesophageal reflux disease (GERD). GERD can irritate the esophagus. It can cause problems with swallowing or breathing. In severe cases, GERD can cause recurrent pneumonia or other serious problems.  Symptoms of reflux include burning, pressure or sharp pain in the upper abdomen or mid to lower chest. The pain can spread to the neck, back, or shoulder. There may be belching, an acid taste in the back of the throat, chronic cough, or sore throat or hoarseness. GERD symptoms often occur during the day after a big meal. They can also occur at night when lying down.   Home care  Lifestyle changes can help reduce symptoms. If needed, medicines may be prescribed. Symptoms often improve with treatment, but if treatment is stopped, the symptoms often return after a few months. So most persons with GERD will need to continue treatment.  Lifestyle changes  · Limit or avoid fatty, fried, and spicy foods, as well as coffee, chocolate, mint, and foods with high acid content such as tomatoes and citrus fruit and juices (orange, grapefruit, lemon).  · Dont eat large meals, especially at night. Frequent, smaller meals are best. Do not lie down right after eating. And dont eat anything 3 hours before going to bed.  · Avoid drinking alcohol and smoking. As much as possible, stay away from second hand smoke.  · If you are overweight, losing weight will reduce symptoms.   · Avoid wearing tight clothing around your stomach area.  · If your symptoms occur during sleep, use a foam wedge to elevate your upper body (not just your head.) Or, place 4" blocks under the head of your bed.  Medicines  If needed, medicines can help relieve " the symptoms of GERD and prevent damage to the esophagus. Discuss a medicine plan with your healthcare provider. This may include one or more of the following medicines:  · Antacids to help neutralize the normal acids in your stomach.  · Acid blockers (H2 blockers) to decrease acid production.  · Acid inhibitors (PPIs) to decrease acid production in a different way than the blockers. They may work better, but can take a little longer to take effect.  Take an antacid 30-60 minutes after eating and at bedtime, but not at the same time as an acid blocker.  Try not to take medicines such as ibuprofen and aspirin. If you are taking aspirin for your heart or other medical reasons, talk to your healthcare provider about stopping it.  Follow-up care  Follow up with your healthcare provider or as advised by our staff.  When to seek medical advice  Call your healthcare provider if any of the following occur:  · Stomach pain gets worse or moves to the lower right abdomen (appendix area)  · Chest pain appears or gets worse, or spreads to the back, neck, shoulder, or arm  · Frequent vomiting (cant keep down liquids)  · Blood in the stool or vomit (red or black in color)  · Feeling weak or dizzy  · Fever of 100.4ºF (38ºC) or higher, or as directed by your healthcare provider  Date Last Reviewed: 6/23/2015 © 2000-2017 [employer]. [address] [place] [address], [place] [address]. All rights reserved. This information is not intended as a substitute for professional medical care. Always follow your healthcare professional's instructions.    Tips to Control Acid Reflux    To control acid reflux, youll need to make some basic diet and lifestyle changes. The simple steps outlined below may be all youll need to ease discomfort.  Watch what you eat  · Avoid fatty foods and spicy foods.  · Eat fewer acidic foods, such as citrus and tomato-based foods. These can increase symptoms.  · Limit drinking alcohol, caffeine, and fizzy  beverages. All increase acid reflux.  · Try limiting chocolate, peppermint, and spearmint. These can worsen acid reflux in some people.  Watch when you eat  · Avoid lying down for 3 hours after eating.  · Do not snack before going to bed.  Raise your head  Raising your head and upper body by 4 to 6 inches helps limit reflux when youre lying down. Put blocks under the head of your bed frame to raise it.  Other changes  · Lose weight, if you need to  · Dont exercise near bedtime  · Avoid tight-fitting clothes  · Limit aspirin and ibuprofen  · Stop smoking   Date Last Reviewed: 7/1/2016  © 6249-2503 [employer]. [address] [place] [address], [place] [address]. All rights reserved. This information is not intended as a substitute for professional medical care. Always follow your healthcare professional's instructions.

## 2020-09-28 RX ORDER — OXYBUTYNIN CHLORIDE 5 MG/1
5 TABLET ORAL 2 TIMES DAILY
Qty: 60 TABLET | Refills: 0 | Status: SHIPPED | OUTPATIENT
Start: 2020-09-28 | End: 2020-12-01 | Stop reason: SDUPTHER

## 2020-09-28 NOTE — TELEPHONE ENCOUNTER
Spoke with pt---would like a refill of Oxybutinin sent to [facility] it helps her with urinary frequency at night.

## 2020-09-28 NOTE — TELEPHONE ENCOUNTER
----- Message from Marcy Valera sent at 2020 11:55 AM CDT -----  Regarding: Self  Marine Mo  MRN: [id number]  : 1940  PCP: Serg Hwang  Home Phone      [phone]  Work Phone      [phone]  Mobile          [phone]      MESSAGE:   Pt would like a refill on her medication for a bladder infection called in to [facility] in [place], please return call @ [phone]. Thanks.

## 2020-09-29 ENCOUNTER — OFFICE VISIT (OUTPATIENT)
Dept: INTERNAL MEDICINE | Facility: CLINIC | Age: 80
End: 2020-09-29
Payer: MEDICARE

## 2020-09-29 VITALS
DIASTOLIC BLOOD PRESSURE: 72 MMHG | WEIGHT: 158.5 LBS | BODY MASS INDEX: 29.92 KG/M2 | OXYGEN SATURATION: 100 % | SYSTOLIC BLOOD PRESSURE: 114 MMHG | RESPIRATION RATE: 14 BRPM | HEIGHT: 61 IN | HEART RATE: 78 BPM

## 2020-09-29 DIAGNOSIS — K21.9 GASTROESOPHAGEAL REFLUX DISEASE WITHOUT ESOPHAGITIS: ICD-10-CM

## 2020-09-29 DIAGNOSIS — M79.605 LEG PAIN, BILATERAL: Primary | ICD-10-CM

## 2020-09-29 DIAGNOSIS — M79.604 LEG PAIN, BILATERAL: Primary | ICD-10-CM

## 2020-09-29 PROCEDURE — 99213 PR OFFICE/OUTPT VISIT, EST, LEVL III, 20-29 MIN: ICD-10-PCS | Mod: S$GLB,,, | Performed by: INTERNAL MEDICINE

## 2020-09-29 PROCEDURE — 3074F SYST BP LT 130 MM HG: CPT | Mod: CPTII,S$GLB,, | Performed by: INTERNAL MEDICINE

## 2020-09-29 PROCEDURE — 1159F MED LIST DOCD IN RCRD: CPT | Mod: S$GLB,,, | Performed by: INTERNAL MEDICINE

## 2020-09-29 PROCEDURE — 1159F PR MEDICATION LIST DOCUMENTED IN MEDICAL RECORD: ICD-10-PCS | Mod: S$GLB,,, | Performed by: INTERNAL MEDICINE

## 2020-09-29 PROCEDURE — 99999 PR PBB SHADOW E&M-EST. PATIENT-LVL V: ICD-10-PCS | Mod: PBBFAC,,, | Performed by: INTERNAL MEDICINE

## 2020-09-29 PROCEDURE — 1101F PR PT FALLS ASSESS DOC 0-1 FALLS W/OUT INJ PAST YR: ICD-10-PCS | Mod: CPTII,S$GLB,, | Performed by: INTERNAL MEDICINE

## 2020-09-29 PROCEDURE — 3078F PR MOST RECENT DIASTOLIC BLOOD PRESSURE < 80 MM HG: ICD-10-PCS | Mod: CPTII,S$GLB,, | Performed by: INTERNAL MEDICINE

## 2020-09-29 PROCEDURE — 3078F DIAST BP <80 MM HG: CPT | Mod: CPTII,S$GLB,, | Performed by: INTERNAL MEDICINE

## 2020-09-29 PROCEDURE — 1101F PT FALLS ASSESS-DOCD LE1/YR: CPT | Mod: CPTII,S$GLB,, | Performed by: INTERNAL MEDICINE

## 2020-09-29 PROCEDURE — 1126F AMNT PAIN NOTED NONE PRSNT: CPT | Mod: S$GLB,,, | Performed by: INTERNAL MEDICINE

## 2020-09-29 PROCEDURE — 1126F PR PAIN SEVERITY QUANTIFIED, NO PAIN PRESENT: ICD-10-PCS | Mod: S$GLB,,, | Performed by: INTERNAL MEDICINE

## 2020-09-29 PROCEDURE — 3074F PR MOST RECENT SYSTOLIC BLOOD PRESSURE < 130 MM HG: ICD-10-PCS | Mod: CPTII,S$GLB,, | Performed by: INTERNAL MEDICINE

## 2020-09-29 PROCEDURE — 99999 PR PBB SHADOW E&M-EST. PATIENT-LVL V: CPT | Mod: PBBFAC,,, | Performed by: INTERNAL MEDICINE

## 2020-09-29 PROCEDURE — 99213 OFFICE O/P EST LOW 20 MIN: CPT | Mod: S$GLB,,, | Performed by: INTERNAL MEDICINE

## 2020-09-29 NOTE — PROGRESS NOTES
Subjective:       Patient ID: Marine Mo is a 80 y.o. female.    Chief Complaint: Med Change Follow Up (on protonix .), Gastroesophageal Reflux, and Leg Pain (discoloration of leg )    Marine Mo is a 80 y.o. female  Here with two concerns.    1. GERD ; on Protonix doing half decent .  She is taking aleeve ; she will stop now.  S/p EGD .    2. Leg pains / skin changes .  Concern for DVT       Review of Systems   Constitutional: Negative for chills and fever.   HENT: Negative for congestion and sore throat.    Respiratory: Negative for cough and shortness of breath.    Cardiovascular: Negative for chest pain and palpitations.   Gastrointestinal: Negative for constipation, diarrhea and vomiting.        + heart burn    Genitourinary: Negative for dysuria.   Musculoskeletal: Negative for arthralgias, myalgias and neck pain.   Skin: Negative for pallor.   Neurological: Negative for dizziness and numbness.   Psychiatric/Behavioral: Negative for confusion. The patient is nervous/anxious.        Objective:      Physical Exam  Vitals signs and nursing note reviewed.   Constitutional:       Appearance: She is well-developed.   HENT:      Head: Normocephalic and atraumatic.      Right Ear: External ear normal.      Left Ear: External ear normal.   Eyes:      Conjunctiva/sclera: Conjunctivae normal.      Pupils: Pupils are equal, round, and reactive to light.   Neck:      Musculoskeletal: Normal range of motion and neck supple.      Thyroid: No thyromegaly.      Vascular: No JVD.      Trachea: No tracheal deviation.   Cardiovascular:      Rate and Rhythm: Normal rate and regular rhythm.      Heart sounds: Normal heart sounds.   Pulmonary:      Effort: Pulmonary effort is normal. No respiratory distress.      Breath sounds: Normal breath sounds. No wheezing or rales.   Chest:      Chest wall: No tenderness.   Abdominal:      General: Bowel sounds are normal. There is no distension.      Palpations:  Abdomen is soft. There is no mass.      Tenderness: There is no abdominal tenderness. There is no guarding or rebound.   Musculoskeletal: Normal range of motion.      Right lower leg: She exhibits tenderness.        Legs:    Lymphadenopathy:      Cervical: No cervical adenopathy.   Skin:     General: Skin is warm and dry.             Comments: Pale pigmented areas .   Neurological:      Mental Status: She is alert and oriented to person, place, and time.      Cranial Nerves: No cranial nerve deficit.      Motor: No abnormal muscle tone.      Coordination: Coordination normal.      Deep Tendon Reflexes: Reflexes are normal and symmetric. Reflexes normal.      Comments: CN: Optic discs are flat with normal vasculature, PERRL, Extraoccular movements and visual fields are full. Normal facial sensation and strength, Hearing symmetric, Tongue and Palate are midline and strong. Shoulder Shrug symmetric and strong.         Assessment:       1. Leg pain, bilateral    2. Gastroesophageal reflux disease without esophagitis        Plan:   Marine was seen today for med change follow up, gastroesophageal reflux and leg pain.    Diagnoses and all orders for this visit:    Leg pain, bilateral  -     US Lower Extremity Veins Bilateral; Future  I am not sure about the skin discoloration.  I told her to see dermatologist .    Gastroesophageal reflux disease without esophagitis    she must continue Protonix   STOP LIDYA DAVID >    Problem List Items Addressed This Visit     None      Visit Diagnoses     Leg pain, bilateral    -  Primary    Relevant Orders    US Lower Extremity Veins Bilateral

## 2020-10-02 ENCOUNTER — HOSPITAL ENCOUNTER (OUTPATIENT)
Dept: RADIOLOGY | Facility: HOSPITAL | Age: 80
Discharge: HOME | End: 2020-10-02
Attending: INTERNAL MEDICINE
Payer: MEDICARE

## 2020-10-02 DIAGNOSIS — M79.604 LEG PAIN, BILATERAL: ICD-10-CM

## 2020-10-02 DIAGNOSIS — M79.605 LEG PAIN, BILATERAL: ICD-10-CM

## 2020-10-02 PROCEDURE — 93970 EXTREMITY STUDY: CPT | Mod: TC

## 2020-10-02 PROCEDURE — 93970 US LOWER EXTREMITY VEINS BILATERAL: ICD-10-PCS | Mod: 26,,, | Performed by: RADIOLOGY

## 2020-10-02 PROCEDURE — 93970 EXTREMITY STUDY: CPT | Mod: 26,,, | Performed by: RADIOLOGY

## 2020-10-05 DIAGNOSIS — R10.13 EPIGASTRIC PAIN: ICD-10-CM

## 2020-10-05 DIAGNOSIS — K21.9 GASTROESOPHAGEAL REFLUX DISEASE WITHOUT ESOPHAGITIS: ICD-10-CM

## 2020-10-05 DIAGNOSIS — R14.0 ABDOMINAL BLOATING: ICD-10-CM

## 2020-10-05 RX ORDER — PANTOPRAZOLE SODIUM 40 MG/1
40 TABLET, DELAYED RELEASE ORAL DAILY
Qty: 30 TABLET | Refills: 0 | Status: SHIPPED | OUTPATIENT
Start: 2020-10-05 | End: 2020-10-23 | Stop reason: SDUPTHER

## 2020-10-05 NOTE — TELEPHONE ENCOUNTER
----- Message from Vivienne Morejon sent at 10/5/2020  3:05 PM CDT -----  Contact: Self  Marine Mo  MRN: [id number]  : 1940  PCP: Serg Hwang  Home Phone      [phone]  Work Phone      [phone]  Mobile          [phone]      MESSAGE:   Refill  pantoprazole (PROTONIX) 40 MG tablet    [place] [id number]    [phone]

## 2020-10-06 ENCOUNTER — TELEPHONE (OUTPATIENT)
Dept: INTERNAL MEDICINE | Facility: CLINIC | Age: 80
End: 2020-10-06

## 2020-10-06 NOTE — TELEPHONE ENCOUNTER
Spoke with pt's pharmacy and they state they did receive the authorization, but that it was too early to fill yesterday and that they will fill it today. Contacted and notified pt. Verbalized understanding.

## 2020-10-06 NOTE — TELEPHONE ENCOUNTER
----- Message from Haley Valdivia sent at 10/6/2020  2:32 PM CDT -----  Regarding: Rx Refill  Contact: self  Marine Mo  MRN: [id number]  : 1940  PCP: Serg Hwang  Home Phone      [phone]  Work Phone      [phone]  Mobile          [phone]      MESSAGE:    Rx Refill - pantoprazole (PROTONIX) 40 MG tablet. Patient has only one pill left.   Pharmacy instructed no Rx received - contact PCP.    Phone # [phone]    Pharmacy - [facility] [address] [place] [address]

## 2020-10-13 ENCOUNTER — OFFICE VISIT (OUTPATIENT)
Dept: INTERNAL MEDICINE | Facility: CLINIC | Age: 80
End: 2020-10-13
Payer: MEDICARE

## 2020-10-13 VITALS
DIASTOLIC BLOOD PRESSURE: 82 MMHG | WEIGHT: 166.88 LBS | TEMPERATURE: 98 F | OXYGEN SATURATION: 94 % | SYSTOLIC BLOOD PRESSURE: 132 MMHG | HEART RATE: 85 BPM | BODY MASS INDEX: 31.51 KG/M2 | HEIGHT: 61 IN | RESPIRATION RATE: 18 BRPM

## 2020-10-13 DIAGNOSIS — R14.0 POSTPRANDIAL ABDOMINAL BLOATING: ICD-10-CM

## 2020-10-13 DIAGNOSIS — K21.00 GASTROESOPHAGEAL REFLUX DISEASE WITH ESOPHAGITIS WITHOUT HEMORRHAGE: ICD-10-CM

## 2020-10-13 DIAGNOSIS — E11.40 TYPE 2 DIABETES MELLITUS WITH DIABETIC NEUROPATHY, WITHOUT LONG-TERM CURRENT USE OF INSULIN: ICD-10-CM

## 2020-10-13 DIAGNOSIS — R10.13 EPIGASTRIC PAIN: ICD-10-CM

## 2020-10-13 DIAGNOSIS — R07.89 CHEST HEAVINESS: Primary | ICD-10-CM

## 2020-10-13 DIAGNOSIS — E11.9 CONTROLLED TYPE 2 DIABETES MELLITUS WITHOUT COMPLICATION, WITHOUT LONG-TERM CURRENT USE OF INSULIN: ICD-10-CM

## 2020-10-13 DIAGNOSIS — I70.0 AORTIC ATHEROSCLEROSIS: ICD-10-CM

## 2020-10-13 DIAGNOSIS — I10 ESSENTIAL HYPERTENSION: ICD-10-CM

## 2020-10-13 DIAGNOSIS — K76.6 PORTAL HYPERTENSION: ICD-10-CM

## 2020-10-13 DIAGNOSIS — K29.70 GASTRITIS WITHOUT BLEEDING, UNSPECIFIED CHRONICITY, UNSPECIFIED GASTRITIS TYPE: ICD-10-CM

## 2020-10-13 PROCEDURE — 99999 PR PBB SHADOW E&M-EST. PATIENT-LVL IV: ICD-10-PCS | Mod: PBBFAC,,, | Performed by: NURSE PRACTITIONER

## 2020-10-13 PROCEDURE — 3075F SYST BP GE 130 - 139MM HG: CPT | Mod: CPTII,S$GLB,, | Performed by: NURSE PRACTITIONER

## 2020-10-13 PROCEDURE — 99499 RISK ADDL DX/OHS AUDIT: ICD-10-PCS | Mod: S$GLB,,, | Performed by: NURSE PRACTITIONER

## 2020-10-13 PROCEDURE — 1101F PR PT FALLS ASSESS DOC 0-1 FALLS W/OUT INJ PAST YR: ICD-10-PCS | Mod: CPTII,S$GLB,, | Performed by: NURSE PRACTITIONER

## 2020-10-13 PROCEDURE — 99999 PR PBB SHADOW E&M-EST. PATIENT-LVL IV: CPT | Mod: PBBFAC,,, | Performed by: NURSE PRACTITIONER

## 2020-10-13 PROCEDURE — 1159F PR MEDICATION LIST DOCUMENTED IN MEDICAL RECORD: ICD-10-PCS | Mod: S$GLB,,, | Performed by: NURSE PRACTITIONER

## 2020-10-13 PROCEDURE — 1125F AMNT PAIN NOTED PAIN PRSNT: CPT | Mod: S$GLB,,, | Performed by: NURSE PRACTITIONER

## 2020-10-13 PROCEDURE — 1101F PT FALLS ASSESS-DOCD LE1/YR: CPT | Mod: CPTII,S$GLB,, | Performed by: NURSE PRACTITIONER

## 2020-10-13 PROCEDURE — 3079F PR MOST RECENT DIASTOLIC BLOOD PRESSURE 80-89 MM HG: ICD-10-PCS | Mod: CPTII,S$GLB,, | Performed by: NURSE PRACTITIONER

## 2020-10-13 PROCEDURE — 99499 UNLISTED E&M SERVICE: CPT | Mod: S$GLB,,, | Performed by: NURSE PRACTITIONER

## 2020-10-13 PROCEDURE — 99214 OFFICE O/P EST MOD 30 MIN: CPT | Mod: S$GLB,,, | Performed by: NURSE PRACTITIONER

## 2020-10-13 PROCEDURE — 3075F PR MOST RECENT SYSTOLIC BLOOD PRESS GE 130-139MM HG: ICD-10-PCS | Mod: CPTII,S$GLB,, | Performed by: NURSE PRACTITIONER

## 2020-10-13 PROCEDURE — 99214 PR OFFICE/OUTPT VISIT, EST, LEVL IV, 30-39 MIN: ICD-10-PCS | Mod: S$GLB,,, | Performed by: NURSE PRACTITIONER

## 2020-10-13 PROCEDURE — 3079F DIAST BP 80-89 MM HG: CPT | Mod: CPTII,S$GLB,, | Performed by: NURSE PRACTITIONER

## 2020-10-13 PROCEDURE — 1125F PR PAIN SEVERITY QUANTIFIED, PAIN PRESENT: ICD-10-PCS | Mod: S$GLB,,, | Performed by: NURSE PRACTITIONER

## 2020-10-13 PROCEDURE — 1159F MED LIST DOCD IN RCRD: CPT | Mod: S$GLB,,, | Performed by: NURSE PRACTITIONER

## 2020-10-13 RX ORDER — SUCRALFATE 1 G/1
1 TABLET ORAL
Qty: 28 TABLET | Refills: 0 | Status: SHIPPED | OUTPATIENT
Start: 2020-10-13 | End: 2022-02-23

## 2020-10-13 RX ORDER — TIMOLOL MALEATE 5 MG/ML
SOLUTION/ DROPS OPHTHALMIC
COMMUNITY
Start: 2020-09-14

## 2020-10-13 NOTE — PROGRESS NOTES
Subjective:           Patient ID: Marine Mo is a 80 y.o. female.    Chief Complaint: Abdominal Pain    Marine Mo is a 80 y.o. female known to me from acute visit, follows with fellow providers; PMHX of Diabetes, Hyperlipidemia, Hypertension, and chronic liver disease, seen 9/11 with c/o Chest Pain, Abdominal Pain, Bloated, and Nausea; worse of late. Notes she gets some relief when she presses on abd and also notes heating pad helps. Associated with Lots of belching. + Nausea of late, no vomiting. Has been having formed BM daily. No fever.  Prior cholecystectomy  ; she was prescribed protonix and bentyl.; Rx had been helping but notes she had terrible epigastric and chest pain last Friday. Did not go to ER/ Notes 10/10; still having lots of bloating after eating. Note  abd pain 50% of the time after eating. Lots of belching   Denies melena   She tried getting appnt with GI but no available until December; notes she has had multiple GI eval in [place] and at [facility].     Prior CT in [facility] 2/2018 - The spleen is enlarged measuring 14.5 cm. The pancreas is unremarkable. The gallbladder is absent. The liver contains innumerable hypodensities affecting all lobes as seen on prior examination dated 1/13/17 which likely represent cysts. There is recanalization of the umbilical vein consistent with portal hypertension. The adrenal glands are unremarkable. The visualized portion of the aorta is significant for scattered calcification and plaque formation. The left kidney contains a 0.9 cm stone. There are innumerable renal hypodensities bilaterally which likely represent cysts. There is no hydronephrosis or hydroureter. The bladder is unremarkable. The uterus is absent or atrophic. The bowel is unremarkable.     Notes atherosclerosis to Aorta and coronaries per CTA chest   She defers cardiac referral   EKG today   Known RBBB, prior dobutamine stress test in 2018     Review of Systems    Constitutional: Negative for chills and fever.   HENT: Negative for congestion and sore throat.    Respiratory: Negative for cough and shortness of breath.    Cardiovascular: Negative for chest pain and palpitations.   Gastrointestinal: Negative for constipation, diarrhea and vomiting.        + heart burn , belching on exam    Genitourinary: Negative for dysuria.   Musculoskeletal: Negative for arthralgias, myalgias and neck pain.   Skin: Negative for pallor.   Neurological: Negative for dizziness and numbness.   Psychiatric/Behavioral: Negative for confusion. The patient is nervous/anxious.        Objective:      Physical Exam  Constitutional:       Appearance: She is well-developed.   HENT:      Head: Normocephalic and atraumatic.      Nose: Nose normal.   Eyes:      Pupils: Pupils are equal, round, and reactive to light.   Neck:      Musculoskeletal: Normal range of motion and neck supple.   Cardiovascular:      Rate and Rhythm: Normal rate and regular rhythm.      Heart sounds: No murmur.   Pulmonary:      Effort: Pulmonary effort is normal.      Breath sounds: Normal breath sounds.   Abdominal:      General: Bowel sounds are normal.      Palpations: Abdomen is soft.      Comments: abd soft, no epigastric tenderness on exam   No chest wall tenderness    Musculoskeletal: Normal range of motion.   Skin:     General: Skin is warm and dry.      Capillary Refill: Capillary refill takes less than 2 seconds.   Neurological:      Mental Status: She is alert and oriented to person, place, and time.   Psychiatric:         Behavior: Behavior normal.         Thought Content: Thought content normal.         Judgment: Judgment normal.         Assessment:       1. Chest heaviness    2. Aortic atherosclerosis    3. Postprandial abdominal bloating    4. Type 2 diabetes mellitus with diabetic neuropathy, without long-term current use of insulin    5. Controlled type 2 diabetes mellitus without complication, without long-term  current use of insulin    6. Essential hypertension    7. Epigastric pain    8. Gastritis without bleeding, unspecified chronicity, unspecified gastritis type    9. Portal hypertension    10. Gastroesophageal reflux disease with esophagitis without hemorrhage        Plan:   Marine was seen today for abdominal pain.    Diagnoses and all orders for this visit:    Chest heaviness  -     IN OFFICE EKG 12-LEAD (to Muse)    Aortic atherosclerosis    Postprandial abdominal bloating    Type 2 diabetes mellitus with diabetic neuropathy, without long-term current use of insulin    Controlled type 2 diabetes mellitus without complication, without long-term current use of insulin    Essential hypertension    Epigastric pain  -     Comprehensive Metabolic Panel; Future  -     Lipase; Future  -     Gamma GT; Future    Gastritis without bleeding, unspecified chronicity, unspecified gastritis type    Portal hypertension  -     Gamma GT; Future    Gastroesophageal reflux disease with esophagitis without hemorrhage  -     FL Upper GI to include esophagram; Future    Other orders  -     sucralfate (CARAFATE) 1 gram tablet; Take 1 tablet (1 g total) by mouth 4 (four) times daily before meals and nightly. Place pill in 30ml water, do not crush, allow to dissolve and then stir for slurry      Problem List Items Addressed This Visit        Cardiac/Vascular    Essential hypertension    Aortic atherosclerosis    Overview     Calcified coronary artery disease is noted.  Calcified atheromatous disease affects the aorta and its branch vessels. Per CTA of chest 5/2018             Endocrine    Type 2 diabetes mellitus, controlled    Diabetes mellitus with neuropathy       GI    Portal hypertension    Relevant Orders    Gamma GT      Other Visit Diagnoses     Chest heaviness    -  Primary    Relevant Orders    IN OFFICE EKG 12-LEAD (to Muse)    Postprandial abdominal bloating        Epigastric pain        Relevant Orders    Comprehensive  Metabolic Panel    Lipase    Gamma GT    Gastritis without bleeding, unspecified chronicity, unspecified gastritis type        Gastroesophageal reflux disease with esophagitis without hemorrhage        Relevant Orders    FL Upper GI to include esophagram        DDX- esophageal spasm??, cardiac sphincter,   Esophagitis   No evidence of Hiatal hernia on prior exam  GI notes/testing not noted in chart  Labs and upper GI ordered  carafate Rx  Discuss with Dr. Hwang after

## 2020-10-19 ENCOUNTER — HOSPITAL ENCOUNTER (OUTPATIENT)
Dept: RADIOLOGY | Facility: HOSPITAL | Age: 80
Discharge: HOME | End: 2020-10-19
Attending: NURSE PRACTITIONER
Payer: MEDICARE

## 2020-10-19 DIAGNOSIS — K21.00 GASTROESOPHAGEAL REFLUX DISEASE WITH ESOPHAGITIS WITHOUT HEMORRHAGE: ICD-10-CM

## 2020-10-19 PROCEDURE — A9698 NON-RAD CONTRAST MATERIALNOC: HCPCS | Performed by: NURSE PRACTITIONER

## 2020-10-19 PROCEDURE — 74240 X-RAY XM UPR GI TRC 1CNTRST: CPT | Mod: TC

## 2020-10-19 PROCEDURE — 25500020 PHARM REV CODE 255: Performed by: NURSE PRACTITIONER

## 2020-10-19 RX ADMIN — BARIUM SULFATE 355 ML: 0.6 SUSPENSION ORAL at 09:10

## 2020-10-19 RX ADMIN — BARIUM SULFATE 176 G: 960 POWDER, FOR SUSPENSION ORAL at 09:10

## 2020-10-22 ENCOUNTER — TELEPHONE (OUTPATIENT)
Dept: INTERNAL MEDICINE | Facility: CLINIC | Age: 80
End: 2020-10-22

## 2020-10-22 NOTE — TELEPHONE ENCOUNTER
----- Message from Haley Valdivia sent at 10/22/2020  9:20 AM CDT -----  Regarding: Lab / X Ray Results  Contact: self  Marine Mo  MRN: [id number]  : 1940  PCP: Serg Hwang  Home Phone      [phone]  Work Phone      [phone]  Mobile          [phone]      MESSAGE:    Request lab, and x ray results.    Phone # [phone]    Pharmacy -   [facility] [address] [place] [address]

## 2020-10-23 DIAGNOSIS — R10.13 EPIGASTRIC PAIN: ICD-10-CM

## 2020-10-23 DIAGNOSIS — R14.0 ABDOMINAL BLOATING: ICD-10-CM

## 2020-10-23 DIAGNOSIS — K21.9 GASTROESOPHAGEAL REFLUX DISEASE WITHOUT ESOPHAGITIS: ICD-10-CM

## 2020-10-23 RX ORDER — PANTOPRAZOLE SODIUM 40 MG/1
40 TABLET, DELAYED RELEASE ORAL 2 TIMES DAILY
Qty: 60 TABLET | Refills: 1 | Status: SHIPPED | OUTPATIENT
Start: 2020-10-23 | End: 2020-10-26

## 2020-10-26 ENCOUNTER — TELEPHONE (OUTPATIENT)
Dept: INTERNAL MEDICINE | Facility: CLINIC | Age: 80
End: 2020-10-26

## 2020-10-26 NOTE — TELEPHONE ENCOUNTER
"" roz notes "    Notes sliding hiatal hernia and acute duodenitis; possibly duodenal ulcer; recommend increasing protonix to twice daily and back to GI for possible scope;   Will place referral for [facility] - she follows there      "

## 2020-10-26 NOTE — TELEPHONE ENCOUNTER
Spoke with pt and notified her of results. Pt wrote down information and verbalized understanding.

## 2020-10-26 NOTE — TELEPHONE ENCOUNTER
----- Message from Haley Valdivia sent at 10/26/2020  9:41 AM CDT -----  Regarding: Request to speak to a nurse  Contact: self  Marine Mo  MRN: [id number]  : 1940  PCP: Serg Hwang  Home Phone      [phone]  Work Phone      [phone]  Mobile          [phone]      MESSAGE:    Request to speak to a nurse regarding lab, US, x - ray results.     Phone # [phone] / [phone]    Pharmacy - [facility] [address] [place] [address]

## 2020-10-27 ENCOUNTER — OFFICE VISIT (OUTPATIENT)
Dept: INTERNAL MEDICINE | Facility: CLINIC | Age: 80
End: 2020-10-27
Payer: MEDICARE

## 2020-10-27 ENCOUNTER — TELEPHONE (OUTPATIENT)
Dept: INTERNAL MEDICINE | Facility: CLINIC | Age: 80
End: 2020-10-27

## 2020-10-27 VITALS
WEIGHT: 167.31 LBS | DIASTOLIC BLOOD PRESSURE: 78 MMHG | OXYGEN SATURATION: 96 % | HEART RATE: 68 BPM | SYSTOLIC BLOOD PRESSURE: 122 MMHG | BODY MASS INDEX: 31.59 KG/M2 | HEIGHT: 61 IN | RESPIRATION RATE: 16 BRPM

## 2020-10-27 DIAGNOSIS — K21.9 GASTROESOPHAGEAL REFLUX DISEASE WITHOUT ESOPHAGITIS: Primary | ICD-10-CM

## 2020-10-27 PROCEDURE — 99213 PR OFFICE/OUTPT VISIT, EST, LEVL III, 20-29 MIN: ICD-10-PCS | Mod: S$GLB,,, | Performed by: INTERNAL MEDICINE

## 2020-10-27 PROCEDURE — 1126F AMNT PAIN NOTED NONE PRSNT: CPT | Mod: S$GLB,,, | Performed by: INTERNAL MEDICINE

## 2020-10-27 PROCEDURE — 3078F PR MOST RECENT DIASTOLIC BLOOD PRESSURE < 80 MM HG: ICD-10-PCS | Mod: CPTII,S$GLB,, | Performed by: INTERNAL MEDICINE

## 2020-10-27 PROCEDURE — 1126F PR PAIN SEVERITY QUANTIFIED, NO PAIN PRESENT: ICD-10-PCS | Mod: S$GLB,,, | Performed by: INTERNAL MEDICINE

## 2020-10-27 PROCEDURE — 3074F SYST BP LT 130 MM HG: CPT | Mod: CPTII,S$GLB,, | Performed by: INTERNAL MEDICINE

## 2020-10-27 PROCEDURE — 1101F PR PT FALLS ASSESS DOC 0-1 FALLS W/OUT INJ PAST YR: ICD-10-PCS | Mod: CPTII,S$GLB,, | Performed by: INTERNAL MEDICINE

## 2020-10-27 PROCEDURE — 99213 OFFICE O/P EST LOW 20 MIN: CPT | Mod: S$GLB,,, | Performed by: INTERNAL MEDICINE

## 2020-10-27 PROCEDURE — 3078F DIAST BP <80 MM HG: CPT | Mod: CPTII,S$GLB,, | Performed by: INTERNAL MEDICINE

## 2020-10-27 PROCEDURE — 3074F PR MOST RECENT SYSTOLIC BLOOD PRESSURE < 130 MM HG: ICD-10-PCS | Mod: CPTII,S$GLB,, | Performed by: INTERNAL MEDICINE

## 2020-10-27 PROCEDURE — 1101F PT FALLS ASSESS-DOCD LE1/YR: CPT | Mod: CPTII,S$GLB,, | Performed by: INTERNAL MEDICINE

## 2020-10-27 PROCEDURE — 99999 PR PBB SHADOW E&M-EST. PATIENT-LVL V: CPT | Mod: PBBFAC,,, | Performed by: INTERNAL MEDICINE

## 2020-10-27 PROCEDURE — 99999 PR PBB SHADOW E&M-EST. PATIENT-LVL V: ICD-10-PCS | Mod: PBBFAC,,, | Performed by: INTERNAL MEDICINE

## 2020-10-27 PROCEDURE — 1159F PR MEDICATION LIST DOCUMENTED IN MEDICAL RECORD: ICD-10-PCS | Mod: S$GLB,,, | Performed by: INTERNAL MEDICINE

## 2020-10-27 PROCEDURE — 1159F MED LIST DOCD IN RCRD: CPT | Mod: S$GLB,,, | Performed by: INTERNAL MEDICINE

## 2020-10-27 NOTE — TELEPHONE ENCOUNTER
Referred to [facility] per Dr. Hwang for GERD. Documents faxed to the office for scheduling.

## 2020-10-27 NOTE — PROGRESS NOTES
Subjective:       Patient ID: Marine Mo is a 80 y.o. female.    Chief Complaint: Results    Marine Mo is a 80 y.o. female  Continues to have symptoms ; seen Dr Hernandez .  She is taking protonix BID.      Review of Systems   Constitutional: Negative for chills and fever.   HENT: Negative for congestion and sore throat.    Respiratory: Negative for cough and shortness of breath.    Cardiovascular: Negative for chest pain and palpitations.   Gastrointestinal: Negative for constipation, diarrhea and vomiting.        + heart burn , belching on exam    Genitourinary: Negative for dysuria.   Musculoskeletal: Negative for arthralgias, myalgias and neck pain.   Skin: Negative for pallor.   Neurological: Negative for dizziness and numbness.   Psychiatric/Behavioral: Negative for confusion. The patient is nervous/anxious.        Objective:      Physical Exam  Constitutional:       Appearance: She is well-developed.   HENT:      Head: Normocephalic and atraumatic.      Nose: Nose normal.   Eyes:      Pupils: Pupils are equal, round, and reactive to light.   Neck:      Musculoskeletal: Normal range of motion and neck supple.   Cardiovascular:      Rate and Rhythm: Normal rate and regular rhythm.      Heart sounds: No murmur.   Pulmonary:      Effort: Pulmonary effort is normal.      Breath sounds: Normal breath sounds.   Abdominal:      General: Bowel sounds are normal.      Palpations: Abdomen is soft.      Comments: abd soft, no epigastric tenderness on exam   No chest wall tenderness    Musculoskeletal: Normal range of motion.   Skin:     General: Skin is warm and dry.      Capillary Refill: Capillary refill takes less than 2 seconds.   Neurological:      Mental Status: She is alert and oriented to person, place, and time.   Psychiatric:         Behavior: Behavior normal.         Thought Content: Thought content normal.         Judgment: Judgment normal.         Assessment:       1. Gastroesophageal  reflux disease without esophagitis        Plan:   Marine was seen today for results.    Diagnoses and all orders for this visit:    Gastroesophageal reflux disease without esophagitis  -     Ambulatory referral/consult to Gastroenterology; Future    she will need to see GI .seen Dr David in past ; Dr tanner   She has seen Dr Hernandez       . Small to moderate size sliding-type hiatal hernia.  2. Spontaneous gastroesophageal reflux classified as grade 3/4 in fluoroscopic appearance terminating at the upper 3rd.  3. Mild luminal irregularity about the duodenum throughout the 2nd portion of the duodenum suggestive of underlying acute duodenitis changes.    Problem List Items Addressed This Visit     None

## 2020-11-24 DIAGNOSIS — R14.0 ABDOMINAL BLOATING: ICD-10-CM

## 2020-11-24 RX ORDER — DICYCLOMINE HYDROCHLORIDE 10 MG/1
10 CAPSULE ORAL 2 TIMES DAILY PRN
Qty: 30 CAPSULE | Refills: 0 | Status: SHIPPED | OUTPATIENT
Start: 2020-11-24 | End: 2021-02-15

## 2020-11-24 NOTE — TELEPHONE ENCOUNTER
----- Message from Vivienne Morejon sent at 2020  9:11 AM CST -----  Contact: Self  Marine Mo  MRN: [id number]  : 1940  PCP: Serg Hwang  Home Phone      [phone]  Work Phone      [phone]  Mobile          [phone]      MESSAGE:   Refill  dicyclomine (BENTYL) 10 MG capsule    [place] [address]    [phone]

## 2020-11-27 NOTE — TELEPHONE ENCOUNTER
----- Message from Haley Valdivia sent at 2020  8:15 AM CST -----  Regarding: Rx Refiils  Contact: self  Marine Mo  MRN: [id number]  : 1940  PCP: Serg Hwang  Home Phone      [phone]  Work Phone      [phone]  Mobile          [phone]      MESSAGE:   Rx Refills:  dicyclomine (BENTYL) 10 MG capsule - Patient out of medication.   metFORMIN (GLUCOPHAGE) 500 MG tablet - Patient only has a few pills left.     Phone #  [phone]    Pharmacy - [address]

## 2020-11-30 RX ORDER — METFORMIN HYDROCHLORIDE 500 MG/1
500 TABLET ORAL 2 TIMES DAILY WITH MEALS
Qty: 180 TABLET | Refills: 0 | Status: SHIPPED | OUTPATIENT
Start: 2020-11-30 | End: 2021-06-03

## 2020-12-01 RX ORDER — OXYBUTYNIN CHLORIDE 5 MG/1
5 TABLET ORAL 2 TIMES DAILY
Qty: 60 TABLET | Refills: 1 | Status: SHIPPED | OUTPATIENT
Start: 2020-12-01 | End: 2021-03-01

## 2020-12-01 NOTE — TELEPHONE ENCOUNTER
----- Message from Marcy Valera sent at 12/1/2020  8:53 AM CST -----  Regarding: self  Marine Mo  MRN: [id number]  Home Phone      [phone]  Work Phone      [phone]  Mobile          [phone]    Patient Care Team:  Serg Hwang MD as PCP - General (Internal Medicine)  Wilton Joseph MD as Obstetrician (Obstetrics)  Daily Gruber LPN as Care Coordinator  OB? No  What phone number can you be reached at? [phone]  Message:   Pt would like a refill on oxybutynin (DITROPAN)  called in to [facility] in [place]. Please return call.

## 2020-12-01 NOTE — TELEPHONE ENCOUNTER
Marine desires refill of Ditropan. Annual scheduled, 01/21/2021.  Patient Active Problem List   Diagnosis    Spinal stenosis, lumbar    DDD (degenerative disc disease), lumbar    Low back pain without sciatica    Lumbar radiculopathy    Neurogenic claudication    Hyperlipidemia    Obesity (BMI 30.0-34.9)    Thrombocytopenia    Type 2 diabetes mellitus, controlled    Chronic insomnia    Hereditary and idiopathic peripheral neuropathy    Essential hypertension    Diabetes mellitus with neuropathy    Hepatomegaly    Splenomegaly    Anemia    Upper GI bleed    Polycystic liver disease    Portal hypertension    Iron deficiency anemia    Other chest pain    Dyspnea on exertion    Depression    Postmenopausal state    Gastroesophageal reflux disease without esophagitis    Aortic atherosclerosis     Prior to Admission medications    Medication Sig Start Date End Date Taking? Authorizing Provider   ALPRAZolam (XANAX) 0.5 MG tablet Take 1 tablet (0.5 mg total) by mouth nightly as needed for Insomnia or Anxiety. 7/28/20   Serg Hwang MD   aspirin 81 MG Chew Take 81 mg by mouth once daily.    Historical Provider   dicyclomine (BENTYL) 10 MG capsule TAKE 1 CAPSULE BY MOUTH TWICE DAILY AS NEEDED FOR  ABDOMINAL  PAIN 11/30/20   Serg Hwang MD   dicyclomine (BENTYL) 10 MG capsule Take 1 capsule (10 mg total) by mouth 2 (two) times daily as needed (abdominal pain). 11/24/20   Serg Hwang MD   FLUoxetine 10 MG capsule TAKE 1 CAPSULE BY MOUTH ONCE DAILY 2/12/20   Serg Hwang MD   latanoprost 0.005 % ophthalmic solution  8/15/18   Historical Provider   metFORMIN (GLUCOPHAGE) 500 MG tablet Take 1 tablet (500 mg total) by mouth 2 (two) times daily with meals. 11/30/20   Serg Hwang MD   metoprolol tartrate (LOPRESSOR) 100 MG tablet TAKE 1/2 (ONE-HALF) TABLET BY MOUTH TWICE DAILY 8/6/20   Serg Hwang MD   nystatin (MYCOSTATIN) cream Apply topically 2 (two) times daily.  7/16/19   Debbie Alves CNM   oxybutynin (DITROPAN) 5 MG Tab Take 1 tablet (5 mg total) by mouth 2 (two) times daily. 9/28/20   Wilton Joseph MD   pantoprazole (PROTONIX) 40 MG tablet Take 1 tablet by mouth once daily 10/26/20   Serg Hwang MD   polymyxin B sulf-trimethoprim (POLYTRIM) 10,000 unit- 1 mg/mL Drop INSTILL 1 DROP INTO EACH EYE EVERY 4 HOURS FOR 1 WEEK 6/13/19   Historical Provider   sucralfate (CARAFATE) 1 gram tablet Take 1 tablet (1 g total) by mouth 4 (four) times daily before meals and nightly. Place pill in 30ml water, do not crush, allow to dissolve and then stir for slurry 10/13/20   Larissa Beltran NP   timolol maleate 0.5% (TIMOPTIC) 0.5 % Drop INSTILL 1 DROP INTO EACH EYE TWICE DAILY 9/14/20   Historical Provider   triamcinolone acetonide 0.1% (KENALOG) 0.1 % cream Apply topically 2 (two) times daily. 7/16/19   Debbie Alves CNM

## 2021-01-07 ENCOUNTER — HOSPITAL ENCOUNTER (OUTPATIENT)
Dept: RADIOLOGY | Facility: HOSPITAL | Age: 81
Discharge: HOME | End: 2021-01-07
Attending: NURSE PRACTITIONER
Payer: MEDICARE

## 2021-01-07 DIAGNOSIS — K74.60 CIRRHOSIS: ICD-10-CM

## 2021-01-07 DIAGNOSIS — R10.9 ABDOMINAL PAIN: ICD-10-CM

## 2021-01-07 PROCEDURE — 76705 ECHO EXAM OF ABDOMEN: CPT | Mod: 26,,, | Performed by: RADIOLOGY

## 2021-01-07 PROCEDURE — 76705 US ABDOMEN LIMITED: ICD-10-PCS | Mod: 26,,, | Performed by: RADIOLOGY

## 2021-01-07 PROCEDURE — 76705 ECHO EXAM OF ABDOMEN: CPT | Mod: TC

## 2021-01-18 ENCOUNTER — LAB VISIT (OUTPATIENT)
Dept: LAB | Facility: HOSPITAL | Age: 81
End: 2021-01-18
Attending: NURSE PRACTITIONER
Payer: MEDICARE

## 2021-01-18 DIAGNOSIS — K74.60 CIRRHOSIS, NON-ALCOHOLIC: Primary | ICD-10-CM

## 2021-01-18 DIAGNOSIS — R10.10 UPPER ABDOMINAL PAIN: ICD-10-CM

## 2021-01-18 DIAGNOSIS — K20.90 ESOPHAGITIS, UNSPECIFIED WITHOUT BLEEDING: ICD-10-CM

## 2021-01-18 LAB
AFP SERPL-MCNC: 2.3 NG/ML (ref 0–8.4)
ALBUMIN SERPL BCP-MCNC: 4 G/DL (ref 3.5–5.2)
ALP SERPL-CCNC: 31 U/L (ref 55–135)
ALT SERPL W/O P-5'-P-CCNC: 17 U/L (ref 10–44)
AST SERPL-CCNC: 24 U/L (ref 10–40)
BILIRUB DIRECT SERPL-MCNC: 0.2 MG/DL (ref 0.1–0.3)
BILIRUB SERPL-MCNC: 0.7 MG/DL (ref 0.1–1)
INR PPP: 1.1 (ref 0.8–1.2)
PROT SERPL-MCNC: 7.2 G/DL (ref 6–8.4)
PROTHROMBIN TIME: 11.8 SEC (ref 9–12.5)

## 2021-01-18 PROCEDURE — 36415 COLL VENOUS BLD VENIPUNCTURE: CPT

## 2021-01-18 PROCEDURE — 82105 ALPHA-FETOPROTEIN SERUM: CPT

## 2021-01-18 PROCEDURE — 85610 PROTHROMBIN TIME: CPT

## 2021-01-18 PROCEDURE — 80076 HEPATIC FUNCTION PANEL: CPT

## 2021-01-19 ENCOUNTER — PATIENT OUTREACH (OUTPATIENT)
Dept: ADMINISTRATIVE | Facility: OTHER | Age: 81
End: 2021-01-19

## 2021-01-21 ENCOUNTER — OFFICE VISIT (OUTPATIENT)
Dept: OBSTETRICS AND GYNECOLOGY | Facility: CLINIC | Age: 81
End: 2021-01-21
Payer: MEDICARE

## 2021-01-21 VITALS
WEIGHT: 163 LBS | SYSTOLIC BLOOD PRESSURE: 122 MMHG | DIASTOLIC BLOOD PRESSURE: 80 MMHG | HEART RATE: 69 BPM | BODY MASS INDEX: 30.8 KG/M2

## 2021-01-21 DIAGNOSIS — Z78.0 POSTMENOPAUSAL STATE: Primary | ICD-10-CM

## 2021-01-21 DIAGNOSIS — Z01.419 ENCOUNTER FOR GYNECOLOGICAL EXAMINATION WITHOUT ABNORMAL FINDING: ICD-10-CM

## 2021-01-21 PROCEDURE — 3288F FALL RISK ASSESSMENT DOCD: CPT | Mod: CPTII,S$GLB,, | Performed by: OBSTETRICS & GYNECOLOGY

## 2021-01-21 PROCEDURE — 99999 PR PBB SHADOW E&M-EST. PATIENT-LVL III: CPT | Mod: PBBFAC,,, | Performed by: OBSTETRICS & GYNECOLOGY

## 2021-01-21 PROCEDURE — 1101F PR PT FALLS ASSESS DOC 0-1 FALLS W/OUT INJ PAST YR: ICD-10-PCS | Mod: CPTII,S$GLB,, | Performed by: OBSTETRICS & GYNECOLOGY

## 2021-01-21 PROCEDURE — 99999 PR PBB SHADOW E&M-EST. PATIENT-LVL III: ICD-10-PCS | Mod: PBBFAC,,, | Performed by: OBSTETRICS & GYNECOLOGY

## 2021-01-21 PROCEDURE — 3288F PR FALLS RISK ASSESSMENT DOCUMENTED: ICD-10-PCS | Mod: CPTII,S$GLB,, | Performed by: OBSTETRICS & GYNECOLOGY

## 2021-01-21 PROCEDURE — 1126F AMNT PAIN NOTED NONE PRSNT: CPT | Mod: S$GLB,,, | Performed by: OBSTETRICS & GYNECOLOGY

## 2021-01-21 PROCEDURE — G0101 CA SCREEN;PELVIC/BREAST EXAM: HCPCS | Mod: S$GLB,,, | Performed by: OBSTETRICS & GYNECOLOGY

## 2021-01-21 PROCEDURE — 1101F PT FALLS ASSESS-DOCD LE1/YR: CPT | Mod: CPTII,S$GLB,, | Performed by: OBSTETRICS & GYNECOLOGY

## 2021-01-21 PROCEDURE — 1126F PR PAIN SEVERITY QUANTIFIED, NO PAIN PRESENT: ICD-10-PCS | Mod: S$GLB,,, | Performed by: OBSTETRICS & GYNECOLOGY

## 2021-01-21 PROCEDURE — G0101 PR CA SCREEN;PELVIC/BREAST EXAM: ICD-10-PCS | Mod: S$GLB,,, | Performed by: OBSTETRICS & GYNECOLOGY

## 2021-01-21 RX ORDER — NYSTATIN 100000 U/G
CREAM TOPICAL 2 TIMES DAILY
Qty: 30 G | Refills: 0 | Status: SHIPPED | OUTPATIENT
Start: 2021-01-21 | End: 2022-07-01 | Stop reason: SDUPTHER

## 2021-01-21 RX ORDER — TRIAMCINOLONE ACETONIDE 1 MG/G
CREAM TOPICAL 2 TIMES DAILY
Qty: 28.4 G | Refills: 0 | Status: SHIPPED | OUTPATIENT
Start: 2021-01-21 | End: 2022-02-23 | Stop reason: ALTCHOICE

## 2021-01-22 ENCOUNTER — HOSPITAL ENCOUNTER (OUTPATIENT)
Dept: RADIOLOGY | Facility: HOSPITAL | Age: 81
Discharge: HOME | End: 2021-01-22
Attending: NURSE PRACTITIONER
Payer: MEDICARE

## 2021-01-22 DIAGNOSIS — R14.0 BLOATING: ICD-10-CM

## 2021-01-22 DIAGNOSIS — R10.9 AP (ABDOMINAL PAIN): ICD-10-CM

## 2021-01-22 DIAGNOSIS — K74.60 CIRRHOSIS: ICD-10-CM

## 2021-01-22 PROCEDURE — 74177 CT ABD & PELVIS W/CONTRAST: CPT | Mod: TC

## 2021-01-22 PROCEDURE — 25500020 PHARM REV CODE 255: Performed by: RADIOLOGY

## 2021-01-22 PROCEDURE — 74177 CT ABDOMEN PELVIS WITH CONTRAST: ICD-10-PCS | Mod: 26,,, | Performed by: RADIOLOGY

## 2021-01-22 PROCEDURE — 74177 CT ABD & PELVIS W/CONTRAST: CPT | Mod: 26,,, | Performed by: RADIOLOGY

## 2021-01-22 RX ADMIN — IOHEXOL 30 ML: 350 INJECTION, SOLUTION INTRAVENOUS at 10:01

## 2021-01-22 RX ADMIN — IOHEXOL 75 ML: 350 INJECTION, SOLUTION INTRAVENOUS at 10:01

## 2021-01-28 ENCOUNTER — OFFICE VISIT (OUTPATIENT)
Dept: INTERNAL MEDICINE | Facility: CLINIC | Age: 81
End: 2021-01-28
Payer: MEDICARE

## 2021-01-28 VITALS
BODY MASS INDEX: 31.75 KG/M2 | DIASTOLIC BLOOD PRESSURE: 68 MMHG | HEIGHT: 61 IN | RESPIRATION RATE: 16 BRPM | OXYGEN SATURATION: 96 % | WEIGHT: 168.19 LBS | HEART RATE: 75 BPM | TEMPERATURE: 97 F | SYSTOLIC BLOOD PRESSURE: 138 MMHG

## 2021-01-28 DIAGNOSIS — D69.6 THROMBOCYTOPENIA: ICD-10-CM

## 2021-01-28 DIAGNOSIS — I10 ESSENTIAL HYPERTENSION: ICD-10-CM

## 2021-01-28 DIAGNOSIS — I70.0 AORTIC ATHEROSCLEROSIS: ICD-10-CM

## 2021-01-28 DIAGNOSIS — K76.6 PORTAL HYPERTENSION: ICD-10-CM

## 2021-01-28 DIAGNOSIS — E11.40 TYPE 2 DIABETES MELLITUS WITH DIABETIC NEUROPATHY, WITHOUT LONG-TERM CURRENT USE OF INSULIN: Primary | ICD-10-CM

## 2021-01-28 DIAGNOSIS — E78.2 MIXED HYPERLIPIDEMIA: ICD-10-CM

## 2021-01-28 PROCEDURE — 1101F PR PT FALLS ASSESS DOC 0-1 FALLS W/OUT INJ PAST YR: ICD-10-PCS | Mod: CPTII,S$GLB,, | Performed by: INTERNAL MEDICINE

## 2021-01-28 PROCEDURE — 1125F PR PAIN SEVERITY QUANTIFIED, PAIN PRESENT: ICD-10-PCS | Mod: S$GLB,,, | Performed by: INTERNAL MEDICINE

## 2021-01-28 PROCEDURE — 99214 PR OFFICE/OUTPT VISIT, EST, LEVL IV, 30-39 MIN: ICD-10-PCS | Mod: S$GLB,,, | Performed by: INTERNAL MEDICINE

## 2021-01-28 PROCEDURE — 3075F PR MOST RECENT SYSTOLIC BLOOD PRESS GE 130-139MM HG: ICD-10-PCS | Mod: CPTII,S$GLB,, | Performed by: INTERNAL MEDICINE

## 2021-01-28 PROCEDURE — 1159F PR MEDICATION LIST DOCUMENTED IN MEDICAL RECORD: ICD-10-PCS | Mod: S$GLB,,, | Performed by: INTERNAL MEDICINE

## 2021-01-28 PROCEDURE — 3078F DIAST BP <80 MM HG: CPT | Mod: CPTII,S$GLB,, | Performed by: INTERNAL MEDICINE

## 2021-01-28 PROCEDURE — 99999 PR PBB SHADOW E&M-EST. PATIENT-LVL IV: ICD-10-PCS | Mod: PBBFAC,,, | Performed by: INTERNAL MEDICINE

## 2021-01-28 PROCEDURE — 99499 RISK ADDL DX/OHS AUDIT: ICD-10-PCS | Mod: S$GLB,,, | Performed by: INTERNAL MEDICINE

## 2021-01-28 PROCEDURE — 1101F PT FALLS ASSESS-DOCD LE1/YR: CPT | Mod: CPTII,S$GLB,, | Performed by: INTERNAL MEDICINE

## 2021-01-28 PROCEDURE — 1125F AMNT PAIN NOTED PAIN PRSNT: CPT | Mod: S$GLB,,, | Performed by: INTERNAL MEDICINE

## 2021-01-28 PROCEDURE — 3078F PR MOST RECENT DIASTOLIC BLOOD PRESSURE < 80 MM HG: ICD-10-PCS | Mod: CPTII,S$GLB,, | Performed by: INTERNAL MEDICINE

## 2021-01-28 PROCEDURE — 1159F MED LIST DOCD IN RCRD: CPT | Mod: S$GLB,,, | Performed by: INTERNAL MEDICINE

## 2021-01-28 PROCEDURE — 99999 PR PBB SHADOW E&M-EST. PATIENT-LVL IV: CPT | Mod: PBBFAC,,, | Performed by: INTERNAL MEDICINE

## 2021-01-28 PROCEDURE — 3075F SYST BP GE 130 - 139MM HG: CPT | Mod: CPTII,S$GLB,, | Performed by: INTERNAL MEDICINE

## 2021-01-28 PROCEDURE — 3288F PR FALLS RISK ASSESSMENT DOCUMENTED: ICD-10-PCS | Mod: CPTII,S$GLB,, | Performed by: INTERNAL MEDICINE

## 2021-01-28 PROCEDURE — 99499 UNLISTED E&M SERVICE: CPT | Mod: S$GLB,,, | Performed by: INTERNAL MEDICINE

## 2021-01-28 PROCEDURE — 99214 OFFICE O/P EST MOD 30 MIN: CPT | Mod: S$GLB,,, | Performed by: INTERNAL MEDICINE

## 2021-01-28 PROCEDURE — 3288F FALL RISK ASSESSMENT DOCD: CPT | Mod: CPTII,S$GLB,, | Performed by: INTERNAL MEDICINE

## 2021-03-02 ENCOUNTER — TELEPHONE (OUTPATIENT)
Dept: OBSTETRICS AND GYNECOLOGY | Facility: CLINIC | Age: 81
End: 2021-03-02

## 2021-03-11 DIAGNOSIS — R14.0 ABDOMINAL BLOATING: ICD-10-CM

## 2021-03-11 RX ORDER — DICYCLOMINE HYDROCHLORIDE 10 MG/1
CAPSULE ORAL
Qty: 60 CAPSULE | Refills: 3 | Status: SHIPPED | OUTPATIENT
Start: 2021-03-11 | End: 2021-07-26

## 2021-07-01 ENCOUNTER — PATIENT MESSAGE (OUTPATIENT)
Dept: ADMINISTRATIVE | Facility: OTHER | Age: 81
End: 2021-07-01

## 2021-07-09 LAB
LEFT EYE DM RETINOPATHY: NEGATIVE
RIGHT EYE DM RETINOPATHY: NEGATIVE

## 2021-07-13 ENCOUNTER — PATIENT OUTREACH (OUTPATIENT)
Dept: ADMINISTRATIVE | Facility: HOSPITAL | Age: 81
End: 2021-07-13

## 2021-07-23 ENCOUNTER — LAB VISIT (OUTPATIENT)
Dept: LAB | Facility: HOSPITAL | Age: 81
End: 2021-07-23
Attending: INTERNAL MEDICINE
Payer: MEDICARE

## 2021-07-23 DIAGNOSIS — D69.6 THROMBOCYTOPENIA: ICD-10-CM

## 2021-07-23 DIAGNOSIS — I70.0 AORTIC ATHEROSCLEROSIS: ICD-10-CM

## 2021-07-23 DIAGNOSIS — E78.2 MIXED HYPERLIPIDEMIA: ICD-10-CM

## 2021-07-23 DIAGNOSIS — I10 ESSENTIAL HYPERTENSION: ICD-10-CM

## 2021-07-23 DIAGNOSIS — E11.40 TYPE 2 DIABETES MELLITUS WITH DIABETIC NEUROPATHY, WITHOUT LONG-TERM CURRENT USE OF INSULIN: ICD-10-CM

## 2021-07-23 DIAGNOSIS — K76.6 PORTAL HYPERTENSION: ICD-10-CM

## 2021-07-23 LAB
ALBUMIN SERPL BCP-MCNC: 3.7 G/DL (ref 3.5–5.2)
ALBUMIN/CREAT UR: 15.8 UG/MG (ref 0–30)
ALP SERPL-CCNC: 29 U/L (ref 55–135)
ALT SERPL W/O P-5'-P-CCNC: 16 U/L (ref 10–44)
ANION GAP SERPL CALC-SCNC: 9 MMOL/L (ref 8–16)
AST SERPL-CCNC: 23 U/L (ref 10–40)
BASOPHILS # BLD AUTO: 0 K/UL (ref 0–0.2)
BASOPHILS NFR BLD: 0 % (ref 0–1.9)
BILIRUB SERPL-MCNC: 0.5 MG/DL (ref 0.1–1)
BUN SERPL-MCNC: 15 MG/DL (ref 8–23)
CALCIUM SERPL-MCNC: 9.9 MG/DL (ref 8.7–10.5)
CHLORIDE SERPL-SCNC: 106 MMOL/L (ref 95–110)
CHOLEST SERPL-MCNC: 143 MG/DL (ref 120–199)
CHOLEST/HDLC SERPL: 4 {RATIO} (ref 2–5)
CO2 SERPL-SCNC: 26 MMOL/L (ref 23–29)
CREAT SERPL-MCNC: 0.8 MG/DL (ref 0.5–1.4)
CREAT UR-MCNC: 63.1 MG/DL (ref 15–325)
DIFFERENTIAL METHOD: ABNORMAL
EOSINOPHIL # BLD AUTO: 0.1 K/UL (ref 0–0.5)
EOSINOPHIL NFR BLD: 2.9 % (ref 0–8)
ERYTHROCYTE [DISTWIDTH] IN BLOOD BY AUTOMATED COUNT: 17.3 % (ref 11.5–14.5)
EST. GFR  (AFRICAN AMERICAN): >60 ML/MIN/1.73 M^2
EST. GFR  (NON AFRICAN AMERICAN): >60 ML/MIN/1.73 M^2
ESTIMATED AVG GLUCOSE: 103 MG/DL (ref 68–131)
GLUCOSE SERPL-MCNC: 124 MG/DL (ref 70–110)
HBA1C MFR BLD: 5.2 % (ref 4–5.6)
HCT VFR BLD AUTO: 32.6 % (ref 37–48.5)
HDLC SERPL-MCNC: 36 MG/DL (ref 40–75)
HDLC SERPL: 25.2 % (ref 20–50)
HGB BLD-MCNC: 10.5 G/DL (ref 12–16)
IMM GRANULOCYTES # BLD AUTO: 0 K/UL (ref 0–0.04)
IMM GRANULOCYTES NFR BLD AUTO: 0 % (ref 0–0.5)
LDLC SERPL CALC-MCNC: 71.6 MG/DL (ref 63–159)
LYMPHOCYTES # BLD AUTO: 0.7 K/UL (ref 1–4.8)
LYMPHOCYTES NFR BLD: 31.9 % (ref 18–48)
MCH RBC QN AUTO: 27.7 PG (ref 27–31)
MCHC RBC AUTO-ENTMCNC: 32.2 G/DL (ref 32–36)
MCV RBC AUTO: 86 FL (ref 82–98)
MICROALBUMIN UR DL<=1MG/L-MCNC: 10 UG/ML
MONOCYTES # BLD AUTO: 0.2 K/UL (ref 0.3–1)
MONOCYTES NFR BLD: 9.7 % (ref 4–15)
NEUTROPHILS # BLD AUTO: 1.2 K/UL (ref 1.8–7.7)
NEUTROPHILS NFR BLD: 55.5 % (ref 38–73)
NONHDLC SERPL-MCNC: 107 MG/DL
NRBC BLD-RTO: 0 /100 WBC
PLATELET # BLD AUTO: 69 K/UL (ref 150–450)
PMV BLD AUTO: 9.4 FL (ref 9.2–12.9)
POTASSIUM SERPL-SCNC: 4.4 MMOL/L (ref 3.5–5.1)
PROT SERPL-MCNC: 6.9 G/DL (ref 6–8.4)
RBC # BLD AUTO: 3.79 M/UL (ref 4–5.4)
SODIUM SERPL-SCNC: 141 MMOL/L (ref 136–145)
TRIGL SERPL-MCNC: 177 MG/DL (ref 30–150)
TSH SERPL DL<=0.005 MIU/L-ACNC: 2.92 UIU/ML (ref 0.4–4)
WBC # BLD AUTO: 2.07 K/UL (ref 3.9–12.7)

## 2021-07-23 PROCEDURE — 80061 LIPID PANEL: CPT | Performed by: INTERNAL MEDICINE

## 2021-07-23 PROCEDURE — 82570 ASSAY OF URINE CREATININE: CPT | Performed by: INTERNAL MEDICINE

## 2021-07-23 PROCEDURE — 84443 ASSAY THYROID STIM HORMONE: CPT | Performed by: INTERNAL MEDICINE

## 2021-07-23 PROCEDURE — 36415 COLL VENOUS BLD VENIPUNCTURE: CPT | Performed by: INTERNAL MEDICINE

## 2021-07-23 PROCEDURE — 83036 HEMOGLOBIN GLYCOSYLATED A1C: CPT | Performed by: INTERNAL MEDICINE

## 2021-07-23 PROCEDURE — 82043 UR ALBUMIN QUANTITATIVE: CPT | Performed by: INTERNAL MEDICINE

## 2021-07-23 PROCEDURE — 85025 COMPLETE CBC W/AUTO DIFF WBC: CPT | Performed by: INTERNAL MEDICINE

## 2021-07-23 PROCEDURE — 80053 COMPREHEN METABOLIC PANEL: CPT | Performed by: INTERNAL MEDICINE

## 2021-08-04 ENCOUNTER — OFFICE VISIT (OUTPATIENT)
Dept: INTERNAL MEDICINE | Facility: CLINIC | Age: 81
End: 2021-08-04
Payer: MEDICARE

## 2021-08-04 VITALS
HEART RATE: 86 BPM | BODY MASS INDEX: 29.55 KG/M2 | RESPIRATION RATE: 19 BRPM | HEIGHT: 61 IN | OXYGEN SATURATION: 95 % | SYSTOLIC BLOOD PRESSURE: 116 MMHG | DIASTOLIC BLOOD PRESSURE: 50 MMHG | WEIGHT: 156.5 LBS

## 2021-08-04 DIAGNOSIS — E11.9 CONTROLLED TYPE 2 DIABETES MELLITUS WITHOUT COMPLICATION, WITHOUT LONG-TERM CURRENT USE OF INSULIN: ICD-10-CM

## 2021-08-04 DIAGNOSIS — I10 ESSENTIAL HYPERTENSION: Primary | ICD-10-CM

## 2021-08-04 DIAGNOSIS — E78.2 MIXED HYPERLIPIDEMIA: ICD-10-CM

## 2021-08-04 DIAGNOSIS — K76.6 PORTAL HYPERTENSION: ICD-10-CM

## 2021-08-04 DIAGNOSIS — D69.6 THROMBOCYTOPENIA: ICD-10-CM

## 2021-08-04 PROCEDURE — 1159F MED LIST DOCD IN RCRD: CPT | Mod: CPTII,S$GLB,, | Performed by: INTERNAL MEDICINE

## 2021-08-04 PROCEDURE — 3078F DIAST BP <80 MM HG: CPT | Mod: CPTII,S$GLB,, | Performed by: INTERNAL MEDICINE

## 2021-08-04 PROCEDURE — 3074F SYST BP LT 130 MM HG: CPT | Mod: CPTII,S$GLB,, | Performed by: INTERNAL MEDICINE

## 2021-08-04 PROCEDURE — 1160F RVW MEDS BY RX/DR IN RCRD: CPT | Mod: CPTII,S$GLB,, | Performed by: INTERNAL MEDICINE

## 2021-08-04 PROCEDURE — 99214 PR OFFICE/OUTPT VISIT, EST, LEVL IV, 30-39 MIN: ICD-10-PCS | Mod: S$GLB,,, | Performed by: INTERNAL MEDICINE

## 2021-08-04 PROCEDURE — 99999 PR PBB SHADOW E&M-EST. PATIENT-LVL IV: CPT | Mod: PBBFAC,,, | Performed by: INTERNAL MEDICINE

## 2021-08-04 PROCEDURE — 99214 OFFICE O/P EST MOD 30 MIN: CPT | Mod: S$GLB,,, | Performed by: INTERNAL MEDICINE

## 2021-08-04 PROCEDURE — 99499 UNLISTED E&M SERVICE: CPT | Mod: S$GLB,,, | Performed by: INTERNAL MEDICINE

## 2021-08-04 PROCEDURE — 1159F PR MEDICATION LIST DOCUMENTED IN MEDICAL RECORD: ICD-10-PCS | Mod: CPTII,S$GLB,, | Performed by: INTERNAL MEDICINE

## 2021-08-04 PROCEDURE — 3074F PR MOST RECENT SYSTOLIC BLOOD PRESSURE < 130 MM HG: ICD-10-PCS | Mod: CPTII,S$GLB,, | Performed by: INTERNAL MEDICINE

## 2021-08-04 PROCEDURE — 99999 PR PBB SHADOW E&M-EST. PATIENT-LVL IV: ICD-10-PCS | Mod: PBBFAC,,, | Performed by: INTERNAL MEDICINE

## 2021-08-04 PROCEDURE — 1101F PR PT FALLS ASSESS DOC 0-1 FALLS W/OUT INJ PAST YR: ICD-10-PCS | Mod: CPTII,S$GLB,, | Performed by: INTERNAL MEDICINE

## 2021-08-04 PROCEDURE — 1126F PR PAIN SEVERITY QUANTIFIED, NO PAIN PRESENT: ICD-10-PCS | Mod: CPTII,S$GLB,, | Performed by: INTERNAL MEDICINE

## 2021-08-04 PROCEDURE — 3288F PR FALLS RISK ASSESSMENT DOCUMENTED: ICD-10-PCS | Mod: CPTII,S$GLB,, | Performed by: INTERNAL MEDICINE

## 2021-08-04 PROCEDURE — 99499 RISK ADDL DX/OHS AUDIT: ICD-10-PCS | Mod: S$GLB,,, | Performed by: INTERNAL MEDICINE

## 2021-08-04 PROCEDURE — 1160F PR REVIEW ALL MEDS BY PRESCRIBER/CLIN PHARMACIST DOCUMENTED: ICD-10-PCS | Mod: CPTII,S$GLB,, | Performed by: INTERNAL MEDICINE

## 2021-08-04 PROCEDURE — 1101F PT FALLS ASSESS-DOCD LE1/YR: CPT | Mod: CPTII,S$GLB,, | Performed by: INTERNAL MEDICINE

## 2021-08-04 PROCEDURE — 3288F FALL RISK ASSESSMENT DOCD: CPT | Mod: CPTII,S$GLB,, | Performed by: INTERNAL MEDICINE

## 2021-08-04 PROCEDURE — 3078F PR MOST RECENT DIASTOLIC BLOOD PRESSURE < 80 MM HG: ICD-10-PCS | Mod: CPTII,S$GLB,, | Performed by: INTERNAL MEDICINE

## 2021-08-04 PROCEDURE — 1126F AMNT PAIN NOTED NONE PRSNT: CPT | Mod: CPTII,S$GLB,, | Performed by: INTERNAL MEDICINE

## 2021-08-04 RX ORDER — ISOSORBIDE MONONITRATE 30 MG/1
30 TABLET, EXTENDED RELEASE ORAL DAILY
COMMUNITY
Start: 2021-08-03 | End: 2022-02-23

## 2021-08-04 RX ORDER — CLOPIDOGREL BISULFATE 75 MG/1
75 TABLET ORAL DAILY
COMMUNITY
Start: 2021-08-03

## 2021-08-10 ENCOUNTER — CLINICAL SUPPORT (OUTPATIENT)
Dept: INTERNAL MEDICINE | Facility: CLINIC | Age: 81
End: 2021-08-10
Payer: MEDICARE

## 2021-08-10 DIAGNOSIS — R50.9 FEVER, UNSPECIFIED FEVER CAUSE: ICD-10-CM

## 2021-08-10 DIAGNOSIS — R05.9 COUGH: Primary | ICD-10-CM

## 2021-08-10 PROCEDURE — U0005 INFEC AGEN DETEC AMPLI PROBE: HCPCS | Performed by: NURSE PRACTITIONER

## 2021-08-10 PROCEDURE — U0003 INFECTIOUS AGENT DETECTION BY NUCLEIC ACID (DNA OR RNA); SEVERE ACUTE RESPIRATORY SYNDROME CORONAVIRUS 2 (SARS-COV-2) (CORONAVIRUS DISEASE [COVID-19]), AMPLIFIED PROBE TECHNIQUE, MAKING USE OF HIGH THROUGHPUT TECHNOLOGIES AS DESCRIBED BY CMS-2020-01-R: HCPCS | Performed by: NURSE PRACTITIONER

## 2021-08-11 LAB
SARS-COV-2 RNA RESP QL NAA+PROBE: DETECTED
SARS-COV-2- CYCLE NUMBER: 12.11

## 2021-08-12 DIAGNOSIS — U07.1 COVID-19 VIRUS DETECTED: ICD-10-CM

## 2021-08-13 ENCOUNTER — INFUSION (OUTPATIENT)
Dept: INFECTIOUS DISEASES | Facility: HOSPITAL | Age: 81
End: 2021-08-13
Attending: FAMILY MEDICINE
Payer: MEDICARE

## 2021-08-13 VITALS
TEMPERATURE: 98 F | DIASTOLIC BLOOD PRESSURE: 67 MMHG | SYSTOLIC BLOOD PRESSURE: 151 MMHG | HEART RATE: 70 BPM | RESPIRATION RATE: 20 BRPM | OXYGEN SATURATION: 96 %

## 2021-08-13 DIAGNOSIS — U07.1 COVID-19: ICD-10-CM

## 2021-08-13 DIAGNOSIS — U07.1 COVID-19: Primary | ICD-10-CM

## 2021-08-13 PROCEDURE — M0243 CASIRIVI AND IMDEVI INFUSION: HCPCS | Performed by: FAMILY MEDICINE

## 2021-08-13 PROCEDURE — 25000003 PHARM REV CODE 250: Performed by: FAMILY MEDICINE

## 2021-08-13 PROCEDURE — 63600175 PHARM REV CODE 636 W HCPCS: Performed by: FAMILY MEDICINE

## 2021-08-13 RX ORDER — SODIUM CHLORIDE 0.9 % (FLUSH) 0.9 %
10 SYRINGE (ML) INJECTION
Status: DISCONTINUED | OUTPATIENT
Start: 2021-08-13 | End: 2022-02-23 | Stop reason: ALTCHOICE

## 2021-08-13 RX ORDER — ONDANSETRON 4 MG/1
4 TABLET, ORALLY DISINTEGRATING ORAL ONCE AS NEEDED
Status: DISCONTINUED | OUTPATIENT
Start: 2021-08-13 | End: 2022-02-23 | Stop reason: ALTCHOICE

## 2021-08-13 RX ORDER — EPINEPHRINE 0.3 MG/.3ML
0.3 INJECTION SUBCUTANEOUS
Status: DISCONTINUED | OUTPATIENT
Start: 2021-08-13 | End: 2022-02-23 | Stop reason: ALTCHOICE

## 2021-08-13 RX ORDER — ALBUTEROL SULFATE 90 UG/1
2 AEROSOL, METERED RESPIRATORY (INHALATION)
Status: DISCONTINUED | OUTPATIENT
Start: 2021-08-13 | End: 2022-02-23 | Stop reason: ALTCHOICE

## 2021-08-13 RX ORDER — ACETAMINOPHEN 325 MG/1
650 TABLET ORAL ONCE AS NEEDED
Status: DISCONTINUED | OUTPATIENT
Start: 2021-08-13 | End: 2022-02-23 | Stop reason: ALTCHOICE

## 2021-08-13 RX ORDER — DIPHENHYDRAMINE HYDROCHLORIDE 50 MG/ML
25 INJECTION INTRAMUSCULAR; INTRAVENOUS ONCE AS NEEDED
Status: DISCONTINUED | OUTPATIENT
Start: 2021-08-13 | End: 2022-02-23 | Stop reason: ALTCHOICE

## 2021-08-13 RX ADMIN — CASIRIVIMAB AND IMDEVIMAB 600 MG: 600; 600 INJECTION, SOLUTION, CONCENTRATE INTRAVENOUS at 11:08

## 2021-08-17 ENCOUNTER — NURSE TRIAGE (OUTPATIENT)
Dept: ADMINISTRATIVE | Facility: CLINIC | Age: 81
End: 2021-08-17

## 2021-08-17 ENCOUNTER — TELEPHONE (OUTPATIENT)
Dept: INTERNAL MEDICINE | Facility: CLINIC | Age: 81
End: 2021-08-17

## 2021-08-17 DIAGNOSIS — U07.1 COVID-19: Primary | ICD-10-CM

## 2021-08-17 NOTE — TELEPHONE ENCOUNTER
----- Message from Casi Arceo LPN sent at 8/17/2021  3:53 PM CDT -----  Pt was notified already, just notifying PCP

## 2021-08-23 ENCOUNTER — TELEPHONE (OUTPATIENT)
Dept: INTERNAL MEDICINE | Facility: CLINIC | Age: 81
End: 2021-08-23

## 2021-08-23 RX ORDER — METHYLPREDNISOLONE 4 MG/1
TABLET ORAL
Qty: 1 PACKAGE | Refills: 0 | Status: SHIPPED | OUTPATIENT
Start: 2021-08-23 | End: 2022-02-23

## 2021-09-16 RX ORDER — METFORMIN HYDROCHLORIDE 500 MG/1
500 TABLET ORAL 2 TIMES DAILY WITH MEALS
Qty: 180 TABLET | Refills: 0 | Status: SHIPPED | OUTPATIENT
Start: 2021-09-16 | End: 2022-03-17

## 2021-09-21 DIAGNOSIS — F51.04 CHRONIC INSOMNIA: ICD-10-CM

## 2021-09-21 RX ORDER — ALPRAZOLAM 0.5 MG/1
0.5 TABLET ORAL NIGHTLY PRN
Qty: 30 TABLET | Refills: 0 | Status: SHIPPED | OUTPATIENT
Start: 2021-09-21 | End: 2021-10-25

## 2022-01-28 DIAGNOSIS — R10.13 EPIGASTRIC PAIN: ICD-10-CM

## 2022-01-28 DIAGNOSIS — K21.9 GASTROESOPHAGEAL REFLUX DISEASE WITHOUT ESOPHAGITIS: ICD-10-CM

## 2022-01-28 DIAGNOSIS — R14.0 ABDOMINAL BLOATING: ICD-10-CM

## 2022-01-28 NOTE — TELEPHONE ENCOUNTER
----- Message from Emma Mosley MA sent at 2022 10:24 AM CST -----  Marine Mo  MRN: [id number]  : 1940  PCP: Serg Hwang  Home Phone      [phone]  Work Phone      [phone]  Mobile          [phone]      MESSAGE:     Patient needs new prescription for Pantoprazole.     Please send script to [facility] ([address])    Previously prescribed by Larissa Beltran.

## 2022-01-28 NOTE — TELEPHONE ENCOUNTER
Care Due:                  Date            Visit Type   Department     Provider  --------------------------------------------------------------------------------                                             [facility]  Last Visit: 08-      None         MEDICINE     Serg Hwang                                           [facility] INTERNAL  Next Visit: 02-      [facility]    Serg Hwang                                                            Last  Test          Frequency    Reason                     Performed    Due Date  --------------------------------------------------------------------------------    HBA1C.......  6 months...  metFORMIN................  07- 01-    Powered by [email or web address]. Reference number: [id number].   1/28/2022 10:32:31 AM CST

## 2022-01-28 NOTE — TELEPHONE ENCOUNTER
Medication called in during your absence.  LOV 8/4/2021  Scheduled visit 2/9/2022    Requested Prescriptions     Pending Prescriptions Disp Refills    pantoprazole (PROTONIX) 40 MG tablet 60 tablet 0     Sig: Take 1 tablet (40 mg total) by mouth 2 (two) times daily.

## 2022-01-31 RX ORDER — PANTOPRAZOLE SODIUM 40 MG/1
40 TABLET, DELAYED RELEASE ORAL 2 TIMES DAILY
Qty: 60 TABLET | Refills: 0 | Status: SHIPPED | OUTPATIENT
Start: 2022-01-31 | End: 2024-01-10

## 2022-02-04 ENCOUNTER — LAB VISIT (OUTPATIENT)
Dept: LAB | Facility: HOSPITAL | Age: 82
End: 2022-02-04
Attending: INTERNAL MEDICINE
Payer: MEDICARE

## 2022-02-04 DIAGNOSIS — I10 ESSENTIAL HYPERTENSION: ICD-10-CM

## 2022-02-04 DIAGNOSIS — E78.2 MIXED HYPERLIPIDEMIA: ICD-10-CM

## 2022-02-04 DIAGNOSIS — D69.6 THROMBOCYTOPENIA: ICD-10-CM

## 2022-02-04 DIAGNOSIS — E11.9 CONTROLLED TYPE 2 DIABETES MELLITUS WITHOUT COMPLICATION, WITHOUT LONG-TERM CURRENT USE OF INSULIN: ICD-10-CM

## 2022-02-04 DIAGNOSIS — K76.6 PORTAL HYPERTENSION: ICD-10-CM

## 2022-02-04 LAB
ALBUMIN SERPL BCP-MCNC: 3.5 G/DL (ref 3.5–5.2)
ALBUMIN/CREAT UR: 25.5 UG/MG (ref 0–30)
ALP SERPL-CCNC: 32 U/L (ref 55–135)
ALT SERPL W/O P-5'-P-CCNC: 12 U/L (ref 10–44)
ANION GAP SERPL CALC-SCNC: 10 MMOL/L (ref 8–16)
AST SERPL-CCNC: 17 U/L (ref 10–40)
BASOPHILS # BLD AUTO: 0.01 K/UL (ref 0–0.2)
BASOPHILS NFR BLD: 0.5 % (ref 0–1.9)
BILIRUB SERPL-MCNC: 0.5 MG/DL (ref 0.1–1)
BUN SERPL-MCNC: 20 MG/DL (ref 8–23)
CALCIUM SERPL-MCNC: 9.2 MG/DL (ref 8.7–10.5)
CHLORIDE SERPL-SCNC: 106 MMOL/L (ref 95–110)
CHOLEST SERPL-MCNC: 136 MG/DL (ref 120–199)
CHOLEST/HDLC SERPL: 3.2 {RATIO} (ref 2–5)
CO2 SERPL-SCNC: 26 MMOL/L (ref 23–29)
CREAT SERPL-MCNC: 0.8 MG/DL (ref 0.5–1.4)
CREAT UR-MCNC: 74.6 MG/DL (ref 15–325)
DIFFERENTIAL METHOD: ABNORMAL
EOSINOPHIL # BLD AUTO: 0.1 K/UL (ref 0–0.5)
EOSINOPHIL NFR BLD: 2.8 % (ref 0–8)
ERYTHROCYTE [DISTWIDTH] IN BLOOD BY AUTOMATED COUNT: 20.4 % (ref 11.5–14.5)
EST. GFR  (AFRICAN AMERICAN): >60 ML/MIN/1.73 M^2
EST. GFR  (NON AFRICAN AMERICAN): >60 ML/MIN/1.73 M^2
ESTIMATED AVG GLUCOSE: 97 MG/DL (ref 68–131)
GLUCOSE SERPL-MCNC: 108 MG/DL (ref 70–110)
HBA1C MFR BLD: 5 % (ref 4–5.6)
HCT VFR BLD AUTO: 31.8 % (ref 37–48.5)
HDLC SERPL-MCNC: 42 MG/DL (ref 40–75)
HDLC SERPL: 30.9 % (ref 20–50)
HGB BLD-MCNC: 10.1 G/DL (ref 12–16)
IMM GRANULOCYTES # BLD AUTO: 0.01 K/UL (ref 0–0.04)
IMM GRANULOCYTES NFR BLD AUTO: 0.5 % (ref 0–0.5)
LDLC SERPL CALC-MCNC: 71.2 MG/DL (ref 63–159)
LYMPHOCYTES # BLD AUTO: 0.6 K/UL (ref 1–4.8)
LYMPHOCYTES NFR BLD: 27.6 % (ref 18–48)
MCH RBC QN AUTO: 27.5 PG (ref 27–31)
MCHC RBC AUTO-ENTMCNC: 31.8 G/DL (ref 32–36)
MCV RBC AUTO: 87 FL (ref 82–98)
MICROALBUMIN UR DL<=1MG/L-MCNC: 19 UG/ML
MONOCYTES # BLD AUTO: 0.2 K/UL (ref 0.3–1)
MONOCYTES NFR BLD: 9.8 % (ref 4–15)
NEUTROPHILS # BLD AUTO: 1.3 K/UL (ref 1.8–7.7)
NEUTROPHILS NFR BLD: 58.8 % (ref 38–73)
NONHDLC SERPL-MCNC: 94 MG/DL
NRBC BLD-RTO: 0 /100 WBC
PLATELET # BLD AUTO: 65 K/UL (ref 150–450)
PMV BLD AUTO: 9 FL (ref 9.2–12.9)
POTASSIUM SERPL-SCNC: 4.7 MMOL/L (ref 3.5–5.1)
PROT SERPL-MCNC: 6.4 G/DL (ref 6–8.4)
RBC # BLD AUTO: 3.67 M/UL (ref 4–5.4)
SODIUM SERPL-SCNC: 142 MMOL/L (ref 136–145)
TRIGL SERPL-MCNC: 114 MG/DL (ref 30–150)
TSH SERPL DL<=0.005 MIU/L-ACNC: 2.53 UIU/ML (ref 0.4–4)
WBC # BLD AUTO: 2.14 K/UL (ref 3.9–12.7)

## 2022-02-04 PROCEDURE — 80053 COMPREHEN METABOLIC PANEL: CPT | Performed by: INTERNAL MEDICINE

## 2022-02-04 PROCEDURE — 84443 ASSAY THYROID STIM HORMONE: CPT | Performed by: INTERNAL MEDICINE

## 2022-02-04 PROCEDURE — 83036 HEMOGLOBIN GLYCOSYLATED A1C: CPT | Performed by: INTERNAL MEDICINE

## 2022-02-04 PROCEDURE — 82570 ASSAY OF URINE CREATININE: CPT | Performed by: INTERNAL MEDICINE

## 2022-02-04 PROCEDURE — 85025 COMPLETE CBC W/AUTO DIFF WBC: CPT | Performed by: INTERNAL MEDICINE

## 2022-02-04 PROCEDURE — 36415 COLL VENOUS BLD VENIPUNCTURE: CPT | Performed by: INTERNAL MEDICINE

## 2022-02-04 PROCEDURE — 80061 LIPID PANEL: CPT | Performed by: INTERNAL MEDICINE

## 2022-02-15 ENCOUNTER — OFFICE VISIT (OUTPATIENT)
Dept: INTERNAL MEDICINE | Facility: CLINIC | Age: 82
End: 2022-02-15
Payer: MEDICARE

## 2022-02-15 VITALS
RESPIRATION RATE: 16 BRPM | HEIGHT: 61 IN | SYSTOLIC BLOOD PRESSURE: 110 MMHG | OXYGEN SATURATION: 97 % | DIASTOLIC BLOOD PRESSURE: 66 MMHG | BODY MASS INDEX: 28.67 KG/M2 | WEIGHT: 151.88 LBS | HEART RATE: 81 BPM

## 2022-02-15 DIAGNOSIS — R29.818 NEUROGENIC CLAUDICATION: ICD-10-CM

## 2022-02-15 DIAGNOSIS — D69.6 THROMBOCYTOPENIA: ICD-10-CM

## 2022-02-15 DIAGNOSIS — I25.118 ATHEROSCLEROTIC HEART DISEASE OF NATIVE CORONARY ARTERY WITH OTHER FORMS OF ANGINA PECTORIS: ICD-10-CM

## 2022-02-15 DIAGNOSIS — I73.9 PERIPHERAL VASCULAR DISEASE, UNSPECIFIED: ICD-10-CM

## 2022-02-15 DIAGNOSIS — E78.2 MIXED HYPERLIPIDEMIA: ICD-10-CM

## 2022-02-15 DIAGNOSIS — E11.39 TYPE 2 DIABETES MELLITUS WITH OTHER DIABETIC OPHTHALMIC COMPLICATION: ICD-10-CM

## 2022-02-15 DIAGNOSIS — F41.1 GAD (GENERALIZED ANXIETY DISORDER): ICD-10-CM

## 2022-02-15 DIAGNOSIS — I10 ESSENTIAL HYPERTENSION: Primary | ICD-10-CM

## 2022-02-15 DIAGNOSIS — K76.6 PORTAL HYPERTENSION: ICD-10-CM

## 2022-02-15 PROCEDURE — 3074F PR MOST RECENT SYSTOLIC BLOOD PRESSURE < 130 MM HG: ICD-10-PCS | Mod: CPTII,S$GLB,, | Performed by: INTERNAL MEDICINE

## 2022-02-15 PROCEDURE — 1159F MED LIST DOCD IN RCRD: CPT | Mod: CPTII,S$GLB,, | Performed by: INTERNAL MEDICINE

## 2022-02-15 PROCEDURE — 1126F PR PAIN SEVERITY QUANTIFIED, NO PAIN PRESENT: ICD-10-PCS | Mod: CPTII,S$GLB,, | Performed by: INTERNAL MEDICINE

## 2022-02-15 PROCEDURE — 99999 PR PBB SHADOW E&M-EST. PATIENT-LVL V: CPT | Mod: PBBFAC,,, | Performed by: INTERNAL MEDICINE

## 2022-02-15 PROCEDURE — 3078F DIAST BP <80 MM HG: CPT | Mod: CPTII,S$GLB,, | Performed by: INTERNAL MEDICINE

## 2022-02-15 PROCEDURE — 99499 RISK ADDL DX/OHS AUDIT: ICD-10-PCS | Mod: S$GLB,,, | Performed by: INTERNAL MEDICINE

## 2022-02-15 PROCEDURE — 1101F PT FALLS ASSESS-DOCD LE1/YR: CPT | Mod: CPTII,S$GLB,, | Performed by: INTERNAL MEDICINE

## 2022-02-15 PROCEDURE — 3074F SYST BP LT 130 MM HG: CPT | Mod: CPTII,S$GLB,, | Performed by: INTERNAL MEDICINE

## 2022-02-15 PROCEDURE — 99499 UNLISTED E&M SERVICE: CPT | Mod: S$GLB,,, | Performed by: INTERNAL MEDICINE

## 2022-02-15 PROCEDURE — 99214 OFFICE O/P EST MOD 30 MIN: CPT | Mod: S$GLB,,, | Performed by: INTERNAL MEDICINE

## 2022-02-15 PROCEDURE — 1159F PR MEDICATION LIST DOCUMENTED IN MEDICAL RECORD: ICD-10-PCS | Mod: CPTII,S$GLB,, | Performed by: INTERNAL MEDICINE

## 2022-02-15 PROCEDURE — 1101F PR PT FALLS ASSESS DOC 0-1 FALLS W/OUT INJ PAST YR: ICD-10-PCS | Mod: CPTII,S$GLB,, | Performed by: INTERNAL MEDICINE

## 2022-02-15 PROCEDURE — 1126F AMNT PAIN NOTED NONE PRSNT: CPT | Mod: CPTII,S$GLB,, | Performed by: INTERNAL MEDICINE

## 2022-02-15 PROCEDURE — 99999 PR PBB SHADOW E&M-EST. PATIENT-LVL V: ICD-10-PCS | Mod: PBBFAC,,, | Performed by: INTERNAL MEDICINE

## 2022-02-15 PROCEDURE — 1160F PR REVIEW ALL MEDS BY PRESCRIBER/CLIN PHARMACIST DOCUMENTED: ICD-10-PCS | Mod: CPTII,S$GLB,, | Performed by: INTERNAL MEDICINE

## 2022-02-15 PROCEDURE — 99214 PR OFFICE/OUTPT VISIT, EST, LEVL IV, 30-39 MIN: ICD-10-PCS | Mod: S$GLB,,, | Performed by: INTERNAL MEDICINE

## 2022-02-15 PROCEDURE — 3288F FALL RISK ASSESSMENT DOCD: CPT | Mod: CPTII,S$GLB,, | Performed by: INTERNAL MEDICINE

## 2022-02-15 PROCEDURE — 3078F PR MOST RECENT DIASTOLIC BLOOD PRESSURE < 80 MM HG: ICD-10-PCS | Mod: CPTII,S$GLB,, | Performed by: INTERNAL MEDICINE

## 2022-02-15 PROCEDURE — 3288F PR FALLS RISK ASSESSMENT DOCUMENTED: ICD-10-PCS | Mod: CPTII,S$GLB,, | Performed by: INTERNAL MEDICINE

## 2022-02-15 PROCEDURE — 1160F RVW MEDS BY RX/DR IN RCRD: CPT | Mod: CPTII,S$GLB,, | Performed by: INTERNAL MEDICINE

## 2022-02-15 NOTE — PROGRESS NOTES
Subjective:       Patient ID: Marine Mo is a 81 y.o. female.    Chief Complaint: Follow-up, Hyperlipidemia, Hypertension, and Diabetes    Marine Mo is a 81  female who presents for Type II DM,  Chronic liver disease , Hypertension, and Hyperlipidemia follow up. Labs were reviewed with patient today. Patient with c/o recurrent stomach pain and increased anxiety and depression. requesting refill  For alprazolam   Labs are reviewed.      Interval history : angiogram Dr Latham 1/27/22  Received blood transfusion .  Also seen hematology at [facility]   8/10/21 COVID       Hypertension  This is a chronic problem. The current episode started more than 1 year ago. The problem has been waxing and waning since onset. The problem is controlled. Associated symptoms include anxiety. Pertinent negatives include no chest pain, neck pain, palpitations or shortness of breath. Risk factors for coronary artery disease include sedentary lifestyle, obesity and dyslipidemia. Past treatments include beta blockers, lifestyle changes and diuretics. The current treatment provides moderate improvement.   Diabetes  She presents for her follow-up diabetic visit. She has type 2 diabetes mellitus. Her disease course has been stable. Hypoglycemia symptoms include nervousness/anxiousness. Pertinent negatives for hypoglycemia include no confusion, dizziness or pallor. Pertinent negatives for diabetes include no chest pain. Diabetic complications include peripheral neuropathy. Current diabetic treatment includes diet. An ACE inhibitor/angiotensin II receptor blocker is not being taken.   Anxiety  Presents for initial visit. Onset was 1 to 6 months ago. The problem has been gradually worsening. Symptoms include insomnia and nervous/anxious behavior. Patient reports no chest pain, confusion, dizziness, nausea, palpitations or shortness of breath. Symptoms occur most days. The severity of symptoms is moderate. Exacerbated by:  xanax at night. The quality of sleep is poor. Nighttime awakenings: one to two.     There are no known risk factors. Past treatments include benzodiazephines. The treatment provided mild relief.   Hyperlipidemia  This is a chronic problem. The current episode started more than 1 year ago. The problem is controlled. Recent lipid tests were reviewed and are low. Exacerbating diseases include obesity. Pertinent negatives include no chest pain, myalgias or shortness of breath. She is currently on no antihyperlipidemic treatment. The current treatment provides mild improvement of lipids. Compliance problems include adherence to diet.    Medication Refill  Associated symptoms include abdominal pain. Pertinent negatives include no arthralgias, chest pain, chills, congestion, coughing, fever, myalgias, nausea, neck pain, numbness, sore throat or vomiting.     Review of Systems   Constitutional: Negative for chills and fever.   HENT: Negative for congestion and sore throat.    Respiratory: Negative for cough and shortness of breath.    Cardiovascular: Negative for chest pain and palpitations.   Gastrointestinal: Positive for abdominal pain. Negative for constipation, diarrhea, nausea and vomiting.        + heart burn , belching on exam  Unchanged ;seen GI ; Dr mann    Genitourinary: Negative for dysuria.   Musculoskeletal: Negative for arthralgias, myalgias and neck pain.   Skin: Negative for pallor.   Neurological: Negative for dizziness and numbness.   Psychiatric/Behavioral: Negative for confusion. The patient is nervous/anxious and has insomnia.        Objective:      Physical Exam  Vitals and nursing note reviewed.   Constitutional:       Appearance: She is well-developed and well-nourished.   HENT:      Head: Normocephalic and atraumatic.      Right Ear: External ear normal.      Left Ear: External ear normal.      Mouth/Throat:      Mouth: Oropharynx is clear and moist.   Eyes:      Extraocular Movements: EOM  normal.      Conjunctiva/sclera: Conjunctivae normal.      Pupils: Pupils are equal, round, and reactive to light.   Neck:      Thyroid: No thyromegaly.      Vascular: No JVD.      Trachea: No tracheal deviation.   Cardiovascular:      Rate and Rhythm: Normal rate and regular rhythm.      Pulses: Intact distal pulses.      Heart sounds: Normal heart sounds.   Pulmonary:      Effort: Pulmonary effort is normal. No respiratory distress.      Breath sounds: Normal breath sounds. No wheezing or rales.   Chest:      Chest wall: No tenderness.   Abdominal:      General: Bowel sounds are normal. There is no distension.      Palpations: Abdomen is soft. There is no mass.      Tenderness: There is no abdominal tenderness. There is no guarding or rebound.   Musculoskeletal:         General: No edema. Normal range of motion.      Cervical back: Normal range of motion and neck supple.   Lymphadenopathy:      Cervical: No cervical adenopathy.   Skin:     General: Skin is warm and dry.   Neurological:      Mental Status: She is alert and oriented to person, place, and time.      Cranial Nerves: No cranial nerve deficit.      Motor: No abnormal muscle tone.      Coordination: Coordination normal.      Deep Tendon Reflexes: Reflexes are normal and symmetric. Reflexes normal.      Comments: CN: Optic discs are flat with normal vasculature, PERRL, Extraoccular movements and visual fields are full. Normal facial sensation and strength, Hearing symmetric, Tongue and Palate are midline and strong. Shoulder Shrug symmetric and strong.   Psychiatric:         Mood and Affect: Mood and affect normal.         Assessment:       1. Essential hypertension    2. Atherosclerotic heart disease of native coronary artery with other forms of angina pectoris    3. Type 2 diabetes mellitus with other diabetic ophthalmic complication    4. Portal hypertension    5. Thrombocytopenia    6. Neurogenic claudication    7. Peripheral vascular disease,  unspecified    8. Mixed hyperlipidemia        Plan:   Marine was seen today for follow-up, hyperlipidemia, hypertension and diabetes.    Diagnoses and all orders for this visit:    Essential hypertension  -     CBC Auto Differential; Future  -     Comprehensive Metabolic Panel; Future    Well controlled.  Continue same medication and dose.  1. Keep weight close to ideal body weight.   2.   Avoid high salt foods (olives, pickles, smoked meats, salted potato chips, etc.).   Do not add salt to your food at the table.   Use only small amounts of salt when cooking.   3. Begin an exercise program. Discuss with your doctor what type of exercise program would be best for you. It doesn't have to be difficult. Even brisk walking for 20 minutes three times a week is a good form of exercise.   4. Avoid medicines which contain heart stimulants. This includes many cold and sinus decongestant pills and sprays as well as diet pills. Check the warnings about hypertension on the label. Stimulants such as amphetamine or cocaine could be lethal for someone with hypertension. Never take these.    Atherosclerotic heart disease of native coronary artery with other forms of angina pectoris  Lab Results   Component Value Date    LDLCALC 71.2 02/04/2022       Type 2 diabetes mellitus with other diabetic ophthalmic complication  -     Hemoglobin A1C; Future  -     Microalbumin/Creatinine Ratio, Urine; Future  Patient has controlled Diabetes .  We discussed about diet ;low in calories. Avoid sweats, sodas.  Also increasing activity;walking 2-3 miles a day.  I also adjusted medications and gave patient  instructions about adherence to plan.  Goal of  A1c  less than 7 % stressed.  Also goal of LDL less than 70 highlighted to patient.  Portal hypertension  Stable    Thrombocytopenia  -     CBC Auto Differential; Future  Likely due to chronic liver disease     Neurogenic claudication    Peripheral vascular disease, unspecified  Continue ASA and  plavix     Mixed hyperlipidemia  -     Lipid Panel; Future  -     TSH; Future  Lab Results   Component Value Date    LDLCALC 71.2 02/04/2022     Continue meds    RAFIQ (generalized anxiety disorder)  Continue alprazolam and prozac      Problem List Items Addressed This Visit    None

## 2022-02-23 ENCOUNTER — OFFICE VISIT (OUTPATIENT)
Dept: INTERNAL MEDICINE | Facility: CLINIC | Age: 82
End: 2022-02-23
Payer: MEDICARE

## 2022-02-23 VITALS
HEIGHT: 61 IN | HEART RATE: 68 BPM | RESPIRATION RATE: 18 BRPM | SYSTOLIC BLOOD PRESSURE: 120 MMHG | BODY MASS INDEX: 28.67 KG/M2 | OXYGEN SATURATION: 97 % | DIASTOLIC BLOOD PRESSURE: 62 MMHG | WEIGHT: 151.88 LBS

## 2022-02-23 DIAGNOSIS — R42 DIZZINESS: Primary | ICD-10-CM

## 2022-02-23 PROCEDURE — 1126F PR PAIN SEVERITY QUANTIFIED, NO PAIN PRESENT: ICD-10-PCS | Mod: CPTII,S$GLB,, | Performed by: INTERNAL MEDICINE

## 2022-02-23 PROCEDURE — 99999 PR PBB SHADOW E&M-EST. PATIENT-LVL IV: CPT | Mod: PBBFAC,,, | Performed by: INTERNAL MEDICINE

## 2022-02-23 PROCEDURE — 3078F PR MOST RECENT DIASTOLIC BLOOD PRESSURE < 80 MM HG: ICD-10-PCS | Mod: CPTII,S$GLB,, | Performed by: INTERNAL MEDICINE

## 2022-02-23 PROCEDURE — 3288F FALL RISK ASSESSMENT DOCD: CPT | Mod: CPTII,S$GLB,, | Performed by: INTERNAL MEDICINE

## 2022-02-23 PROCEDURE — 1159F MED LIST DOCD IN RCRD: CPT | Mod: CPTII,S$GLB,, | Performed by: INTERNAL MEDICINE

## 2022-02-23 PROCEDURE — 99999 PR PBB SHADOW E&M-EST. PATIENT-LVL IV: ICD-10-PCS | Mod: PBBFAC,,, | Performed by: INTERNAL MEDICINE

## 2022-02-23 PROCEDURE — 3074F SYST BP LT 130 MM HG: CPT | Mod: CPTII,S$GLB,, | Performed by: INTERNAL MEDICINE

## 2022-02-23 PROCEDURE — 1101F PR PT FALLS ASSESS DOC 0-1 FALLS W/OUT INJ PAST YR: ICD-10-PCS | Mod: CPTII,S$GLB,, | Performed by: INTERNAL MEDICINE

## 2022-02-23 PROCEDURE — 1126F AMNT PAIN NOTED NONE PRSNT: CPT | Mod: CPTII,S$GLB,, | Performed by: INTERNAL MEDICINE

## 2022-02-23 PROCEDURE — 3074F PR MOST RECENT SYSTOLIC BLOOD PRESSURE < 130 MM HG: ICD-10-PCS | Mod: CPTII,S$GLB,, | Performed by: INTERNAL MEDICINE

## 2022-02-23 PROCEDURE — 3288F PR FALLS RISK ASSESSMENT DOCUMENTED: ICD-10-PCS | Mod: CPTII,S$GLB,, | Performed by: INTERNAL MEDICINE

## 2022-02-23 PROCEDURE — 99213 OFFICE O/P EST LOW 20 MIN: CPT | Mod: S$GLB,,, | Performed by: INTERNAL MEDICINE

## 2022-02-23 PROCEDURE — 99213 PR OFFICE/OUTPT VISIT, EST, LEVL III, 20-29 MIN: ICD-10-PCS | Mod: S$GLB,,, | Performed by: INTERNAL MEDICINE

## 2022-02-23 PROCEDURE — 1160F RVW MEDS BY RX/DR IN RCRD: CPT | Mod: CPTII,S$GLB,, | Performed by: INTERNAL MEDICINE

## 2022-02-23 PROCEDURE — 1160F PR REVIEW ALL MEDS BY PRESCRIBER/CLIN PHARMACIST DOCUMENTED: ICD-10-PCS | Mod: CPTII,S$GLB,, | Performed by: INTERNAL MEDICINE

## 2022-02-23 PROCEDURE — 3078F DIAST BP <80 MM HG: CPT | Mod: CPTII,S$GLB,, | Performed by: INTERNAL MEDICINE

## 2022-02-23 PROCEDURE — 1101F PT FALLS ASSESS-DOCD LE1/YR: CPT | Mod: CPTII,S$GLB,, | Performed by: INTERNAL MEDICINE

## 2022-02-23 PROCEDURE — 1159F PR MEDICATION LIST DOCUMENTED IN MEDICAL RECORD: ICD-10-PCS | Mod: CPTII,S$GLB,, | Performed by: INTERNAL MEDICINE

## 2022-02-23 RX ORDER — MECLIZINE HYDROCHLORIDE 25 MG/1
25 TABLET ORAL 3 TIMES DAILY PRN
Qty: 30 TABLET | Refills: 0 | Status: SHIPPED | OUTPATIENT
Start: 2022-02-23

## 2022-02-23 RX ORDER — PRAVASTATIN SODIUM 40 MG/1
TABLET ORAL
COMMUNITY
Start: 2022-02-08

## 2022-02-23 NOTE — PROGRESS NOTES
Subjective:       Patient ID: Marine Mo is a 81 y.o. female.    Chief Complaint: Dizziness (C/o constant dizziness x 1 week, can be sitting in front the TV and will get dizziness) and Abdominal Swelling    Dizziness:   Chronicity:  New  Onset:  1 to 4 weeks ago  Progression since onset:  Waxing and waning  Severity:  Moderate  Dizziness characteristics:  Lightheaded/impending faint   Associated symptoms: ear congestion, headaches, diaphoresis and light-headedness.no hearing loss, no fever, no tinnitus, no nausea, no vomiting, no palpitations and no chest pain.    Review of Systems   Constitutional: Positive for diaphoresis. Negative for chills and fever.   HENT: Positive for congestion, postnasal drip, rhinorrhea, sinus pressure, sneezing and sore throat. Negative for hearing loss and tinnitus.    Eyes: Negative for photophobia.   Respiratory: Negative for cough, choking, chest tightness, shortness of breath and wheezing.    Cardiovascular: Negative for chest pain and palpitations.   Gastrointestinal: Negative for blood in stool, nausea and vomiting.   Genitourinary: Negative for dysuria and hematuria.   Musculoskeletal: Negative for arthralgias and myalgias.   Skin: Negative for pallor.   Neurological: Positive for dizziness, light-headedness and headaches. Negative for numbness.   Hematological: Does not bruise/bleed easily.   Psychiatric/Behavioral: Negative for confusion and suicidal ideas. The patient is not nervous/anxious.        Objective:      Physical Exam  Vitals and nursing note reviewed.   Constitutional:       Appearance: She is well-developed.   HENT:      Head: Normocephalic and atraumatic.      Right Ear: External ear normal. A middle ear effusion is present.      Left Ear: External ear normal. A middle ear effusion is present.      Nose: Mucosal edema and rhinorrhea present.      Mouth/Throat:      Mouth: Mucous membranes are pale.   Eyes:      Conjunctiva/sclera: Conjunctivae normal.       Pupils: Pupils are equal, round, and reactive to light.   Neck:      Thyroid: No thyromegaly.      Vascular: No JVD.      Trachea: No tracheal deviation.   Cardiovascular:      Rate and Rhythm: Normal rate and regular rhythm.      Heart sounds: Normal heart sounds.   Pulmonary:      Effort: Pulmonary effort is normal. No respiratory distress.      Breath sounds: Normal breath sounds. No wheezing or rales.   Chest:      Chest wall: No tenderness.   Abdominal:      General: Bowel sounds are normal. There is no distension.      Palpations: Abdomen is soft. There is no mass.      Tenderness: There is no abdominal tenderness. There is no guarding or rebound.   Musculoskeletal:         General: Normal range of motion.      Cervical back: Normal range of motion and neck supple.   Lymphadenopathy:      Cervical: No cervical adenopathy.   Skin:     General: Skin is warm and dry.   Neurological:      Mental Status: She is alert and oriented to person, place, and time.      Cranial Nerves: No cranial nerve deficit.      Motor: No abnormal muscle tone.      Coordination: Coordination normal.      Deep Tendon Reflexes: Reflexes are normal and symmetric.         Assessment:       1. Dizziness        Plan:   Marine was seen today for dizziness and abdominal swelling.    Diagnoses and all orders for this visit:    Dizziness  -     meclizine (ANTIVERT) 25 mg tablet; Take 1 tablet (25 mg total) by mouth 3 (three) times daily as needed for Dizziness.      Problem List Items Addressed This Visit    None

## 2022-03-15 NOTE — TELEPHONE ENCOUNTER
No new care gaps identified.  Powered by [employer] by [employer]. Reference number: [id number].   3/15/2022 5:32:52 PM CDT

## 2022-03-17 ENCOUNTER — TELEPHONE (OUTPATIENT)
Dept: INTERNAL MEDICINE | Facility: CLINIC | Age: 82
End: 2022-03-17
Payer: MEDICARE

## 2022-03-17 RX ORDER — METFORMIN HYDROCHLORIDE 500 MG/1
TABLET ORAL
Qty: 180 TABLET | Refills: 0 | Status: SHIPPED | OUTPATIENT
Start: 2022-03-17 | End: 2022-06-20

## 2022-03-17 NOTE — TELEPHONE ENCOUNTER
No new care gaps identified.  Powered by [employer]. Reference number: [id number].   3/17/2022 5:53:25 PM CDT

## 2022-03-17 NOTE — TELEPHONE ENCOUNTER
----- Message from Vivienne Morejon sent at 3/17/2022  8:18 AM CDT -----  Contact: Self  Marine Mo  MRN: [id number]  : 1940  PCP: Serg Hwang  Home Phone      [phone]  Work Phone      [phone]  Mobile          [phone]      MESSAGE:   Refill  metFORMIN (GLUCOPHAGE) 500 MG tablet    [facility] [address] [place] [address]   Phone: [phone]  Fax:  [phone]

## 2022-03-23 RX ORDER — METFORMIN HYDROCHLORIDE 500 MG/1
TABLET ORAL
Qty: 180 TABLET | Refills: 0 | OUTPATIENT
Start: 2022-03-23

## 2022-04-26 RX ORDER — OXYBUTYNIN CHLORIDE 5 MG/1
5 TABLET ORAL 2 TIMES DAILY
Qty: 60 TABLET | Refills: 0 | Status: SHIPPED | OUTPATIENT
Start: 2022-04-26 | End: 2022-06-13

## 2022-04-26 NOTE — TELEPHONE ENCOUNTER
Marine desire refill of ditropan, pt hasn't been seen since 1/21/21.  Patient Active Problem List   Diagnosis    Spinal stenosis, lumbar    DDD (degenerative disc disease), lumbar    Low back pain without sciatica    Lumbar radiculopathy    Neurogenic claudication    Hyperlipidemia    Obesity (BMI 30.0-34.9)    Thrombocytopenia    Type 2 diabetes mellitus, controlled    Chronic insomnia    Hereditary and idiopathic peripheral neuropathy    Essential hypertension    Diabetes mellitus with neuropathy    Hepatomegaly    Splenomegaly    Anemia    Upper GI bleed    Polycystic liver disease    Portal hypertension    Iron deficiency anemia    Other chest pain    Dyspnea on exertion    Depression    Postmenopausal state    Gastroesophageal reflux disease without esophagitis    Aortic atherosclerosis    Atherosclerotic heart disease of native coronary artery with other forms of angina pectoris    RAFIQ (generalized anxiety disorder)     Prior to Admission medications    Medication Sig Start Date End Date Taking? Authorizing Provider   ALPRAZolam (XANAX) 0.5 MG tablet TAKE 1 TABLET BY MOUTH ONCE NIGHTLY AS NEEDED 11/22/21   Serg Hwang MD   aspirin 81 MG Chew Take 81 mg by mouth once daily.    Historical Provider   clopidogreL (PLAVIX) 75 mg tablet Take 75 mg by mouth once daily. 8/3/21   Historical Provider   FLUoxetine 10 MG capsule TAKE 1 CAPSULE BY MOUTH ONCE DAILY 2/12/20   Serg Hwang MD   meclizine (ANTIVERT) 25 mg tablet Take 1 tablet (25 mg total) by mouth 3 (three) times daily as needed for Dizziness. 2/23/22   Serg Hwang MD   metFORMIN (GLUCOPHAGE) 500 MG tablet TAKE 1 TABLET BY MOUTH TWICE DAILY WITH MEALS 3/17/22   Serg Hwang MD   metoprolol tartrate (LOPRESSOR) 100 MG tablet Take 1/2 (one-half) tablet by mouth twice daily 11/1/21   Serg Hwang MD   nystatin (MYCOSTATIN) cream Apply topically 2 (two) times daily. 1/21/21   Wilton Joseph MD    oxybutynin (DITROPAN) 5 MG Tab Take 1 tablet by mouth twice daily 22   Wilton Joseph MD   pantoprazole (PROTONIX) 40 MG tablet Take 1 tablet (40 mg total) by mouth 2 (two) times daily. 22   Serg Hwang MD   pravastatin (PRAVACHOL) 40 MG tablet SMARTSI Tablet(s) By Mouth Every Evening 22   Historical Provider   pulse oximeter (PULSE OXIMETER) device by Apply Externally route 2 (two) times a day. Use twice daily at 8 AM and 3 PM and record the value in MyChart as directed. 21   Serg Hwang MD   timolol maleate 0.5% (TIMOPTIC) 0.5 % Drop INSTILL 1 DROP INTO EACH EYE TWICE DAILY 20   Historical Provider

## 2022-05-22 DIAGNOSIS — F51.04 CHRONIC INSOMNIA: ICD-10-CM

## 2022-05-22 NOTE — TELEPHONE ENCOUNTER
Care Due:                  Date            Visit Type   Department     Provider  --------------------------------------------------------------------------------                                EP -                              PRIMARY      [facility]  Last Visit: 02-      CARE ([facility])   MEDICINE II    Serg Hwang                              EP -                              PRIMARY      [facility]  Next Visit: 08-      CARE ([facility])   MEDICINE II    Serg Hwang                                                            Last  Test          Frequency    Reason                     Performed    Due Date  --------------------------------------------------------------------------------    HBA1C.......  6 months...  metFORMIN................  02- 08-    [employer] Embedded Care Gaps. Reference number: [id number]. 5/22/2022   7:35:13 AM CDT

## 2022-05-23 RX ORDER — ALPRAZOLAM 0.5 MG/1
TABLET ORAL
Qty: 30 TABLET | Refills: 0 | Status: SHIPPED | OUTPATIENT
Start: 2022-05-23 | End: 2022-07-25

## 2022-05-23 NOTE — TELEPHONE ENCOUNTER
LOV 2/23/2022  Scheduled visit 8/16/2022    Requested Prescriptions     Pending Prescriptions Disp Refills    ALPRAZolam (XANAX) 0.5 MG tablet [Pharmacy Med Name: ALPRAZolam 0.5 MG Oral Tablet] 30 tablet 0     Sig: TAKE 1 TABLET BY MOUTH ONCE NIGHTLY AS NEEDED

## 2022-05-30 ENCOUNTER — PES CALL (OUTPATIENT)
Dept: ADMINISTRATIVE | Facility: CLINIC | Age: 82
End: 2022-05-30
Payer: MEDICARE

## 2022-06-14 ENCOUNTER — PATIENT OUTREACH (OUTPATIENT)
Dept: ADMINISTRATIVE | Facility: OTHER | Age: 82
End: 2022-06-14
Payer: MEDICARE

## 2022-06-14 VITALS — OXYGEN SATURATION: 98 % | SYSTOLIC BLOOD PRESSURE: 136 MMHG | DIASTOLIC BLOOD PRESSURE: 60 MMHG | HEART RATE: 74 BPM

## 2022-06-17 NOTE — TELEPHONE ENCOUNTER
No new care gaps identified.  [facility] Embedded Care Gaps. Reference number: [id number]. 6/17/2022   10:34:21 AM JANT

## 2022-06-17 NOTE — TELEPHONE ENCOUNTER
----- Message from Haley Valdivia sent at 2022 10:28 AM CDT -----  Regarding: Rx Refill  Contact: self  Marine Mo  MRN: [id number]  : 1940  PCP: Serg Hwang  Home Phone      [phone]  Work Phone      [phone]  Mobile          [phone]      MESSAGE:   Rx Refill - metFORMIN (GLUCOPHAGE) 500 MG tablet. Patient has 3 - 4 pills left.     Phone # [phone]    Pharmacy -[facility] [address] [place] [address]

## 2022-06-17 NOTE — TELEPHONE ENCOUNTER
Medication phoned in during your absence.       Requested Prescriptions     Pending Prescriptions Disp Refills    metFORMIN (GLUCOPHAGE) 500 MG tablet 180 tablet 0     Sig: Take 1 tablet (500 mg total) by mouth 2 (two) times daily with meals.

## 2022-06-20 RX ORDER — METFORMIN HYDROCHLORIDE 500 MG/1
500 TABLET ORAL 2 TIMES DAILY WITH MEALS
Qty: 180 TABLET | Refills: 0 | Status: SHIPPED | OUTPATIENT
Start: 2022-06-20 | End: 2022-12-19 | Stop reason: SDUPTHER

## 2022-07-01 RX ORDER — NYSTATIN 100000 U/G
CREAM TOPICAL 2 TIMES DAILY
Qty: 30 G | Refills: 0 | Status: SHIPPED | OUTPATIENT
Start: 2022-07-01 | End: 2023-02-15 | Stop reason: SDUPTHER

## 2022-07-01 RX ORDER — TRIAMCINOLONE ACETONIDE 1 MG/G
CREAM TOPICAL 2 TIMES DAILY
Qty: 28.4 G | Refills: 0 | Status: SHIPPED | OUTPATIENT
Start: 2022-07-01 | End: 2023-02-15 | Stop reason: SDUPTHER

## 2022-07-01 NOTE — TELEPHONE ENCOUNTER
----- Message from Sue Ann sent at 2022  8:31 AM CDT -----  Contact: PATIENT  Marine Mo  MRN: [id number]  : 1940  PCP: Serg Hwang  Home Phone      [phone]  Work Phone      [phone]  Mobile          [phone]      MESSAGE: Patient needs a refill on the cream that Dr. Joseph prescribes for her.  She states that it is 2 creams mixed together.  She needs it for today if possible.  She needs it sent to [facility].        Phone: [phone]

## 2022-07-12 ENCOUNTER — PATIENT OUTREACH (OUTPATIENT)
Dept: ADMINISTRATIVE | Facility: OTHER | Age: 82
End: 2022-07-12
Payer: MEDICARE

## 2022-07-12 VITALS — HEART RATE: 79 BPM | DIASTOLIC BLOOD PRESSURE: 60 MMHG | SYSTOLIC BLOOD PRESSURE: 108 MMHG | OXYGEN SATURATION: 97 %

## 2022-07-25 ENCOUNTER — LAB VISIT (OUTPATIENT)
Dept: LAB | Facility: HOSPITAL | Age: 82
End: 2022-07-25
Attending: INTERNAL MEDICINE
Payer: MEDICARE

## 2022-07-25 DIAGNOSIS — K92.1 MELENA: Primary | ICD-10-CM

## 2022-07-25 LAB
BASOPHILS # BLD AUTO: 0.01 K/UL (ref 0–0.2)
BASOPHILS NFR BLD: 0.4 % (ref 0–1.9)
DIFFERENTIAL METHOD: ABNORMAL
EOSINOPHIL # BLD AUTO: 0.1 K/UL (ref 0–0.5)
EOSINOPHIL NFR BLD: 2.9 % (ref 0–8)
ERYTHROCYTE [DISTWIDTH] IN BLOOD BY AUTOMATED COUNT: 16.9 % (ref 11.5–14.5)
HCT VFR BLD AUTO: 32.4 % (ref 37–48.5)
HGB BLD-MCNC: 10.1 G/DL (ref 12–16)
IMM GRANULOCYTES # BLD AUTO: 0.01 K/UL (ref 0–0.04)
IMM GRANULOCYTES NFR BLD AUTO: 0.4 % (ref 0–0.5)
LYMPHOCYTES # BLD AUTO: 0.8 K/UL (ref 1–4.8)
LYMPHOCYTES NFR BLD: 28.1 % (ref 18–48)
MCH RBC QN AUTO: 26 PG (ref 27–31)
MCHC RBC AUTO-ENTMCNC: 31.2 G/DL (ref 32–36)
MCV RBC AUTO: 84 FL (ref 82–98)
MONOCYTES # BLD AUTO: 0.3 K/UL (ref 0.3–1)
MONOCYTES NFR BLD: 10.4 % (ref 4–15)
NEUTROPHILS # BLD AUTO: 1.6 K/UL (ref 1.8–7.7)
NEUTROPHILS NFR BLD: 57.8 % (ref 38–73)
NRBC BLD-RTO: 0 /100 WBC
PLATELET # BLD AUTO: 79 K/UL (ref 150–450)
PMV BLD AUTO: 9 FL (ref 9.2–12.9)
RBC # BLD AUTO: 3.88 M/UL (ref 4–5.4)
WBC # BLD AUTO: 2.78 K/UL (ref 3.9–12.7)

## 2022-07-25 PROCEDURE — 85025 COMPLETE CBC W/AUTO DIFF WBC: CPT | Performed by: INTERNAL MEDICINE

## 2022-07-25 PROCEDURE — 36415 COLL VENOUS BLD VENIPUNCTURE: CPT | Performed by: INTERNAL MEDICINE

## 2022-07-29 ENCOUNTER — TELEPHONE (OUTPATIENT)
Dept: OBSTETRICS AND GYNECOLOGY | Facility: CLINIC | Age: 82
End: 2022-07-29
Payer: MEDICARE

## 2022-07-29 NOTE — TELEPHONE ENCOUNTER
----- Message from Vera Page sent at 2022  9:54 AM CDT -----  Contact: self  Marine Mo  MRN: [id number]  : 1940  PCP: Serg Hwang  Home Phone      [phone]  Work Phone      [phone]  Mobile          [phone]      MESSAGE: Patient call stating that DITROPAN is not working and would like something else called into her pharmacy and stated she do not want something that is expensive.    Pharmacy [facility]    Phone [phone]

## 2022-07-29 NOTE — TELEPHONE ENCOUNTER
Patient called states is taking ditropan 5mg but it is starting to not work anymore. Patient has not been seen since 1-2021. Informed will need to follow up since we are prescribing medication.

## 2022-08-04 ENCOUNTER — HOSPITAL ENCOUNTER (OUTPATIENT)
Dept: RADIOLOGY | Facility: HOSPITAL | Age: 82
Discharge: HOME | End: 2022-08-04
Attending: INTERNAL MEDICINE
Payer: MEDICARE

## 2022-08-04 ENCOUNTER — TELEPHONE (OUTPATIENT)
Dept: INTERNAL MEDICINE | Facility: CLINIC | Age: 82
End: 2022-08-04

## 2022-08-04 ENCOUNTER — OFFICE VISIT (OUTPATIENT)
Dept: INTERNAL MEDICINE | Facility: CLINIC | Age: 82
End: 2022-08-04
Payer: MEDICARE

## 2022-08-04 VITALS
HEART RATE: 78 BPM | WEIGHT: 150.81 LBS | RESPIRATION RATE: 19 BRPM | OXYGEN SATURATION: 97 % | SYSTOLIC BLOOD PRESSURE: 130 MMHG | HEIGHT: 61 IN | DIASTOLIC BLOOD PRESSURE: 60 MMHG | BODY MASS INDEX: 28.47 KG/M2

## 2022-08-04 DIAGNOSIS — R51.9 SCALP PAIN: ICD-10-CM

## 2022-08-04 DIAGNOSIS — R51.9 SCALP PAIN: Primary | ICD-10-CM

## 2022-08-04 PROCEDURE — 3288F FALL RISK ASSESSMENT DOCD: CPT | Mod: CPTII,S$GLB,, | Performed by: INTERNAL MEDICINE

## 2022-08-04 PROCEDURE — 99999 PR PBB SHADOW E&M-EST. PATIENT-LVL IV: ICD-10-PCS | Mod: PBBFAC,,, | Performed by: INTERNAL MEDICINE

## 2022-08-04 PROCEDURE — 1125F PR PAIN SEVERITY QUANTIFIED, PAIN PRESENT: ICD-10-PCS | Mod: CPTII,S$GLB,, | Performed by: INTERNAL MEDICINE

## 2022-08-04 PROCEDURE — 99999 PR PBB SHADOW E&M-EST. PATIENT-LVL IV: CPT | Mod: PBBFAC,,, | Performed by: INTERNAL MEDICINE

## 2022-08-04 PROCEDURE — 1125F AMNT PAIN NOTED PAIN PRSNT: CPT | Mod: CPTII,S$GLB,, | Performed by: INTERNAL MEDICINE

## 2022-08-04 PROCEDURE — 99213 PR OFFICE/OUTPT VISIT, EST, LEVL III, 20-29 MIN: ICD-10-PCS | Mod: S$GLB,,, | Performed by: INTERNAL MEDICINE

## 2022-08-04 PROCEDURE — 99213 OFFICE O/P EST LOW 20 MIN: CPT | Mod: S$GLB,,, | Performed by: INTERNAL MEDICINE

## 2022-08-04 PROCEDURE — 73030 X-RAY EXAM OF SHOULDER: CPT | Mod: 26,RT,, | Performed by: RADIOLOGY

## 2022-08-04 PROCEDURE — 3078F DIAST BP <80 MM HG: CPT | Mod: CPTII,S$GLB,, | Performed by: INTERNAL MEDICINE

## 2022-08-04 PROCEDURE — 1160F PR REVIEW ALL MEDS BY PRESCRIBER/CLIN PHARMACIST DOCUMENTED: ICD-10-PCS | Mod: CPTII,S$GLB,, | Performed by: INTERNAL MEDICINE

## 2022-08-04 PROCEDURE — 1159F MED LIST DOCD IN RCRD: CPT | Mod: CPTII,S$GLB,, | Performed by: INTERNAL MEDICINE

## 2022-08-04 PROCEDURE — 3078F PR MOST RECENT DIASTOLIC BLOOD PRESSURE < 80 MM HG: ICD-10-PCS | Mod: CPTII,S$GLB,, | Performed by: INTERNAL MEDICINE

## 2022-08-04 PROCEDURE — 1101F PR PT FALLS ASSESS DOC 0-1 FALLS W/OUT INJ PAST YR: ICD-10-PCS | Mod: CPTII,S$GLB,, | Performed by: INTERNAL MEDICINE

## 2022-08-04 PROCEDURE — 1159F PR MEDICATION LIST DOCUMENTED IN MEDICAL RECORD: ICD-10-PCS | Mod: CPTII,S$GLB,, | Performed by: INTERNAL MEDICINE

## 2022-08-04 PROCEDURE — 3288F PR FALLS RISK ASSESSMENT DOCUMENTED: ICD-10-PCS | Mod: CPTII,S$GLB,, | Performed by: INTERNAL MEDICINE

## 2022-08-04 PROCEDURE — 3075F SYST BP GE 130 - 139MM HG: CPT | Mod: CPTII,S$GLB,, | Performed by: INTERNAL MEDICINE

## 2022-08-04 PROCEDURE — 3075F PR MOST RECENT SYSTOLIC BLOOD PRESS GE 130-139MM HG: ICD-10-PCS | Mod: CPTII,S$GLB,, | Performed by: INTERNAL MEDICINE

## 2022-08-04 PROCEDURE — 73030 XR SHOULDER COMPLETE 2 OR MORE VIEWS RIGHT: ICD-10-PCS | Mod: 26,RT,, | Performed by: RADIOLOGY

## 2022-08-04 PROCEDURE — 1160F RVW MEDS BY RX/DR IN RCRD: CPT | Mod: CPTII,S$GLB,, | Performed by: INTERNAL MEDICINE

## 2022-08-04 PROCEDURE — 73030 X-RAY EXAM OF SHOULDER: CPT | Mod: TC,RT

## 2022-08-04 PROCEDURE — 1101F PT FALLS ASSESS-DOCD LE1/YR: CPT | Mod: CPTII,S$GLB,, | Performed by: INTERNAL MEDICINE

## 2022-08-04 RX ORDER — ONDANSETRON HYDROCHLORIDE 8 MG/1
8 TABLET, FILM COATED ORAL EVERY 8 HOURS PRN
COMMUNITY
Start: 2022-05-30 | End: 2023-09-01 | Stop reason: SDUPTHER

## 2022-08-04 RX ORDER — DICLOFENAC SODIUM 10 MG/G
2 GEL TOPICAL DAILY
Qty: 100 G | Refills: 11 | Status: SHIPPED | OUTPATIENT
Start: 2022-08-04 | End: 2024-01-10 | Stop reason: SDUPTHER

## 2022-08-04 RX ORDER — LACTULOSE 10 G/15ML
30 SOLUTION ORAL DAILY
COMMUNITY
Start: 2022-06-01

## 2022-08-04 NOTE — PROGRESS NOTES
Subjective:       Patient ID: Marine Mo is a 82 y.o. female.    Chief Complaint: Shoulder Pain (Right) and Back Pain (Top shoulder area)    Shoulder Pain   Pain location: R shoulder blade area pain  This is a new problem. The current episode started more than 1 month ago. The problem occurs daily. The pain is at a severity of 5/10. The pain is moderate. She has tried acetaminophen for the symptoms.   Back Pain      Review of Systems   Musculoskeletal: Positive for back pain.        R shoulder blade /shoulder and upper thoracic        Objective:      Physical Exam  Musculoskeletal:      Right shoulder: No swelling, deformity or tenderness. Normal range of motion.        Arms:       Comments: Area of pain and tenderness          Assessment:       1. Scalp pain        Plan:   Marine was seen today for shoulder pain and back pain.    Diagnoses and all orders for this visit:    Scalp pain  -     X-ray Shoulder 2 or More Views Right; Future  -     diclofenac sodium (VOLTAREN) 1 % Gel; Apply 2 g topically once daily.    right  scapular area pain .  Nothing particular on exam.  She has cirrhosis and also on Plavix.  Already having bleeding issues ; not a good candidate for NSAIDS or muscle relaxants   Topical voltaren seems a reasonable compromise        Problem List Items Addressed This Visit    None

## 2022-08-04 NOTE — TELEPHONE ENCOUNTER
X ray shows narrowing and arthritis changes.  Usual treatment is antiinflammatories , heat, and physical therapy.  If pain not relieved see us

## 2022-08-09 ENCOUNTER — LAB VISIT (OUTPATIENT)
Dept: LAB | Facility: HOSPITAL | Age: 82
End: 2022-08-09
Attending: INTERNAL MEDICINE
Payer: MEDICARE

## 2022-08-09 DIAGNOSIS — E78.2 MIXED HYPERLIPIDEMIA: ICD-10-CM

## 2022-08-09 DIAGNOSIS — I10 ESSENTIAL HYPERTENSION: ICD-10-CM

## 2022-08-09 DIAGNOSIS — D69.6 THROMBOCYTOPENIA: ICD-10-CM

## 2022-08-09 DIAGNOSIS — E11.39 TYPE 2 DIABETES MELLITUS WITH OTHER DIABETIC OPHTHALMIC COMPLICATION: ICD-10-CM

## 2022-08-09 LAB
ALBUMIN SERPL BCP-MCNC: 3.6 G/DL (ref 3.5–5.2)
ALBUMIN/CREAT UR: 24.7 UG/MG (ref 0–30)
ALP SERPL-CCNC: 32 U/L (ref 55–135)
ALT SERPL W/O P-5'-P-CCNC: 14 U/L (ref 10–44)
ANION GAP SERPL CALC-SCNC: 7 MMOL/L (ref 8–16)
AST SERPL-CCNC: 21 U/L (ref 10–40)
BASOPHILS # BLD AUTO: 0.01 K/UL (ref 0–0.2)
BASOPHILS NFR BLD: 0.4 % (ref 0–1.9)
BILIRUB SERPL-MCNC: 0.5 MG/DL (ref 0.1–1)
BUN SERPL-MCNC: 16 MG/DL (ref 8–23)
CALCIUM SERPL-MCNC: 9.7 MG/DL (ref 8.7–10.5)
CHLORIDE SERPL-SCNC: 108 MMOL/L (ref 95–110)
CHOLEST SERPL-MCNC: 150 MG/DL (ref 120–199)
CHOLEST/HDLC SERPL: 4.3 {RATIO} (ref 2–5)
CO2 SERPL-SCNC: 27 MMOL/L (ref 23–29)
CREAT SERPL-MCNC: 0.8 MG/DL (ref 0.5–1.4)
CREAT UR-MCNC: 64.8 MG/DL (ref 15–325)
DIFFERENTIAL METHOD: ABNORMAL
EOSINOPHIL # BLD AUTO: 0.1 K/UL (ref 0–0.5)
EOSINOPHIL NFR BLD: 3.5 % (ref 0–8)
ERYTHROCYTE [DISTWIDTH] IN BLOOD BY AUTOMATED COUNT: 17.6 % (ref 11.5–14.5)
EST. GFR  (NO RACE VARIABLE): >60 ML/MIN/1.73 M^2
ESTIMATED AVG GLUCOSE: 105 MG/DL (ref 68–131)
GLUCOSE SERPL-MCNC: 104 MG/DL (ref 70–110)
HBA1C MFR BLD: 5.3 % (ref 4–5.6)
HCT VFR BLD AUTO: 33.5 % (ref 37–48.5)
HDLC SERPL-MCNC: 35 MG/DL (ref 40–75)
HDLC SERPL: 23.3 % (ref 20–50)
HGB BLD-MCNC: 10.5 G/DL (ref 12–16)
IMM GRANULOCYTES # BLD AUTO: 0 K/UL (ref 0–0.04)
IMM GRANULOCYTES NFR BLD AUTO: 0 % (ref 0–0.5)
LDLC SERPL CALC-MCNC: 74.6 MG/DL (ref 63–159)
LYMPHOCYTES # BLD AUTO: 0.8 K/UL (ref 1–4.8)
LYMPHOCYTES NFR BLD: 33.6 % (ref 18–48)
MCH RBC QN AUTO: 26.7 PG (ref 27–31)
MCHC RBC AUTO-ENTMCNC: 31.3 G/DL (ref 32–36)
MCV RBC AUTO: 85 FL (ref 82–98)
MICROALBUMIN UR DL<=1MG/L-MCNC: 16 UG/ML
MONOCYTES # BLD AUTO: 0.3 K/UL (ref 0.3–1)
MONOCYTES NFR BLD: 11.4 % (ref 4–15)
NEUTROPHILS # BLD AUTO: 1.2 K/UL (ref 1.8–7.7)
NEUTROPHILS NFR BLD: 51.1 % (ref 38–73)
NONHDLC SERPL-MCNC: 115 MG/DL
NRBC BLD-RTO: 0 /100 WBC
PLATELET # BLD AUTO: 71 K/UL (ref 150–450)
PMV BLD AUTO: 8.9 FL (ref 9.2–12.9)
POTASSIUM SERPL-SCNC: 4.6 MMOL/L (ref 3.5–5.1)
PROT SERPL-MCNC: 6.6 G/DL (ref 6–8.4)
RBC # BLD AUTO: 3.93 M/UL (ref 4–5.4)
SODIUM SERPL-SCNC: 142 MMOL/L (ref 136–145)
TRIGL SERPL-MCNC: 202 MG/DL (ref 30–150)
TSH SERPL DL<=0.005 MIU/L-ACNC: 2.9 UIU/ML (ref 0.4–4)
WBC # BLD AUTO: 2.29 K/UL (ref 3.9–12.7)

## 2022-08-09 PROCEDURE — 80053 COMPREHEN METABOLIC PANEL: CPT | Performed by: INTERNAL MEDICINE

## 2022-08-09 PROCEDURE — 36415 COLL VENOUS BLD VENIPUNCTURE: CPT | Performed by: INTERNAL MEDICINE

## 2022-08-09 PROCEDURE — 80061 LIPID PANEL: CPT | Performed by: INTERNAL MEDICINE

## 2022-08-09 PROCEDURE — 84443 ASSAY THYROID STIM HORMONE: CPT | Performed by: INTERNAL MEDICINE

## 2022-08-09 PROCEDURE — 83036 HEMOGLOBIN GLYCOSYLATED A1C: CPT | Performed by: INTERNAL MEDICINE

## 2022-08-09 PROCEDURE — 82570 ASSAY OF URINE CREATININE: CPT | Performed by: INTERNAL MEDICINE

## 2022-08-09 PROCEDURE — 85025 COMPLETE CBC W/AUTO DIFF WBC: CPT | Performed by: INTERNAL MEDICINE

## 2022-08-09 PROCEDURE — 82043 UR ALBUMIN QUANTITATIVE: CPT | Performed by: INTERNAL MEDICINE

## 2022-08-10 ENCOUNTER — PATIENT OUTREACH (OUTPATIENT)
Dept: ADMINISTRATIVE | Facility: OTHER | Age: 82
End: 2022-08-10
Payer: MEDICARE

## 2022-08-10 VITALS — DIASTOLIC BLOOD PRESSURE: 60 MMHG | HEART RATE: 91 BPM | OXYGEN SATURATION: 97 % | SYSTOLIC BLOOD PRESSURE: 122 MMHG

## 2022-08-16 ENCOUNTER — OFFICE VISIT (OUTPATIENT)
Dept: INTERNAL MEDICINE | Facility: CLINIC | Age: 82
End: 2022-08-16
Payer: MEDICARE

## 2022-08-16 VITALS
OXYGEN SATURATION: 97 % | HEART RATE: 81 BPM | SYSTOLIC BLOOD PRESSURE: 110 MMHG | HEIGHT: 61 IN | DIASTOLIC BLOOD PRESSURE: 60 MMHG | WEIGHT: 149.25 LBS | RESPIRATION RATE: 19 BRPM | BODY MASS INDEX: 28.18 KG/M2

## 2022-08-16 DIAGNOSIS — R16.1 SPLENOMEGALY: ICD-10-CM

## 2022-08-16 DIAGNOSIS — I10 ESSENTIAL HYPERTENSION: ICD-10-CM

## 2022-08-16 DIAGNOSIS — E78.2 MIXED HYPERLIPIDEMIA: Primary | ICD-10-CM

## 2022-08-16 DIAGNOSIS — E11.9 CONTROLLED TYPE 2 DIABETES MELLITUS WITHOUT COMPLICATION, WITHOUT LONG-TERM CURRENT USE OF INSULIN: ICD-10-CM

## 2022-08-16 DIAGNOSIS — R16.0 HEPATOMEGALY: ICD-10-CM

## 2022-08-16 DIAGNOSIS — D69.6 THROMBOCYTOPENIA: ICD-10-CM

## 2022-08-16 PROCEDURE — 3288F FALL RISK ASSESSMENT DOCD: CPT | Mod: CPTII,S$GLB,, | Performed by: INTERNAL MEDICINE

## 2022-08-16 PROCEDURE — 99999 PR PBB SHADOW E&M-EST. PATIENT-LVL III: CPT | Mod: PBBFAC,,, | Performed by: INTERNAL MEDICINE

## 2022-08-16 PROCEDURE — 3074F PR MOST RECENT SYSTOLIC BLOOD PRESSURE < 130 MM HG: ICD-10-PCS | Mod: CPTII,S$GLB,, | Performed by: INTERNAL MEDICINE

## 2022-08-16 PROCEDURE — 1101F PT FALLS ASSESS-DOCD LE1/YR: CPT | Mod: CPTII,S$GLB,, | Performed by: INTERNAL MEDICINE

## 2022-08-16 PROCEDURE — 1159F PR MEDICATION LIST DOCUMENTED IN MEDICAL RECORD: ICD-10-PCS | Mod: CPTII,S$GLB,, | Performed by: INTERNAL MEDICINE

## 2022-08-16 PROCEDURE — 99214 PR OFFICE/OUTPT VISIT, EST, LEVL IV, 30-39 MIN: ICD-10-PCS | Mod: S$GLB,,, | Performed by: INTERNAL MEDICINE

## 2022-08-16 PROCEDURE — 3078F PR MOST RECENT DIASTOLIC BLOOD PRESSURE < 80 MM HG: ICD-10-PCS | Mod: CPTII,S$GLB,, | Performed by: INTERNAL MEDICINE

## 2022-08-16 PROCEDURE — 3078F DIAST BP <80 MM HG: CPT | Mod: CPTII,S$GLB,, | Performed by: INTERNAL MEDICINE

## 2022-08-16 PROCEDURE — 1126F PR PAIN SEVERITY QUANTIFIED, NO PAIN PRESENT: ICD-10-PCS | Mod: CPTII,S$GLB,, | Performed by: INTERNAL MEDICINE

## 2022-08-16 PROCEDURE — 99214 OFFICE O/P EST MOD 30 MIN: CPT | Mod: S$GLB,,, | Performed by: INTERNAL MEDICINE

## 2022-08-16 PROCEDURE — 99499 RISK ADDL DX/OHS AUDIT: ICD-10-PCS | Mod: S$GLB,,, | Performed by: INTERNAL MEDICINE

## 2022-08-16 PROCEDURE — 3074F SYST BP LT 130 MM HG: CPT | Mod: CPTII,S$GLB,, | Performed by: INTERNAL MEDICINE

## 2022-08-16 PROCEDURE — 1159F MED LIST DOCD IN RCRD: CPT | Mod: CPTII,S$GLB,, | Performed by: INTERNAL MEDICINE

## 2022-08-16 PROCEDURE — 99999 PR PBB SHADOW E&M-EST. PATIENT-LVL III: ICD-10-PCS | Mod: PBBFAC,,, | Performed by: INTERNAL MEDICINE

## 2022-08-16 PROCEDURE — 1126F AMNT PAIN NOTED NONE PRSNT: CPT | Mod: CPTII,S$GLB,, | Performed by: INTERNAL MEDICINE

## 2022-08-16 PROCEDURE — 3288F PR FALLS RISK ASSESSMENT DOCUMENTED: ICD-10-PCS | Mod: CPTII,S$GLB,, | Performed by: INTERNAL MEDICINE

## 2022-08-16 PROCEDURE — 1101F PR PT FALLS ASSESS DOC 0-1 FALLS W/OUT INJ PAST YR: ICD-10-PCS | Mod: CPTII,S$GLB,, | Performed by: INTERNAL MEDICINE

## 2022-08-16 PROCEDURE — 99499 UNLISTED E&M SERVICE: CPT | Mod: S$GLB,,, | Performed by: INTERNAL MEDICINE

## 2022-08-16 NOTE — PROGRESS NOTES
Subjective:       Patient ID: Marine Mo is a 82 y.o. female.    Chief Complaint: Follow-up    Marine Mo is a 82  female who presents for Type II DM,  Chronic liver disease , Hypertension, and Hyperlipidemia follow up. Labs were reviewed with patient today. Patient with c/o recurrent stomach pain and increased anxiety and depression. requesting refill  For alprazolam   Labs are reviewed.    Follow-up  Pertinent negatives include no arthralgias, chest pain, chills, congestion, coughing, fever, myalgias, nausea, neck pain, numbness, sore throat or vomiting.   Hypertension  This is a chronic problem. The current episode started more than 1 year ago. The problem has been waxing and waning since onset. The problem is controlled. Associated symptoms include anxiety. Pertinent negatives include no chest pain, neck pain, palpitations or shortness of breath. Risk factors for coronary artery disease include sedentary lifestyle, obesity and dyslipidemia. Past treatments include beta blockers, lifestyle changes and diuretics. The current treatment provides moderate improvement.   Diabetes  She presents for her follow-up diabetic visit. She has type 2 diabetes mellitus. Her disease course has been stable. Hypoglycemia symptoms include nervousness/anxiousness. Pertinent negatives for hypoglycemia include no confusion, dizziness or pallor. Pertinent negatives for diabetes include no chest pain. Diabetic complications include peripheral neuropathy. Current diabetic treatment includes diet. An ACE inhibitor/angiotensin II receptor blocker is not being taken.   Anxiety  Presents for initial visit. Onset was 1 to 6 months ago. The problem has been gradually worsening. Symptoms include insomnia and nervous/anxious behavior. Patient reports no chest pain, confusion, dizziness, nausea, palpitations, shortness of breath or suicidal ideas. Symptoms occur most days. The severity of symptoms is moderate. Exacerbated by:  xanax at night. The quality of sleep is poor. Nighttime awakenings: one to two.     There are no known risk factors. Past treatments include benzodiazephines. The treatment provided mild relief.   Hyperlipidemia  This is a chronic problem. The current episode started more than 1 year ago. The problem is controlled. Recent lipid tests were reviewed and are low. Exacerbating diseases include obesity. Pertinent negatives include no chest pain, myalgias or shortness of breath. She is currently on no antihyperlipidemic treatment. The current treatment provides mild improvement of lipids. Compliance problems include adherence to diet.    Medication Refill  Pertinent negatives include no arthralgias, chest pain, chills, congestion, coughing, fever, myalgias, nausea, neck pain, numbness, sore throat or vomiting.     Review of Systems   Constitutional: Negative for chills and fever.   HENT: Negative for congestion, hearing loss, sinus pressure and sore throat.    Eyes: Negative for photophobia.   Respiratory: Negative for cough, choking, chest tightness, shortness of breath and wheezing.    Cardiovascular: Negative for chest pain and palpitations.   Gastrointestinal: Positive for constipation. Negative for blood in stool, nausea and vomiting.        Linzess and lactulose    Genitourinary: Negative for dysuria and hematuria.   Musculoskeletal: Negative for arthralgias, myalgias and neck pain.   Skin: Negative for pallor.   Neurological: Negative for dizziness and numbness.   Hematological: Does not bruise/bleed easily.   Psychiatric/Behavioral: Negative for confusion and suicidal ideas. The patient is nervous/anxious and has insomnia.        Objective:      Physical Exam  Vitals and nursing note reviewed.   Constitutional:       Appearance: She is well-developed.   HENT:      Head: Normocephalic and atraumatic.      Right Ear: External ear normal.      Left Ear: External ear normal.   Eyes:      Conjunctiva/sclera:  Conjunctivae normal.      Pupils: Pupils are equal, round, and reactive to light.   Neck:      Thyroid: No thyromegaly.      Vascular: No JVD.      Trachea: No tracheal deviation.   Cardiovascular:      Rate and Rhythm: Normal rate and regular rhythm.      Heart sounds: Normal heart sounds.   Pulmonary:      Effort: Pulmonary effort is normal. No respiratory distress.      Breath sounds: Normal breath sounds. No wheezing or rales.   Chest:      Chest wall: No tenderness.   Abdominal:      General: Bowel sounds are normal. There is no distension.      Palpations: Abdomen is soft. There is hepatomegaly and splenomegaly. There is no mass.      Tenderness: There is no abdominal tenderness. There is no guarding or rebound.          Comments: hepatosplenomegaly    Musculoskeletal:         General: Normal range of motion.      Cervical back: Normal range of motion and neck supple.   Lymphadenopathy:      Cervical: No cervical adenopathy.   Skin:     General: Skin is warm and dry.   Neurological:      Mental Status: She is alert and oriented to person, place, and time.      Cranial Nerves: No cranial nerve deficit.      Motor: No abnormal muscle tone.      Coordination: Coordination normal.      Deep Tendon Reflexes: Reflexes are normal and symmetric. Reflexes normal.      Comments: CN: Optic discs are flat with normal vasculature, PERRL, Extraoccular movements and visual fields are full. Normal facial sensation and strength, Hearing symmetric, Tongue and Palate are midline and strong. Shoulder Shrug symmetric and strong.         Assessment:       1. Mixed hyperlipidemia    2. Controlled type 2 diabetes mellitus without complication, without long-term current use of insulin    3. Essential hypertension    4. Hepatomegaly    5. Splenomegaly    6. Thrombocytopenia        Plan:   Marine was seen today for follow-up.    Diagnoses and all orders for this visit:    Mixed hyperlipidemia  -     Lipid Panel; Future  -     TSH;  Future  Well controlled.  Continue same medication and dose.  Controlled type 2 diabetes mellitus without complication, without long-term current use of insulin  -     Hemoglobin A1C; Future  -     Microalbumin/Creatinine Ratio, Urine; Future  Well controlled.  Continue same medication and dose.      Essential hypertension  -     CBC Auto Differential; Future  -     Comprehensive Metabolic Panel; Future    Well controlled.  Continue same medication and dose.  1. Keep weight close to ideal body weight.   2.   Avoid high salt foods (olives, pickles, smoked meats, salted potato chips, etc.).   Do not add salt to your food at the table.   Use only small amounts of salt when cooking.   3. Begin an exercise program. Discuss with your doctor what type of exercise program would be best for you. It doesn't have to be difficult. Even brisk walking for 20 minutes three times a week is a good form of exercise.   4. Avoid medicines which contain heart stimulants. This includes many cold and sinus decongestant pills and sprays as well as diet pills. Check the warnings about hypertension on the label. Stimulants such as amphetamine or cocaine could be lethal for someone with hypertension. Never take these.    Hepatomegaly  -     CBC Auto Differential; Future  Chronic liver disease   Stay on lactulose    Splenomegaly  -     CBC Auto Differential; Future  Pancytopenia   Will follow labs     Thrombocytopenia  -     CBC Auto Differential; Future    Due to splenomegaly due to chronic liver disease   She sees Dr Hernandez regularly .      Problem List Items Addressed This Visit     Hyperlipidemia - Primary    Thrombocytopenia    Type 2 diabetes mellitus, controlled    Essential hypertension    Hepatomegaly    Splenomegaly

## 2022-09-16 ENCOUNTER — PATIENT OUTREACH (OUTPATIENT)
Dept: ADMINISTRATIVE | Facility: OTHER | Age: 82
End: 2022-09-16
Payer: MEDICARE

## 2022-09-16 VITALS — DIASTOLIC BLOOD PRESSURE: 60 MMHG | OXYGEN SATURATION: 98 % | HEART RATE: 64 BPM | SYSTOLIC BLOOD PRESSURE: 110 MMHG

## 2022-10-12 ENCOUNTER — PATIENT OUTREACH (OUTPATIENT)
Dept: ADMINISTRATIVE | Facility: OTHER | Age: 82
End: 2022-10-12
Payer: MEDICARE

## 2022-10-12 VITALS — HEART RATE: 74 BPM | SYSTOLIC BLOOD PRESSURE: 116 MMHG | DIASTOLIC BLOOD PRESSURE: 60 MMHG | OXYGEN SATURATION: 98 %

## 2022-10-20 ENCOUNTER — PES CALL (OUTPATIENT)
Dept: ADMINISTRATIVE | Facility: CLINIC | Age: 82
End: 2022-10-20
Payer: MEDICARE

## 2022-11-15 ENCOUNTER — PATIENT OUTREACH (OUTPATIENT)
Dept: ADMINISTRATIVE | Facility: OTHER | Age: 82
End: 2022-11-15
Payer: MEDICARE

## 2022-11-15 VITALS — HEART RATE: 70 BPM | SYSTOLIC BLOOD PRESSURE: 128 MMHG | OXYGEN SATURATION: 98 % | DIASTOLIC BLOOD PRESSURE: 76 MMHG

## 2022-12-15 ENCOUNTER — PATIENT OUTREACH (OUTPATIENT)
Dept: ADMINISTRATIVE | Facility: OTHER | Age: 82
End: 2022-12-15
Payer: MEDICARE

## 2022-12-15 VITALS — HEART RATE: 83 BPM | OXYGEN SATURATION: 96 % | DIASTOLIC BLOOD PRESSURE: 50 MMHG | SYSTOLIC BLOOD PRESSURE: 114 MMHG

## 2022-12-19 RX ORDER — METFORMIN HYDROCHLORIDE 500 MG/1
500 TABLET ORAL 2 TIMES DAILY WITH MEALS
Qty: 180 TABLET | Refills: 0 | Status: SHIPPED | OUTPATIENT
Start: 2022-12-19 | End: 2023-03-22

## 2022-12-19 NOTE — TELEPHONE ENCOUNTER
----- Message from Meche Mo sent at 2022  2:04 PM CST -----  Contact: pt  Marine Mo  MRN: [id number]  : 1940  PCP: Serg Hwang  Home Phone      [phone]  Work Phone      [phone]  Mobile          [phone]      MESSAGE: Pt left a message on the answering machine stating she needs a refill on metFORMIN (GLUCOPHAGE) 500 MG tablet        [facility] [address] [place] [address] [place] [address]  Phone: [phone] Fax: [phone]

## 2022-12-19 NOTE — TELEPHONE ENCOUNTER
No new care gaps identified.  [facility] Embedded Care Gaps. Reference number: [id number]. 12/19/2022   2:13:44 PM CST

## 2023-01-11 ENCOUNTER — PATIENT OUTREACH (OUTPATIENT)
Dept: ADMINISTRATIVE | Facility: OTHER | Age: 83
End: 2023-01-11
Payer: MEDICARE

## 2023-01-11 VITALS — HEART RATE: 74 BPM | SYSTOLIC BLOOD PRESSURE: 136 MMHG | OXYGEN SATURATION: 96 % | DIASTOLIC BLOOD PRESSURE: 70 MMHG

## 2023-01-23 ENCOUNTER — TELEPHONE (OUTPATIENT)
Dept: INTERNAL MEDICINE | Facility: CLINIC | Age: 83
End: 2023-01-23
Payer: MEDICARE

## 2023-01-23 DIAGNOSIS — G89.29 CHRONIC RIGHT SHOULDER PAIN: Primary | ICD-10-CM

## 2023-01-23 DIAGNOSIS — M25.511 CHRONIC RIGHT SHOULDER PAIN: Primary | ICD-10-CM

## 2023-01-23 NOTE — TELEPHONE ENCOUNTER
----- Message from Anneliese Joaquin sent at 2023 12:35 PM CST -----  Contact: pt  Marine Mo  MRN: [id number]  : 1940  PCP: Serg Hwang  Home Phone      [phone]  Work Phone      [phone]  Mobile          [phone]      MESSAGE:     Pt called in regards to an MRI. Pt states she was offered an MRI and want to know if they called the insurance and confirmed they would pay for it.     Pleas advise   [phone]

## 2023-01-23 NOTE — TELEPHONE ENCOUNTER
----- Message from Meche Mo sent at 2023 10:25 AM CST -----  Contact: pt  Marine Mo  MRN: [id number]  : 1940  PCP: Serg Hwang  Home Phone      [phone]  Work Phone      [phone]  Mobile          [phone]      MESSAGE: pt states she had an xray done a month ago and the spot is still hurting her. She is concerned about it and is requesting  a Ct scan to make sure it's nothing more.        [phone]

## 2023-01-23 NOTE — TELEPHONE ENCOUNTER
Pt requesting a CT for her shoulder she states she is still having pain I explained to her the results from the xray from August she declines physical therapy and would like s CT, please advise.

## 2023-02-08 ENCOUNTER — LAB VISIT (OUTPATIENT)
Dept: LAB | Facility: HOSPITAL | Age: 83
End: 2023-02-08
Attending: INTERNAL MEDICINE
Payer: MEDICARE

## 2023-02-08 DIAGNOSIS — D69.6 THROMBOCYTOPENIA: ICD-10-CM

## 2023-02-08 DIAGNOSIS — I10 ESSENTIAL HYPERTENSION: ICD-10-CM

## 2023-02-08 DIAGNOSIS — E11.9 CONTROLLED TYPE 2 DIABETES MELLITUS WITHOUT COMPLICATION, WITHOUT LONG-TERM CURRENT USE OF INSULIN: ICD-10-CM

## 2023-02-08 DIAGNOSIS — E78.2 MIXED HYPERLIPIDEMIA: ICD-10-CM

## 2023-02-08 DIAGNOSIS — R16.1 SPLENOMEGALY: ICD-10-CM

## 2023-02-08 DIAGNOSIS — R16.0 HEPATOMEGALY: ICD-10-CM

## 2023-02-08 LAB
ALBUMIN SERPL BCP-MCNC: 3.6 G/DL (ref 3.5–5.2)
ALBUMIN/CREAT UR: 23.3 UG/MG (ref 0–30)
ALP SERPL-CCNC: 28 U/L (ref 55–135)
ALT SERPL W/O P-5'-P-CCNC: 14 U/L (ref 10–44)
ANION GAP SERPL CALC-SCNC: 9 MMOL/L (ref 8–16)
AST SERPL-CCNC: 20 U/L (ref 10–40)
BASOPHILS # BLD AUTO: 0.01 K/UL (ref 0–0.2)
BASOPHILS NFR BLD: 0.5 % (ref 0–1.9)
BILIRUB SERPL-MCNC: 0.4 MG/DL (ref 0.1–1)
BUN SERPL-MCNC: 18 MG/DL (ref 8–23)
CALCIUM SERPL-MCNC: 9.5 MG/DL (ref 8.7–10.5)
CHLORIDE SERPL-SCNC: 108 MMOL/L (ref 95–110)
CHOLEST SERPL-MCNC: 147 MG/DL (ref 120–199)
CHOLEST/HDLC SERPL: 4.1 {RATIO} (ref 2–5)
CO2 SERPL-SCNC: 24 MMOL/L (ref 23–29)
CREAT SERPL-MCNC: 0.8 MG/DL (ref 0.5–1.4)
CREAT UR-MCNC: 94.4 MG/DL (ref 15–325)
DIFFERENTIAL METHOD: ABNORMAL
EOSINOPHIL # BLD AUTO: 0.1 K/UL (ref 0–0.5)
EOSINOPHIL NFR BLD: 3.2 % (ref 0–8)
ERYTHROCYTE [DISTWIDTH] IN BLOOD BY AUTOMATED COUNT: 17.1 % (ref 11.5–14.5)
EST. GFR  (NO RACE VARIABLE): >60 ML/MIN/1.73 M^2
ESTIMATED AVG GLUCOSE: 105 MG/DL (ref 68–131)
GLUCOSE SERPL-MCNC: 107 MG/DL (ref 70–110)
HBA1C MFR BLD: 5.3 % (ref 4–5.6)
HCT VFR BLD AUTO: 30.7 % (ref 37–48.5)
HDLC SERPL-MCNC: 36 MG/DL (ref 40–75)
HDLC SERPL: 24.5 % (ref 20–50)
HGB BLD-MCNC: 9.3 G/DL (ref 12–16)
IMM GRANULOCYTES # BLD AUTO: 0.01 K/UL (ref 0–0.04)
IMM GRANULOCYTES NFR BLD AUTO: 0.5 % (ref 0–0.5)
LDLC SERPL CALC-MCNC: 72.6 MG/DL (ref 63–159)
LYMPHOCYTES # BLD AUTO: 0.7 K/UL (ref 1–4.8)
LYMPHOCYTES NFR BLD: 30.9 % (ref 18–48)
MCH RBC QN AUTO: 24.5 PG (ref 27–31)
MCHC RBC AUTO-ENTMCNC: 30.3 G/DL (ref 32–36)
MCV RBC AUTO: 81 FL (ref 82–98)
MICROALBUMIN UR DL<=1MG/L-MCNC: 22 UG/ML
MONOCYTES # BLD AUTO: 0.2 K/UL (ref 0.3–1)
MONOCYTES NFR BLD: 9.5 % (ref 4–15)
NEUTROPHILS # BLD AUTO: 1.2 K/UL (ref 1.8–7.7)
NEUTROPHILS NFR BLD: 55.4 % (ref 38–73)
NONHDLC SERPL-MCNC: 111 MG/DL
NRBC BLD-RTO: 0 /100 WBC
PLATELET # BLD AUTO: 75 K/UL (ref 150–450)
PMV BLD AUTO: 9.3 FL (ref 9.2–12.9)
POTASSIUM SERPL-SCNC: 4.1 MMOL/L (ref 3.5–5.1)
PROT SERPL-MCNC: 6.7 G/DL (ref 6–8.4)
RBC # BLD AUTO: 3.8 M/UL (ref 4–5.4)
SODIUM SERPL-SCNC: 141 MMOL/L (ref 136–145)
TRIGL SERPL-MCNC: 192 MG/DL (ref 30–150)
TSH SERPL DL<=0.005 MIU/L-ACNC: 2.06 UIU/ML (ref 0.4–4)
WBC # BLD AUTO: 2.2 K/UL (ref 3.9–12.7)

## 2023-02-08 PROCEDURE — 82570 ASSAY OF URINE CREATININE: CPT | Performed by: INTERNAL MEDICINE

## 2023-02-08 PROCEDURE — 85025 COMPLETE CBC W/AUTO DIFF WBC: CPT | Performed by: INTERNAL MEDICINE

## 2023-02-08 PROCEDURE — 84443 ASSAY THYROID STIM HORMONE: CPT | Performed by: INTERNAL MEDICINE

## 2023-02-08 PROCEDURE — 80053 COMPREHEN METABOLIC PANEL: CPT | Performed by: INTERNAL MEDICINE

## 2023-02-08 PROCEDURE — 36415 COLL VENOUS BLD VENIPUNCTURE: CPT | Performed by: INTERNAL MEDICINE

## 2023-02-08 PROCEDURE — 83036 HEMOGLOBIN GLYCOSYLATED A1C: CPT | Performed by: INTERNAL MEDICINE

## 2023-02-08 PROCEDURE — 80061 LIPID PANEL: CPT | Performed by: INTERNAL MEDICINE

## 2023-02-15 ENCOUNTER — PATIENT OUTREACH (OUTPATIENT)
Dept: ADMINISTRATIVE | Facility: HOSPITAL | Age: 83
End: 2023-02-15
Payer: MEDICARE

## 2023-02-15 ENCOUNTER — OFFICE VISIT (OUTPATIENT)
Dept: INTERNAL MEDICINE | Facility: CLINIC | Age: 83
End: 2023-02-15
Payer: MEDICARE

## 2023-02-15 VITALS
OXYGEN SATURATION: 97 % | BODY MASS INDEX: 29.76 KG/M2 | HEIGHT: 61 IN | RESPIRATION RATE: 18 BRPM | SYSTOLIC BLOOD PRESSURE: 116 MMHG | HEART RATE: 80 BPM | WEIGHT: 157.63 LBS | DIASTOLIC BLOOD PRESSURE: 60 MMHG

## 2023-02-15 DIAGNOSIS — L29.0 ANAL ITCHING: ICD-10-CM

## 2023-02-15 DIAGNOSIS — D69.6 THROMBOCYTOPENIA: ICD-10-CM

## 2023-02-15 DIAGNOSIS — E11.40 TYPE 2 DIABETES MELLITUS WITH DIABETIC NEUROPATHY, WITHOUT LONG-TERM CURRENT USE OF INSULIN: ICD-10-CM

## 2023-02-15 DIAGNOSIS — R29.818 NEUROGENIC CLAUDICATION: ICD-10-CM

## 2023-02-15 DIAGNOSIS — M46.1 SACROILIITIS, NOT ELSEWHERE CLASSIFIED: ICD-10-CM

## 2023-02-15 DIAGNOSIS — I70.0 AORTIC ATHEROSCLEROSIS: ICD-10-CM

## 2023-02-15 DIAGNOSIS — I25.118 ATHEROSCLEROTIC HEART DISEASE OF NATIVE CORONARY ARTERY WITH OTHER FORMS OF ANGINA PECTORIS: ICD-10-CM

## 2023-02-15 DIAGNOSIS — F51.04 CHRONIC INSOMNIA: ICD-10-CM

## 2023-02-15 DIAGNOSIS — I10 ESSENTIAL HYPERTENSION: Primary | ICD-10-CM

## 2023-02-15 DIAGNOSIS — K76.6 PORTAL HYPERTENSION: ICD-10-CM

## 2023-02-15 DIAGNOSIS — E78.2 MIXED HYPERLIPIDEMIA: ICD-10-CM

## 2023-02-15 PROCEDURE — 1126F AMNT PAIN NOTED NONE PRSNT: CPT | Mod: CPTII,S$GLB,, | Performed by: INTERNAL MEDICINE

## 2023-02-15 PROCEDURE — 99999 PR PBB SHADOW E&M-EST. PATIENT-LVL III: ICD-10-PCS | Mod: PBBFAC,,, | Performed by: INTERNAL MEDICINE

## 2023-02-15 PROCEDURE — 1126F PR PAIN SEVERITY QUANTIFIED, NO PAIN PRESENT: ICD-10-PCS | Mod: CPTII,S$GLB,, | Performed by: INTERNAL MEDICINE

## 2023-02-15 PROCEDURE — 99214 OFFICE O/P EST MOD 30 MIN: CPT | Mod: S$GLB,,, | Performed by: INTERNAL MEDICINE

## 2023-02-15 PROCEDURE — 99214 PR OFFICE/OUTPT VISIT, EST, LEVL IV, 30-39 MIN: ICD-10-PCS | Mod: S$GLB,,, | Performed by: INTERNAL MEDICINE

## 2023-02-15 PROCEDURE — 1159F PR MEDICATION LIST DOCUMENTED IN MEDICAL RECORD: ICD-10-PCS | Mod: CPTII,S$GLB,, | Performed by: INTERNAL MEDICINE

## 2023-02-15 PROCEDURE — 3074F PR MOST RECENT SYSTOLIC BLOOD PRESSURE < 130 MM HG: ICD-10-PCS | Mod: CPTII,S$GLB,, | Performed by: INTERNAL MEDICINE

## 2023-02-15 PROCEDURE — 3078F DIAST BP <80 MM HG: CPT | Mod: CPTII,S$GLB,, | Performed by: INTERNAL MEDICINE

## 2023-02-15 PROCEDURE — 99499 UNLISTED E&M SERVICE: CPT | Mod: S$GLB,,, | Performed by: INTERNAL MEDICINE

## 2023-02-15 PROCEDURE — 1160F RVW MEDS BY RX/DR IN RCRD: CPT | Mod: CPTII,S$GLB,, | Performed by: INTERNAL MEDICINE

## 2023-02-15 PROCEDURE — 1159F MED LIST DOCD IN RCRD: CPT | Mod: CPTII,S$GLB,, | Performed by: INTERNAL MEDICINE

## 2023-02-15 PROCEDURE — 99499 RISK ADDL DX/OHS AUDIT: ICD-10-PCS | Mod: S$GLB,,, | Performed by: INTERNAL MEDICINE

## 2023-02-15 PROCEDURE — 3078F PR MOST RECENT DIASTOLIC BLOOD PRESSURE < 80 MM HG: ICD-10-PCS | Mod: CPTII,S$GLB,, | Performed by: INTERNAL MEDICINE

## 2023-02-15 PROCEDURE — 3074F SYST BP LT 130 MM HG: CPT | Mod: CPTII,S$GLB,, | Performed by: INTERNAL MEDICINE

## 2023-02-15 PROCEDURE — 1160F PR REVIEW ALL MEDS BY PRESCRIBER/CLIN PHARMACIST DOCUMENTED: ICD-10-PCS | Mod: CPTII,S$GLB,, | Performed by: INTERNAL MEDICINE

## 2023-02-15 PROCEDURE — 99999 PR PBB SHADOW E&M-EST. PATIENT-LVL III: CPT | Mod: PBBFAC,,, | Performed by: INTERNAL MEDICINE

## 2023-02-15 RX ORDER — NYSTATIN 100000 U/G
CREAM TOPICAL 2 TIMES DAILY
Qty: 30 G | Refills: 0 | Status: SHIPPED | OUTPATIENT
Start: 2023-02-15 | End: 2024-01-10 | Stop reason: SDUPTHER

## 2023-02-15 RX ORDER — ALPRAZOLAM 0.5 MG/1
TABLET ORAL
Qty: 30 TABLET | Refills: 0 | Status: SHIPPED | OUTPATIENT
Start: 2023-02-15 | End: 2023-05-08

## 2023-02-15 RX ORDER — TRIAMCINOLONE ACETONIDE 1 MG/G
CREAM TOPICAL 2 TIMES DAILY
Qty: 28.4 G | Refills: 0 | Status: SHIPPED | OUTPATIENT
Start: 2023-02-15 | End: 2024-01-10 | Stop reason: SDUPTHER

## 2023-02-15 NOTE — PROGRESS NOTES
"HPI:  Marine Mo is a 82 y.o. female here for Follow-up  Labs are reviewed       Review of Systems   Constitutional:  Negative for chills and fever.   HENT:  Negative for congestion, hearing loss, sinus pressure and sore throat.    Eyes:  Negative for photophobia.   Respiratory:  Negative for cough, choking, chest tightness, shortness of breath and wheezing.    Cardiovascular:  Negative for chest pain and palpitations.   Gastrointestinal:  Positive for constipation. Negative for blood in stool, nausea and vomiting.        Linzess and lactulose    Genitourinary:  Negative for dysuria and hematuria.   Musculoskeletal:  Negative for arthralgias, myalgias and neck pain.   Skin:  Negative for pallor.   Neurological:  Negative for dizziness and numbness.   Hematological:  Does not bruise/bleed easily.   Psychiatric/Behavioral:  Negative for confusion and suicidal ideas. The patient is nervous/anxious.     A review of systems was performed and is negative except as noted above.    Objective:  Vitals:    02/15/23 0812   BP: 116/60   Pulse: 80   Resp: 18   SpO2: 97%   Weight: 71.5 kg (157 lb 10.1 oz)   Height: 5' 1" (1.549 m)      Physical Exam  Vitals and nursing note reviewed.   Constitutional:       Appearance: She is well-developed.   HENT:      Head: Normocephalic and atraumatic.      Right Ear: External ear normal.      Left Ear: External ear normal.   Eyes:      Conjunctiva/sclera: Conjunctivae normal.      Pupils: Pupils are equal, round, and reactive to light.   Neck:      Thyroid: No thyromegaly.      Vascular: No JVD.      Trachea: No tracheal deviation.   Cardiovascular:      Rate and Rhythm: Normal rate and regular rhythm.      Heart sounds: Normal heart sounds.   Pulmonary:      Effort: Pulmonary effort is normal. No respiratory distress.      Breath sounds: Normal breath sounds. No wheezing or rales.   Chest:      Chest wall: No tenderness.   Abdominal:      General: Bowel sounds are normal. There " is no distension.      Palpations: Abdomen is soft. There is no mass.      Tenderness: There is no abdominal tenderness. There is no guarding or rebound.   Musculoskeletal:         General: Normal range of motion.      Cervical back: Normal range of motion and neck supple.   Lymphadenopathy:      Cervical: No cervical adenopathy.   Skin:     General: Skin is warm and dry.      Comments: Bruising    Neurological:      Mental Status: She is alert and oriented to person, place, and time.      Cranial Nerves: No cranial nerve deficit.      Motor: No abnormal muscle tone.      Coordination: Coordination normal.      Deep Tendon Reflexes: Reflexes are normal and symmetric. Reflexes normal.      Comments: CN: Optic discs are flat with normal vasculature, PERRL, Extraoccular movements and visual fields are full. Normal facial sensation and strength, Hearing symmetric, Tongue and Palate are midline and strong. Shoulder Shrug symmetric and strong.        Assessment/Plan:  1. Essential hypertension  -     CBC Auto Differential; Future; Expected date: 08/14/2023  -     Comprehensive Metabolic Panel; Future; Expected date: 08/14/2023    Well controlled.  Keep weight close to ideal body weight.     Avoid high salt foods (olives, pickles, smoked meats, salted potato chips, etc.).   Do not add salt to your food at the table.   Use only small amounts of salt when cooking.   Begin an exercise program. Discuss with your doctor what type of exercise program would be best for you. It doesn't have to be difficult. Even brisk walking for 20 minutes three times a week is a good form of exercise.   Avoid medicines which contain heart stimulants. This includes many cold and sinus decongestant pills and sprays as well as diet pills. Check the warnings about hypertension on the label. Stimulants such as amphetamine or cocaine could be lethal for someone with hypertension. Never take these.    2. Sacroiliitis, not elsewhere classified  stable;  continue same for now   3. Portal hypertension  -     CBC Auto Differential; Future; Expected date: 08/14/2023  -     Comprehensive Metabolic Panel; Future; Expected date: 08/14/2023  Due to cirrhosis     4. Thrombocytopenia  -     CBC Auto Differential; Future; Expected date: 08/14/2023  Likely due to cirrhosis of liver     5. Neurogenic claudication  Pain is better controlled     6. Atherosclerotic heart disease of native coronary artery with other forms of angina pectoris  Continue ASA /plavix     7. Aortic atherosclerosis  Overview:  Calcified coronary artery disease is noted.  Calcified atheromatous disease affects the aorta and its branch vessels. Per CTA of chest 5/2018       8. Type 2 diabetes mellitus with diabetic neuropathy, without long-term current use of insulin  -     Hemoglobin A1C; Future; Expected date: 08/14/2023  -     Microalbumin/Creatinine Ratio, Urine; Future; Expected date: 08/14/2023  Patient has controlled Diabetes .  We discussed about diet ;low in calories. Avoid sweats, sodas.  Also increasing activity;walking 2-3 miles a day.  I also adjusted medications and gave patient  instructions about adherence to plan.  Goal of  A1c  less than 7 % stressed.  Also goal of LDL less than 70 highlighted to patient.   9. Mixed hyperlipidemia  -     Lipid Panel; Future; Expected date: 08/14/2023  -     TSH; Future; Expected date: 08/14/2023  Continue pravastatin     10. Chronic insomnia  -     ALPRAZolam (XANAX) 0.5 MG tablet; TAKE 1 TABLET BY MOUTH NIGHTLY AS NEEDED  Dispense: 30 tablet; Refill: 0  -     TSH; Future; Expected date: 08/14/2023    11. Anal itching  -     nystatin (MYCOSTATIN) cream; Apply topically 2 (two) times daily.  Dispense: 30 g; Refill: 0  -     triamcinolone acetonide 0.1% (KENALOG) 0.1 % cream; Apply topically 2 (two) times daily.  Dispense: 28.4 g; Refill: 0

## 2023-02-16 ENCOUNTER — PATIENT OUTREACH (OUTPATIENT)
Dept: ADMINISTRATIVE | Facility: OTHER | Age: 83
End: 2023-02-16
Payer: MEDICARE

## 2023-02-16 ENCOUNTER — PATIENT MESSAGE (OUTPATIENT)
Dept: ADMINISTRATIVE | Facility: OTHER | Age: 83
End: 2023-02-16
Payer: MEDICARE

## 2023-02-16 VITALS — OXYGEN SATURATION: 94 % | DIASTOLIC BLOOD PRESSURE: 60 MMHG | SYSTOLIC BLOOD PRESSURE: 130 MMHG | HEART RATE: 83 BPM

## 2023-03-01 DIAGNOSIS — I10 ESSENTIAL HYPERTENSION: ICD-10-CM

## 2023-03-01 RX ORDER — METOPROLOL TARTRATE 100 MG/1
50 TABLET ORAL 2 TIMES DAILY
Qty: 90 TABLET | Refills: 0 | Status: SHIPPED | OUTPATIENT
Start: 2023-03-01

## 2023-03-01 NOTE — TELEPHONE ENCOUNTER
No new care gaps identified.  [facility] Embedded Care Gaps. Reference number: [id number]. 3/01/2023   9:50:42 AM CST

## 2023-03-01 NOTE — TELEPHONE ENCOUNTER
LOV 2/15/2023  Scheduled visit 8/16/2023    Requested Prescriptions     Pending Prescriptions Disp Refills    metoprolol tartrate (LOPRESSOR) 100 MG tablet 90 tablet 0     Sig: Take 0.5 tablets (50 mg total) by mouth 2 (two) times daily.

## 2023-03-01 NOTE — TELEPHONE ENCOUNTER
----- Message from Meche Mo sent at 3/1/2023  9:36 AM CST -----  Contact: pt  Marine Mo  MRN: [id number]  : 1940  PCP: Serg Hwang  Home Phone      Not on file.  Work Phone      [phone]  [employer]          [phone]      MESSAGE: pt needs a refill on the following prescription    metoprolol tartrate (LOPRESSOR) 100 MG tablet    [facility] [address] [place] [address] [address] [address]  Phone: [phone] Fax: [phone]

## 2023-03-22 ENCOUNTER — TELEPHONE (OUTPATIENT)
Dept: INTERNAL MEDICINE | Facility: CLINIC | Age: 83
End: 2023-03-22
Payer: MEDICARE

## 2023-03-22 RX ORDER — METFORMIN HYDROCHLORIDE 500 MG/1
TABLET ORAL
Qty: 180 TABLET | Refills: 1 | Status: SHIPPED | OUTPATIENT
Start: 2023-03-22 | End: 2023-09-25

## 2023-03-22 NOTE — TELEPHONE ENCOUNTER
Refill Decision Note   Marine Chepe  is requesting a refill authorization.  Brief Assessment and Rationale for Refill:  Approve     Medication Therapy Plan:       Medication Reconciliation Completed: No   Comments:     No Care Gaps recommended.     Note composed:9:55 AM 03/22/2023

## 2023-03-22 NOTE — TELEPHONE ENCOUNTER
No new care gaps identified.  [facility] Embedded Care Gaps. Reference number: [id number]. 3/22/2023   9:11:29 AM JANT

## 2023-03-22 NOTE — TELEPHONE ENCOUNTER
----- Message from Meche Mo sent at 3/22/2023  9:15 AM CDT -----  Contact: pt  Marine Mo  MRN: [id number]  : 1940  PCP: Serg Hwang  Home Phone      Not on file.  Work Phone      [phone]  [employer]          [phone]      MESSAGE: pt is requesting a refill on the following prescription metFORMIN (GLUCOPHAGE) 500 MG tablet    [facility] [place] [id number] [address]  Phone: [phone] Fax: [phone]  Hours: Not open 24 hours    [phone]    
Medication was sent this morning.   
no

## 2023-04-11 ENCOUNTER — PATIENT OUTREACH (OUTPATIENT)
Dept: ADMINISTRATIVE | Facility: OTHER | Age: 83
End: 2023-04-11
Payer: MEDICARE

## 2023-04-11 VITALS — DIASTOLIC BLOOD PRESSURE: 60 MMHG | SYSTOLIC BLOOD PRESSURE: 100 MMHG | OXYGEN SATURATION: 100 % | HEART RATE: 79 BPM

## 2023-05-08 DIAGNOSIS — F51.04 CHRONIC INSOMNIA: ICD-10-CM

## 2023-05-08 RX ORDER — ALPRAZOLAM 0.5 MG/1
TABLET ORAL
Qty: 30 TABLET | Refills: 0 | Status: SHIPPED | OUTPATIENT
Start: 2023-05-08 | End: 2023-07-10

## 2023-05-08 NOTE — TELEPHONE ENCOUNTER
No care due was identified.  [facility] Embedded Care Due Messages. Reference number: [id number].   5/08/2023 8:33:03 AM CDT

## 2023-05-09 ENCOUNTER — PATIENT OUTREACH (OUTPATIENT)
Dept: ADMINISTRATIVE | Facility: OTHER | Age: 83
End: 2023-05-09
Payer: MEDICARE

## 2023-05-09 VITALS — DIASTOLIC BLOOD PRESSURE: 60 MMHG | HEART RATE: 69 BPM | OXYGEN SATURATION: 96 % | SYSTOLIC BLOOD PRESSURE: 114 MMHG

## 2023-06-13 ENCOUNTER — PATIENT OUTREACH (OUTPATIENT)
Dept: ADMINISTRATIVE | Facility: OTHER | Age: 83
End: 2023-06-13
Payer: MEDICARE

## 2023-06-13 VITALS — DIASTOLIC BLOOD PRESSURE: 60 MMHG | SYSTOLIC BLOOD PRESSURE: 110 MMHG | OXYGEN SATURATION: 95 % | HEART RATE: 76 BPM

## 2023-07-15 ENCOUNTER — HOSPITAL ENCOUNTER (EMERGENCY)
Facility: HOSPITAL | Age: 83
Discharge: HOME | End: 2023-07-15
Attending: STUDENT IN AN ORGANIZED HEALTH CARE EDUCATION/TRAINING PROGRAM
Payer: MEDICARE

## 2023-07-15 VITALS
DIASTOLIC BLOOD PRESSURE: 59 MMHG | TEMPERATURE: 99 F | HEIGHT: 61 IN | OXYGEN SATURATION: 95 % | RESPIRATION RATE: 20 BRPM | BODY MASS INDEX: 29.98 KG/M2 | SYSTOLIC BLOOD PRESSURE: 127 MMHG | WEIGHT: 158.81 LBS | HEART RATE: 75 BPM

## 2023-07-15 DIAGNOSIS — S20.212A CONTUSION OF LEFT CHEST WALL, INITIAL ENCOUNTER: ICD-10-CM
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
## 2023-07-15 PROCEDURE — 25000003 PHARM REV CODE 250: Performed by: NURSE PRACTITIONER

## 2023-07-15 PROCEDURE — 99284 EMERGENCY DEPT VISIT MOD MDM: CPT | Mod: 25

## 2023-07-15 RX ORDER — ACETAMINOPHEN 500 MG
1000 TABLET ORAL
Status: COMPLETED | OUTPATIENT
Start: 2023-07-15 | End: 2023-07-15

## 2023-07-15 RX ADMIN — ACETAMINOPHEN 1000 MG: 500 TABLET ORAL at 04:07
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
## 2023-07-20 ENCOUNTER — OFFICE VISIT (OUTPATIENT)
Dept: INTERNAL MEDICINE | Facility: CLINIC | Age: 83
End: 2023-07-20
Payer: MEDICARE

## 2023-07-20 VITALS
SYSTOLIC BLOOD PRESSURE: 126 MMHG | HEIGHT: 61 IN | BODY MASS INDEX: 29.97 KG/M2 | OXYGEN SATURATION: 97 % | DIASTOLIC BLOOD PRESSURE: 58 MMHG | WEIGHT: 158.75 LBS | HEART RATE: 77 BPM

## 2023-07-20 DIAGNOSIS — S20.01XA CONTUSION OF RIGHT BREAST, INITIAL ENCOUNTER: ICD-10-CM
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
## 2023-07-20 DIAGNOSIS — S20.212A CONTUSION OF LEFT CHEST WALL, INITIAL ENCOUNTER: ICD-10-CM

## 2023-07-20 DIAGNOSIS — S80.12XA CONTUSION OF LEFT LOWER EXTREMITY, INITIAL ENCOUNTER: ICD-10-CM

## 2023-07-20 PROCEDURE — 3288F PR FALLS RISK ASSESSMENT DOCUMENTED: ICD-10-PCS | Mod: CPTII,S$GLB,, | Performed by: INTERNAL MEDICINE

## 2023-07-20 PROCEDURE — 3078F DIAST BP <80 MM HG: CPT | Mod: CPTII,S$GLB,, | Performed by: INTERNAL MEDICINE

## 2023-07-20 PROCEDURE — 99214 OFFICE O/P EST MOD 30 MIN: CPT | Mod: S$GLB,,, | Performed by: INTERNAL MEDICINE

## 2023-07-20 PROCEDURE — 99214 PR OFFICE/OUTPT VISIT, EST, LEVL IV, 30-39 MIN: ICD-10-PCS | Mod: S$GLB,,, | Performed by: INTERNAL MEDICINE

## 2023-07-20 PROCEDURE — 3074F SYST BP LT 130 MM HG: CPT | Mod: CPTII,S$GLB,, | Performed by: INTERNAL MEDICINE

## 2023-07-20 PROCEDURE — 3288F FALL RISK ASSESSMENT DOCD: CPT | Mod: CPTII,S$GLB,, | Performed by: INTERNAL MEDICINE

## 2023-07-20 PROCEDURE — 1101F PR PT FALLS ASSESS DOC 0-1 FALLS W/OUT INJ PAST YR: ICD-10-PCS | Mod: CPTII,S$GLB,, | Performed by: INTERNAL MEDICINE

## 2023-07-20 PROCEDURE — 1160F RVW MEDS BY RX/DR IN RCRD: CPT | Mod: CPTII,S$GLB,, | Performed by: INTERNAL MEDICINE

## 2023-07-20 PROCEDURE — 3074F PR MOST RECENT SYSTOLIC BLOOD PRESSURE < 130 MM HG: ICD-10-PCS | Mod: CPTII,S$GLB,, | Performed by: INTERNAL MEDICINE

## 2023-07-20 PROCEDURE — 1159F MED LIST DOCD IN RCRD: CPT | Mod: CPTII,S$GLB,, | Performed by: INTERNAL MEDICINE

## 2023-07-20 PROCEDURE — 3078F PR MOST RECENT DIASTOLIC BLOOD PRESSURE < 80 MM HG: ICD-10-PCS | Mod: CPTII,S$GLB,, | Performed by: INTERNAL MEDICINE

## 2023-07-20 PROCEDURE — 1101F PT FALLS ASSESS-DOCD LE1/YR: CPT | Mod: CPTII,S$GLB,, | Performed by: INTERNAL MEDICINE

## 2023-07-20 PROCEDURE — 99999 PR PBB SHADOW E&M-EST. PATIENT-LVL III: ICD-10-PCS | Mod: PBBFAC,,, | Performed by: INTERNAL MEDICINE

## 2023-07-20 PROCEDURE — 1160F PR REVIEW ALL MEDS BY PRESCRIBER/CLIN PHARMACIST DOCUMENTED: ICD-10-PCS | Mod: CPTII,S$GLB,, | Performed by: INTERNAL MEDICINE

## 2023-07-20 PROCEDURE — 1159F PR MEDICATION LIST DOCUMENTED IN MEDICAL RECORD: ICD-10-PCS | Mod: CPTII,S$GLB,, | Performed by: INTERNAL MEDICINE

## 2023-07-20 PROCEDURE — 99999 PR PBB SHADOW E&M-EST. PATIENT-LVL III: CPT | Mod: PBBFAC,,, | Performed by: INTERNAL MEDICINE

## 2023-07-20 RX ORDER — ATORVASTATIN CALCIUM 20 MG/1
20 TABLET, FILM COATED ORAL
COMMUNITY
Start: 2023-05-31

## 2023-07-20 RX ORDER — LISINOPRIL 5 MG/1
5 TABLET ORAL DAILY
COMMUNITY

## 2023-07-20 RX ORDER — SUCRALFATE 1 G/1
TABLET ORAL
COMMUNITY
Start: 2023-02-08

## 2023-07-20 RX ORDER — CYCLOSPORINE 0.5 MG/ML
EMULSION OPHTHALMIC
COMMUNITY
Start: 2023-06-28
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
## 2023-08-09 ENCOUNTER — PATIENT OUTREACH (OUTPATIENT)
Dept: ADMINISTRATIVE | Facility: OTHER | Age: 83
End: 2023-08-09
Payer: MEDICARE

## 2023-08-09 VITALS — OXYGEN SATURATION: 95 % | SYSTOLIC BLOOD PRESSURE: 108 MMHG | DIASTOLIC BLOOD PRESSURE: 60 MMHG | HEART RATE: 90 BPM

## 2023-08-11 ENCOUNTER — LAB VISIT (OUTPATIENT)
Dept: LAB | Facility: HOSPITAL | Age: 83
End: 2023-08-11
Attending: INTERNAL MEDICINE
Payer: MEDICARE

## 2023-08-11 DIAGNOSIS — D69.6 THROMBOCYTOPENIA: ICD-10-CM

## 2023-08-11 DIAGNOSIS — E78.2 MIXED HYPERLIPIDEMIA: ICD-10-CM

## 2023-08-11 DIAGNOSIS — K76.6 PORTAL HYPERTENSION: ICD-10-CM

## 2023-08-11 DIAGNOSIS — E11.40 TYPE 2 DIABETES MELLITUS WITH DIABETIC NEUROPATHY, WITHOUT LONG-TERM CURRENT USE OF INSULIN: ICD-10-CM

## 2023-08-11 DIAGNOSIS — F51.04 CHRONIC INSOMNIA: ICD-10-CM

## 2023-08-11 DIAGNOSIS — I10 ESSENTIAL HYPERTENSION: ICD-10-CM

## 2023-08-11 DIAGNOSIS — E11.40 TYPE 2 DIABETES MELLITUS WITH DIABETIC NEUROPATHY, WITHOUT LONG-TERM CURRENT USE OF INSULIN: Primary | ICD-10-CM

## 2023-08-11 LAB
ALBUMIN SERPL BCP-MCNC: 3.9 G/DL (ref 3.5–5.2)
ALBUMIN/CREAT UR: 56.5 UG/MG (ref 0–30)
ALP SERPL-CCNC: 29 U/L (ref 55–135)
ALT SERPL W/O P-5'-P-CCNC: 16 U/L (ref 10–44)
ANION GAP SERPL CALC-SCNC: 10 MMOL/L (ref 8–16)
AST SERPL-CCNC: 22 U/L (ref 10–40)
BASOPHILS # BLD AUTO: 0.02 K/UL (ref 0–0.2)
BASOPHILS NFR BLD: 0.7 % (ref 0–1.9)
BILIRUB SERPL-MCNC: 0.7 MG/DL (ref 0.1–1)
BUN SERPL-MCNC: 21 MG/DL (ref 8–23)
CALCIUM SERPL-MCNC: 10 MG/DL (ref 8.7–10.5)
CHLORIDE SERPL-SCNC: 108 MMOL/L (ref 95–110)
CHOLEST SERPL-MCNC: 127 MG/DL (ref 120–199)
CHOLEST/HDLC SERPL: 3.5 {RATIO} (ref 2–5)
CO2 SERPL-SCNC: 25 MMOL/L (ref 23–29)
CREAT SERPL-MCNC: 0.9 MG/DL (ref 0.5–1.4)
CREAT UR-MCNC: 58.4 MG/DL (ref 15–325)
DIFFERENTIAL METHOD: ABNORMAL
EOSINOPHIL # BLD AUTO: 0.1 K/UL (ref 0–0.5)
EOSINOPHIL NFR BLD: 2.4 % (ref 0–8)
ERYTHROCYTE [DISTWIDTH] IN BLOOD BY AUTOMATED COUNT: 16.6 % (ref 11.5–14.5)
EST. GFR  (NO RACE VARIABLE): >60 ML/MIN/1.73 M^2
ESTIMATED AVG GLUCOSE: 103 MG/DL (ref 68–131)
GLUCOSE SERPL-MCNC: 117 MG/DL (ref 70–110)
HBA1C MFR BLD: 5.2 % (ref 4–5.6)
HCT VFR BLD AUTO: 37.1 % (ref 37–48.5)
HDLC SERPL-MCNC: 36 MG/DL (ref 40–75)
HDLC SERPL: 28.3 % (ref 20–50)
HGB BLD-MCNC: 12.3 G/DL (ref 12–16)
IMM GRANULOCYTES # BLD AUTO: 0.01 K/UL (ref 0–0.04)
IMM GRANULOCYTES NFR BLD AUTO: 0.3 % (ref 0–0.5)
LDLC SERPL CALC-MCNC: 62.2 MG/DL (ref 63–159)
LYMPHOCYTES # BLD AUTO: 0.8 K/UL (ref 1–4.8)
LYMPHOCYTES NFR BLD: 28.3 % (ref 18–48)
MCH RBC QN AUTO: 29.6 PG (ref 27–31)
MCHC RBC AUTO-ENTMCNC: 33.2 G/DL (ref 32–36)
MCV RBC AUTO: 89 FL (ref 82–98)
MICROALBUMIN UR DL<=1MG/L-MCNC: 33 UG/ML
MONOCYTES # BLD AUTO: 0.3 K/UL (ref 0.3–1)
MONOCYTES NFR BLD: 10 % (ref 4–15)
NEUTROPHILS # BLD AUTO: 1.7 K/UL (ref 1.8–7.7)
NEUTROPHILS NFR BLD: 58.3 % (ref 38–73)
NONHDLC SERPL-MCNC: 91 MG/DL
NRBC BLD-RTO: 0 /100 WBC
PLATELET # BLD AUTO: 65 K/UL (ref 150–450)
PMV BLD AUTO: 8.7 FL (ref 9.2–12.9)
POTASSIUM SERPL-SCNC: 5 MMOL/L (ref 3.5–5.1)
PROT SERPL-MCNC: 7 G/DL (ref 6–8.4)
RBC # BLD AUTO: 4.16 M/UL (ref 4–5.4)
SODIUM SERPL-SCNC: 143 MMOL/L (ref 136–145)
TRIGL SERPL-MCNC: 144 MG/DL (ref 30–150)
TSH SERPL DL<=0.005 MIU/L-ACNC: 1.83 UIU/ML (ref 0.4–4)
WBC # BLD AUTO: 2.9 K/UL (ref 3.9–12.7)

## 2023-08-11 PROCEDURE — 85025 COMPLETE CBC W/AUTO DIFF WBC: CPT | Performed by: INTERNAL MEDICINE

## 2023-08-11 PROCEDURE — 36415 COLL VENOUS BLD VENIPUNCTURE: CPT | Performed by: INTERNAL MEDICINE

## 2023-08-11 PROCEDURE — 80053 COMPREHEN METABOLIC PANEL: CPT | Performed by: INTERNAL MEDICINE

## 2023-08-11 PROCEDURE — 80061 LIPID PANEL: CPT | Performed by: INTERNAL MEDICINE

## 2023-08-11 PROCEDURE — 83036 HEMOGLOBIN GLYCOSYLATED A1C: CPT | Performed by: INTERNAL MEDICINE

## 2023-08-11 PROCEDURE — 82570 ASSAY OF URINE CREATININE: CPT | Performed by: INTERNAL MEDICINE

## 2023-08-11 PROCEDURE — 84443 ASSAY THYROID STIM HORMONE: CPT | Performed by: INTERNAL MEDICINE

## 2023-08-16 ENCOUNTER — OFFICE VISIT (OUTPATIENT)
Dept: INTERNAL MEDICINE | Facility: CLINIC | Age: 83
End: 2023-08-16
Payer: MEDICARE

## 2023-08-16 VITALS
RESPIRATION RATE: 16 BRPM | DIASTOLIC BLOOD PRESSURE: 60 MMHG | SYSTOLIC BLOOD PRESSURE: 108 MMHG | OXYGEN SATURATION: 96 % | BODY MASS INDEX: 29.81 KG/M2 | WEIGHT: 157.88 LBS | HEART RATE: 80 BPM | HEIGHT: 61 IN

## 2023-08-16 DIAGNOSIS — E78.2 MIXED HYPERLIPIDEMIA: Primary | ICD-10-CM

## 2023-08-16 DIAGNOSIS — D69.6 THROMBOCYTOPENIA: ICD-10-CM

## 2023-08-16 DIAGNOSIS — R32 URINARY INCONTINENCE, UNSPECIFIED TYPE: ICD-10-CM

## 2023-08-16 DIAGNOSIS — E11.9 CONTROLLED TYPE 2 DIABETES MELLITUS WITHOUT COMPLICATION, WITHOUT LONG-TERM CURRENT USE OF INSULIN: ICD-10-CM

## 2023-08-16 DIAGNOSIS — F51.04 CHRONIC INSOMNIA: ICD-10-CM

## 2023-08-16 DIAGNOSIS — I10 ESSENTIAL HYPERTENSION: ICD-10-CM

## 2023-08-16 PROCEDURE — 3074F SYST BP LT 130 MM HG: CPT | Mod: CPTII,S$GLB,, | Performed by: INTERNAL MEDICINE

## 2023-08-16 PROCEDURE — 3288F PR FALLS RISK ASSESSMENT DOCUMENTED: ICD-10-PCS | Mod: CPTII,S$GLB,, | Performed by: INTERNAL MEDICINE

## 2023-08-16 PROCEDURE — 1159F PR MEDICATION LIST DOCUMENTED IN MEDICAL RECORD: ICD-10-PCS | Mod: CPTII,S$GLB,, | Performed by: INTERNAL MEDICINE

## 2023-08-16 PROCEDURE — 99999 PR PBB SHADOW E&M-EST. PATIENT-LVL IV: ICD-10-PCS | Mod: PBBFAC,,, | Performed by: INTERNAL MEDICINE

## 2023-08-16 PROCEDURE — 1101F PT FALLS ASSESS-DOCD LE1/YR: CPT | Mod: CPTII,S$GLB,, | Performed by: INTERNAL MEDICINE

## 2023-08-16 PROCEDURE — 1160F PR REVIEW ALL MEDS BY PRESCRIBER/CLIN PHARMACIST DOCUMENTED: ICD-10-PCS | Mod: CPTII,S$GLB,, | Performed by: INTERNAL MEDICINE

## 2023-08-16 PROCEDURE — 3074F PR MOST RECENT SYSTOLIC BLOOD PRESSURE < 130 MM HG: ICD-10-PCS | Mod: CPTII,S$GLB,, | Performed by: INTERNAL MEDICINE

## 2023-08-16 PROCEDURE — 3078F PR MOST RECENT DIASTOLIC BLOOD PRESSURE < 80 MM HG: ICD-10-PCS | Mod: CPTII,S$GLB,, | Performed by: INTERNAL MEDICINE

## 2023-08-16 PROCEDURE — 1101F PR PT FALLS ASSESS DOC 0-1 FALLS W/OUT INJ PAST YR: ICD-10-PCS | Mod: CPTII,S$GLB,, | Performed by: INTERNAL MEDICINE

## 2023-08-16 PROCEDURE — 3288F FALL RISK ASSESSMENT DOCD: CPT | Mod: CPTII,S$GLB,, | Performed by: INTERNAL MEDICINE

## 2023-08-16 PROCEDURE — 99214 PR OFFICE/OUTPT VISIT, EST, LEVL IV, 30-39 MIN: ICD-10-PCS | Mod: S$GLB,,, | Performed by: INTERNAL MEDICINE

## 2023-08-16 PROCEDURE — 1159F MED LIST DOCD IN RCRD: CPT | Mod: CPTII,S$GLB,, | Performed by: INTERNAL MEDICINE

## 2023-08-16 PROCEDURE — 99999 PR PBB SHADOW E&M-EST. PATIENT-LVL IV: CPT | Mod: PBBFAC,,, | Performed by: INTERNAL MEDICINE

## 2023-08-16 PROCEDURE — 1160F RVW MEDS BY RX/DR IN RCRD: CPT | Mod: CPTII,S$GLB,, | Performed by: INTERNAL MEDICINE

## 2023-08-16 PROCEDURE — 99214 OFFICE O/P EST MOD 30 MIN: CPT | Mod: S$GLB,,, | Performed by: INTERNAL MEDICINE

## 2023-08-16 PROCEDURE — 1126F PR PAIN SEVERITY QUANTIFIED, NO PAIN PRESENT: ICD-10-PCS | Mod: CPTII,S$GLB,, | Performed by: INTERNAL MEDICINE

## 2023-08-16 PROCEDURE — 1126F AMNT PAIN NOTED NONE PRSNT: CPT | Mod: CPTII,S$GLB,, | Performed by: INTERNAL MEDICINE

## 2023-08-16 PROCEDURE — 3078F DIAST BP <80 MM HG: CPT | Mod: CPTII,S$GLB,, | Performed by: INTERNAL MEDICINE

## 2023-08-16 RX ORDER — OXYBUTYNIN CHLORIDE 5 MG/1
5 TABLET ORAL 2 TIMES DAILY
Qty: 60 TABLET | Refills: 0 | Status: SHIPPED | OUTPATIENT
Start: 2023-08-16 | End: 2023-09-14 | Stop reason: SDUPTHER

## 2023-08-16 RX ORDER — ALPRAZOLAM 0.5 MG/1
0.5 TABLET ORAL NIGHTLY PRN
Qty: 30 TABLET | Refills: 2 | Status: SHIPPED | OUTPATIENT
Start: 2023-08-16 | End: 2024-03-08
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
## 2023-08-18 NOTE — ED TRIAGE NOTES
"Pt presents to the ED c/o abnormal lab taken this AM. Pt states Dr. Roper called her to come to ED for "severe anemia." Denies fatigue, weakness. Pt asymptomatic.  " Plan: Sun screen (SPF 30 or greater) should be applied every 1.5-2 hours, during peak UV exposure (between 10am and 2pm) and reapplied after exercise or swimming.\\n\\nThe ABCDEs of melanoma were reviewed with the patient, and the importance of monthly self-examination of moles was emphasized. Should any moles change in shape or color, or itch, bleed or burn, pt will contact our office for evaluation sooner than their interval appointment.\\n\\nSun protective clothing is also effective at protecting against UV rays that cause skin cancer. Other Instructions: Sun screen (SPF 30 or greater) should be applied every 1.5-2 hours, during peak UV exposure (between 10am and 2pm) and reapplied after exercise or swimming.\\n\\nThe ABCDEs of melanoma were reviewed with the patient, and the importance of monthly self-examination of moles was emphasized. Should any moles change in shape or color, or itch, bleed or burn, pt will contact our office for evaluation sooner than their interval appointment.\\n\\nSun protective clothing is also effective at protecting against UV rays that cause skin cancer.

## 2023-08-31 ENCOUNTER — PATIENT OUTREACH (OUTPATIENT)
Dept: ADMINISTRATIVE | Facility: HOSPITAL | Age: 83
End: 2023-08-31
Payer: MEDICARE

## 2023-08-31 NOTE — PROGRESS NOTES
Chart reviewed, immunization record updated.  No new results noted on [facility] or [facility] web site.  Care Everywhere updated.   Patient care coordination note updated.   Upcoming PCP visit updated.  Next PCP visit 02/19/2024.  LOV with PCP 08/16/2023.  Contacted patient to discuss scheduling Enhanced Annual Wellness Visit.  Patient will speak to her  and call clinic when ready to schedule.    E-fax sent to Dr. Jacob Bartlett for last eye exam report completed.

## 2023-08-31 NOTE — LETTER
AUTHORIZATION FOR RELEASE OF   CONFIDENTIAL INFORMATION    Dear Dr.Jonathan Bartlett,    We are seeing Marine Mo, date of birth 1940, in the clinic at [facility] INTERNAL MEDICINE II. Serg Hwang MD is the patient's PCP. Marine Mo has an outstanding lab/procedure at the time we reviewed her chart. In order to help keep her health information updated, she has authorized us to request the following medical record(s):                                                ( X )  EYE EXAM                   Please fax records to [facility], Nawaz, Mohammad Q., MD, [phone].    If you have any questions, please contact...  Larissa Bermudez LPN  Clinical Care Coordinator  [facility]  Phone: [phone]  Fax: [phone]           Patient Name: Marine Mo  : 1940  Patient Phone #: [phone]

## 2023-09-01 ENCOUNTER — TELEPHONE (OUTPATIENT)
Dept: INTERNAL MEDICINE | Facility: CLINIC | Age: 83
End: 2023-09-01
Payer: MEDICARE

## 2023-09-01 DIAGNOSIS — R11.0 NAUSEA: Primary | ICD-10-CM

## 2023-09-01 RX ORDER — ONDANSETRON HYDROCHLORIDE 8 MG/1
8 TABLET, FILM COATED ORAL EVERY 8 HOURS PRN
Qty: 16 TABLET | Refills: 0 | Status: SHIPPED | OUTPATIENT
Start: 2023-09-01 | End: 2023-10-10

## 2023-09-01 NOTE — TELEPHONE ENCOUNTER
Pt is asking if she can get RX for zofran not active on med list she has been having nausea on and off denies any other symptoms, please advise.

## 2023-09-13 ENCOUNTER — PATIENT OUTREACH (OUTPATIENT)
Dept: ADMINISTRATIVE | Facility: OTHER | Age: 83
End: 2023-09-13
Payer: MEDICARE

## 2023-09-13 VITALS — HEART RATE: 80 BPM | DIASTOLIC BLOOD PRESSURE: 42 MMHG | SYSTOLIC BLOOD PRESSURE: 90 MMHG

## 2023-09-14 DIAGNOSIS — R32 URINARY INCONTINENCE, UNSPECIFIED TYPE: ICD-10-CM

## 2023-09-14 RX ORDER — OXYBUTYNIN CHLORIDE 5 MG/1
5 TABLET ORAL 2 TIMES DAILY
Qty: 60 TABLET | Refills: 0 | Status: SHIPPED | OUTPATIENT
Start: 2023-09-14 | End: 2023-11-15

## 2023-09-14 NOTE — TELEPHONE ENCOUNTER
----- Message from Anneliese Joaquin sent at 2023 10:25 AM CDT -----  Contact: pt  Marine Mo  MRN: [id number]  : 1940  PCP: Serg Hwang  Home Phone      Not on file.  Work Phone      [phone]  Mobile          [phone]      MESSAGE:     Pt needs a refill of oxybutynin (DITROPAN) 5 MG Tab sent to [facility] in [place].       Marine   [phone]

## 2023-09-14 NOTE — TELEPHONE ENCOUNTER
No care due was identified.  Health [facility] Embedded Care Due Messages. Reference number: [id number].   9/14/2023 10:30:13 AM CDT

## 2023-09-18 ENCOUNTER — HOSPITAL ENCOUNTER (EMERGENCY)
Facility: HOSPITAL | Age: 83
Discharge: HOME | End: 2023-09-18
Attending: SURGERY
Payer: MEDICARE

## 2023-09-18 ENCOUNTER — TELEPHONE (OUTPATIENT)
Dept: INTERNAL MEDICINE | Facility: CLINIC | Age: 83
End: 2023-09-18
Payer: MEDICARE

## 2023-09-18 VITALS
WEIGHT: 153 LBS | DIASTOLIC BLOOD PRESSURE: 74 MMHG | BODY MASS INDEX: 28.89 KG/M2 | HEART RATE: 68 BPM | OXYGEN SATURATION: 97 % | SYSTOLIC BLOOD PRESSURE: 132 MMHG | TEMPERATURE: 98 F | RESPIRATION RATE: 16 BRPM | HEIGHT: 61 IN

## 2023-09-18 DIAGNOSIS — K64.5: Primary | ICD-10-CM

## 2023-09-18 DIAGNOSIS — K59.00 CONSTIPATION, UNSPECIFIED CONSTIPATION TYPE: ICD-10-CM

## 2023-09-18 DIAGNOSIS — K64.9 HEMORRHOIDS, UNSPECIFIED HEMORRHOID TYPE: Primary | ICD-10-CM

## 2023-09-18 PROCEDURE — 99283 EMERGENCY DEPT VISIT LOW MDM: CPT

## 2023-09-18 RX ORDER — POLYETHYLENE GLYCOL 3350 17 G/17G
17 POWDER, FOR SOLUTION ORAL DAILY
Qty: 500 G | Refills: 0 | Status: SHIPPED | OUTPATIENT
Start: 2023-09-18

## 2023-09-18 RX ORDER — DOCUSATE SODIUM 100 MG/1
100 CAPSULE, LIQUID FILLED ORAL 2 TIMES DAILY PRN
Qty: 30 CAPSULE | Refills: 0 | Status: SHIPPED | OUTPATIENT
Start: 2023-09-18

## 2023-09-18 RX ORDER — HYDROCORTISONE 25 MG/G
CREAM TOPICAL 2 TIMES DAILY
Qty: 20 G | Refills: 0 | Status: SHIPPED | OUTPATIENT
Start: 2023-09-18

## 2023-09-18 NOTE — TELEPHONE ENCOUNTER
[place] rectal referral placed   If pain does not get better ; may need to have those piles lanced : ER if not sooner appt with colorectal   Continue stools softer; sitz bath also help .

## 2023-09-18 NOTE — TELEPHONE ENCOUNTER
Patient states she has a thrombosed Hemorrhoid. She has seen Dr Hernandez and this is not something he will tend to, she has also tried stool softeners, 2 enemas, and suppositories without relief.    Please advise

## 2023-09-18 NOTE — ED NOTES
"Pt remains on commode with no success having a bm s/p soap suds enema. Tearful and adamant about obtaining some relief. Dr. Barreto notified and states "let pt stay on commode as long as necessary and then d/c."    "

## 2023-09-18 NOTE — ED PROVIDER NOTES
Encounter Date: 9/18/2023       History     Chief Complaint   Patient presents with    Rectal Pain     Marine Mo is a 83 y.o. female that presents with hemorrhoid issues today  Patient on exam with a chaperone has several hemorrhoids, nonthrombosed today  Patient also has severe constipation issues, fecal impaction noted in the ER today  Patient states she is been constipated last couple days with straining & hemorrhoids  No bleeding of the hemorrhoid, no signs of obstruction, would like an enema in the ER    The history is provided by the patient.     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     Breast cyst     Depression 1/30/2019    Diabetes mellitus     Hypertension      Past Surgical History:   Procedure Laterality Date    BACK SURGERY      BREAST CYST ASPIRATION  1982    CATARACT EXTRACTION Bilateral     don't remember date    CHOLECYSTECTOMY      COLONOSCOPY N/A 3/1/2018    Procedure: COLONOSCOPY;  Surgeon: Ren Newton MD;  Location: [address];  Service: Endoscopy;  Laterality: N/A;    HYSTERECTOMY  age 70    bleeding    INSERTION, STENT, ARTERY  2022    OOPHORECTOMY      TONSILLECTOMY, ADENOIDECTOMY       Family History   Problem Relation Age of Onset    Kidney disease Father     Heart attack Neg Hx     Heart disease Neg Hx     Breast cancer Neg Hx     Colon cancer Neg Hx     Ovarian cancer Neg Hx      Social History     Tobacco Use    Smoking status: Never    Smokeless tobacco: Never   Substance Use Topics    Alcohol use: No    Drug use: No     Review of Systems   Constitutional: Negative.    HENT: Negative.     Eyes: Negative.    Respiratory: Negative.     Cardiovascular: Negative.    Gastrointestinal:  Positive for constipation and rectal pain.   Genitourinary: Negative.    Musculoskeletal: Negative.    Skin: Negative.     Neurological: Negative.    Psychiatric/Behavioral: Negative.         Physical Exam     Initial Vitals [09/18/23 1107]   BP Pulse Resp Temp SpO2   (!) 142/65 75 20 97.5 °F (36.4 °C) 95 %      MAP       --         Physical Exam    Nursing note and vitals reviewed.  Constitutional: Vital signs are normal. She appears well-developed and well-nourished. She is cooperative.   HENT:   Head: Normocephalic and atraumatic.   Eyes: Conjunctivae, EOM and lids are normal. Pupils are equal, round, and reactive to light.   Neck: Trachea normal and phonation normal. Neck supple. No JVD present.   Normal range of motion.   Full passive range of motion without pain.     Cardiovascular:  Normal rate, regular rhythm, S1 normal, S2 normal, normal heart sounds, intact distal pulses and normal pulses.           Pulmonary/Chest: Effort normal and breath sounds normal.   Abdominal: Abdomen is soft and flat. Bowel sounds are normal.   Genitourinary:    Genitourinary Comments: Several external hemorrhoids, nonthrombosed  Significant fecal impaction, can not be disimpaction     Musculoskeletal:         General: Normal range of motion.      Cervical back: Full passive range of motion without pain, normal range of motion and neck supple.     Neurological: She is alert and oriented to person, place, and time. She has normal strength.   Skin: Skin is warm, dry and intact. Capillary refill takes less than 2 seconds.         ED Course   Procedures  Labs Reviewed - No data to display       Imaging Results    None          Medications - No data to display    Medical Decision Making  Constipation hemorrhoids with no active bleeding or signs of obstruction  Differential includes hemorrhoids, anal fissure, rectal mass constipation    Problems Addressed:  Constipation, unspecified constipation type: complicated acute illness or injury  Hemorrhoids, unspecified hemorrhoid type: complicated acute illness or injury    ED Management & Risk of Complications,  Morbidity, Mortality:  Soapsuds enema with good bowel movement in the emergency room this p.m.  High-fiber diet, Colace, MiraLax with close primary care physician follow-up  Follow-up with PCP, follow-up with GI/colorectal physician as well outpatient  Will prescribe Anusol with a hemorrhoids with Colace & MiraLax Rx as well  This patient does not need to be hospitalized on ER evaluation today  The patient's diagnosis is not limited by social determinants of health  Does not require surgery or procedure (major or minor), no risk factors  Pt/Family counseled to return to the ER with any concerning symptoms       Clinical Impression:   Final diagnoses:  [K64.9] Hemorrhoids, unspecified hemorrhoid type (Primary)  [K59.00] Constipation, unspecified constipation type        ED Disposition Condition    Discharge Stable          ED Prescriptions       Medication Sig Dispense Start Date End Date Auth. Provider    polyethylene glycol (GLYCOLAX) 17 gram/dose powder Take 17 g by mouth once daily. 500 g 9/18/2023 -- Stevan Barreto MD    docusate sodium (COLACE) 100 MG capsule Take 1 capsule (100 mg total) by mouth 2 (two) times daily as needed for Constipation. 30 capsule 9/18/2023 -- Stevan Barreto MD    hydrocortisone 2.5 % cream Apply topically 2 (two) times daily. 20 g 9/18/2023 -- Stevan Barreto MD          Follow-up Information       Follow up With Specialties Details Why Contact Info    Serg Hwang MD Internal Medicine Schedule an appointment as soon as possible for a visit in 2 days  [address]  [facility] [address]  [phone]               Stevan Barreto MD  09/18/23 6010

## 2023-09-18 NOTE — ED TRIAGE NOTES
C/o severe rectal pain since 9/12/2023. Patient reports has to strain to have a bowel movement. Patient reports called Dr. Hwang and was instructed to come to the ED.

## 2023-09-18 NOTE — ED NOTES
Soap suds enema completed without complication, pt sara well and assisted onto exam 4 commode. Instructed to pull red call string if in need of assistance with v/u from pt.

## 2023-09-18 NOTE — ED NOTES
Assisted at bedside for manual disimpaction done per Dr. Barreto. Pt sara well without incident or complication

## 2023-09-25 RX ORDER — METFORMIN HYDROCHLORIDE 500 MG/1
TABLET ORAL
Qty: 180 TABLET | Refills: 0 | Status: SHIPPED | OUTPATIENT
Start: 2023-09-25 | End: 2023-12-27

## 2023-09-25 NOTE — TELEPHONE ENCOUNTER
No care due was identified.  Health [facility] Embedded Care Due Messages. Reference number: [id number].   9/25/2023 9:17:42 AM CDT

## 2023-10-10 DIAGNOSIS — R11.0 NAUSEA: ICD-10-CM

## 2023-10-10 RX ORDER — ONDANSETRON HYDROCHLORIDE 8 MG/1
8 TABLET, FILM COATED ORAL EVERY 8 HOURS PRN
Qty: 16 TABLET | Refills: 0 | Status: SHIPPED | OUTPATIENT
Start: 2023-10-10 | End: 2023-11-27

## 2023-10-11 ENCOUNTER — PATIENT OUTREACH (OUTPATIENT)
Dept: ADMINISTRATIVE | Facility: OTHER | Age: 83
End: 2023-10-11
Payer: MEDICARE

## 2023-10-11 VITALS — DIASTOLIC BLOOD PRESSURE: 55 MMHG | SYSTOLIC BLOOD PRESSURE: 106 MMHG | OXYGEN SATURATION: 99 % | HEART RATE: 73 BPM

## 2023-11-14 ENCOUNTER — PATIENT OUTREACH (OUTPATIENT)
Dept: ADMINISTRATIVE | Facility: OTHER | Age: 83
End: 2023-11-14
Payer: MEDICARE

## 2023-11-15 VITALS — HEART RATE: 80 BPM | DIASTOLIC BLOOD PRESSURE: 60 MMHG | SYSTOLIC BLOOD PRESSURE: 116 MMHG

## 2023-11-15 DIAGNOSIS — R32 URINARY INCONTINENCE, UNSPECIFIED TYPE: ICD-10-CM

## 2023-11-15 RX ORDER — OXYBUTYNIN CHLORIDE 5 MG/1
5 TABLET ORAL 2 TIMES DAILY
Qty: 60 TABLET | Refills: 0 | Status: SHIPPED | OUTPATIENT
Start: 2023-11-15 | End: 2024-01-10

## 2023-11-15 NOTE — TELEPHONE ENCOUNTER
Care Due:                  Date            Visit Type   Department     Provider  --------------------------------------------------------------------------------                                EP -                              PRIMARY      [facility] INTERNAL  Last Visit: 08-      CARE ([facility])   MEDICINE II    Serg Hwang                              EP -                              PRIMARY      [facility] INTERNAL  Next Visit: 02-      CARE ([facility])   MEDICINE II    Serg Hwang                                                            Last  Test          Frequency    Reason                     Performed    Due Date  --------------------------------------------------------------------------------    HBA1C.......  6 months...  metFORMIN................  08- 02-    [employer] Embedded Care Due Messages. Reference number: [id number].   11/15/2023 9:57:24 AM CST

## 2023-11-16 LAB
LEFT EYE DM RETINOPATHY: NORMAL
RIGHT EYE DM RETINOPATHY: NORMAL

## 2023-11-26 DIAGNOSIS — R11.0 NAUSEA: ICD-10-CM

## 2023-11-26 NOTE — TELEPHONE ENCOUNTER
No care due was identified.  [facility] Embedded Care Due Messages. Reference number: [id number].   11/26/2023 8:06:41 AM CST

## 2023-11-27 RX ORDER — ONDANSETRON HYDROCHLORIDE 8 MG/1
8 TABLET, FILM COATED ORAL EVERY 8 HOURS PRN
Qty: 16 TABLET | Refills: 0 | Status: SHIPPED | OUTPATIENT
Start: 2023-11-27

## 2023-12-12 ENCOUNTER — PATIENT OUTREACH (OUTPATIENT)
Dept: ADMINISTRATIVE | Facility: OTHER | Age: 83
End: 2023-12-12
Payer: MEDICARE

## 2023-12-19 VITALS — OXYGEN SATURATION: 97 % | HEART RATE: 80 BPM | DIASTOLIC BLOOD PRESSURE: 68 MMHG | SYSTOLIC BLOOD PRESSURE: 124 MMHG

## 2023-12-27 ENCOUNTER — TELEPHONE (OUTPATIENT)
Dept: INTERNAL MEDICINE | Facility: CLINIC | Age: 83
End: 2023-12-27
Payer: MEDICARE

## 2023-12-27 RX ORDER — METFORMIN HYDROCHLORIDE 500 MG/1
TABLET ORAL
Qty: 180 TABLET | Refills: 0 | Status: SHIPPED | OUTPATIENT
Start: 2023-12-27 | End: 2024-03-31

## 2023-12-27 NOTE — TELEPHONE ENCOUNTER
This was already done:  metFORMIN (GLUCOPHAGE) 500 MG tablet 180 tablet 0 12/27/2023 -- No   Sig: TAKE 1 TABLET BY MOUTH TWICE DAILY WITH MEALS   Sent to pharmacy as: metFORMIN (GLUCOPHAGE) 500 MG tablet   Class: Normal   Order: [id number]   Date/Time Signed: 12/27/2023 07:51       E-Prescribing Status: Receipt confirmed by pharmacy (12/27/2023  7:52 AM CST)     Pharmacy    [place] [id number]

## 2023-12-27 NOTE — TELEPHONE ENCOUNTER
----- Message from Anneliese Joaquin sent at 2023  9:33 AM CST -----  Contact: pt  Marine Mo  MRN: [id number]  : 1940  PCP: Serg Hwang  Home Phone      Not on file.  Work Phone      [phone]  Mobile          [phone]      MESSAGE:     Pt needs a refill of metFORMIN (GLUCOPHAGE) 500 MG tablet 90 day supply sent to [facility] in [place].       Marine   [phone]

## 2024-01-09 ENCOUNTER — PATIENT OUTREACH (OUTPATIENT)
Dept: ADMINISTRATIVE | Facility: OTHER | Age: 84
End: 2024-01-09
Payer: MEDICARE

## 2024-01-10 ENCOUNTER — OFFICE VISIT (OUTPATIENT)
Dept: INTERNAL MEDICINE | Facility: CLINIC | Age: 84
End: 2024-01-10
Payer: MEDICARE

## 2024-01-10 VITALS
OXYGEN SATURATION: 94 % | HEART RATE: 76 BPM | HEART RATE: 72 BPM | WEIGHT: 155.19 LBS | BODY MASS INDEX: 29.3 KG/M2 | DIASTOLIC BLOOD PRESSURE: 60 MMHG | OXYGEN SATURATION: 95 % | SYSTOLIC BLOOD PRESSURE: 110 MMHG | RESPIRATION RATE: 19 BRPM | SYSTOLIC BLOOD PRESSURE: 106 MMHG | HEIGHT: 61 IN | DIASTOLIC BLOOD PRESSURE: 60 MMHG

## 2024-01-10 DIAGNOSIS — L29.0 ANAL ITCHING: ICD-10-CM

## 2024-01-10 DIAGNOSIS — M54.2 NECK PAIN: Primary | ICD-10-CM

## 2024-01-10 DIAGNOSIS — R51.9 SCALP PAIN: ICD-10-CM

## 2024-01-10 PROCEDURE — 99999 PR PBB SHADOW E&M-EST. PATIENT-LVL IV: CPT | Mod: PBBFAC,,, | Performed by: INTERNAL MEDICINE

## 2024-01-10 PROCEDURE — 96372 THER/PROPH/DIAG INJ SC/IM: CPT | Mod: S$GLB,,, | Performed by: INTERNAL MEDICINE

## 2024-01-10 PROCEDURE — 99213 OFFICE O/P EST LOW 20 MIN: CPT | Mod: 25,S$GLB,, | Performed by: INTERNAL MEDICINE

## 2024-01-10 RX ORDER — METHYLPREDNISOLONE ACETATE 80 MG/ML
80 INJECTION, SUSPENSION INTRA-ARTICULAR; INTRALESIONAL; INTRAMUSCULAR; SOFT TISSUE
Status: COMPLETED | OUTPATIENT
Start: 2024-01-10 | End: 2024-01-10

## 2024-01-10 RX ORDER — DICLOFENAC SODIUM 10 MG/G
2 GEL TOPICAL DAILY
Qty: 100 G | Refills: 11 | Status: SHIPPED | OUTPATIENT
Start: 2024-01-10

## 2024-01-10 RX ORDER — NYSTATIN 100000 U/G
CREAM TOPICAL 2 TIMES DAILY
Qty: 30 G | Refills: 0 | Status: SHIPPED | OUTPATIENT
Start: 2024-01-10

## 2024-01-10 RX ORDER — GLUCOSAMINE SULFATE 1500 MG
POWDER IN PACKET (EA) ORAL
COMMUNITY
Start: 2023-05-31

## 2024-01-10 RX ORDER — GLUCOSAM/CHONDRO/HERB 149/HYAL 750-100 MG
TABLET ORAL
COMMUNITY
Start: 2023-06-19

## 2024-01-10 RX ORDER — DICLOFENAC SODIUM 10 MG/G
2 GEL TOPICAL DAILY
Qty: 200 G | Refills: 1 | Status: SHIPPED | OUTPATIENT
Start: 2024-01-10 | End: 2024-02-14 | Stop reason: SDUPTHER

## 2024-01-10 RX ORDER — TRIAMCINOLONE ACETONIDE 1 MG/G
CREAM TOPICAL 2 TIMES DAILY
Qty: 28.4 G | Refills: 0 | Status: SHIPPED | OUTPATIENT
Start: 2024-01-10

## 2024-01-10 RX ADMIN — METHYLPREDNISOLONE ACETATE 80 MG: 80 INJECTION, SUSPENSION INTRA-ARTICULAR; INTRALESIONAL; INTRAMUSCULAR; SOFT TISSUE at 10:01

## 2024-01-10 NOTE — PROGRESS NOTES
"HPI:  Marine Mo is a 83 y.o. female here for Neck Pain and Shoulder Pain  .  C/o pain both shoulder across her neck   Pain is worse with movement   Taking tylenol   Review of Systems   Constitutional:  Negative for chills and fever.   HENT:  Negative for congestion, hearing loss, sinus pressure and sore throat.    Eyes:  Negative for photophobia.   Respiratory:  Negative for cough, choking, chest tightness, shortness of breath and wheezing.    Cardiovascular:  Negative for chest pain and palpitations.   Gastrointestinal:  Positive for constipation. Negative for blood in stool, nausea and vomiting.        Linzess and lactulose    Genitourinary:  Negative for dysuria and hematuria.   Musculoskeletal:  Positive for arthralgias, back pain and neck pain. Negative for myalgias.        Neck pain is there off and on      Skin:  Negative for pallor.   Neurological:  Negative for dizziness and numbness.   Hematological:  Does not bruise/bleed easily.   Psychiatric/Behavioral:  Positive for sleep disturbance. Negative for confusion and suicidal ideas. The patient is nervous/anxious.       A review of systems was performed and is negative except as noted above.    Objective:  Vitals:    01/10/24 0959   BP: 110/60   Pulse: 72   Resp: 19   SpO2: 95%   Weight: 70.4 kg (155 lb 3.3 oz)   Height: 5' 1" (1.549 m)      Physical Exam  Vitals and nursing note reviewed.   Constitutional:       Appearance: She is well-developed.   HENT:      Head: Normocephalic and atraumatic.      Right Ear: External ear normal.      Left Ear: External ear normal.   Eyes:      Conjunctiva/sclera: Conjunctivae normal.      Pupils: Pupils are equal, round, and reactive to light.   Neck:      Thyroid: No thyromegaly.      Vascular: No JVD.      Trachea: No tracheal deviation.   Cardiovascular:      Rate and Rhythm: Normal rate and regular rhythm.      Heart sounds: Normal heart sounds.   Pulmonary:      Effort: Pulmonary effort is normal. No " respiratory distress.      Breath sounds: Normal breath sounds. No wheezing or rales.   Chest:      Chest wall: No tenderness.   Abdominal:      General: Bowel sounds are normal. There is no distension.      Palpations: Abdomen is soft. There is no mass.      Tenderness: There is no abdominal tenderness. There is no guarding or rebound.   Musculoskeletal:      Cervical back: Spinous process tenderness and muscular tenderness present. Decreased range of motion.   Lymphadenopathy:      Cervical: No cervical adenopathy.   Skin:     General: Skin is warm and dry.   Neurological:      Mental Status: She is alert and oriented to person, place, and time.      Cranial Nerves: No cranial nerve deficit.      Motor: No abnormal muscle tone.      Coordination: Coordination normal.      Deep Tendon Reflexes: Reflexes are normal and symmetric. Reflexes normal.      Comments: CN: Optic discs are flat with normal vasculature, PERRL, Extraoccular movements and visual fields are full. Normal facial sensation and strength, Hearing symmetric, Tongue and Palate are midline and strong. Shoulder Shrug symmetric and strong.          Assessment/Plan:  1. Neck pain  -     diclofenac sodium (VOLTAREN) 1 % Gel; Apply 2 g topically once daily.  Dispense: 100 g; Refill: 11  -     methylPREDNISolone acetate injection 80 mg  Tylenol continue   Apply heating pad     2. Anal itching  -     nystatin (MYCOSTATIN) cream; Apply topically 2 (two) times daily.  Dispense: 30 g; Refill: 0  -     triamcinolone acetonide 0.1% (KENALOG) 0.1 % cream; Apply topically 2 (two) times daily.  Dispense: 28.4 g; Refill: 0    3. Scalp pain  -     diclofenac sodium (VOLTAREN) 1 % Gel; Apply 2 g topically once daily.  Dispense: 100 g; Refill: 11    Other orders  -     diclofenac sodium (VOLTAREN) 1 % Gel; Apply 2 g topically once daily.  Dispense: 200 g; Refill: 1

## 2024-02-01 LAB
CHOLEST SERPL-MSCNC: 124 MG/DL (ref 0–200)
CHOLEST/HDLC SERPL: 2.6 {RATIO}
EGFR: 59.7
HBA1C MFR BLD: 5.2 % (ref 4–6)
HDLC SERPL-MCNC: 47 MG/DL (ref 35–70)
LDL CHOLESTEROL DIRECT: 63 MG/DL
NON HDL CHOL. (LDL+VLDL): 77
TRIGL SERPL-MCNC: 97 MG/DL (ref 40–160)
VLDL CHOLESTEROL: 19 MG/DL

## 2024-02-06 ENCOUNTER — PATIENT OUTREACH (OUTPATIENT)
Dept: ADMINISTRATIVE | Facility: OTHER | Age: 84
End: 2024-02-06
Payer: MEDICARE

## 2024-02-07 ENCOUNTER — PATIENT OUTREACH (OUTPATIENT)
Dept: ADMINISTRATIVE | Facility: HOSPITAL | Age: 84
End: 2024-02-07
Payer: MEDICARE

## 2024-02-07 VITALS — DIASTOLIC BLOOD PRESSURE: 50 MMHG | SYSTOLIC BLOOD PRESSURE: 120 MMHG | HEART RATE: 84 BPM

## 2024-02-07 NOTE — LETTER
AUTHORIZATION FOR RELEASE OF   CONFIDENTIAL INFORMATION    Dear Dr. Gaona,    We are seeing Marine Mo, date of birth 1940, in the clinic at [facility] INTERNAL MEDICINE II. Serg Hwang MD is the patient's PCP. Marine Mo has an outstanding lab/procedure at the time we reviewed her chart. In order to help keep her health information updated, she has authorized us to request the following medical record(s):          ( X )  DEXA SCAN                                                Please fax records to [facility], Nawaz, Mohammad Q., MD, [phone].    If you have any questions, please contact...  Larissa Bermudez LPN  Clinical Care Coordinator  [facility]  Phone: [phone]  Fax: [phone]           Patient Name: Marine Mo  : 1940  Patient Phone #: [phone]

## 2024-02-07 NOTE — LETTER
AUTHORIZATION FOR RELEASE OF   CONFIDENTIAL INFORMATION    Dear [facility],    We are seeing Marine Mo, date of birth 1940, in the clinic at [facility] INTERNAL MEDICINE II. Serg Hwang MD is the patient's PCP. Marine Mo has an outstanding lab/procedure at the time we reviewed her chart. In order to help keep her health information updated, she has authorized us to request the following medical record(s):                                                ( X )  EYE EXAM                  Please fax records to [facility], Nawaz, Mohammad Q., MD, [phone].     If you have any questions, please contact...  Larissa Bermudez LPN  Clinical Care Coordinator  [facility]  Phone: [phone]  Fax: [phone]           Patient Name: Marine Mo  : 1940  Patient Phone #: [phone]

## 2024-02-07 NOTE — LETTER
AUTHORIZATION FOR RELEASE OF   CONFIDENTIAL INFORMATION    Dear Dr. Jacob Bartlett,    We are seeing Marine Mo, date of birth 1940, in the clinic at [facility] INTERNAL MEDICINE II. Serg Hwang MD is the patient's PCP. Marine Mo has an outstanding lab/procedure at the time we reviewed her chart. In order to help keep her health information updated, she has authorized us to request the following medical record(s):                                               ( X )  EYE EXAM                    Please fax records to [facility], Nawaz, Mohammad Q., MD, [phone].    If you have any questions, please contact...  Larissa Bermudez LPN  Clinical Care Coordinator  [facility]  Phone: [phone]  Fax: [phone]           Patient Name: Marine Mo  : 1940  Patient Phone #: [phone]

## 2024-02-07 NOTE — PROGRESS NOTES
Population Health Chart Review & Patient Outreach Details    Outreach Performed: YES Telephone Successful    Additional Pop Health Notes:    Contacted patient to discuss Enhanced Annual Wellness Visit.  Patient states she will call clinic when ready to schedule.  Received external Lipid Panel and HgbA1c collected/ completed on 2024, updated to .     NOV with PCP: 2026  LOV with PCP: 01/10/2024       Updates Requested / Reviewed:      Updated Care Coordination Note, Care Everywhere, , External Sources: [facility] and [facility], Care Team Updated, Removed  or Duplicate Orders, and Immunizations Reconciliation Completed or Queried: [place]    Social Determinants Reviewed and Updated:  Tobacco Use  Financial Resource Strain  Transportation  Food Insecurity          Health Maintenance Topics Overdue:    Health Maintenance Due   Topic Date Due    Pneumococcal Vaccines (Age 65+) (1 of 2 - PCV) Never done    TETANUS VACCINE  Never done    RSV Vaccine (Age 60+ and Pregnant patients) (1 - 1-dose 60+ series) Never done    Shingles Vaccine (2 of 3) 2017    DEXA Scan  05/10/2019    Influenza Vaccine (1) 2023    COVID-19 Vaccine (3 - -24 season) 2023         Health Maintenance Topic(s) Outreach Outcomes & Actions Taken:    Eye Exam - Outreach Outcomes & Actions Taken  : External Records Requested & Care Team Updated if Applicable    Osteoporosis Screening - Outreach Outcomes & Actions Taken  : External Records Requested, Care Team Updated if Applicable  Patient aware she is due for DEXA scan, will call clinic when ready to schedule.     Enhanced Annual Wellness Visit: Patient will call clinic when ready to schedule.    Labs: Received external Lipid panel and HgbA1c collected/ completed on 2024, updated to .

## 2024-02-14 ENCOUNTER — PATIENT OUTREACH (OUTPATIENT)
Dept: ADMINISTRATIVE | Facility: HOSPITAL | Age: 84
End: 2024-02-14
Payer: MEDICARE

## 2024-02-14 ENCOUNTER — OFFICE VISIT (OUTPATIENT)
Dept: INTERNAL MEDICINE | Facility: CLINIC | Age: 84
End: 2024-02-14
Payer: MEDICARE

## 2024-02-14 VITALS
OXYGEN SATURATION: 98 % | BODY MASS INDEX: 29.27 KG/M2 | RESPIRATION RATE: 20 BRPM | WEIGHT: 155 LBS | HEART RATE: 72 BPM | DIASTOLIC BLOOD PRESSURE: 58 MMHG | HEIGHT: 61 IN | SYSTOLIC BLOOD PRESSURE: 102 MMHG

## 2024-02-14 DIAGNOSIS — D61.818 OTHER PANCYTOPENIA: ICD-10-CM

## 2024-02-14 DIAGNOSIS — D69.6 THROMBOCYTOPENIA: ICD-10-CM

## 2024-02-14 DIAGNOSIS — J30.1 SEASONAL ALLERGIC RHINITIS DUE TO POLLEN: Primary | ICD-10-CM

## 2024-02-14 DIAGNOSIS — E11.9 CONTROLLED TYPE 2 DIABETES MELLITUS WITHOUT COMPLICATION, WITHOUT LONG-TERM CURRENT USE OF INSULIN: ICD-10-CM

## 2024-02-14 DIAGNOSIS — R06.7 SNEEZING: ICD-10-CM

## 2024-02-14 DIAGNOSIS — J34.89 RHINORRHEA: ICD-10-CM

## 2024-02-14 PROCEDURE — 99999 PR PBB SHADOW E&M-EST. PATIENT-LVL IV: CPT | Mod: PBBFAC,,, | Performed by: NURSE PRACTITIONER

## 2024-02-14 PROCEDURE — 99213 OFFICE O/P EST LOW 20 MIN: CPT | Mod: 25,S$GLB,, | Performed by: NURSE PRACTITIONER

## 2024-02-14 PROCEDURE — 96372 THER/PROPH/DIAG INJ SC/IM: CPT | Mod: S$GLB,,, | Performed by: NURSE PRACTITIONER

## 2024-02-14 RX ORDER — LORATADINE 10 MG/1
10 TABLET ORAL DAILY PRN
Qty: 30 TABLET | Refills: 0 | Status: SHIPPED | OUTPATIENT
Start: 2024-02-14 | End: 2025-02-13

## 2024-02-14 RX ORDER — FLUTICASONE PROPIONATE 50 MCG
1 SPRAY, SUSPENSION (ML) NASAL DAILY
Qty: 16 G | Refills: 0 | Status: SHIPPED | OUTPATIENT
Start: 2024-02-14

## 2024-02-14 RX ORDER — NITROGLYCERIN 0.4 MG/1
0.4 TABLET SUBLINGUAL EVERY 5 MIN PRN
COMMUNITY
Start: 2023-05-31

## 2024-02-14 RX ORDER — METHYLPREDNISOLONE ACETATE 40 MG/ML
40 INJECTION, SUSPENSION INTRA-ARTICULAR; INTRALESIONAL; INTRAMUSCULAR; SOFT TISSUE
Status: COMPLETED | OUTPATIENT
Start: 2024-02-14 | End: 2024-02-14

## 2024-02-14 RX ORDER — MONTELUKAST SODIUM 10 MG/1
10 TABLET ORAL NIGHTLY
Qty: 14 TABLET | Refills: 0 | Status: SHIPPED | OUTPATIENT
Start: 2024-02-14 | End: 2024-02-28

## 2024-02-14 RX ORDER — EPINEPHRINE 0.22MG
100 AEROSOL WITH ADAPTER (ML) INHALATION DAILY
COMMUNITY
Start: 2023-05-31

## 2024-02-14 RX ADMIN — METHYLPREDNISOLONE ACETATE 40 MG: 40 INJECTION, SUSPENSION INTRA-ARTICULAR; INTRALESIONAL; INTRAMUSCULAR; SOFT TISSUE at 10:02

## 2024-02-14 NOTE — PROGRESS NOTES
"Subjective:           Patient ID: Marine Mo is a 83 y.o. female.    Chief Complaint: Nasal Congestion (Patient here today for runny nose and sneezing "it has been going on for 2 to 3 weeks but yesterday it got worse I couldn't stop sneezing" denies fever, chills, and/or body aches "all this sneezing and nose dripping is driving me crazy")    Marine Mo is a 83 y.o. female,  known to me from acute visit, follows with fellow providers; PMHX of Diabetes, Hyperlipidemia, Hypertension, and chronic liver disease, here with c/o runny nose and sneezing ; just starting with slight cough  Notes she has had these symptoms x 3 weeks  No fever   Clear mucous, states "nose is running like a faucet" ; has tissue stuffed in right nostril           Review of Systems   HENT:  Positive for rhinorrhea and sneezing.        Objective:      Physical Exam  Vitals and nursing note reviewed.   Constitutional:       General: She is not in acute distress.     Appearance: She is well-developed. She is not ill-appearing, toxic-appearing or diaphoretic.   HENT:      Head: Normocephalic.      Right Ear: Tympanic membrane and external ear normal.      Left Ear: Tympanic membrane and external ear normal.      Nose: Mucosal edema and rhinorrhea present. No congestion.      Comments: has tissue stuffed in right nostril   Frequent sneezing during exam   Eyes:      Conjunctiva/sclera: Conjunctivae normal.      Pupils: Pupils are equal, round, and reactive to light.   Neck:      Thyroid: No thyromegaly.   Cardiovascular:      Rate and Rhythm: Normal rate and regular rhythm.      Heart sounds: Normal heart sounds. No murmur heard.  Pulmonary:      Effort: Pulmonary effort is normal. No respiratory distress.      Breath sounds: Normal breath sounds. No stridor. No wheezing, rhonchi or rales.   Chest:      Chest wall: No tenderness.   Abdominal:      General: Bowel sounds are normal.      Palpations: Abdomen is soft. "   Musculoskeletal:         General: Normal range of motion.   Lymphadenopathy:      Cervical: No cervical adenopathy.   Skin:     General: Skin is warm and dry.      Findings: No rash.   Neurological:      Mental Status: She is alert and oriented to person, place, and time.      Cranial Nerves: No cranial nerve deficit.      Sensory: No sensory deficit.         Assessment:       1. Controlled type 2 diabetes mellitus without complication, without long-term current use of insulin    2. Seasonal allergic rhinitis due to pollen    3. Rhinorrhea    4. Sneezing        Plan:   1. Controlled type 2 diabetes mellitus without complication, without long-term current use of insulin  Overview:  Noted since 2013  Diet controlled  Lab Results   Component Value Date    HGBA1C 5.2 02/01/2024           2. Seasonal allergic rhinitis due to pollen  -     fluticasone propionate (FLONASE) 50 mcg/actuation nasal spray; 1 spray (50 mcg total) by Each Nostril route once daily.  Dispense: 16 g; Refill: 0  -     montelukast (SINGULAIR) 10 mg tablet; Take 1 tablet (10 mg total) by mouth every evening. for 14 days  Dispense: 14 tablet; Refill: 0  -     methylPREDNISolone acetate injection 40 mg  -     loratadine (CLARITIN) 10 mg tablet; Take 1 tablet (10 mg total) by mouth daily as needed for Allergies (as need d for allergies , sneezing , runny nose, take in am).  Dispense: 30 tablet; Refill: 0    3. Rhinorrhea  -     fluticasone propionate (FLONASE) 50 mcg/actuation nasal spray; 1 spray (50 mcg total) by Each Nostril route once daily.  Dispense: 16 g; Refill: 0  -     montelukast (SINGULAIR) 10 mg tablet; Take 1 tablet (10 mg total) by mouth every evening. for 14 days  Dispense: 14 tablet; Refill: 0  -     methylPREDNISolone acetate injection 40 mg    4. Sneezing  -     fluticasone propionate (FLONASE) 50 mcg/actuation nasal spray; 1 spray (50 mcg total) by Each Nostril route once daily.  Dispense: 16 g; Refill: 0  -     methylPREDNISolone  acetate injection 40 mg

## 2024-02-14 NOTE — PROGRESS NOTES
Updates were requested from care everywhere.  Health Maintenance has been updated.  [place] immunization registry triggered.  Immunizations were reconciled.  Per LORNA in basket message, pt declined flu vaccine 02/14/2024.

## 2024-02-26 ENCOUNTER — OFFICE VISIT (OUTPATIENT)
Dept: INTERNAL MEDICINE | Facility: CLINIC | Age: 84
End: 2024-02-26
Payer: MEDICARE

## 2024-02-26 VITALS
BODY MASS INDEX: 28.76 KG/M2 | WEIGHT: 152.31 LBS | RESPIRATION RATE: 18 BRPM | HEIGHT: 61 IN | OXYGEN SATURATION: 98 % | DIASTOLIC BLOOD PRESSURE: 60 MMHG | HEART RATE: 73 BPM | SYSTOLIC BLOOD PRESSURE: 110 MMHG

## 2024-02-26 DIAGNOSIS — E11.40 TYPE 2 DIABETES MELLITUS WITH DIABETIC NEUROPATHY, WITHOUT LONG-TERM CURRENT USE OF INSULIN: ICD-10-CM

## 2024-02-26 DIAGNOSIS — K76.6 PORTAL HYPERTENSION: ICD-10-CM

## 2024-02-26 DIAGNOSIS — I25.118 ATHEROSCLEROTIC HEART DISEASE OF NATIVE CORONARY ARTERY WITH OTHER FORMS OF ANGINA PECTORIS: ICD-10-CM

## 2024-02-26 DIAGNOSIS — I70.0 AORTIC ATHEROSCLEROSIS: ICD-10-CM

## 2024-02-26 DIAGNOSIS — E78.2 MIXED HYPERLIPIDEMIA: Primary | ICD-10-CM

## 2024-02-26 DIAGNOSIS — G95.19 OTHER VASCULAR MYELOPATHIES: ICD-10-CM

## 2024-02-26 DIAGNOSIS — M46.1 SACROILIITIS, NOT ELSEWHERE CLASSIFIED: ICD-10-CM

## 2024-02-26 PROCEDURE — 99999 PR PBB SHADOW E&M-EST. PATIENT-LVL III: CPT | Mod: PBBFAC,,, | Performed by: INTERNAL MEDICINE

## 2024-02-26 PROCEDURE — 99214 OFFICE O/P EST MOD 30 MIN: CPT | Mod: S$GLB,,, | Performed by: INTERNAL MEDICINE

## 2024-02-26 NOTE — PROGRESS NOTES
"HPI:  Marine Mo is a 83 y.o. female here for Follow-up  Labs from [facility] ; Dr Deluna   2/1/24 reviewed .  Platelets 73 K       Review of Systems   Constitutional:  Negative for chills and fever.   HENT:  Negative for congestion, hearing loss, sinus pressure and sore throat.    Eyes:  Negative for photophobia.   Respiratory:  Negative for cough, choking, chest tightness, shortness of breath and wheezing.    Cardiovascular:  Negative for chest pain and palpitations.   Gastrointestinal:  Negative for blood in stool, nausea and vomiting.        Linzess and lactulose help.   Genitourinary:  Negative for dysuria and hematuria.   Musculoskeletal:  Positive for back pain. Negative for arthralgias, myalgias and neck pain.        Neck pain is getting better      Skin:  Negative for pallor.   Neurological:  Positive for dizziness and headaches. Negative for numbness.   Hematological:  Does not bruise/bleed easily.   Psychiatric/Behavioral:  Positive for sleep disturbance. Negative for confusion and suicidal ideas. The patient is nervous/anxious.       A review of systems was performed and is negative except as noted above.    Objective:  Vitals:    02/26/24 0827   BP: 110/60   Pulse: 73   Resp: 18   SpO2: 98%   Weight: 69.1 kg (152 lb 5.4 oz)   Height: 5' 1" (1.549 m)      Physical Exam  Vitals and nursing note reviewed.   Constitutional:       Appearance: She is well-developed.   HENT:      Head: Normocephalic and atraumatic.      Right Ear: External ear normal.      Left Ear: External ear normal.   Eyes:      Conjunctiva/sclera: Conjunctivae normal.      Pupils: Pupils are equal, round, and reactive to light.   Neck:      Thyroid: No thyromegaly.      Vascular: No JVD.      Trachea: No tracheal deviation.   Cardiovascular:      Rate and Rhythm: Normal rate and regular rhythm.      Heart sounds: Normal heart sounds.   Pulmonary:      Effort: Pulmonary effort is normal. No respiratory distress.      Breath sounds: " Normal breath sounds. No wheezing or rales.   Chest:      Chest wall: No tenderness.   Abdominal:      General: Bowel sounds are normal. There is no distension.      Palpations: Abdomen is soft. There is no mass.      Tenderness: There is no abdominal tenderness. There is no guarding or rebound.   Musculoskeletal:         General: Normal range of motion.      Cervical back: Normal range of motion and neck supple.   Lymphadenopathy:      Cervical: No cervical adenopathy.   Skin:     General: Skin is warm and dry.   Neurological:      Mental Status: She is alert and oriented to person, place, and time.      Cranial Nerves: No cranial nerve deficit.      Motor: No abnormal muscle tone.      Coordination: Coordination normal.      Deep Tendon Reflexes: Reflexes are normal and symmetric. Reflexes normal.      Comments: CN: Optic discs are flat with normal vasculature, PERRL, Extraoccular movements and visual fields are full. Normal facial sensation and strength, Hearing symmetric, Tongue and Palate are midline and strong. Shoulder Shrug symmetric and strong.        Assessment/Plan:  1. Mixed hyperlipidemia  -     CBC Auto Differential; Future; Expected date: 08/24/2024  -     Comprehensive Metabolic Panel; Future; Expected date: 08/24/2024  -     Lipid Panel; Future; Expected date: 08/24/2024    2. Other vascular myelopathies  Lab Results   Component Value Date    LDLCALC 62.2 (L) 08/11/2023     Continue lipitor    3. Type 2 diabetes mellitus with diabetic neuropathy, without long-term current use of insulin  -     Hemoglobin A1C; Future; Expected date: 08/24/2024  Metformin  controlled     4. Portal hypertension  Continue lactulose   5. Sacroiliitis, not elsewhere classified  A constant problem    6. Atherosclerotic heart disease of native coronary artery with other forms of angina pectoris  -     Lipid Panel; Future; Expected date: 08/24/2024  Continue plavix     7. Aortic atherosclerosis  Overview:  Calcified coronary  artery disease is noted.  Calcified atheromatous disease affects the aorta and its branch vessels. Per CTA of chest 5/2018     Orders:  -     Lipid Panel; Future; Expected date: 08/24/2024  -     TSH; Future; Expected date: 08/24/2024     Continue statin

## 2024-03-07 DIAGNOSIS — F51.04 CHRONIC INSOMNIA: ICD-10-CM

## 2024-03-07 NOTE — TELEPHONE ENCOUNTER
Care Due:                  Date            Visit Type   Department     Provider  --------------------------------------------------------------------------------                                EP -                              PRIMARY      [facility] INTERNAL  Last Visit: 02-      CARE ([facility])   MEDICINE II    Serg Hwang                              EP -                              PRIMARY      [facility] INTERNAL  Next Visit: 08-      CARE ([facility])   MEDICINE II    Serg Hwang                                                            Last  Test          Frequency    Reason                     Performed    Due Date  --------------------------------------------------------------------------------    Cr..........  12 months..  diclofenac, metFORMIN....  Not Found    Overdue    [employer] Embedded Care Due Messages. Reference number: [id number].   3/07/2024 5:12:35 PM CST

## 2024-03-08 RX ORDER — ALPRAZOLAM 0.5 MG/1
TABLET ORAL
Qty: 30 TABLET | Refills: 0 | Status: SHIPPED | OUTPATIENT
Start: 2024-03-08 | End: 2024-05-06

## 2024-03-12 ENCOUNTER — PATIENT OUTREACH (OUTPATIENT)
Dept: ADMINISTRATIVE | Facility: OTHER | Age: 84
End: 2024-03-12
Payer: MEDICARE

## 2024-03-12 VITALS — HEART RATE: 86 BPM | SYSTOLIC BLOOD PRESSURE: 96 MMHG | DIASTOLIC BLOOD PRESSURE: 50 MMHG | OXYGEN SATURATION: 98 %

## 2024-03-22 ENCOUNTER — TELEPHONE (OUTPATIENT)
Dept: INTERNAL MEDICINE | Facility: CLINIC | Age: 84
End: 2024-03-22
Payer: MEDICARE

## 2024-03-22 NOTE — TELEPHONE ENCOUNTER
----- Message from Meche Mo sent at 3/22/2024 12:02 PM CDT -----  Contact: pt  Marine Mo  MRN: [id number]  : 1940  PCP: Serg Hwang  Home Phone      Not on file.  Work Phone      [phone]  Mobile          [phone]      MESSAGE:     Pt left a message on the machine stating she would like Dr. Hwang to call a prescription in for her because she isn't feeling well. Please advise       [phone]

## 2024-03-22 NOTE — TELEPHONE ENCOUNTER
Pt has been dizzy since beginning of Feb. Pt states she sometimes gets headaches with this. She spoke with her cardiologist and everything is cleared on his end. They believe it is her anxiety. Pt takes xanax 0.5 mg. Pt is not sleeping well and wanting to know if something else can be called in for her anxiety.

## 2024-03-25 RX ORDER — ESCITALOPRAM OXALATE 5 MG/1
5 TABLET ORAL DAILY
Qty: 30 TABLET | Refills: 1 | Status: SHIPPED | OUTPATIENT
Start: 2024-03-25 | End: 2024-06-10

## 2024-03-30 NOTE — TELEPHONE ENCOUNTER
No care due was identified.  [facility] Embedded Care Due Messages. Reference number: [id number].   3/29/2024 7:55:26 PM CDT

## 2024-03-31 RX ORDER — METFORMIN HYDROCHLORIDE 500 MG/1
TABLET ORAL
Qty: 180 TABLET | Refills: 1 | Status: SHIPPED | OUTPATIENT
Start: 2024-03-31

## 2024-03-31 NOTE — TELEPHONE ENCOUNTER
Refill Decision Note   Marine Mo  is requesting a refill authorization.  Brief Assessment and Rationale for Refill:  Approve     Medication Therapy Plan:         Comments:     Note composed:4:22 AM 03/31/2024

## 2024-04-01 ENCOUNTER — TELEPHONE (OUTPATIENT)
Dept: INTERNAL MEDICINE | Facility: CLINIC | Age: 84
End: 2024-04-01
Payer: MEDICARE

## 2024-04-01 NOTE — TELEPHONE ENCOUNTER
----- Message from Anneliese Joaquin sent at 2024  9:23 AM CDT -----  Contact: Pt  Marine Mo  MRN: [id number]  : 1940  PCP: Serg Hwang  Home Phone      Not on file.  Work Phone      [phone]  Mobile          [phone]      MESSAGE:     Pt needs a refill of metFORMIN (GLUCOPHAGE) 500 MG tablet sent in to [facility] in [place].         Marine   [phone]

## 2024-04-03 ENCOUNTER — HOSPITAL ENCOUNTER (OUTPATIENT)
Dept: RADIOLOGY | Facility: HOSPITAL | Age: 84
Discharge: HOME | End: 2024-04-03
Attending: NURSE PRACTITIONER
Payer: MEDICARE

## 2024-04-03 DIAGNOSIS — R14.2 BELCHINGS: ICD-10-CM

## 2024-04-03 DIAGNOSIS — R10.13 DYSPEPSIA: ICD-10-CM

## 2024-04-03 DIAGNOSIS — R11.0 NAUSEA: ICD-10-CM

## 2024-04-03 PROCEDURE — 76705 ECHO EXAM OF ABDOMEN: CPT | Mod: 26,,, | Performed by: STUDENT IN AN ORGANIZED HEALTH CARE EDUCATION/TRAINING PROGRAM

## 2024-04-03 PROCEDURE — 76705 ECHO EXAM OF ABDOMEN: CPT | Mod: TC

## 2024-04-09 ENCOUNTER — PATIENT OUTREACH (OUTPATIENT)
Dept: ADMINISTRATIVE | Facility: OTHER | Age: 84
End: 2024-04-09
Payer: MEDICARE

## 2024-04-09 VITALS — HEART RATE: 82 BPM | DIASTOLIC BLOOD PRESSURE: 60 MMHG | OXYGEN SATURATION: 98 % | SYSTOLIC BLOOD PRESSURE: 130 MMHG

## 2024-04-12 ENCOUNTER — TELEPHONE (OUTPATIENT)
Dept: INTERNAL MEDICINE | Facility: CLINIC | Age: 84
End: 2024-04-12
Payer: MEDICARE

## 2024-04-12 NOTE — TELEPHONE ENCOUNTER
----- Message from Sourav Bingham sent at 2024  9:35 AM CDT -----  Contact: pt.  Marine Mo  MRN: [id number]  : 1940  PCP: Serg Hwang  Home Phone      Not on file.  Work Phone      [phone]  Mobile          [phone]      MESSAGE:     Pt. Call and ask for her last labs to  be fax to ( [facility] [phone] ) [phone]

## 2024-05-03 DIAGNOSIS — F51.04 CHRONIC INSOMNIA: ICD-10-CM

## 2024-05-03 NOTE — TELEPHONE ENCOUNTER
Care Due:                  Date            Visit Type   Department     Provider  --------------------------------------------------------------------------------                                EP -                              PRIMARY      [facility] INTERNAL  Last Visit: 02-      CARE ([facility])   MEDICINE II    Serg Hwang                              EP -                              PRIMARY      [facility] INTERNAL  Next Visit: 08-      CARE ([facility])   MEDICINE II    Serg Hwang                                                            Last  Test          Frequency    Reason                     Performed    Due Date  --------------------------------------------------------------------------------    HBA1C.......  6 months...  metFORMIN................  02- 07-    [facility] Embedded Care Due Messages. Reference number: [id number].   5/03/2024 2:48:32 PM CDT

## 2024-05-06 ENCOUNTER — OFFICE VISIT (OUTPATIENT)
Dept: INTERNAL MEDICINE | Facility: CLINIC | Age: 84
End: 2024-05-06
Payer: MEDICARE

## 2024-05-06 VITALS
BODY MASS INDEX: 28.93 KG/M2 | WEIGHT: 153.25 LBS | HEART RATE: 79 BPM | RESPIRATION RATE: 18 BRPM | SYSTOLIC BLOOD PRESSURE: 90 MMHG | DIASTOLIC BLOOD PRESSURE: 48 MMHG | HEIGHT: 61 IN

## 2024-05-06 DIAGNOSIS — I95.9 HYPOTENSION, UNSPECIFIED HYPOTENSION TYPE: ICD-10-CM

## 2024-05-06 DIAGNOSIS — F13.20 BENZODIAZEPINE DEPENDENCE: ICD-10-CM

## 2024-05-06 DIAGNOSIS — D69.6 THROMBOCYTOPENIA: ICD-10-CM

## 2024-05-06 DIAGNOSIS — Z00.00 ENCOUNTER FOR PREVENTIVE HEALTH EXAMINATION: ICD-10-CM

## 2024-05-06 DIAGNOSIS — F41.1 GAD (GENERALIZED ANXIETY DISORDER): ICD-10-CM

## 2024-05-06 DIAGNOSIS — E11.29 CONTROLLED TYPE 2 DIABETES MELLITUS WITH MICROALBUMINURIA, WITHOUT LONG-TERM CURRENT USE OF INSULIN: ICD-10-CM

## 2024-05-06 DIAGNOSIS — D72.818 OTHER DECREASED WHITE BLOOD CELL (WBC) COUNT: ICD-10-CM

## 2024-05-06 DIAGNOSIS — R80.9 CONTROLLED TYPE 2 DIABETES MELLITUS WITH MICROALBUMINURIA, WITHOUT LONG-TERM CURRENT USE OF INSULIN: ICD-10-CM

## 2024-05-06 DIAGNOSIS — Z99.89 DEPENDENCE ON OTHER ENABLING MACHINES AND DEVICES: ICD-10-CM

## 2024-05-06 DIAGNOSIS — I10 ESSENTIAL HYPERTENSION: ICD-10-CM

## 2024-05-06 DIAGNOSIS — R53.83 FATIGUE, UNSPECIFIED TYPE: ICD-10-CM

## 2024-05-06 DIAGNOSIS — I73.9 PAD (PERIPHERAL ARTERY DISEASE): ICD-10-CM

## 2024-05-06 DIAGNOSIS — I70.0 AORTIC ATHEROSCLEROSIS: Primary | ICD-10-CM

## 2024-05-06 DIAGNOSIS — E78.2 MIXED HYPERLIPIDEMIA: ICD-10-CM

## 2024-05-06 DIAGNOSIS — I25.118 ATHEROSCLEROTIC HEART DISEASE OF NATIVE CORONARY ARTERY WITH OTHER FORMS OF ANGINA PECTORIS: ICD-10-CM

## 2024-05-06 DIAGNOSIS — H40.1131 PRIMARY OPEN ANGLE GLAUCOMA (POAG) OF BOTH EYES, MILD STAGE: ICD-10-CM

## 2024-05-06 DIAGNOSIS — D69.2 SENILE PURPURA: ICD-10-CM

## 2024-05-06 DIAGNOSIS — R26.9 ABNORMALITY OF GAIT AND MOBILITY: ICD-10-CM

## 2024-05-06 DIAGNOSIS — K74.69 OTHER CIRRHOSIS OF LIVER: ICD-10-CM

## 2024-05-06 PROBLEM — D72.819 LEUKOPENIA: Status: ACTIVE | Noted: 2024-05-06

## 2024-05-06 PROCEDURE — G0439 PPPS, SUBSEQ VISIT: HCPCS | Mod: S$GLB,,, | Performed by: NURSE PRACTITIONER

## 2024-05-06 PROCEDURE — 1170F FXNL STATUS ASSESSED: CPT | Mod: CPTII,S$GLB,, | Performed by: NURSE PRACTITIONER

## 2024-05-06 PROCEDURE — 1101F PT FALLS ASSESS-DOCD LE1/YR: CPT | Mod: CPTII,S$GLB,, | Performed by: NURSE PRACTITIONER

## 2024-05-06 PROCEDURE — 1159F MED LIST DOCD IN RCRD: CPT | Mod: CPTII,S$GLB,, | Performed by: NURSE PRACTITIONER

## 2024-05-06 PROCEDURE — 1126F AMNT PAIN NOTED NONE PRSNT: CPT | Mod: CPTII,S$GLB,, | Performed by: NURSE PRACTITIONER

## 2024-05-06 PROCEDURE — 1158F ADVNC CARE PLAN TLK DOCD: CPT | Mod: CPTII,S$GLB,, | Performed by: NURSE PRACTITIONER

## 2024-05-06 PROCEDURE — 99999 PR PBB SHADOW E&M-EST. PATIENT-LVL IV: CPT | Mod: PBBFAC,,, | Performed by: NURSE PRACTITIONER

## 2024-05-06 PROCEDURE — 3074F SYST BP LT 130 MM HG: CPT | Mod: CPTII,S$GLB,, | Performed by: NURSE PRACTITIONER

## 2024-05-06 PROCEDURE — 3078F DIAST BP <80 MM HG: CPT | Mod: CPTII,S$GLB,, | Performed by: NURSE PRACTITIONER

## 2024-05-06 PROCEDURE — 1160F RVW MEDS BY RX/DR IN RCRD: CPT | Mod: CPTII,S$GLB,, | Performed by: NURSE PRACTITIONER

## 2024-05-06 PROCEDURE — 3288F FALL RISK ASSESSMENT DOCD: CPT | Mod: CPTII,S$GLB,, | Performed by: NURSE PRACTITIONER

## 2024-05-06 RX ORDER — AZELASTINE 1 MG/ML
SPRAY, METERED NASAL
COMMUNITY
Start: 2024-03-20

## 2024-05-06 RX ORDER — ALPRAZOLAM 0.5 MG/1
0.5 TABLET ORAL NIGHTLY PRN
Qty: 30 TABLET | Refills: 2 | Status: SHIPPED | OUTPATIENT
Start: 2024-05-06 | End: 2024-08-04

## 2024-05-06 RX ORDER — IPRATROPIUM BROMIDE 42 UG/1
2 SPRAY, METERED NASAL DAILY PRN
COMMUNITY
Start: 2024-03-20

## 2024-05-06 NOTE — PROGRESS NOTES
"    Marine Mo presented for a  Medicare AWV and comprehensive Health Risk Assessment today. The following components were reviewed and updated:    Medical history  Family History  Social history  Allergies and Current Medications  Health Risk Assessment  Health Maintenance  Care Team         ** See Completed Assessments for Annual Wellness Visit within the encounter summary.**         The following assessments were completed:  Living Situation  CAGE  Depression Screening  Timed Get Up and Go  Whisper Test  Cognitive Function Screening  Nutrition Screening  ADL Screening  PAQ Screening      Opioid documentation:      Patient does not have a current opioid prescription.       reviewed and she does fill xanax routinely from her pcp 30 tablets 0.5mg every other month 1/17/23, 2/15/23, 5/8/23, 7/10/23, 8/16/23, 11/9/23, 1/7/24, 3/8/24     Vitals:    05/06/24 1426   BP: (!) 90/48   Pulse: 79   Resp: 18   Weight: 69.5 kg (153 lb 3.5 oz)   Height: 5' 1" (1.549 m)     Body mass index is 28.95 kg/m².  Physical Exam  Vitals and nursing note reviewed.   Constitutional:       Appearance: Normal appearance.   HENT:      Head: Normocephalic and atraumatic.   Cardiovascular:      Rate and Rhythm: Normal rate and regular rhythm.   Pulmonary:      Effort: Pulmonary effort is normal.      Breath sounds: Rales (coarse right lower lobe) present. No rhonchi.   Abdominal:      Palpations: Abdomen is soft.   Musculoskeletal:         General: No swelling. Normal range of motion.   Skin:     General: Skin is warm and dry.      Capillary Refill: Capillary refill takes less than 2 seconds.      Findings: Bruising present.   Neurological:      General: No focal deficit present.      Mental Status: She is alert.               Diagnoses and health risks identified today and associated recommendations/orders:  Encounter for preventative health Wellness exam     Had labs A1c/cmp/lipid 2/2024> the rest of her full panel with microalbumin " which was elevated 8/2023 (did have 2 fasting sugars @124 and one at 117--pre DM all A1c seem to be WNL)  DEXA 2/2024 1.7  Last mammo was 2020   PCP Dr Hwang   DM eye exam 2/7/24  POAG bilaterally on timolol   83 yo aged out of routine colonoscopy screens last one was 2023 [place]   Declines all vaccines even the conversation      1. Aortic atherosclerosis  1/22/21 CT abd and pelvis >>Calcified coronary artery disease. Calcified atheromatous disease affects the aorta and its major branch vessels. There is ectasia of the infrarenal abdominal aorta.   On asa and plavix with statin    2. Other decreased white blood cell (WBC) count  Thrombocytopenia and leukopenia  Dr Laughlin notes in [place] [facility] 9/2016 - still follows with Dr Mcclain   Lab Results   Component Value Date    WBC 2.90 (L) 08/11/2023    HGB 12.3 08/11/2023    HCT 37.1 08/11/2023    MCV 89 08/11/2023    PLT 65 (L) 08/11/2023       3. Thrombocytopenia  Thrombocytopenia and leukopenia  Dr Laughlin notes in [place] [facility] 9/2016 - still follows with Dr Mcclain   Lab Results   Component Value Date    WBC 2.90 (L) 08/11/2023    HGB 12.3 08/11/2023    HCT 37.1 08/11/2023    MCV 89 08/11/2023    PLT 65 (L) 08/11/2023       4. Other cirrhosis of liver  1/22/21 ct abd and pelvis  >Imaging findings compatible with cirrhosis with portal hypertension as evidence by a nodular contour of the liver, recanalization of the umbilical vein, splenomegaly and multiple collateral vessels. There are innumerable cystic lesions throughout the liver.   Follows with [facility]     5. Atherosclerotic heart disease of native coronary artery with other forms of angina pectoris  Notes from [facility] in media 2/2022 > cad with stable angina- has rx for nitro  hx of DESC has only used nitro twice since rx was given   On asa statin and plavix     6. PAD (peripheral artery disease)  On asa plavix and statin   Notes from [facility] in media 2/2022 >PAD     7.  Primary open angle glaucoma (POAG) of both eyes, mild stage  DM eye exam 2/7/24  POAG bilaterally on timolol     8. Benzodiazepine dependence  RAFIQ on lexapro daily with xanax 0.5mg daily PRN per pcp    reviewed and she does fill xanax routinely from her pcp 30 tablets 0.5mg every other month 1/17/23, 2/15/23, 5/8/23, 7/10/23, 8/16/23, 11/9/23, 1/7/24, 3/8/24     9. RAFIQ (generalized anxiety disorder)  RAFIQ on lexapro daily with xanax 0.5mg daily PRN per pcp     10. Mixed hyperlipidemia  On statin and follows with cardiology [facility]    11. Essential hypertension  HTN on metoprolol and lisinopril follows with [facility] Dr Griffin   She is hypotensive today in office 90/48 and report that she has been so tired lately- will hold her lisinopril and made her a flu with her pcp this week (she reoprt that she can not come tomorrow- appt made for Wednesday)     12. Controlled type 2 diabetes mellitus with microalbuminuria, without long-term current use of insulin  Well controlled DM on metformin 500mg daily (8/1/2016 had A1c 6.5>6.4>7.4 but sinice then has been well controlled) eleavted microalbumin     13. Dependence on other enabling machines and devices  Uses cane for balance and weakness of bilateral lower ext     14. Abnormality of gait and mobility  Uses cane for balance and weakness of bilateral lower ext     15. Senile purpura  Seen on exam- see photo  Lab Results   Component Value Date    WBC 2.90 (L) 08/11/2023    HGB 12.3 08/11/2023    HCT 37.1 08/11/2023    MCV 89 08/11/2023    PLT 65 (L) 08/11/2023       On plavix and asa    16. Hypotension, unspecified hypotension type  HTN on metoprolol and lisinopril follows with [facility] Dr Griffin   She is hypotensive today in office 90/48 and report that she has been so tired lately- will hold her lisinopril and made her a flu with her pcp this week (she reoprt that she can not come tomorrow- appt made for Wednesday)     17. Fatigue, unspecified type  Likely from hypotension , but could have  several reasons  She is hypotensive today in office 90/48 and report that she has been so tired lately- will hold her lisinopril and made her a flu with her pcp this week (she reoprt that she can not come tomorrow- appt made for Wednesday)   - CBC Auto Differential; Future  - Comprehensive Metabolic Panel; Future  - X-Ray Chest PA And Lateral; Future          Provided Marine with a 5-10 year written screening schedule and personal prevention plan. Recommendations were developed using the USPSTF age appropriate recommendations. Education, counseling, and referrals were provided as needed. After Visit Summary printed and given to patient which includes a list of additional screenings\tests needed.    No follow-ups on file.    Ami Valentin NP          I offered to discuss advanced care planning, including how to pick a person who would make decisions for you if you were unable to make them for yourself, called a health care power of , and what kind of decisions you might make such as use of life sustaining treatments such as ventilators and tube feeding when faced with a life limiting illness recorded on a living will that they will need to know. (How you want to be cared for as you near the end of your natural life)     X Patient is interested in learning more about how to make advanced directives.  I provided them paperwork and offered to discuss this with them. She has  and 2 children but would want her grand daughter as her decision maker- we discussed how to complete the POA and she will take home. If completed will bring copy back to Dr Hwang

## 2024-05-06 NOTE — PATIENT INSTRUCTIONS
Counseling and Referral of Other Preventative  (Italic type indicates deductible and co-insurance are waived)    Patient Name: Marine Mo  Today's Date: 5/6/2024    Health Maintenance       Date Due Completion Date    Pneumococcal Vaccines (Age 65+) (1 of 2 - PCV) Never done ---    TETANUS VACCINE Never done ---    RSV Vaccine (Age 60+ and Pregnant patients) (1 - 1-dose 60+ series) Never done ---    Shingles Vaccine (2 of 3) 11/29/2017 10/4/2017    DEXA Scan 05/10/2019 5/10/2017    COVID-19 Vaccine (3 - 2023-24 season) 09/01/2023 1/31/2021    Hemoglobin A1c 08/01/2024 2/1/2024    Diabetes Urine Screening 08/11/2024 8/11/2023    Eye Exam 11/16/2024 11/16/2023    Lipid Panel 02/01/2025 2/1/2024    Aspirin/Antiplatelet Therapy 05/06/2025 5/6/2024        No orders of the defined types were placed in this encounter.    The following information is provided to all patients.  This information is to help you find resources for any of the problems found today that may be affecting your health:                  Living healthy guide: www.[facility].[place].gov      Understanding Diabetes: www.diabetes.org      Eating healthy: www.cdc.gov/healthyweight      CDC home safety checklist: www.cdc.gov/steadi/patient.html      Agency on Aging: www.goea.[place].[facility]      Alcoholics anonymous (AA): www.aa.org      Physical Activity: www.deysi.nih.gov/tz9cmuk      Tobacco use: www.quitwithusla.org

## 2024-05-07 ENCOUNTER — HOSPITAL ENCOUNTER (OUTPATIENT)
Dept: RADIOLOGY | Facility: HOSPITAL | Age: 84
Discharge: HOME | End: 2024-05-07
Attending: NURSE PRACTITIONER
Payer: MEDICARE

## 2024-05-07 DIAGNOSIS — R53.83 FATIGUE, UNSPECIFIED TYPE: ICD-10-CM

## 2024-05-07 PROCEDURE — 71046 X-RAY EXAM CHEST 2 VIEWS: CPT | Mod: TC

## 2024-05-07 PROCEDURE — 71046 X-RAY EXAM CHEST 2 VIEWS: CPT | Mod: 26,,, | Performed by: RADIOLOGY

## 2024-05-09 ENCOUNTER — OFFICE VISIT (OUTPATIENT)
Dept: INTERNAL MEDICINE | Facility: CLINIC | Age: 84
End: 2024-05-09
Payer: MEDICARE

## 2024-05-09 VITALS
OXYGEN SATURATION: 94 % | WEIGHT: 151.25 LBS | BODY MASS INDEX: 28.56 KG/M2 | SYSTOLIC BLOOD PRESSURE: 120 MMHG | RESPIRATION RATE: 18 BRPM | DIASTOLIC BLOOD PRESSURE: 60 MMHG | HEIGHT: 61 IN | HEART RATE: 83 BPM

## 2024-05-09 DIAGNOSIS — R53.83 FATIGUE, UNSPECIFIED TYPE: Primary | ICD-10-CM

## 2024-05-09 DIAGNOSIS — N18.31 CHRONIC KIDNEY DISEASE, STAGE 3A: ICD-10-CM

## 2024-05-09 PROCEDURE — 1101F PT FALLS ASSESS-DOCD LE1/YR: CPT | Mod: CPTII,S$GLB,, | Performed by: INTERNAL MEDICINE

## 2024-05-09 PROCEDURE — 1159F MED LIST DOCD IN RCRD: CPT | Mod: CPTII,S$GLB,, | Performed by: INTERNAL MEDICINE

## 2024-05-09 PROCEDURE — 3288F FALL RISK ASSESSMENT DOCD: CPT | Mod: CPTII,S$GLB,, | Performed by: INTERNAL MEDICINE

## 2024-05-09 PROCEDURE — 1160F RVW MEDS BY RX/DR IN RCRD: CPT | Mod: CPTII,S$GLB,, | Performed by: INTERNAL MEDICINE

## 2024-05-09 PROCEDURE — 1126F AMNT PAIN NOTED NONE PRSNT: CPT | Mod: CPTII,S$GLB,, | Performed by: INTERNAL MEDICINE

## 2024-05-09 PROCEDURE — 3074F SYST BP LT 130 MM HG: CPT | Mod: CPTII,S$GLB,, | Performed by: INTERNAL MEDICINE

## 2024-05-09 PROCEDURE — 99999 PR PBB SHADOW E&M-EST. PATIENT-LVL IV: CPT | Mod: PBBFAC,,, | Performed by: INTERNAL MEDICINE

## 2024-05-09 PROCEDURE — 99213 OFFICE O/P EST LOW 20 MIN: CPT | Mod: S$GLB,,, | Performed by: INTERNAL MEDICINE

## 2024-05-09 PROCEDURE — 3078F DIAST BP <80 MM HG: CPT | Mod: CPTII,S$GLB,, | Performed by: INTERNAL MEDICINE

## 2024-05-09 NOTE — PROGRESS NOTES
HPI:  Marine oM is a 84 y.o. female here for review tests  She saw NP the other day ; labs and cxr were ordered.    Lab Results   Component Value Date    WBC 2.88 (L) 05/07/2024    HGB 12.0 05/07/2024    HCT 36.5 (L) 05/07/2024    MCV 88 05/07/2024    PLT 84 (L) 05/07/2024       CMP  Sodium   Date Value Ref Range Status   05/07/2024 142 136 - 145 mmol/L Final     Potassium   Date Value Ref Range Status   05/07/2024 4.5 3.5 - 5.1 mmol/L Final     Chloride   Date Value Ref Range Status   05/07/2024 107 95 - 110 mmol/L Final     CO2   Date Value Ref Range Status   05/07/2024 26 23 - 29 mmol/L Final     Glucose   Date Value Ref Range Status   05/07/2024 114 (H) 70 - 110 mg/dL Final     BUN   Date Value Ref Range Status   05/07/2024 24 (H) 8 - 23 mg/dL Final     Creatinine   Date Value Ref Range Status   05/07/2024 1.0 0.5 - 1.4 mg/dL Final     Calcium   Date Value Ref Range Status   05/07/2024 9.6 8.7 - 10.5 mg/dL Final     Total Protein   Date Value Ref Range Status   05/07/2024 6.3 6.0 - 8.4 g/dL Final     Albumin   Date Value Ref Range Status   05/07/2024 3.7 3.5 - 5.2 g/dL Final     Total Bilirubin   Date Value Ref Range Status   05/07/2024 0.8 0.1 - 1.0 mg/dL Final     Comment:     For infants and newborns, interpretation of results should be based  on gestational age, weight and in agreement with clinical  observations.    Premature Infant recommended reference ranges:  Up to 24 hours.............<8.0 mg/dL  Up to 48 hours............<12.0 mg/dL  3-5 days..................<15.0 mg/dL  6-29 days.................<15.0 mg/dL       Alkaline Phosphatase   Date Value Ref Range Status   05/07/2024 28 (L) 55 - 135 U/L Final     AST   Date Value Ref Range Status   05/07/2024 22 10 - 40 U/L Final     ALT   Date Value Ref Range Status   05/07/2024 14 10 - 44 U/L Final     Anion Gap   Date Value Ref Range Status   05/07/2024 9 8 - 16 mmol/L Final     eGFR   Date Value Ref Range Status   05/07/2024 55 (A) >60  "mL/min/1.73 m^2 Final   02/01/2024 59.7  Final     CXR:  There is elevation of the right hemidiaphragm.The lungs are clear, with normal appearance of pulmonary vasculature.No pleural effusion.No pneumothorax.     The cardiac silhouette is normal in size. The hilar and mediastinal contours are unremarkable.Calcified atheromatous disease affects the aorta.     Bones are intact.     Impression:     No acute abnormality.    Review of Systems   Constitutional:  Negative for chills and fever.   HENT:  Negative for congestion, hearing loss, sinus pressure and sore throat.    Eyes:  Negative for photophobia.   Respiratory:  Negative for cough, choking, chest tightness, shortness of breath and wheezing.    Cardiovascular:  Negative for chest pain and palpitations.   Gastrointestinal:  Negative for blood in stool, nausea and vomiting.        Linzess and lactulose help.   Genitourinary:  Negative for dysuria and hematuria.   Musculoskeletal:  Positive for back pain. Negative for arthralgias, myalgias and neck pain.        Neck pain is getting better      Skin:  Negative for pallor.   Neurological:  Positive for headaches. Negative for dizziness and numbness.   Hematological:  Does not bruise/bleed easily.   Psychiatric/Behavioral:  Positive for sleep disturbance. Negative for confusion and suicidal ideas. The patient is not nervous/anxious.     A review of systems was performed and is negative except as noted above.    Objective:  Vitals:    05/09/24 1100   BP: 120/60   Pulse: 83   Resp: 18   SpO2: (!) 94%   Weight: 68.6 kg (151 lb 3.8 oz)   Height: 5' 1" (1.549 m)      Physical Exam  Vitals and nursing note reviewed.   Constitutional:       Appearance: She is well-developed.   HENT:      Head: Normocephalic and atraumatic.      Right Ear: External ear normal.      Left Ear: External ear normal.   Eyes:      Conjunctiva/sclera: Conjunctivae normal.      Pupils: Pupils are equal, round, and reactive to light.   Neck:      " Thyroid: No thyromegaly.      Vascular: No JVD.      Trachea: No tracheal deviation.   Cardiovascular:      Rate and Rhythm: Normal rate and regular rhythm.      Heart sounds: Normal heart sounds.   Pulmonary:      Effort: Pulmonary effort is normal. No respiratory distress.      Breath sounds: Normal breath sounds. No wheezing or rales.   Chest:      Chest wall: No tenderness.   Abdominal:      General: Bowel sounds are normal. There is no distension.      Palpations: Abdomen is soft. There is no mass.      Tenderness: There is no abdominal tenderness. There is no guarding or rebound.   Musculoskeletal:         General: Normal range of motion.      Cervical back: Normal range of motion and neck supple.   Lymphadenopathy:      Cervical: No cervical adenopathy.   Skin:     General: Skin is warm and dry.   Neurological:      Mental Status: She is alert and oriented to person, place, and time.      Cranial Nerves: No cranial nerve deficit.      Motor: No abnormal muscle tone.      Coordination: Coordination normal.      Deep Tendon Reflexes: Reflexes are normal and symmetric. Reflexes normal.      Comments: CN: Optic discs are flat with normal vasculature, PERRL, Extraoccular movements and visual fields are full. Normal facial sensation and strength, Hearing symmetric, Tongue and Palate are midline and strong. Shoulder Shrug symmetric and strong.        Assessment/Plan:  1. Fatigue, unspecified type  CBC CMP reviewed .  Her BP is still off of her lisinopril   Hydrate     2. Chronic kidney disease, stage 3a  Hydrate .

## 2024-05-14 ENCOUNTER — PATIENT OUTREACH (OUTPATIENT)
Dept: ADMINISTRATIVE | Facility: OTHER | Age: 84
End: 2024-05-14
Payer: MEDICARE

## 2024-05-14 VITALS — HEART RATE: 84 BPM | OXYGEN SATURATION: 99 % | SYSTOLIC BLOOD PRESSURE: 126 MMHG | DIASTOLIC BLOOD PRESSURE: 60 MMHG

## 2024-05-30 ENCOUNTER — OFFICE VISIT (OUTPATIENT)
Dept: INTERNAL MEDICINE | Facility: CLINIC | Age: 84
End: 2024-05-30
Payer: MEDICARE

## 2024-05-30 VITALS
RESPIRATION RATE: 20 BRPM | SYSTOLIC BLOOD PRESSURE: 130 MMHG | WEIGHT: 149.94 LBS | HEIGHT: 64 IN | DIASTOLIC BLOOD PRESSURE: 64 MMHG | BODY MASS INDEX: 25.6 KG/M2 | OXYGEN SATURATION: 96 % | HEART RATE: 71 BPM

## 2024-05-30 DIAGNOSIS — S99.912A INJURY OF LEFT ANKLE, INITIAL ENCOUNTER: ICD-10-CM

## 2024-05-30 DIAGNOSIS — S90.512A ABRASION, LEFT ANKLE, INITIAL ENCOUNTER: ICD-10-CM

## 2024-05-30 DIAGNOSIS — L03.90 CELLULITIS, UNSPECIFIED CELLULITIS SITE: Primary | ICD-10-CM

## 2024-05-30 PROCEDURE — 90471 IMMUNIZATION ADMIN: CPT | Mod: S$GLB,,, | Performed by: NURSE PRACTITIONER

## 2024-05-30 PROCEDURE — 3075F SYST BP GE 130 - 139MM HG: CPT | Mod: CPTII,S$GLB,, | Performed by: NURSE PRACTITIONER

## 2024-05-30 PROCEDURE — 1101F PT FALLS ASSESS-DOCD LE1/YR: CPT | Mod: CPTII,S$GLB,, | Performed by: NURSE PRACTITIONER

## 2024-05-30 PROCEDURE — 3288F FALL RISK ASSESSMENT DOCD: CPT | Mod: CPTII,S$GLB,, | Performed by: NURSE PRACTITIONER

## 2024-05-30 PROCEDURE — 1159F MED LIST DOCD IN RCRD: CPT | Mod: CPTII,S$GLB,, | Performed by: NURSE PRACTITIONER

## 2024-05-30 PROCEDURE — 3078F DIAST BP <80 MM HG: CPT | Mod: CPTII,S$GLB,, | Performed by: NURSE PRACTITIONER

## 2024-05-30 PROCEDURE — 99213 OFFICE O/P EST LOW 20 MIN: CPT | Mod: 25,S$GLB,, | Performed by: NURSE PRACTITIONER

## 2024-05-30 PROCEDURE — 99999 PR PBB SHADOW E&M-EST. PATIENT-LVL V: CPT | Mod: PBBFAC,,, | Performed by: NURSE PRACTITIONER

## 2024-05-30 PROCEDURE — 1125F AMNT PAIN NOTED PAIN PRSNT: CPT | Mod: CPTII,S$GLB,, | Performed by: NURSE PRACTITIONER

## 2024-05-30 PROCEDURE — 90714 TD VACC NO PRESV 7 YRS+ IM: CPT | Mod: S$GLB,,, | Performed by: NURSE PRACTITIONER

## 2024-05-30 RX ORDER — CEPHALEXIN 500 MG/1
500 CAPSULE ORAL EVERY 12 HOURS
Qty: 14 CAPSULE | Refills: 0 | Status: SHIPPED | OUTPATIENT
Start: 2024-05-30 | End: 2024-06-06

## 2024-05-30 RX ORDER — MUPIROCIN 20 MG/G
OINTMENT TOPICAL 3 TIMES DAILY
Qty: 30 G | Refills: 0 | Status: SHIPPED | OUTPATIENT
Start: 2024-05-30

## 2024-05-30 NOTE — PROGRESS NOTES
Subjective:       Patient ID: Marine Mo is a 84 y.o. female.    Chief Complaint: sore on L. ankle area (X 1 week- with redness swelling and pain PS:6)    Patient is known, to me and presents with   Chief Complaint   Patient presents with    sore on L. ankle area     X 1 week- with redness swelling and pain PS:6   .  Denies chest pain and shortness of breath.  A week ago patient hit her left lower ankle on her bed and since then developed worsening redness and pain to area.  She is cleaning it with peroxide and water and applying neosporin. She is not utd with tetanus. She is a diabetic   HPI  Review of Systems   Constitutional: Negative.    Respiratory: Negative.     Cardiovascular: Negative.    Skin:  Positive for color change and wound. Negative for pallor and rash.       Objective:      Physical Exam  Constitutional:       General: She is not in acute distress.     Appearance: Normal appearance. She is not ill-appearing, toxic-appearing or diaphoretic.   Cardiovascular:      Rate and Rhythm: Normal rate and regular rhythm.      Heart sounds: Normal heart sounds. No murmur heard.  Pulmonary:      Effort: Pulmonary effort is normal. No respiratory distress.      Breath sounds: Normal breath sounds. No stridor. No wheezing, rhonchi or rales.   Chest:      Chest wall: No tenderness.   Skin:     General: Skin is warm and dry.      Capillary Refill: Capillary refill takes less than 2 seconds.      Coloration: Skin is not jaundiced or pale.      Findings: Erythema and wound present. No bruising, lesion or rash.             Comments: Quarter size open wound with surrounding erythema. No drainage. Tender to touch.    Neurological:      Mental Status: She is alert.         Assessment:       1. Cellulitis, unspecified cellulitis site    2. Injury of left ankle, initial encounter    3. Abrasion, left ankle, initial encounter        Plan:   1. Cellulitis, unspecified cellulitis site  -     cephALEXin (KEFLEX)  "500 MG capsule; Take 1 capsule (500 mg total) by mouth every 12 (twelve) hours. for 7 days  Dispense: 14 capsule; Refill: 0  -     mupirocin (BACTROBAN) 2 % ointment; Apply topically 3 (three) times daily.  Dispense: 30 g; Refill: 0  -     Discontinue: tetanus-diphtheria toxoid (PF) (TENIVAC) injection  -     tetanus-diphtheria toxoid (PF) (TENIVAC) injection    2. Injury of left ankle, initial encounter  -     tetanus-diphtheria toxoid (PF) (TENIVAC) injection    3. Abrasion, left ankle, initial encounter  -     tetanus-diphtheria toxoid (PF) (TENIVAC) injection       "This note will not be shared with the patient."  See above plan of care  Any worsening needs to return to clinic sooner  Rtc as scheduled  "

## 2024-06-06 ENCOUNTER — OFFICE VISIT (OUTPATIENT)
Dept: INTERNAL MEDICINE | Facility: CLINIC | Age: 84
End: 2024-06-06
Payer: MEDICARE

## 2024-06-06 VITALS
HEIGHT: 64 IN | HEART RATE: 75 BPM | BODY MASS INDEX: 25.93 KG/M2 | RESPIRATION RATE: 18 BRPM | WEIGHT: 151.88 LBS | OXYGEN SATURATION: 96 %

## 2024-06-06 DIAGNOSIS — L03.90 CELLULITIS, UNSPECIFIED CELLULITIS SITE: Primary | ICD-10-CM

## 2024-06-06 PROCEDURE — 99213 OFFICE O/P EST LOW 20 MIN: CPT | Mod: S$GLB,,, | Performed by: NURSE PRACTITIONER

## 2024-06-06 PROCEDURE — 99999 PR PBB SHADOW E&M-EST. PATIENT-LVL III: CPT | Mod: PBBFAC,,, | Performed by: NURSE PRACTITIONER

## 2024-06-06 PROCEDURE — 3288F FALL RISK ASSESSMENT DOCD: CPT | Mod: CPTII,S$GLB,, | Performed by: NURSE PRACTITIONER

## 2024-06-06 PROCEDURE — 1125F AMNT PAIN NOTED PAIN PRSNT: CPT | Mod: CPTII,S$GLB,, | Performed by: NURSE PRACTITIONER

## 2024-06-06 PROCEDURE — 1159F MED LIST DOCD IN RCRD: CPT | Mod: CPTII,S$GLB,, | Performed by: NURSE PRACTITIONER

## 2024-06-06 PROCEDURE — 1101F PT FALLS ASSESS-DOCD LE1/YR: CPT | Mod: CPTII,S$GLB,, | Performed by: NURSE PRACTITIONER

## 2024-06-06 RX ORDER — NEOMYCIN SULFATE, POLYMYXIN B SULFATE, AND DEXAMETHASONE 3.5; 10000; 1 MG/G; [USP'U]/G; MG/G
OINTMENT OPHTHALMIC
COMMUNITY
Start: 2024-05-30

## 2024-06-06 NOTE — PROGRESS NOTES
"Subjective:       Patient ID: Marine Mo is a 84 y.o. female.    Chief Complaint: Follow-up ( 1 wk- recheck leg PS:8)    Patient is known, to me and presents with   Chief Complaint   Patient presents with    Follow-up      1 wk- recheck leg PS:8   .  Denies chest pain and shortness of breath.  Patient presents with one week follow up for cellulitis to right lower ankle and is much improved. Took all antibx. Still using bactroban ointment. States that it is feeling better.   Follow-up  Pertinent negatives include no rash.     Review of Systems   Constitutional: Negative.    Respiratory: Negative.     Cardiovascular: Negative.    Skin:  Positive for wound. Negative for color change, pallor and rash.       Objective:      Physical Exam  Constitutional:       General: She is not in acute distress.     Appearance: Normal appearance. She is not ill-appearing, toxic-appearing or diaphoretic.   Cardiovascular:      Rate and Rhythm: Normal rate and regular rhythm.      Heart sounds: Normal heart sounds. No murmur heard.  Pulmonary:      Effort: Pulmonary effort is normal. No respiratory distress.      Breath sounds: Normal breath sounds. No stridor. No wheezing, rhonchi or rales.   Chest:      Chest wall: No tenderness.   Skin:     General: Skin is warm and dry.      Capillary Refill: Capillary refill takes less than 2 seconds.      Coloration: Skin is not jaundiced or pale.      Findings: Erythema and wound present. No bruising, lesion or rash.             Comments: Scab and erythema improved. No longer looks infected and no drainage. Erythema much improved     Neurological:      Mental Status: She is alert.         Assessment:       1. Cellulitis, unspecified cellulitis site        Plan:   1. Cellulitis, unspecified cellulitis site       "This note will not be shared with the patient."  No need for more antibx   Continue cleaning area and apply bactroban  If exacerbates will let me know  Rtc as scheduled  "

## 2024-06-07 NOTE — TELEPHONE ENCOUNTER
No care due was identified.  [facility] Embedded Care Due Messages. Reference number: [id number].   6/07/2024 5:17:55 PM CDT

## 2024-06-08 NOTE — TELEPHONE ENCOUNTER
Refill Routing Note   Medication(s) are not appropriate for processing by [facility] for the following reason(s):        New or recently adjusted medication    [facility] action(s):  Defer             Appointments  past 12m or future 3m with PCP    Date Provider   Last Visit   5/9/2024 Serg Hwang MD   Next Visit   8/26/2024 Serg Hwang MD   ED visits in past 90 days: 0        Note composed:11:01 PM 06/07/2024

## 2024-06-10 ENCOUNTER — TELEPHONE (OUTPATIENT)
Dept: INTERNAL MEDICINE | Facility: CLINIC | Age: 84
End: 2024-06-10
Payer: MEDICARE

## 2024-06-10 RX ORDER — ESCITALOPRAM OXALATE 5 MG/1
5 TABLET ORAL
Qty: 30 TABLET | Refills: 0 | Status: SHIPPED | OUTPATIENT
Start: 2024-06-10

## 2024-06-10 NOTE — TELEPHONE ENCOUNTER
----- Message from Anneliese Joaquin sent at 6/10/2024  9:09 AM CDT -----  Contact: Pt  Marine Mo  MRN: [id number]  : 1940  PCP: Serg Hwang  Home Phone      Not on file.  Work Phone      [phone]  Mobile          [phone]      MESSAGE:     Pt needs a refill of EScitalopram oxalate (LEXAPRO) 5 MG Tab sent to [facility] in [place]. Pt is out.       Marine   [phone]

## 2024-06-18 ENCOUNTER — PATIENT OUTREACH (OUTPATIENT)
Dept: ADMINISTRATIVE | Facility: OTHER | Age: 84
End: 2024-06-18
Payer: MEDICARE

## 2024-06-18 VITALS — HEART RATE: 88 BPM | DIASTOLIC BLOOD PRESSURE: 62 MMHG | OXYGEN SATURATION: 96 % | SYSTOLIC BLOOD PRESSURE: 118 MMHG

## 2024-07-08 RX ORDER — ESCITALOPRAM OXALATE 5 MG/1
5 TABLET ORAL DAILY
Qty: 30 TABLET | Refills: 2 | Status: SHIPPED | OUTPATIENT
Start: 2024-07-08

## 2024-07-08 RX ORDER — ESCITALOPRAM OXALATE 5 MG/1
5 TABLET ORAL
Qty: 30 TABLET | Refills: 0 | OUTPATIENT
Start: 2024-07-08

## 2024-07-08 NOTE — TELEPHONE ENCOUNTER
Care Due:                  Date            Visit Type   Department     Provider  --------------------------------------------------------------------------------                                EP -                              PRIMARY      [facility]  Last Visit: 05-      CARE ([facility])   MEDICINE RONEY Hwang  Next Visit: None Scheduled  None         None Found                                                            Last  Test          Frequency    Reason                     Performed    Due Date  --------------------------------------------------------------------------------    HBA1C.......  6 months...  metFORMIN................  02- 07-    [facility] Embedded Care Due Messages. Reference number: [id number].   7/08/2024 9:30:41 AM CDT

## 2024-07-08 NOTE — TELEPHONE ENCOUNTER
----- Message from Sourav Bingham sent at 2024  9:33 AM CDT -----  Contact: pt  Marine Mo  MRN: [id number]  : 1940  PCP: Serg Hwang  Home Phone      Not on file.  Work Phone      [phone]  Mobile          [phone]      MESSAGE:     Pt needs a refill on EScitalopram oxalate (LEXAPRO) 5 MG Tab                  Preferred Pharmacy    [facility] [address] [address]  Phone: [phone] Fax: [phone]  Hours: Not open 24 hours        [phone]

## 2024-07-08 NOTE — TELEPHONE ENCOUNTER
No care due was identified.  [facility] Embedded Care Due Messages. Reference number: [id number].   7/08/2024 9:58:06 AM CDT

## 2024-07-08 NOTE — TELEPHONE ENCOUNTER
LOV 5/9/2024  Scheduled visit 8/26/2024    Requested Prescriptions     Pending Prescriptions Disp Refills    EScitalopram oxalate (LEXAPRO) 5 MG Tab 30 tablet 0     Sig: Take 1 tablet (5 mg total) by mouth once daily.

## 2024-07-09 ENCOUNTER — PATIENT OUTREACH (OUTPATIENT)
Dept: ADMINISTRATIVE | Facility: OTHER | Age: 84
End: 2024-07-09
Payer: MEDICARE

## 2024-07-09 VITALS — DIASTOLIC BLOOD PRESSURE: 50 MMHG | HEART RATE: 107 BPM | SYSTOLIC BLOOD PRESSURE: 112 MMHG | OXYGEN SATURATION: 96 %

## 2024-08-13 ENCOUNTER — PATIENT OUTREACH (OUTPATIENT)
Dept: ADMINISTRATIVE | Facility: OTHER | Age: 84
End: 2024-08-13
Payer: MEDICARE

## 2024-08-14 VITALS — HEART RATE: 80 BPM | OXYGEN SATURATION: 96 % | DIASTOLIC BLOOD PRESSURE: 56 MMHG | SYSTOLIC BLOOD PRESSURE: 124 MMHG

## 2024-08-15 DIAGNOSIS — E11.29 CONTROLLED TYPE 2 DIABETES MELLITUS WITH MICROALBUMINURIA, WITHOUT LONG-TERM CURRENT USE OF INSULIN: Primary | ICD-10-CM

## 2024-08-15 DIAGNOSIS — R80.9 CONTROLLED TYPE 2 DIABETES MELLITUS WITH MICROALBUMINURIA, WITHOUT LONG-TERM CURRENT USE OF INSULIN: Primary | ICD-10-CM

## 2024-08-19 ENCOUNTER — LAB VISIT (OUTPATIENT)
Dept: LAB | Facility: HOSPITAL | Age: 84
End: 2024-08-19
Attending: INTERNAL MEDICINE
Payer: MEDICARE

## 2024-08-19 DIAGNOSIS — E11.40 TYPE 2 DIABETES MELLITUS WITH DIABETIC NEUROPATHY, WITHOUT LONG-TERM CURRENT USE OF INSULIN: ICD-10-CM

## 2024-08-19 DIAGNOSIS — I25.118 ATHEROSCLEROTIC HEART DISEASE OF NATIVE CORONARY ARTERY WITH OTHER FORMS OF ANGINA PECTORIS: ICD-10-CM

## 2024-08-19 DIAGNOSIS — E11.29 CONTROLLED TYPE 2 DIABETES MELLITUS WITH MICROALBUMINURIA, WITHOUT LONG-TERM CURRENT USE OF INSULIN: ICD-10-CM

## 2024-08-19 DIAGNOSIS — R80.9 CONTROLLED TYPE 2 DIABETES MELLITUS WITH MICROALBUMINURIA, WITHOUT LONG-TERM CURRENT USE OF INSULIN: ICD-10-CM

## 2024-08-19 DIAGNOSIS — I70.0 AORTIC ATHEROSCLEROSIS: ICD-10-CM

## 2024-08-19 DIAGNOSIS — E78.2 MIXED HYPERLIPIDEMIA: ICD-10-CM

## 2024-08-19 LAB
ALBUMIN SERPL BCP-MCNC: 3.7 G/DL (ref 3.5–5.2)
ALBUMIN/CREAT UR: 40.9 UG/MG (ref 0–30)
ALP SERPL-CCNC: 29 U/L (ref 55–135)
ALT SERPL W/O P-5'-P-CCNC: 10 U/L (ref 10–44)
ANION GAP SERPL CALC-SCNC: 9 MMOL/L (ref 8–16)
AST SERPL-CCNC: 19 U/L (ref 10–40)
BASOPHILS # BLD AUTO: 0.01 K/UL (ref 0–0.2)
BASOPHILS NFR BLD: 0.4 % (ref 0–1.9)
BILIRUB SERPL-MCNC: 0.6 MG/DL (ref 0.1–1)
BUN SERPL-MCNC: 17 MG/DL (ref 8–23)
CALCIUM SERPL-MCNC: 9.6 MG/DL (ref 8.7–10.5)
CHLORIDE SERPL-SCNC: 105 MMOL/L (ref 95–110)
CHOLEST SERPL-MCNC: 111 MG/DL (ref 120–199)
CHOLEST/HDLC SERPL: 2.7 {RATIO} (ref 2–5)
CO2 SERPL-SCNC: 28 MMOL/L (ref 23–29)
CREAT SERPL-MCNC: 0.9 MG/DL (ref 0.5–1.4)
CREAT UR-MCNC: 100.3 MG/DL (ref 15–325)
DIFFERENTIAL METHOD BLD: ABNORMAL
EOSINOPHIL # BLD AUTO: 0.1 K/UL (ref 0–0.5)
EOSINOPHIL NFR BLD: 2.7 % (ref 0–8)
ERYTHROCYTE [DISTWIDTH] IN BLOOD BY AUTOMATED COUNT: 16.3 % (ref 11.5–14.5)
EST. GFR  (NO RACE VARIABLE): >60 ML/MIN/1.73 M^2
ESTIMATED AVG GLUCOSE: 103 MG/DL (ref 68–131)
GLUCOSE SERPL-MCNC: 106 MG/DL (ref 70–110)
HBA1C MFR BLD: 5.2 % (ref 4–5.6)
HCT VFR BLD AUTO: 34 % (ref 37–48.5)
HDLC SERPL-MCNC: 41 MG/DL (ref 40–75)
HDLC SERPL: 36.9 % (ref 20–50)
HGB BLD-MCNC: 10.7 G/DL (ref 12–16)
IMM GRANULOCYTES # BLD AUTO: 0 K/UL (ref 0–0.04)
IMM GRANULOCYTES NFR BLD AUTO: 0 % (ref 0–0.5)
LDLC SERPL CALC-MCNC: 46.2 MG/DL (ref 63–159)
LYMPHOCYTES # BLD AUTO: 0.7 K/UL (ref 1–4.8)
LYMPHOCYTES NFR BLD: 28.6 % (ref 18–48)
MCH RBC QN AUTO: 27.9 PG (ref 27–31)
MCHC RBC AUTO-ENTMCNC: 31.5 G/DL (ref 32–36)
MCV RBC AUTO: 89 FL (ref 82–98)
MICROALBUMIN UR DL<=1MG/L-MCNC: 41 UG/ML
MONOCYTES # BLD AUTO: 0.3 K/UL (ref 0.3–1)
MONOCYTES NFR BLD: 10.8 % (ref 4–15)
NEUTROPHILS # BLD AUTO: 1.5 K/UL (ref 1.8–7.7)
NEUTROPHILS NFR BLD: 57.5 % (ref 38–73)
NONHDLC SERPL-MCNC: 70 MG/DL
NRBC BLD-RTO: 0 /100 WBC
PLATELET # BLD AUTO: 82 K/UL (ref 150–450)
PMV BLD AUTO: 9.7 FL (ref 9.2–12.9)
POTASSIUM SERPL-SCNC: 4.5 MMOL/L (ref 3.5–5.1)
PROT SERPL-MCNC: 6.4 G/DL (ref 6–8.4)
RBC # BLD AUTO: 3.83 M/UL (ref 4–5.4)
SODIUM SERPL-SCNC: 142 MMOL/L (ref 136–145)
TRIGL SERPL-MCNC: 119 MG/DL (ref 30–150)
TSH SERPL DL<=0.005 MIU/L-ACNC: 1.22 UIU/ML (ref 0.4–4)
WBC # BLD AUTO: 2.59 K/UL (ref 3.9–12.7)

## 2024-08-19 PROCEDURE — 85025 COMPLETE CBC W/AUTO DIFF WBC: CPT | Performed by: INTERNAL MEDICINE

## 2024-08-19 PROCEDURE — 80061 LIPID PANEL: CPT | Performed by: INTERNAL MEDICINE

## 2024-08-19 PROCEDURE — 80053 COMPREHEN METABOLIC PANEL: CPT | Performed by: INTERNAL MEDICINE

## 2024-08-19 PROCEDURE — 84443 ASSAY THYROID STIM HORMONE: CPT | Performed by: INTERNAL MEDICINE

## 2024-08-19 PROCEDURE — 82570 ASSAY OF URINE CREATININE: CPT | Performed by: INTERNAL MEDICINE

## 2024-08-19 PROCEDURE — 82043 UR ALBUMIN QUANTITATIVE: CPT | Performed by: INTERNAL MEDICINE

## 2024-08-19 PROCEDURE — 83036 HEMOGLOBIN GLYCOSYLATED A1C: CPT | Performed by: INTERNAL MEDICINE

## 2024-08-19 PROCEDURE — 36415 COLL VENOUS BLD VENIPUNCTURE: CPT | Performed by: INTERNAL MEDICINE

## 2024-08-26 ENCOUNTER — OFFICE VISIT (OUTPATIENT)
Dept: INTERNAL MEDICINE | Facility: CLINIC | Age: 84
End: 2024-08-26
Payer: MEDICARE

## 2024-08-26 VITALS
WEIGHT: 149.25 LBS | BODY MASS INDEX: 25.48 KG/M2 | HEART RATE: 71 BPM | DIASTOLIC BLOOD PRESSURE: 70 MMHG | HEIGHT: 64 IN | OXYGEN SATURATION: 96 % | SYSTOLIC BLOOD PRESSURE: 130 MMHG | RESPIRATION RATE: 16 BRPM

## 2024-08-26 DIAGNOSIS — K74.69 OTHER CIRRHOSIS OF LIVER: ICD-10-CM

## 2024-08-26 DIAGNOSIS — D69.6 THROMBOCYTOPENIA: ICD-10-CM

## 2024-08-26 DIAGNOSIS — E11.29 CONTROLLED TYPE 2 DIABETES MELLITUS WITH MICROALBUMINURIA, WITHOUT LONG-TERM CURRENT USE OF INSULIN: ICD-10-CM

## 2024-08-26 DIAGNOSIS — I10 ESSENTIAL HYPERTENSION: Primary | ICD-10-CM

## 2024-08-26 DIAGNOSIS — R80.9 CONTROLLED TYPE 2 DIABETES MELLITUS WITH MICROALBUMINURIA, WITHOUT LONG-TERM CURRENT USE OF INSULIN: ICD-10-CM

## 2024-08-26 DIAGNOSIS — E78.2 MIXED HYPERLIPIDEMIA: ICD-10-CM

## 2024-08-26 PROCEDURE — 1101F PT FALLS ASSESS-DOCD LE1/YR: CPT | Mod: CPTII,S$GLB,, | Performed by: INTERNAL MEDICINE

## 2024-08-26 PROCEDURE — 3288F FALL RISK ASSESSMENT DOCD: CPT | Mod: CPTII,S$GLB,, | Performed by: INTERNAL MEDICINE

## 2024-08-26 PROCEDURE — 1160F RVW MEDS BY RX/DR IN RCRD: CPT | Mod: CPTII,S$GLB,, | Performed by: INTERNAL MEDICINE

## 2024-08-26 PROCEDURE — 1159F MED LIST DOCD IN RCRD: CPT | Mod: CPTII,S$GLB,, | Performed by: INTERNAL MEDICINE

## 2024-08-26 PROCEDURE — 3078F DIAST BP <80 MM HG: CPT | Mod: CPTII,S$GLB,, | Performed by: INTERNAL MEDICINE

## 2024-08-26 PROCEDURE — 99999 PR PBB SHADOW E&M-EST. PATIENT-LVL IV: CPT | Mod: PBBFAC,,, | Performed by: INTERNAL MEDICINE

## 2024-08-26 PROCEDURE — 3075F SYST BP GE 130 - 139MM HG: CPT | Mod: CPTII,S$GLB,, | Performed by: INTERNAL MEDICINE

## 2024-08-26 PROCEDURE — 99214 OFFICE O/P EST MOD 30 MIN: CPT | Mod: S$GLB,,, | Performed by: INTERNAL MEDICINE

## 2024-08-26 PROCEDURE — G2211 COMPLEX E/M VISIT ADD ON: HCPCS | Mod: S$GLB,,, | Performed by: INTERNAL MEDICINE

## 2024-08-26 PROCEDURE — 1126F AMNT PAIN NOTED NONE PRSNT: CPT | Mod: CPTII,S$GLB,, | Performed by: INTERNAL MEDICINE

## 2024-08-26 NOTE — PROGRESS NOTES
"HPI:  Marine Mo is a 84 y.o. female here for Follow-up  .  Labs are reviewed.    Review of Systems   Constitutional:  Negative for chills and fever.   HENT:  Negative for congestion, hearing loss, sinus pressure and sore throat.    Eyes:  Negative for photophobia.   Respiratory:  Negative for cough, choking, chest tightness and wheezing.    Cardiovascular:  Negative for chest pain and palpitations.   Gastrointestinal:  Negative for blood in stool, nausea and vomiting.   Genitourinary:  Negative for dysuria and hematuria.   Musculoskeletal:  Negative for arthralgias and myalgias.   Skin:  Negative for pallor.   Neurological:  Negative for dizziness and numbness.   Hematological:  Does not bruise/bleed easily.   Psychiatric/Behavioral:  Negative for confusion and suicidal ideas. The patient is not nervous/anxious.     A review of systems was performed and is negative except as noted above.  Labs are reviewed.    Objective:  Vitals:    08/26/24 0833   BP: 130/70   Pulse: 71   Resp: 16   SpO2: 96%   Weight: 67.7 kg (149 lb 4 oz)   Height: 5' 4" (1.626 m)      Physical Exam  Vitals and nursing note reviewed.   Constitutional:       Appearance: She is well-developed.   HENT:      Head: Normocephalic and atraumatic.      Right Ear: External ear normal.      Left Ear: External ear normal.   Eyes:      Conjunctiva/sclera: Conjunctivae normal.      Pupils: Pupils are equal, round, and reactive to light.   Neck:      Thyroid: No thyromegaly.      Vascular: No JVD.      Trachea: No tracheal deviation.   Cardiovascular:      Rate and Rhythm: Normal rate and regular rhythm.      Heart sounds: Normal heart sounds.   Pulmonary:      Effort: Pulmonary effort is normal. No respiratory distress.      Breath sounds: Normal breath sounds. No wheezing or rales.   Chest:      Chest wall: No tenderness.   Abdominal:      General: Bowel sounds are normal. There is no distension.      Palpations: Abdomen is soft. There is no " mass.      Tenderness: There is no abdominal tenderness. There is no guarding or rebound.   Musculoskeletal:         General: Normal range of motion.      Cervical back: Normal range of motion and neck supple.   Lymphadenopathy:      Cervical: No cervical adenopathy.   Skin:     General: Skin is warm and dry.   Neurological:      Mental Status: She is alert and oriented to person, place, and time.      Cranial Nerves: No cranial nerve deficit.      Motor: No abnormal muscle tone.      Coordination: Coordination normal.      Deep Tendon Reflexes: Reflexes are normal and symmetric. Reflexes normal.      Comments: CN: Optic discs are flat with normal vasculature, PERRL, Extraoccular movements and visual fields are full. Normal facial sensation and strength, Hearing symmetric, Tongue and Palate are midline and strong. Shoulder Shrug symmetric and strong.        Assessment/Plan:  1. Essential hypertension  -     CBC Auto Differential; Future; Expected date: 02/22/2025  -     Comprehensive Metabolic Panel; Future; Expected date: 02/22/2025  -     TSH; Future; Expected date: 02/22/2025    Well controlled.  Continue same medication and dose.  Keep weight close to ideal body weight.     Avoid high salt foods (olives, pickles, smoked meats, salted potato chips, etc.).   Do not add salt to your food at the table.   Use only small amounts of salt when cooking.   Begin an exercise program. Discuss with your doctor what type of exercise program would be best for you. It doesn't have to be difficult. Even brisk walking for 20 minutes three times a week is a good form of exercise.   Avoid medicines which contain heart stimulants. This includes many cold and sinus decongestant pills and sprays as well as diet pills. Check the warnings about hypertension on the label. Stimulants such as amphetamine or cocaine could be lethal for someone with hypertension. Never take these.    2. Mixed hyperlipidemia  -     Lipid Panel; Future;  Expected date: 02/22/2025  -     TSH; Future; Expected date: 02/22/2025  Continue lipitor    3. Other cirrhosis of liver  Seeing Dr Hernandez   We discussed about EGD /bleeding issues   with cirrhosis of liver          4. Thrombocytopenia  Overview:  Chronic - noted since 2013  Associated with chronic liver disease   Lab Results   Component Value Date    WBC 2.90 (L) 08/11/2023    HGB 12.3 08/11/2023    HCT 37.1 08/11/2023    MCV 89 08/11/2023    PLT 65 (L) 08/11/2023     Also taking plavix       Orders:  -     CBC Auto Differential; Future; Expected date: 02/22/2025  Will monitor     5. Controlled type 2 diabetes mellitus with microalbuminuria, without long-term current use of insulin  Overview:  Noted since 2013  Metformin   Lab Results   Component Value Date    HGBA1C 5.2 02/01/2024         Orders:  -     Hemoglobin A1C; Future; Expected date: 02/22/2025  -     Microalbumin/Creatinine Ratio, Urine; Future; Expected date: 02/22/2025     Well controlled.  Continue same medication and dose.

## 2024-09-23 ENCOUNTER — OFFICE VISIT (OUTPATIENT)
Dept: INTERNAL MEDICINE | Facility: CLINIC | Age: 84
End: 2024-09-23
Payer: MEDICARE

## 2024-09-23 VITALS
SYSTOLIC BLOOD PRESSURE: 102 MMHG | WEIGHT: 147.94 LBS | BODY MASS INDEX: 25.25 KG/M2 | HEART RATE: 76 BPM | TEMPERATURE: 100 F | RESPIRATION RATE: 20 BRPM | HEIGHT: 64 IN | DIASTOLIC BLOOD PRESSURE: 56 MMHG

## 2024-09-23 DIAGNOSIS — R50.9 FEVER, UNSPECIFIED FEVER CAUSE: ICD-10-CM

## 2024-09-23 DIAGNOSIS — R52 BODY ACHES: ICD-10-CM

## 2024-09-23 DIAGNOSIS — U07.1 COVID-19: Primary | ICD-10-CM

## 2024-09-23 LAB
CTP QC/QA: YES
CTP QC/QA: YES
POC MOLECULAR INFLUENZA A AGN: NEGATIVE
POC MOLECULAR INFLUENZA B AGN: NEGATIVE
SARS-COV-2 AG RESP QL IA.RAPID: POSITIVE

## 2024-09-23 PROCEDURE — 1159F MED LIST DOCD IN RCRD: CPT | Mod: CPTII,S$GLB,, | Performed by: INTERNAL MEDICINE

## 2024-09-23 PROCEDURE — 87426 SARSCOV CORONAVIRUS AG IA: CPT | Mod: QW,S$GLB,, | Performed by: INTERNAL MEDICINE

## 2024-09-23 PROCEDURE — 1101F PT FALLS ASSESS-DOCD LE1/YR: CPT | Mod: CPTII,S$GLB,, | Performed by: INTERNAL MEDICINE

## 2024-09-23 PROCEDURE — 99213 OFFICE O/P EST LOW 20 MIN: CPT | Mod: S$GLB,,, | Performed by: INTERNAL MEDICINE

## 2024-09-23 PROCEDURE — G2211 COMPLEX E/M VISIT ADD ON: HCPCS | Mod: S$GLB,,, | Performed by: INTERNAL MEDICINE

## 2024-09-23 PROCEDURE — 87502 INFLUENZA DNA AMP PROBE: CPT | Mod: QW,S$GLB,, | Performed by: INTERNAL MEDICINE

## 2024-09-23 PROCEDURE — 3078F DIAST BP <80 MM HG: CPT | Mod: CPTII,S$GLB,, | Performed by: INTERNAL MEDICINE

## 2024-09-23 PROCEDURE — 1125F AMNT PAIN NOTED PAIN PRSNT: CPT | Mod: CPTII,S$GLB,, | Performed by: INTERNAL MEDICINE

## 2024-09-23 PROCEDURE — 3288F FALL RISK ASSESSMENT DOCD: CPT | Mod: CPTII,S$GLB,, | Performed by: INTERNAL MEDICINE

## 2024-09-23 PROCEDURE — 3074F SYST BP LT 130 MM HG: CPT | Mod: CPTII,S$GLB,, | Performed by: INTERNAL MEDICINE

## 2024-09-23 PROCEDURE — 1160F RVW MEDS BY RX/DR IN RCRD: CPT | Mod: CPTII,S$GLB,, | Performed by: INTERNAL MEDICINE

## 2024-09-23 PROCEDURE — 99999 PR PBB SHADOW E&M-EST. PATIENT-LVL IV: CPT | Mod: PBBFAC,,, | Performed by: INTERNAL MEDICINE

## 2024-09-23 NOTE — PROGRESS NOTES
"HPI:  Marine Mo is a 84 y.o. female here for Generalized Body Aches, Headache, Nasal Congestion, and Fatigue (All stated friday)  .febrile illness since Friday   + achiness   Sneezing ++  + HEADACHES    Review of Systems   Constitutional:  Positive for diaphoresis. Negative for chills and fever.   HENT:  Positive for congestion, postnasal drip, rhinorrhea, sinus pressure, sneezing and sore throat. Negative for hearing loss.    Eyes:  Negative for photophobia.   Respiratory:  Negative for cough, choking, chest tightness, shortness of breath and wheezing.    Cardiovascular:  Negative for chest pain and palpitations.   Gastrointestinal:  Negative for blood in stool, nausea and vomiting.   Genitourinary:  Negative for dysuria and hematuria.   Musculoskeletal:  Negative for arthralgias and myalgias.   Skin:  Negative for pallor.   Neurological:  Positive for headaches. Negative for dizziness and numbness.   Hematological:  Does not bruise/bleed easily.   Psychiatric/Behavioral:  Negative for confusion and suicidal ideas. The patient is not nervous/anxious.     A review of systems was performed and is negative except as noted above.    Objective:  Vitals:    09/23/24 0924   BP: (!) 102/56   Pulse: 76   Resp: 20   Temp: 99.7 °F (37.6 °C)   Weight: 67.1 kg (147 lb 14.9 oz)   Height: 5' 4" (1.626 m)      Physical Exam  Vitals and nursing note reviewed.   Constitutional:       Appearance: She is well-developed.   HENT:      Head: Normocephalic and atraumatic.      Right Ear: External ear normal. A middle ear effusion is present.      Left Ear: External ear normal. A middle ear effusion is present.      Nose: Mucosal edema and rhinorrhea present.      Mouth/Throat:      Mouth: Mucous membranes are pale.   Eyes:      Conjunctiva/sclera: Conjunctivae normal.      Pupils: Pupils are equal, round, and reactive to light.   Neck:      Thyroid: No thyromegaly.      Vascular: No JVD.      Trachea: No tracheal deviation. "   Cardiovascular:      Rate and Rhythm: Normal rate and regular rhythm.      Heart sounds: Normal heart sounds.   Pulmonary:      Effort: Pulmonary effort is normal. No respiratory distress.      Breath sounds: Normal breath sounds. No wheezing or rales.   Chest:      Chest wall: No tenderness.   Abdominal:      General: Bowel sounds are normal. There is no distension.      Palpations: Abdomen is soft. There is no mass.      Tenderness: There is no abdominal tenderness. There is no guarding or rebound.   Musculoskeletal:         General: Normal range of motion.      Cervical back: Normal range of motion and neck supple.   Lymphadenopathy:      Cervical: No cervical adenopathy.   Skin:     General: Skin is warm and dry.   Neurological:      Mental Status: She is alert and oriented to person, place, and time.      Cranial Nerves: No cranial nerve deficit.      Motor: No abnormal muscle tone.      Coordination: Coordination normal.      Deep Tendon Reflexes: Reflexes are normal and symmetric.        Assessment/Plan:  1. COVID-19  -     nirmatrelvir-ritonavir 300 mg (150 mg x 2)-100 mg copackaged tablets (EUA); Take 3 tablets by mouth 2 (two) times daily. Each dose contains 2 nirmatrelvir (pink tablets) and 1 ritonavir (white tablet). Take all 3 tablets together  Dispense: 30 tablet; Refill: 0  SHE IS NOT ON PLAVIX   Hold ATORVASTATIN   BMP  Lab Results   Component Value Date     08/19/2024    K 4.5 08/19/2024     08/19/2024    CO2 28 08/19/2024    BUN 17 08/19/2024    CREATININE 0.9 08/19/2024    CALCIUM 9.6 08/19/2024    ANIONGAP 9 08/19/2024    EGFRNORACEVR >60 08/19/2024       2. Fever, unspecified fever cause  -     POCT Influenza A/B Molecular  -     SARS Coronavirus 2 Antigen, POCT    3. Body aches  -     POCT Influenza A/B Molecular  -     SARS Coronavirus 2 Antigen, POCT

## 2024-09-26 ENCOUNTER — TELEPHONE (OUTPATIENT)
Dept: INTERNAL MEDICINE | Facility: CLINIC | Age: 84
End: 2024-09-26
Payer: MEDICARE

## 2024-09-26 NOTE — TELEPHONE ENCOUNTER
It could be from paxlovid or it can be from covid itself-she can stop the paxlovid to see if it improves if she is feeling better from a resp stand point

## 2024-09-26 NOTE — TELEPHONE ENCOUNTER
----- Message from Sourav Bingham sent at 2024  8:20 AM CDT -----  Contact: pt  Marine Mo  MRN: [id number]  : 1940  PCP: Serg Hwang  Home Phone      Not on file.  Work Phone      [phone]  Mobile          [phone]      MESSAGE:     Pt would like a call back from nurse           [phone]

## 2024-09-26 NOTE — TELEPHONE ENCOUNTER
Patient saw Dr. Hwang on Monday; dx with covid, and has been on paxlovid. She states that she is feeling better, but started with diarrhea last night. She is wondering if that could be caused from the paxlovid, and if so, should she continue taking it. Dr. Hwang is off today. Please advise. Thanks

## 2024-09-30 ENCOUNTER — TELEPHONE (OUTPATIENT)
Dept: INTERNAL MEDICINE | Facility: CLINIC | Age: 84
End: 2024-09-30
Payer: MEDICARE

## 2024-09-30 ENCOUNTER — OFFICE VISIT (OUTPATIENT)
Dept: INTERNAL MEDICINE | Facility: CLINIC | Age: 84
End: 2024-09-30
Payer: MEDICARE

## 2024-09-30 ENCOUNTER — HOSPITAL ENCOUNTER (OUTPATIENT)
Dept: RADIOLOGY | Facility: HOSPITAL | Age: 84
Discharge: HOME | End: 2024-09-30
Attending: INTERNAL MEDICINE
Payer: MEDICARE

## 2024-09-30 VITALS
BODY MASS INDEX: 25.21 KG/M2 | HEART RATE: 80 BPM | DIASTOLIC BLOOD PRESSURE: 80 MMHG | HEIGHT: 64 IN | OXYGEN SATURATION: 97 % | RESPIRATION RATE: 18 BRPM | WEIGHT: 147.69 LBS | SYSTOLIC BLOOD PRESSURE: 144 MMHG

## 2024-09-30 DIAGNOSIS — U09.9 POST-COVID CHRONIC COUGH: ICD-10-CM

## 2024-09-30 DIAGNOSIS — R05.3 POST-COVID CHRONIC COUGH: ICD-10-CM

## 2024-09-30 DIAGNOSIS — R05.3 POST-COVID CHRONIC COUGH: Primary | ICD-10-CM

## 2024-09-30 DIAGNOSIS — U09.9 POST-COVID CHRONIC COUGH: Primary | ICD-10-CM

## 2024-09-30 PROCEDURE — 71046 X-RAY EXAM CHEST 2 VIEWS: CPT | Mod: 26,,, | Performed by: RADIOLOGY

## 2024-09-30 PROCEDURE — 1126F AMNT PAIN NOTED NONE PRSNT: CPT | Mod: CPTII,S$GLB,, | Performed by: INTERNAL MEDICINE

## 2024-09-30 PROCEDURE — 1101F PT FALLS ASSESS-DOCD LE1/YR: CPT | Mod: CPTII,S$GLB,, | Performed by: INTERNAL MEDICINE

## 2024-09-30 PROCEDURE — 99999 PR PBB SHADOW E&M-EST. PATIENT-LVL IV: CPT | Mod: PBBFAC,,, | Performed by: INTERNAL MEDICINE

## 2024-09-30 PROCEDURE — 99213 OFFICE O/P EST LOW 20 MIN: CPT | Mod: 25,S$GLB,, | Performed by: INTERNAL MEDICINE

## 2024-09-30 PROCEDURE — 96372 THER/PROPH/DIAG INJ SC/IM: CPT | Mod: S$GLB,,, | Performed by: INTERNAL MEDICINE

## 2024-09-30 PROCEDURE — 3079F DIAST BP 80-89 MM HG: CPT | Mod: CPTII,S$GLB,, | Performed by: INTERNAL MEDICINE

## 2024-09-30 PROCEDURE — 3288F FALL RISK ASSESSMENT DOCD: CPT | Mod: CPTII,S$GLB,, | Performed by: INTERNAL MEDICINE

## 2024-09-30 PROCEDURE — 1159F MED LIST DOCD IN RCRD: CPT | Mod: CPTII,S$GLB,, | Performed by: INTERNAL MEDICINE

## 2024-09-30 PROCEDURE — 71046 X-RAY EXAM CHEST 2 VIEWS: CPT | Mod: TC

## 2024-09-30 PROCEDURE — 3077F SYST BP >= 140 MM HG: CPT | Mod: CPTII,S$GLB,, | Performed by: INTERNAL MEDICINE

## 2024-09-30 RX ORDER — GUAIFENESIN 100 MG/5ML
200 SOLUTION ORAL 3 TIMES DAILY PRN
COMMUNITY

## 2024-09-30 RX ORDER — METHYLPREDNISOLONE ACETATE 40 MG/ML
40 INJECTION, SUSPENSION INTRA-ARTICULAR; INTRALESIONAL; INTRAMUSCULAR; SOFT TISSUE
Status: COMPLETED | OUTPATIENT
Start: 2024-09-30 | End: 2024-09-30

## 2024-09-30 RX ORDER — BENZONATATE 100 MG/1
100 CAPSULE ORAL 3 TIMES DAILY PRN
Qty: 30 CAPSULE | Refills: 0 | Status: SHIPPED | OUTPATIENT
Start: 2024-09-30 | End: 2024-10-10

## 2024-09-30 RX ORDER — DOXYCYCLINE 100 MG/1
100 CAPSULE ORAL EVERY 12 HOURS
Qty: 20 CAPSULE | Refills: 0 | Status: SHIPPED | OUTPATIENT
Start: 2024-09-30 | End: 2024-10-10

## 2024-09-30 RX ADMIN — METHYLPREDNISOLONE ACETATE 40 MG: 40 INJECTION, SUSPENSION INTRA-ARTICULAR; INTRALESIONAL; INTRAMUSCULAR; SOFT TISSUE at 01:09

## 2024-09-30 NOTE — TELEPHONE ENCOUNTER
----- Message from Meche sent at 2024  8:20 AM CDT -----  Contact: pt  Marine Mo  MRN: [id number]  : 1940  PCP: Serg Hwang  Home Phone      Not on file.  Work Phone      [phone]  Mobile          [phone]      MESSAGE: pt states she is still feeling bad and is congested. Pt is wondering if there is a shot or something she can get for the congestion and cough. I did explain to her that sometimes Covid will last two weeks. She said that is what she figured but wanted to check. Please advise         [phone]

## 2024-09-30 NOTE — TELEPHONE ENCOUNTER
: pt states she is still feeling bad and is congested. Pt is wondering if there is a shot or something she can get for the congestion and cough. I did explain to her that sometimes Covid will last two weeks. She said that is what she figured but wanted to check. Please advise         [phone]

## 2024-09-30 NOTE — PROGRESS NOTES
"HPI:  Marine Mo is a 84 y.o. female here for Follow-up  She reports coughing and sputum since her recent covid   Reports no SOB   OTC medications for Covid not helping       Review of Systems   Constitutional:  Positive for fatigue. Negative for chills and fever.   HENT:  Negative for hearing loss.    Eyes:  Negative for photophobia.   Respiratory:  Positive for cough. Negative for choking, chest tightness, shortness of breath and wheezing.    Cardiovascular:  Negative for chest pain and palpitations.   Gastrointestinal:  Negative for blood in stool, nausea and vomiting.   Genitourinary:  Negative for dysuria and hematuria.   Musculoskeletal:  Negative for arthralgias and myalgias.   Skin:  Negative for pallor.   Neurological:  Negative for dizziness and numbness.   Hematological:  Does not bruise/bleed easily.   Psychiatric/Behavioral:  Negative for confusion and suicidal ideas. The patient is not nervous/anxious.     A review of systems was performed and is negative except as noted above.    Objective:  Vitals:    09/30/24 1325   BP: (!) 144/80   Pulse: 80   Resp: 18   SpO2: 97%   Weight: 67 kg (147 lb 11.3 oz)   Height: 5' 4" (1.626 m)      Physical Exam  Vitals and nursing note reviewed.   Constitutional:       Appearance: She is well-developed.   HENT:      Head: Normocephalic and atraumatic.      Right Ear: External ear normal. A middle ear effusion is present.      Left Ear: External ear normal. A middle ear effusion is present.      Nose: Mucosal edema and rhinorrhea present.      Mouth/Throat:      Mouth: Mucous membranes are pale.   Eyes:      Conjunctiva/sclera: Conjunctivae normal.      Pupils: Pupils are equal, round, and reactive to light.   Neck:      Thyroid: No thyromegaly.      Vascular: No JVD.      Trachea: No tracheal deviation.   Cardiovascular:      Rate and Rhythm: Normal rate and regular rhythm.      Heart sounds: Normal heart sounds.   Pulmonary:      Effort: Pulmonary effort " is normal. No respiratory distress.      Breath sounds: Examination of the right-middle field reveals rales. Examination of the right-lower field reveals rales. Rales present. No wheezing.       Chest:      Chest wall: No tenderness.   Abdominal:      General: Bowel sounds are normal. There is no distension.      Palpations: Abdomen is soft. There is no mass.      Tenderness: There is no abdominal tenderness. There is no guarding or rebound.   Musculoskeletal:         General: Normal range of motion.      Cervical back: Normal range of motion and neck supple.   Lymphadenopathy:      Cervical: No cervical adenopathy.   Skin:     General: Skin is warm and dry.   Neurological:      Mental Status: She is alert and oriented to person, place, and time.      Cranial Nerves: No cranial nerve deficit.      Motor: No abnormal muscle tone.      Coordination: Coordination normal.      Deep Tendon Reflexes: Reflexes are normal and symmetric.        Assessment/Plan:  1. Post-COVID chronic cough  -     methylPREDNISolone acetate injection 40 mg  -     benzonatate (TESSALON PERLES) 100 MG capsule; Take 1 capsule (100 mg total) by mouth 3 (three) times daily as needed for Cough.  Dispense: 30 capsule; Refill: 0  -     X-Ray Chest PA And Lateral; Future; Expected date: 09/30/2024  -     doxycycline (VIBRAMYCIN) 100 MG Cap; Take 1 capsule (100 mg total) by mouth every 12 (twelve) hours. for 10 days  Dispense: 20 capsule; Refill: 0    Viral bronchitis   Vs  Pneumonia with covid or bacterial pneumonia  doxy

## 2024-10-05 DIAGNOSIS — F51.04 CHRONIC INSOMNIA: ICD-10-CM

## 2024-10-05 NOTE — TELEPHONE ENCOUNTER
No care due was identified.  [facility] Embedded Care Due Messages. Reference number: [id number].   10/05/2024 11:45:27 AM CDT

## 2024-10-06 RX ORDER — METFORMIN HYDROCHLORIDE 500 MG/1
TABLET ORAL
Qty: 180 TABLET | Refills: 1 | Status: SHIPPED | OUTPATIENT
Start: 2024-10-06

## 2024-10-06 NOTE — TELEPHONE ENCOUNTER
Refill Routing Note   Medication(s) are not appropriate for processing by [facility] for the following reason(s):        Outside of protocol    [facility] action(s):  Approve  Route             Appointments  past 12m or future 3m with PCP    Date Provider   Last Visit   9/30/2024 Serg Hwang MD   Next Visit   2/19/2025 Serg Hwang MD   ED visits in past 90 days: 0        Note composed:10:07 AM 10/06/2024

## 2024-10-07 RX ORDER — ALPRAZOLAM 0.5 MG/1
TABLET ORAL
Qty: 30 TABLET | Refills: 0 | Status: SHIPPED | OUTPATIENT
Start: 2024-10-07

## 2024-10-08 ENCOUNTER — PATIENT OUTREACH (OUTPATIENT)
Dept: ADMINISTRATIVE | Facility: OTHER | Age: 84
End: 2024-10-08
Payer: MEDICARE

## 2024-10-08 VITALS — DIASTOLIC BLOOD PRESSURE: 64 MMHG | SYSTOLIC BLOOD PRESSURE: 118 MMHG | HEART RATE: 68 BPM | OXYGEN SATURATION: 96 %

## 2024-10-16 RX ORDER — ESCITALOPRAM OXALATE 5 MG/1
5 TABLET ORAL
Qty: 90 TABLET | Refills: 3 | Status: SHIPPED | OUTPATIENT
Start: 2024-10-16

## 2024-10-16 NOTE — TELEPHONE ENCOUNTER
Refill Decision Note   Marine Mo  is requesting a refill authorization.  Brief Assessment and Rationale for Refill:  Approve     Medication Therapy Plan:         Comments:     Note composed:1:19 PM 10/16/2024

## 2024-10-16 NOTE — TELEPHONE ENCOUNTER
No care due was identified.  [facility] Embedded Care Due Messages. Reference number: [id number].   10/16/2024 7:17:08 AM CDT

## 2024-11-19 ENCOUNTER — PATIENT OUTREACH (OUTPATIENT)
Dept: ADMINISTRATIVE | Facility: OTHER | Age: 84
End: 2024-11-19
Payer: MEDICARE

## 2024-11-25 VITALS — DIASTOLIC BLOOD PRESSURE: 64 MMHG | SYSTOLIC BLOOD PRESSURE: 126 MMHG | HEART RATE: 88 BPM | OXYGEN SATURATION: 97 %

## 2024-12-17 ENCOUNTER — PATIENT OUTREACH (OUTPATIENT)
Dept: ADMINISTRATIVE | Facility: OTHER | Age: 84
End: 2024-12-17
Payer: MEDICARE

## 2024-12-17 VITALS — SYSTOLIC BLOOD PRESSURE: 134 MMHG | OXYGEN SATURATION: 98 % | HEART RATE: 68 BPM | DIASTOLIC BLOOD PRESSURE: 66 MMHG

## 2025-01-03 ENCOUNTER — TELEPHONE (OUTPATIENT)
Dept: INTERNAL MEDICINE | Facility: CLINIC | Age: 85
End: 2025-01-03
Payer: MEDICARE

## 2025-01-03 DIAGNOSIS — F51.04 CHRONIC INSOMNIA: ICD-10-CM

## 2025-01-03 NOTE — TELEPHONE ENCOUNTER
Care Due:                  Date            Visit Type   Department     Provider  --------------------------------------------------------------------------------                                EP -                              PRIMARY      [facility] INTER[facility]  Last Visit: 09-      CARE ([facility])   MEDICINE II    Serg Hwang                              EP -                              PRIMARY      [facility] INTERNAL  Next Visit: 02-      CARE ([facility])   MEDICINE II    Serg Hwang                                                            Last  Test          Frequency    Reason                     Performed    Due Date  --------------------------------------------------------------------------------    HBA1C.......  6 months...  metFORMIN................  08-   02-    Health [facility] Embedded Care Due Messages. Reference number: [id number].   1/03/2025 10:25:59 AM CST

## 2025-01-03 NOTE — TELEPHONE ENCOUNTER
30 day supply called in during your absence.    Requested Prescriptions     Pending Prescriptions Disp Refills    ALPRAZolam (XANAX) 0.5 MG tablet [Pharmacy Med Name: ALPRAZolam 0.5 MG Oral Tablet] 30 tablet 0     Sig: TAKE 1 TABLET BY MOUTH NIGHTLY AS NEEDED FOR ANXIETY OR INSOMNIA

## 2025-01-03 NOTE — TELEPHONE ENCOUNTER
----- Message from Meche sent at 1/3/2025 10:32 AM CST -----  Contact: pt  Marine Mo  MRN: [id number]  : 1940  PCP: Serg Hwang  Home Phone      Not on file.  Work Phone      [phone]  Mobile          [phone]      MESSAGE: pt states she needs the following prescription refilled ALPRAZolam (XANAX) 0.5 MG tablet. Pt states she only has 3 pills left      [facility] [address] [place], [place] [address] [address] [address]  Phone: [phone] Fax: [phone]  Hours: Not open 24 hours    [phone]

## 2025-01-06 RX ORDER — ALPRAZOLAM 0.5 MG/1
TABLET ORAL
Qty: 30 TABLET | Refills: 0 | Status: SHIPPED | OUTPATIENT
Start: 2025-01-06

## 2025-01-07 RX ORDER — METFORMIN HYDROCHLORIDE 500 MG/1
500 TABLET ORAL 2 TIMES DAILY WITH MEALS
Qty: 180 TABLET | Refills: 1 | Status: SHIPPED | OUTPATIENT
Start: 2025-01-07

## 2025-01-07 NOTE — TELEPHONE ENCOUNTER
No care due was identified.  Health [facility] Embedded Care Due Messages. Reference number: [id number].   1/07/2025 9:40:39 AM CST

## 2025-01-07 NOTE — TELEPHONE ENCOUNTER
----- Message from Anneliese sent at 2025  9:19 AM CST -----  Contact: Pt  Marine Mo  MRN: [id number]  : 1940  PCP: Serg Hwang  Home Phone      Not on file.  Work Phone      [phone]  Mobile          [phone]      MESSAGE:     Pt needs a refill of metFORMIN (GLUCOPHAGE) 500 MG tablet sent to [facility] in [place].       Marine   [phone]

## 2025-01-14 ENCOUNTER — PATIENT OUTREACH (OUTPATIENT)
Dept: ADMINISTRATIVE | Facility: OTHER | Age: 85
End: 2025-01-14
Payer: MEDICARE

## 2025-01-17 VITALS — SYSTOLIC BLOOD PRESSURE: 122 MMHG | HEART RATE: 68 BPM | DIASTOLIC BLOOD PRESSURE: 66 MMHG | OXYGEN SATURATION: 99 %

## 2025-01-30 ENCOUNTER — HOSPITAL ENCOUNTER (OUTPATIENT)
Dept: RADIOLOGY | Facility: HOSPITAL | Age: 85
Discharge: HOME | End: 2025-01-30
Attending: NURSE PRACTITIONER
Payer: MEDICARE

## 2025-01-30 DIAGNOSIS — D50.9 IRON DEFICIENCY ANEMIA, UNSPECIFIED: ICD-10-CM

## 2025-01-30 DIAGNOSIS — K74.60 NON-ALCOHOLIC CIRRHOSIS: ICD-10-CM

## 2025-01-30 DIAGNOSIS — R11.0 NAUSEA: ICD-10-CM

## 2025-01-30 PROCEDURE — 76705 ECHO EXAM OF ABDOMEN: CPT | Mod: 26,,, | Performed by: RADIOLOGY

## 2025-01-30 PROCEDURE — 76705 ECHO EXAM OF ABDOMEN: CPT | Mod: TC

## 2025-02-11 ENCOUNTER — PATIENT OUTREACH (OUTPATIENT)
Dept: ADMINISTRATIVE | Facility: OTHER | Age: 85
End: 2025-02-11
Payer: MEDICARE

## 2025-02-11 VITALS — HEART RATE: 80 BPM | SYSTOLIC BLOOD PRESSURE: 126 MMHG | DIASTOLIC BLOOD PRESSURE: 60 MMHG | OXYGEN SATURATION: 96 %

## 2025-02-14 DIAGNOSIS — F51.04 CHRONIC INSOMNIA: ICD-10-CM

## 2025-02-14 DIAGNOSIS — I10 ESSENTIAL HYPERTENSION: ICD-10-CM

## 2025-02-14 NOTE — TELEPHONE ENCOUNTER
----- Message from Anneliese sent at 2025 10:03 AM CST -----  Contact: Pt  Marine Mo  MRN: [id number]  : 1940  PCP: Serg Hwang  Home Phone      Not on file.  Work Phone      [phone]  Mobile          [phone]      MESSAGE:     Pt needs a refill of metoprolol tartrate (LOPRESSOR) 100 MG tablet and ALPRAZolam (XANAX) 0.5 MG tablet sent to [facility] in [place].      Marine  [phone]

## 2025-02-14 NOTE — TELEPHONE ENCOUNTER
No care due was identified.  [facility] Embedded Care Due Messages. Reference number: [id number].   2/14/2025 10:34:05 AM CST

## 2025-02-17 RX ORDER — ALPRAZOLAM 0.5 MG/1
0.5 TABLET ORAL NIGHTLY PRN
Qty: 30 TABLET | Refills: 0 | Status: SHIPPED | OUTPATIENT
Start: 2025-02-17

## 2025-02-17 RX ORDER — METOPROLOL TARTRATE 100 MG/1
50 TABLET ORAL 2 TIMES DAILY
Qty: 90 TABLET | Refills: 1 | Status: SHIPPED | OUTPATIENT
Start: 2025-02-17

## 2025-02-19 ENCOUNTER — LAB VISIT (OUTPATIENT)
Dept: LAB | Facility: HOSPITAL | Age: 85
End: 2025-02-19
Attending: INTERNAL MEDICINE
Payer: MEDICARE

## 2025-02-19 ENCOUNTER — OFFICE VISIT (OUTPATIENT)
Dept: INTERNAL MEDICINE | Facility: CLINIC | Age: 85
End: 2025-02-19
Payer: MEDICARE

## 2025-02-19 VITALS
HEART RATE: 74 BPM | DIASTOLIC BLOOD PRESSURE: 60 MMHG | SYSTOLIC BLOOD PRESSURE: 106 MMHG | HEIGHT: 64 IN | WEIGHT: 144.38 LBS | BODY MASS INDEX: 24.65 KG/M2 | OXYGEN SATURATION: 95 % | RESPIRATION RATE: 18 BRPM

## 2025-02-19 DIAGNOSIS — R80.9 CONTROLLED TYPE 2 DIABETES MELLITUS WITH MICROALBUMINURIA, WITHOUT LONG-TERM CURRENT USE OF INSULIN: ICD-10-CM

## 2025-02-19 DIAGNOSIS — E78.2 MIXED HYPERLIPIDEMIA: Primary | ICD-10-CM

## 2025-02-19 DIAGNOSIS — D69.6 THROMBOCYTOPENIA: ICD-10-CM

## 2025-02-19 DIAGNOSIS — I10 ESSENTIAL HYPERTENSION: ICD-10-CM

## 2025-02-19 DIAGNOSIS — F13.20 BENZODIAZEPINE DEPENDENCE: ICD-10-CM

## 2025-02-19 DIAGNOSIS — E78.2 MIXED HYPERLIPIDEMIA: ICD-10-CM

## 2025-02-19 DIAGNOSIS — I25.118 ATHEROSCLEROTIC HEART DISEASE OF NATIVE CORONARY ARTERY WITH OTHER FORMS OF ANGINA PECTORIS: ICD-10-CM

## 2025-02-19 DIAGNOSIS — F51.04 CHRONIC INSOMNIA: ICD-10-CM

## 2025-02-19 DIAGNOSIS — E11.29 CONTROLLED TYPE 2 DIABETES MELLITUS WITH MICROALBUMINURIA, WITHOUT LONG-TERM CURRENT USE OF INSULIN: ICD-10-CM

## 2025-02-19 DIAGNOSIS — K76.6 PORTAL HYPERTENSION: ICD-10-CM

## 2025-02-19 DIAGNOSIS — D61.818 OTHER PANCYTOPENIA: ICD-10-CM

## 2025-02-19 DIAGNOSIS — M54.6 PAIN IN THORACIC SPINE: ICD-10-CM

## 2025-02-19 PROBLEM — G95.19 OTHER VASCULAR MYELOPATHIES: Status: RESOLVED | Noted: 2024-02-26 | Resolved: 2025-02-19

## 2025-02-19 PROBLEM — N18.31 CHRONIC KIDNEY DISEASE, STAGE 3A: Status: RESOLVED | Noted: 2024-05-09 | Resolved: 2025-02-19

## 2025-02-19 LAB
ALBUMIN SERPL BCP-MCNC: 3.7 G/DL (ref 3.5–5.2)
ALBUMIN/CREAT UR: 364.6 UG/MG (ref 0–30)
ALP SERPL-CCNC: 33 U/L (ref 40–150)
ALT SERPL W/O P-5'-P-CCNC: 20 U/L (ref 10–44)
ANION GAP SERPL CALC-SCNC: 8 MMOL/L (ref 8–16)
AST SERPL-CCNC: 24 U/L (ref 10–40)
BASOPHILS # BLD AUTO: 0.01 K/UL (ref 0–0.2)
BASOPHILS NFR BLD: 0.4 % (ref 0–1.9)
BILIRUB SERPL-MCNC: 0.6 MG/DL (ref 0.1–1)
BUN SERPL-MCNC: 15 MG/DL (ref 8–23)
CALCIUM SERPL-MCNC: 9.7 MG/DL (ref 8.7–10.5)
CHLORIDE SERPL-SCNC: 106 MMOL/L (ref 95–110)
CHOLEST SERPL-MCNC: 120 MG/DL (ref 120–199)
CHOLEST/HDLC SERPL: 2.9 {RATIO} (ref 2–5)
CO2 SERPL-SCNC: 28 MMOL/L (ref 23–29)
CREAT SERPL-MCNC: 0.8 MG/DL (ref 0.5–1.4)
CREAT UR-MCNC: 65 MG/DL (ref 15–325)
DIFFERENTIAL METHOD BLD: ABNORMAL
EOSINOPHIL # BLD AUTO: 0.1 K/UL (ref 0–0.5)
EOSINOPHIL NFR BLD: 2.7 % (ref 0–8)
ERYTHROCYTE [DISTWIDTH] IN BLOOD BY AUTOMATED COUNT: 20 % (ref 11.5–14.5)
EST. GFR  (NO RACE VARIABLE): >60 ML/MIN/1.73 M^2
ESTIMATED AVG GLUCOSE: 97 MG/DL (ref 68–131)
GLUCOSE SERPL-MCNC: 105 MG/DL (ref 70–110)
HBA1C MFR BLD: 5 % (ref 4–5.6)
HCT VFR BLD AUTO: 35.9 % (ref 37–48.5)
HDLC SERPL-MCNC: 42 MG/DL (ref 40–75)
HDLC SERPL: 35 % (ref 20–50)
HGB BLD-MCNC: 11.3 G/DL (ref 12–16)
IMM GRANULOCYTES # BLD AUTO: 0 K/UL (ref 0–0.04)
IMM GRANULOCYTES NFR BLD AUTO: 0 % (ref 0–0.5)
LDLC SERPL CALC-MCNC: 56.4 MG/DL (ref 63–159)
LYMPHOCYTES # BLD AUTO: 0.8 K/UL (ref 1–4.8)
LYMPHOCYTES NFR BLD: 31.7 % (ref 18–48)
MCH RBC QN AUTO: 28.1 PG (ref 27–31)
MCHC RBC AUTO-ENTMCNC: 31.5 G/DL (ref 32–36)
MCV RBC AUTO: 89 FL (ref 82–98)
MICROALBUMIN UR DL<=1MG/L-MCNC: 237 UG/ML
MONOCYTES # BLD AUTO: 0.3 K/UL (ref 0.3–1)
MONOCYTES NFR BLD: 12 % (ref 4–15)
NEUTROPHILS # BLD AUTO: 1.4 K/UL (ref 1.8–7.7)
NEUTROPHILS NFR BLD: 53.2 % (ref 38–73)
NONHDLC SERPL-MCNC: 78 MG/DL
NRBC BLD-RTO: 0 /100 WBC
PLATELET # BLD AUTO: 71 K/UL (ref 150–450)
PMV BLD AUTO: 9.5 FL (ref 9.2–12.9)
POTASSIUM SERPL-SCNC: 4.3 MMOL/L (ref 3.5–5.1)
PROT SERPL-MCNC: 7.1 G/DL (ref 6–8.4)
RBC # BLD AUTO: 4.02 M/UL (ref 4–5.4)
SODIUM SERPL-SCNC: 142 MMOL/L (ref 136–145)
TRIGL SERPL-MCNC: 108 MG/DL (ref 30–150)
TSH SERPL DL<=0.005 MIU/L-ACNC: 1.81 UIU/ML (ref 0.4–4)
WBC # BLD AUTO: 2.59 K/UL (ref 3.9–12.7)

## 2025-02-19 PROCEDURE — 80061 LIPID PANEL: CPT | Performed by: INTERNAL MEDICINE

## 2025-02-19 PROCEDURE — 3074F SYST BP LT 130 MM HG: CPT | Mod: CPTII,S$GLB,, | Performed by: INTERNAL MEDICINE

## 2025-02-19 PROCEDURE — 80053 COMPREHEN METABOLIC PANEL: CPT | Performed by: INTERNAL MEDICINE

## 2025-02-19 PROCEDURE — 84443 ASSAY THYROID STIM HORMONE: CPT | Performed by: INTERNAL MEDICINE

## 2025-02-19 PROCEDURE — 99214 OFFICE O/P EST MOD 30 MIN: CPT | Mod: S$GLB,,, | Performed by: INTERNAL MEDICINE

## 2025-02-19 PROCEDURE — 3288F FALL RISK ASSESSMENT DOCD: CPT | Mod: CPTII,S$GLB,, | Performed by: INTERNAL MEDICINE

## 2025-02-19 PROCEDURE — 85025 COMPLETE CBC W/AUTO DIFF WBC: CPT | Performed by: INTERNAL MEDICINE

## 2025-02-19 PROCEDURE — 82570 ASSAY OF URINE CREATININE: CPT | Performed by: INTERNAL MEDICINE

## 2025-02-19 PROCEDURE — 36415 COLL VENOUS BLD VENIPUNCTURE: CPT | Performed by: INTERNAL MEDICINE

## 2025-02-19 PROCEDURE — 83036 HEMOGLOBIN GLYCOSYLATED A1C: CPT | Performed by: INTERNAL MEDICINE

## 2025-02-19 PROCEDURE — 1160F RVW MEDS BY RX/DR IN RCRD: CPT | Mod: CPTII,S$GLB,, | Performed by: INTERNAL MEDICINE

## 2025-02-19 PROCEDURE — G2211 COMPLEX E/M VISIT ADD ON: HCPCS | Mod: S$GLB,,, | Performed by: INTERNAL MEDICINE

## 2025-02-19 PROCEDURE — 1101F PT FALLS ASSESS-DOCD LE1/YR: CPT | Mod: CPTII,S$GLB,, | Performed by: INTERNAL MEDICINE

## 2025-02-19 PROCEDURE — 1126F AMNT PAIN NOTED NONE PRSNT: CPT | Mod: CPTII,S$GLB,, | Performed by: INTERNAL MEDICINE

## 2025-02-19 PROCEDURE — 1159F MED LIST DOCD IN RCRD: CPT | Mod: CPTII,S$GLB,, | Performed by: INTERNAL MEDICINE

## 2025-02-19 PROCEDURE — 3078F DIAST BP <80 MM HG: CPT | Mod: CPTII,S$GLB,, | Performed by: INTERNAL MEDICINE

## 2025-02-19 RX ORDER — DICLOFENAC SODIUM 10 MG/G
2 GEL TOPICAL DAILY
Qty: 100 G | Refills: 1 | Status: SHIPPED | OUTPATIENT
Start: 2025-02-19

## 2025-02-19 RX ORDER — CLOPIDOGREL BISULFATE 75 MG/1
75 TABLET ORAL DAILY
Qty: 90 TABLET | Refills: 1 | Status: SHIPPED | OUTPATIENT
Start: 2025-02-19

## 2025-02-19 RX ORDER — CALCIUM CARBONATE 200(500)MG
1 TABLET,CHEWABLE ORAL DAILY
COMMUNITY

## 2025-02-19 NOTE — PROGRESS NOTES
Subjective:       Patient ID: Marine Mo is a 84 y.o. female.    Chief Complaint: Follow-up    83yo F here for a 6mo follow-up. She has no acute complaints.    Diabetes mellitus: takes Metformin. No issues. Does not take blood sugar time. Had eyes checked 6 months ago.    Hyperlipidemia: takes Atorvastatin. No issues. LDL 56.4    HTN: takes Metoprolol. Denies CP, SOB, palpitations    Coronary artery disease: takes Aspirin, Plavix, and nitroglycerin PRN. Endorses bruising. Denies hematochezia, melena, hematemesis    Depression and anxiety: take Xanax and Lexapro. No issues    Nocturia: reports waking up 4x during the night to urinate. Sometimes cannot make it to the bathroom in time and has urgency. Denies hematuria or dysuria. Not drinking a lot of fluids before bed    Back pain: reports R upper thoracic back pain, dull pain, 7/10, occurs most days. Does not radiate.      Follow-up  Pertinent negatives include no abdominal pain, chest pain, coughing, fatigue, fever, headaches, nausea, sore throat, vomiting or weakness.     Review of Systems   Constitutional:  Negative for fatigue and fever.   HENT:  Negative for rhinorrhea and sore throat.    Eyes:  Negative for visual disturbance.   Respiratory:  Negative for cough and shortness of breath.    Cardiovascular:  Negative for chest pain and palpitations.   Gastrointestinal:  Negative for abdominal pain, constipation, diarrhea, nausea and vomiting.   Genitourinary:  Positive for urgency. Negative for dysuria and hematuria.   Musculoskeletal:  Positive for back pain.        R upper thoracic    Neurological:  Negative for dizziness, weakness and headaches.       Objective:      Physical Exam  Constitutional:       General: She is not in acute distress.     Appearance: Normal appearance. She is not ill-appearing.   HENT:      Mouth/Throat:      Mouth: Mucous membranes are moist.      Pharynx: Oropharynx is clear.   Eyes:      Extraocular Movements: Extraocular  movements intact.      Pupils: Pupils are equal, round, and reactive to light.   Cardiovascular:      Rate and Rhythm: Normal rate and regular rhythm.      Heart sounds: No murmur heard.     No friction rub.   Pulmonary:      Effort: Pulmonary effort is normal. No respiratory distress.      Breath sounds: Normal breath sounds.   Abdominal:      General: Abdomen is flat. There is no distension.      Palpations: There is no mass.      Tenderness: There is no abdominal tenderness.   Musculoskeletal:      Cervical back: Normal range of motion.      Thoracic back: Tenderness present. No swelling or deformity. Normal range of motion.        Back:       Comments: Tenderness to palpation over muscles. No tenderness to bony palpation. Normal ROM   Skin:     General: Skin is warm and dry.   Neurological:      General: No focal deficit present.      Mental Status: She is alert and oriented to person, place, and time. Mental status is at baseline.   Psychiatric:         Mood and Affect: Mood normal.         Behavior: Behavior normal.         Assessment:       1. Mixed hyperlipidemia    2. Controlled type 2 diabetes mellitus with microalbuminuria, without long-term current use of insulin    3. Portal hypertension    4. Benzodiazepine dependence    5. Other pancytopenia    6. Chronic insomnia    7. Pain in thoracic spine    8. Atherosclerotic heart disease of native coronary artery with other forms of angina pectoris        Plan:   Marine was seen today for follow-up.    Diagnoses and all orders for this visit:    Mixed hyperlipidemia  -     Lipid Panel; Future  Lab Results   Component Value Date    LDLCALC 56.4 (L) 02/19/2025    Continue statin      Controlled type 2 diabetes mellitus with microalbuminuria, without long-term current use of insulin  -     CBC Auto Differential; Future  -     Comprehensive Metabolic Panel; Future  -     Lipid Panel; Future  -     TSH; Future  -     Hemoglobin A1C; Future  -      Microalbumin/Creatinine Ratio, Urine; Future  Lab Results   Component Value Date    HGBA1C 5.2 08/19/2024   She is on metformin   Continue with that       Portal hypertension  -     CBC Auto Differential; Future  She has cirrhosis   Will follow labs     Benzodiazepine dependence  On alprazolam     Other pancytopenia  -     CBC Auto Differential; Future  Due to portal HTN   Will monitor 6 monthly labs     Chronic insomnia  -     TSH; Future  On alprazolam     Pain in thoracic spine  -     diclofenac sodium (VOLTAREN ARTHRITIS PAIN) 1 % Gel; Apply 2 g topically once daily.    Atherosclerotic heart disease of native coronary artery with other forms of angina pectoris  -     clopidogreL (PLAVIX) 75 mg tablet; Take 1 tablet (75 mg total) by mouth once daily.  Continue metoprolol       Problem List Items Addressed This Visit    None

## 2025-03-11 ENCOUNTER — PATIENT OUTREACH (OUTPATIENT)
Dept: ADMINISTRATIVE | Facility: OTHER | Age: 85
End: 2025-03-11
Payer: MEDICARE

## 2025-03-11 VITALS — OXYGEN SATURATION: 98 % | DIASTOLIC BLOOD PRESSURE: 66 MMHG | SYSTOLIC BLOOD PRESSURE: 118 MMHG | HEART RATE: 80 BPM

## 2025-03-21 ENCOUNTER — OFFICE VISIT (OUTPATIENT)
Dept: FAMILY MEDICINE | Facility: CLINIC | Age: 85
End: 2025-03-21
Payer: MEDICARE

## 2025-03-21 VITALS
BODY MASS INDEX: 24.59 KG/M2 | SYSTOLIC BLOOD PRESSURE: 122 MMHG | OXYGEN SATURATION: 99 % | DIASTOLIC BLOOD PRESSURE: 80 MMHG | HEIGHT: 64 IN | WEIGHT: 144.06 LBS | RESPIRATION RATE: 16 BRPM | HEART RATE: 70 BPM

## 2025-03-21 DIAGNOSIS — L29.9 ITCHING: Primary | ICD-10-CM

## 2025-03-21 PROCEDURE — 99999 PR PBB SHADOW E&M-EST. PATIENT-LVL III: CPT | Mod: PBBFAC,,, | Performed by: NURSE PRACTITIONER

## 2025-03-21 RX ORDER — METHYLPREDNISOLONE ACETATE 40 MG/ML
40 INJECTION, SUSPENSION INTRA-ARTICULAR; INTRALESIONAL; INTRAMUSCULAR; SOFT TISSUE
Status: COMPLETED | OUTPATIENT
Start: 2025-03-21 | End: 2025-03-21

## 2025-03-21 RX ORDER — HYDROXYZINE HYDROCHLORIDE 10 MG/1
TABLET, FILM COATED ORAL
Qty: 45 TABLET | Refills: 0 | Status: SHIPPED | OUTPATIENT
Start: 2025-03-21

## 2025-03-21 RX ADMIN — METHYLPREDNISOLONE ACETATE 40 MG: 40 INJECTION, SUSPENSION INTRA-ARTICULAR; INTRALESIONAL; INTRAMUSCULAR; SOFT TISSUE at 03:03

## 2025-03-21 NOTE — PROGRESS NOTES
Subjective:       Patient ID: Marine Mo is a 85 y.o. female.    Chief Complaint: Itching (Patient has been itching all over for about 1 month. )    Here with itching for about 1 month.    Itching  The current episode started more than 1 month ago. Pertinent negatives include no abdominal pain, arthralgias, congestion, coughing, fatigue, fever, headaches, myalgias, nausea, rash, sore throat or vomiting. Associated symptoms comments: Mild pain in the area of the itching.     Review of Systems   Constitutional: Negative.  Negative for appetite change, fatigue and fever.   HENT: Negative.  Negative for congestion, ear pain and sore throat.    Eyes: Negative.  Negative for visual disturbance.   Respiratory: Negative.  Negative for cough, shortness of breath and wheezing.    Cardiovascular: Negative.    Gastrointestinal: Negative.  Negative for abdominal pain, diarrhea, nausea and vomiting.   Endocrine: Negative.    Genitourinary: Negative.  Negative for difficulty urinating and urgency.   Musculoskeletal: Negative.  Negative for arthralgias and myalgias.   Skin:  Positive for itching. Negative for color change and rash.   Allergic/Immunologic: Negative.    Neurological: Negative.  Negative for dizziness and headaches.   Hematological: Negative.    Psychiatric/Behavioral: Negative.  Negative for sleep disturbance.    All other systems reviewed and are negative.      Objective:      Physical Exam  Vitals and nursing note reviewed.   Constitutional:       General: She is not in acute distress.     Appearance: Normal appearance. She is well-developed.   HENT:      Head: Normocephalic and atraumatic.   Eyes:      Pupils: Pupils are equal, round, and reactive to light.   Cardiovascular:      Rate and Rhythm: Normal rate and regular rhythm.      Pulses: Normal pulses.      Heart sounds: Murmur heard.   Pulmonary:      Effort: Pulmonary effort is normal. No respiratory distress.      Breath sounds: Normal breath  sounds.   Abdominal:      Tenderness: There is no right CVA tenderness or left CVA tenderness.   Musculoskeletal:         General: Normal range of motion.      Cervical back: Normal range of motion and neck supple.   Skin:     General: Skin is warm and dry.      Comments: No visible rash in the area of the itching   Neurological:      Mental Status: She is alert and oriented to person, place, and time.   Psychiatric:         Mood and Affect: Mood normal.         Assessment:       1. Itching        Plan:     1. Itching  -     methylPREDNISolone acetate injection 40 mg  -     hydrOXYzine HCL (ATARAX) 10 MG Tab; 1-3 tablets every 8 hours as needed for itching  Dispense: 45 tablet; Refill: 0     Follow up with Dr. Hwang

## 2025-04-28 DIAGNOSIS — Z00.00 ENCOUNTER FOR MEDICARE ANNUAL WELLNESS EXAM: ICD-10-CM

## 2025-05-14 DIAGNOSIS — F51.04 CHRONIC INSOMNIA: ICD-10-CM

## 2025-05-14 RX ORDER — ALPRAZOLAM 0.5 MG/1
0.5 TABLET ORAL NIGHTLY PRN
Qty: 30 TABLET | Refills: 0 | Status: SHIPPED | OUTPATIENT
Start: 2025-05-14

## 2025-05-14 NOTE — TELEPHONE ENCOUNTER
No care due was identified.  [facility] Embedded Care Due Messages. Reference number: [id number].   5/14/2025 8:36:47 AM CDT

## 2025-05-28 NOTE — TELEPHONE ENCOUNTER
----- Message from Denia Gordon sent at 5/28/2025  8:27 AM CDT -----  Regarding: FW: Consult/Advisory/Medicine  Contact: [phone](Nurse)  Kenneth, can you let Zoey know that it is fine to restart her Avonex? Recently admitted for acute pancreatitis and klebsiella bacteremia.  ----- Message -----  From: Darleen Garcia MD  Sent: 5/27/2025  12:27 PM CDT  To: Heidy Mendez MD; Denia Gordon, APRN#  Subject: FW: Consult/Advisory/Medicine                    Hi, I received this message. There is no infectious contra-indication from my standpoint to hold it now that she is done, but wanted to defer to you. Darleen Noe  ----- Message -----  From: Mulu Alejandra MA  Sent: 5/27/2025  10:39 AM CDT  To: Darleen Garcia MD  Subject: FW: Consult/Advisory/Medicine                    Spoke to Pablo, patient's  nurse. He stated the patient wants to know if she can resume taking AVONEX again, now that she's completed her abx.  ----- Message -----  From: Samreen Lyman  Sent: 5/27/2025  10:09 AM CDT  To: Jose Morejon Staff  Subject: Consult/Advisory/Medicine                        Consult/Advisory [facility]  Contact Preference:[phone](Nurse) [phone]  Nature of call:  pt nurse wants to know if pt can start her rx interferon beta-1a (AVONEX) 30 mcg/0.5 mL PnKt now that she has comopleted her antibiotics. Please call to advise thank you   ----- Message from Jaqueline Nuñez MA sent at 4/26/2022 10:47 AM CDT -----  Regarding: medication refil  Marine Mo  MRN: [id number]  Home Phone      [phone]  Work Phone      [phone]  Mobile          [phone]    Patient Care Team:  Serg Hwang MD as PCP - General (Internal Medicine)  Wilton Joseph MD as Obstetrician (Obstetrics)  Daily Gruber LPN as Care Coordinator  OB? No  What phone number can you be reached at? [phone]  Message:   Pt is requesting a refill for oxybutynin (DITROPAN) 5 MG Tab    Pharmacy -[facility]       Pt was last seen by Dr. Joseph on 1/21/21

## 2025-06-19 ENCOUNTER — TELEPHONE (OUTPATIENT)
Dept: INTERNAL MEDICINE | Facility: CLINIC | Age: 85
End: 2025-06-19
Payer: MEDICARE

## 2025-06-19 DIAGNOSIS — F51.04 CHRONIC INSOMNIA: ICD-10-CM

## 2025-06-19 NOTE — TELEPHONE ENCOUNTER
----- Message from Nimisha sent at 2025  4:33 PM CDT -----  Contact: pt  Marine Mo  MRN: [id number]  : 1940  PCP: Serg Hwang  Home Phone      Not on file.  Work Phone      [phone]  Mobile          [phone]      MESSAGE:   Pt requests a refill on the following medication    ALPRAZolam (XANAX) 0.5 MG tablet    [facility] [place] [address] [address]  Phone: [phone] Fax: [phone]  Hours: Not open 24 hours    [phone]   Continue Regimen: Doxycycline 100 mg bid,aczone 5% bid Detail Level: Zone

## 2025-06-19 NOTE — TELEPHONE ENCOUNTER
Care Due:                  Date            Visit Type   Department     Provider  --------------------------------------------------------------------------------                                EP -                              PRIMARY      [facility]  Last Visit: 02-      CARE ([facility])   MEDICINE II    Serg Hwang                              EP -                              PRIMARY      [facility]  Next Visit: 08-      CARE ([facility])   MEDICINE II    Serg Hwang                                                            Last  Test          Frequency    Reason                     Performed    Due Date  --------------------------------------------------------------------------------    HBA1C.......  6 months...  metFORMIN................  02- 08-    Health [facility] Embedded Care Due Messages. Reference number: [id number].   6/19/2025 4:30:49 PM CDT

## 2025-06-23 RX ORDER — ALPRAZOLAM 0.5 MG/1
0.5 TABLET ORAL NIGHTLY PRN
Qty: 30 TABLET | Refills: 0 | Status: SHIPPED | OUTPATIENT
Start: 2025-06-23

## 2025-07-08 ENCOUNTER — PATIENT OUTREACH (OUTPATIENT)
Dept: ADMINISTRATIVE | Facility: OTHER | Age: 85
End: 2025-07-08
Payer: MEDICARE

## 2025-07-08 VITALS — SYSTOLIC BLOOD PRESSURE: 134 MMHG | OXYGEN SATURATION: 99 % | DIASTOLIC BLOOD PRESSURE: 60 MMHG | HEART RATE: 71 BPM

## 2025-07-10 ENCOUNTER — LAB VISIT (OUTPATIENT)
Dept: LAB | Facility: HOSPITAL | Age: 85
End: 2025-07-10
Attending: INTERNAL MEDICINE
Payer: MEDICARE

## 2025-07-10 ENCOUNTER — TELEPHONE (OUTPATIENT)
Dept: INTERNAL MEDICINE | Facility: CLINIC | Age: 85
End: 2025-07-10
Payer: MEDICARE

## 2025-07-10 DIAGNOSIS — K76.0 FATTY METAMORPHOSIS OF LIVER: ICD-10-CM

## 2025-07-10 DIAGNOSIS — R19.7 DIARRHEA OF PRESUMED INFECTIOUS ORIGIN: ICD-10-CM

## 2025-07-10 DIAGNOSIS — K74.60 HEPATIC CIRRHOSIS, UNSPECIFIED HEPATIC CIRRHOSIS TYPE, UNSPECIFIED WHETHER ASCITES PRESENT: Primary | ICD-10-CM

## 2025-07-10 DIAGNOSIS — R14.0 GASTRIC TYMPANY: ICD-10-CM

## 2025-07-10 LAB
ABSOLUTE EOSINOPHIL (OHS): 0.07 K/UL
ABSOLUTE MONOCYTE (OHS): 0.28 K/UL (ref 0.3–1)
ABSOLUTE NEUTROPHIL COUNT (OHS): 1.36 K/UL (ref 1.8–7.7)
AFP SERPL-MCNC: <2 NG/ML
ALBUMIN SERPL BCP-MCNC: 3.5 G/DL (ref 3.5–5.2)
ALP SERPL-CCNC: 33 UNIT/L (ref 40–150)
ALT SERPL W/O P-5'-P-CCNC: 15 UNIT/L (ref 10–44)
AST SERPL-CCNC: 23 UNIT/L (ref 11–45)
BASOPHILS # BLD AUTO: 0.01 K/UL
BASOPHILS NFR BLD AUTO: 0.4 %
BILIRUB DIRECT SERPL-MCNC: 0.2 MG/DL (ref 0.1–0.3)
BILIRUB SERPL-MCNC: 0.5 MG/DL (ref 0.1–1)
ERYTHROCYTE [DISTWIDTH] IN BLOOD BY AUTOMATED COUNT: 15.3 % (ref 11.5–14.5)
HCT VFR BLD AUTO: 33.8 % (ref 37–48.5)
HGB BLD-MCNC: 10.9 GM/DL (ref 12–16)
IMM GRANULOCYTES # BLD AUTO: 0.01 K/UL (ref 0–0.04)
IMM GRANULOCYTES NFR BLD AUTO: 0.4 % (ref 0–0.5)
LYMPHOCYTES # BLD AUTO: 0.8 K/UL (ref 1–4.8)
MCH RBC QN AUTO: 29.5 PG (ref 27–31)
MCHC RBC AUTO-ENTMCNC: 32.2 G/DL (ref 32–36)
MCV RBC AUTO: 91 FL (ref 82–98)
NUCLEATED RBC (/100WBC) (OHS): 0 /100 WBC
PLATELET # BLD AUTO: 69 K/UL (ref 150–450)
PMV BLD AUTO: 8.7 FL (ref 9.2–12.9)
PROT SERPL-MCNC: 6.7 GM/DL (ref 6–8.4)
RBC # BLD AUTO: 3.7 M/UL (ref 4–5.4)
RELATIVE EOSINOPHIL (OHS): 2.8 %
RELATIVE LYMPHOCYTE (OHS): 31.6 % (ref 18–48)
RELATIVE MONOCYTE (OHS): 11.1 % (ref 4–15)
RELATIVE NEUTROPHIL (OHS): 53.7 % (ref 38–73)
WBC # BLD AUTO: 2.53 K/UL (ref 3.9–12.7)

## 2025-07-10 PROCEDURE — 80076 HEPATIC FUNCTION PANEL: CPT

## 2025-07-10 PROCEDURE — 36415 COLL VENOUS BLD VENIPUNCTURE: CPT

## 2025-07-10 PROCEDURE — 82105 ALPHA-FETOPROTEIN SERUM: CPT

## 2025-07-10 PROCEDURE — 85025 COMPLETE CBC W/AUTO DIFF WBC: CPT

## 2025-07-10 NOTE — TELEPHONE ENCOUNTER
Could you please advise in Dr. Hwang absence, pt states she just saw Dr. Hernandez. He suggested she call Dr. Hwang to see if she could stop taking the metformin for two weeks to see if it is causing her diarrhea. Dr. Hernandez said just make sure you can be with out it for 2 weeks. Please advise       [phone]

## 2025-07-10 NOTE — TELEPHONE ENCOUNTER
----- Message from Meche sent at 7/10/2025  9:47 AM CDT -----  Contact: pt  Marine Mo  MRN: [id number]  : 1940  PCP: Serg Hwang  Home Phone      Not on file.  Work Phone      [phone]  Mobile          [phone]      MESSAGE: pt states she just saw Dr. Hernandez. He suggested she call Dr. Hwang to see if she could stop taking the metformin for two weeks to see if it is causing her diarrhea. Dr. Hernandez said just make sure you can be with out it for 2 weeks. Please advise       [phone]

## 2025-07-11 ENCOUNTER — HOSPITAL ENCOUNTER (EMERGENCY)
Facility: HOSPITAL | Age: 85
Discharge: ANOTHER HOSPITAL | End: 2025-07-12
Attending: SURGERY
Payer: MEDICARE

## 2025-07-11 DIAGNOSIS — N39.0 URINARY TRACT INFECTION WITHOUT HEMATURIA, SITE UNSPECIFIED: ICD-10-CM

## 2025-07-11 DIAGNOSIS — N20.0 KIDNEY STONE: ICD-10-CM

## 2025-07-11 DIAGNOSIS — A41.9 SEPSIS, DUE TO UNSPECIFIED ORGANISM, UNSPECIFIED WHETHER ACUTE ORGAN DYSFUNCTION PRESENT: Primary | ICD-10-CM

## 2025-07-11 LAB
ABSOLUTE EOSINOPHIL (OHS): 0 K/UL
ABSOLUTE MONOCYTE (OHS): 0.12 K/UL (ref 0.3–1)
ABSOLUTE NEUTROPHIL COUNT (OHS): 5.29 K/UL (ref 1.8–7.7)
ALBUMIN SERPL BCP-MCNC: 3 G/DL (ref 3.5–5.2)
ALP SERPL-CCNC: 53 UNIT/L (ref 40–150)
ALT SERPL W/O P-5'-P-CCNC: 19 UNIT/L (ref 10–44)
ANION GAP (OHS): 17 MMOL/L (ref 8–16)
AST SERPL-CCNC: 35 UNIT/L (ref 11–45)
BACTERIA #/AREA URNS HPF: ABNORMAL /HPF
BASOPHILS # BLD AUTO: 0.01 K/UL
BASOPHILS NFR BLD AUTO: 0.2 %
BILIRUB SERPL-MCNC: 1.3 MG/DL (ref 0.1–1)
BILIRUB UR QL STRIP.AUTO: NEGATIVE
BNP SERPL-MCNC: 622 PG/ML (ref 0–99)
BUN SERPL-MCNC: 21 MG/DL (ref 8–23)
CALCIUM SERPL-MCNC: 9.5 MG/DL (ref 8.7–10.5)
CHLORIDE SERPL-SCNC: 105 MMOL/L (ref 95–110)
CK SERPL-CCNC: 42 U/L (ref 20–180)
CLARITY UR: ABNORMAL
CO2 SERPL-SCNC: 18 MMOL/L (ref 23–29)
COLOR UR AUTO: YELLOW
CREAT SERPL-MCNC: 1.5 MG/DL (ref 0.5–1.4)
ERYTHROCYTE [DISTWIDTH] IN BLOOD BY AUTOMATED COUNT: 15.8 % (ref 11.5–14.5)
GFR SERPLBLD CREATININE-BSD FMLA CKD-EPI: 34 ML/MIN/1.73/M2
GLUCOSE SERPL-MCNC: 127 MG/DL (ref 70–110)
GLUCOSE UR QL STRIP: ABNORMAL
GROUP A STREP MOLECULAR (OHS): NEGATIVE
HCT VFR BLD AUTO: 34.7 % (ref 37–48.5)
HGB BLD-MCNC: 11.5 GM/DL (ref 12–16)
HGB UR QL STRIP: ABNORMAL
HYALINE CASTS #/AREA URNS LPF: 15 /LPF (ref 0–1)
IMM GRANULOCYTES # BLD AUTO: 0.06 K/UL (ref 0–0.04)
IMM GRANULOCYTES NFR BLD AUTO: 1 % (ref 0–0.5)
INFLUENZA A MOLECULAR (OHS): NEGATIVE
INFLUENZA B MOLECULAR (OHS): NEGATIVE
KETONES UR QL STRIP: NEGATIVE
LACTATE SERPL-SCNC: 10.6 MMOL/L (ref 0.5–2.2)
LEUKOCYTE ESTERASE UR QL STRIP: ABNORMAL
LIPASE SERPL-CCNC: 33 U/L (ref 4–60)
LYMPHOCYTES # BLD AUTO: 0.27 K/UL (ref 1–4.8)
MAGNESIUM SERPL-MCNC: 1.3 MG/DL (ref 1.6–2.6)
MCH RBC QN AUTO: 29.9 PG (ref 27–31)
MCHC RBC AUTO-ENTMCNC: 33.1 G/DL (ref 32–36)
MCV RBC AUTO: 90 FL (ref 82–98)
MICROSCOPIC COMMENT: ABNORMAL
NITRITE UR QL STRIP: NEGATIVE
NUCLEATED RBC (/100WBC) (OHS): 0 /100 WBC
PH UR STRIP: 7 [PH]
PHOSPHATE SERPL-MCNC: 1.2 MG/DL (ref 2.7–4.5)
PLATELET # BLD AUTO: 42 K/UL (ref 150–450)
PLATELET BLD QL SMEAR: ABNORMAL
PMV BLD AUTO: 9.1 FL (ref 9.2–12.9)
POCT GLUCOSE: 158 MG/DL (ref 70–110)
POTASSIUM SERPL-SCNC: 3.3 MMOL/L (ref 3.5–5.1)
PROCALCITONIN SERPL-MCNC: 22.72 NG/ML
PROT SERPL-MCNC: 6 GM/DL (ref 6–8.4)
PROT UR QL STRIP: ABNORMAL
RBC # BLD AUTO: 3.85 M/UL (ref 4–5.4)
RBC #/AREA URNS HPF: 40 /HPF (ref 0–4)
RELATIVE EOSINOPHIL (OHS): 0 %
RELATIVE LYMPHOCYTE (OHS): 4.7 % (ref 18–48)
RELATIVE MONOCYTE (OHS): 2.1 % (ref 4–15)
RELATIVE NEUTROPHIL (OHS): 92 % (ref 38–73)
SARS-COV-2 RDRP RESP QL NAA+PROBE: NEGATIVE
SODIUM SERPL-SCNC: 140 MMOL/L (ref 136–145)
SP GR UR STRIP: 1.01
SQUAMOUS #/AREA URNS HPF: 1 /HPF
TROPONIN I SERPL DL<=0.01 NG/ML-MCNC: 0.02 NG/ML
UROBILINOGEN UR STRIP-ACNC: NEGATIVE EU/DL
WBC # BLD AUTO: 5.75 K/UL (ref 3.9–12.7)
WBC #/AREA URNS HPF: 65 /HPF (ref 0–5)
WBC CLUMPS URNS QL MICRO: ABNORMAL

## 2025-07-11 PROCEDURE — 25500020 PHARM REV CODE 255: Performed by: SURGERY

## 2025-07-11 PROCEDURE — 81003 URINALYSIS AUTO W/O SCOPE: CPT | Performed by: SURGERY

## 2025-07-11 PROCEDURE — 84100 ASSAY OF PHOSPHORUS: CPT | Performed by: SURGERY

## 2025-07-11 PROCEDURE — 25000003 PHARM REV CODE 250: Performed by: SURGERY

## 2025-07-11 PROCEDURE — 82550 ASSAY OF CK (CPK): CPT | Performed by: SURGERY

## 2025-07-11 PROCEDURE — 87040 BLOOD CULTURE FOR BACTERIA: CPT | Performed by: SURGERY

## 2025-07-11 PROCEDURE — 84484 ASSAY OF TROPONIN QUANT: CPT | Performed by: SURGERY

## 2025-07-11 PROCEDURE — 96361 HYDRATE IV INFUSION ADD-ON: CPT

## 2025-07-11 PROCEDURE — 83605 ASSAY OF LACTIC ACID: CPT | Performed by: SURGERY

## 2025-07-11 PROCEDURE — 96368 THER/DIAG CONCURRENT INF: CPT

## 2025-07-11 PROCEDURE — 93005 ELECTROCARDIOGRAM TRACING: CPT

## 2025-07-11 PROCEDURE — 80053 COMPREHEN METABOLIC PANEL: CPT | Performed by: SURGERY

## 2025-07-11 PROCEDURE — 99900035 HC TECH TIME PER 15 MIN (STAT)

## 2025-07-11 PROCEDURE — 87154 CUL TYP ID BLD PTHGN 6+ TRGT: CPT | Performed by: SURGERY

## 2025-07-11 PROCEDURE — 99285 EMERGENCY DEPT VISIT HI MDM: CPT | Mod: 25

## 2025-07-11 PROCEDURE — 87651 STREP A DNA AMP PROBE: CPT | Performed by: SURGERY

## 2025-07-11 PROCEDURE — 63600175 PHARM REV CODE 636 W HCPCS: Performed by: SURGERY

## 2025-07-11 PROCEDURE — 83880 ASSAY OF NATRIURETIC PEPTIDE: CPT | Performed by: SURGERY

## 2025-07-11 PROCEDURE — 87502 INFLUENZA DNA AMP PROBE: CPT | Performed by: SURGERY

## 2025-07-11 PROCEDURE — 85025 COMPLETE CBC W/AUTO DIFF WBC: CPT | Performed by: SURGERY

## 2025-07-11 PROCEDURE — U0002 COVID-19 LAB TEST NON-CDC: HCPCS | Performed by: SURGERY

## 2025-07-11 PROCEDURE — 83735 ASSAY OF MAGNESIUM: CPT | Performed by: SURGERY

## 2025-07-11 PROCEDURE — 83690 ASSAY OF LIPASE: CPT | Performed by: SURGERY

## 2025-07-11 PROCEDURE — 27000221 HC OXYGEN, UP TO 24 HOURS

## 2025-07-11 PROCEDURE — 84145 PROCALCITONIN (PCT): CPT | Performed by: SURGERY

## 2025-07-11 PROCEDURE — 87186 SC STD MICRODIL/AGAR DIL: CPT | Performed by: SURGERY

## 2025-07-11 PROCEDURE — 96365 THER/PROPH/DIAG IV INF INIT: CPT | Mod: 59

## 2025-07-11 PROCEDURE — 82962 GLUCOSE BLOOD TEST: CPT

## 2025-07-11 PROCEDURE — 93010 ELECTROCARDIOGRAM REPORT: CPT | Mod: ,,, | Performed by: INTERNAL MEDICINE

## 2025-07-11 RX ORDER — NOREPINEPHRINE BITARTRATE/D5W 4MG/250ML
0-3 PLASTIC BAG, INJECTION (ML) INTRAVENOUS CONTINUOUS
Status: DISCONTINUED | OUTPATIENT
Start: 2025-07-11 | End: 2025-07-12 | Stop reason: HOSPADM

## 2025-07-11 RX ORDER — SUCRALFATE 1 G/10ML
10 SUSPENSION ORAL 2 TIMES DAILY
Status: ON HOLD | COMMUNITY
Start: 2025-07-11 | End: 2025-07-19 | Stop reason: HOSPADM

## 2025-07-11 RX ADMIN — IOHEXOL 75 ML: 350 INJECTION, SOLUTION INTRAVENOUS at 11:07

## 2025-07-11 RX ADMIN — NOREPINEPHRINE BITARTRATE 0.05 MCG/KG/MIN: 4 INJECTION, SOLUTION INTRAVENOUS at 09:07

## 2025-07-11 RX ADMIN — SODIUM CHLORIDE 3000 ML: 9 INJECTION, SOLUTION INTRAVENOUS at 09:07

## 2025-07-11 RX ADMIN — SODIUM CHLORIDE 1000 ML: 9 INJECTION, SOLUTION INTRAVENOUS at 08:07

## 2025-07-11 RX ADMIN — PIPERACILLIN SODIUM AND TAZOBACTAM SODIUM 4.5 G: 4; .5 INJECTION, POWDER, LYOPHILIZED, FOR SOLUTION INTRAVENOUS at 11:07

## 2025-07-11 NOTE — ED TRIAGE NOTES
C/o AMS and hypotension pta. Patient received 300 ml of NS pta per EMS. Family reports patient started taking Carafate today after seeing Dr. Hernandez today for abdominal pain.patient with c/o intermittent abdominal pain and back pain. Patients spouse reports patient had chills this afternoon. Patient with c/o nausea.

## 2025-07-12 ENCOUNTER — HOSPITAL ENCOUNTER (INPATIENT)
Facility: HOSPITAL | Age: 85
LOS: 7 days | Discharge: HOME HEALTH | DRG: 853 | End: 2025-07-19
Attending: HOSPITALIST | Admitting: HOSPITALIST
Payer: MEDICARE

## 2025-07-12 ENCOUNTER — ANESTHESIA (OUTPATIENT)
Dept: SURGERY | Facility: HOSPITAL | Age: 85
End: 2025-07-12
Payer: MEDICARE

## 2025-07-12 ENCOUNTER — ANESTHESIA EVENT (OUTPATIENT)
Dept: INTENSIVE CARE | Facility: HOSPITAL | Age: 85
End: 2025-07-12
Payer: MEDICARE

## 2025-07-12 ENCOUNTER — ANESTHESIA (OUTPATIENT)
Dept: INTENSIVE CARE | Facility: HOSPITAL | Age: 85
End: 2025-07-12
Payer: MEDICARE

## 2025-07-12 ENCOUNTER — ANESTHESIA EVENT (OUTPATIENT)
Dept: SURGERY | Facility: HOSPITAL | Age: 85
End: 2025-07-12
Payer: MEDICARE

## 2025-07-12 VITALS
TEMPERATURE: 98 F | WEIGHT: 150 LBS | OXYGEN SATURATION: 96 % | BODY MASS INDEX: 25.61 KG/M2 | HEIGHT: 64 IN | HEART RATE: 88 BPM | RESPIRATION RATE: 22 BRPM | SYSTOLIC BLOOD PRESSURE: 96 MMHG | DIASTOLIC BLOOD PRESSURE: 53 MMHG

## 2025-07-12 DIAGNOSIS — R94.31 PROLONGED Q-T INTERVAL ON ECG: ICD-10-CM

## 2025-07-12 DIAGNOSIS — R07.9 CHEST PAIN: ICD-10-CM

## 2025-07-12 DIAGNOSIS — A02.1: Primary | ICD-10-CM

## 2025-07-12 DIAGNOSIS — A41.9 SEPTIC SHOCK: ICD-10-CM

## 2025-07-12 DIAGNOSIS — N17.0: Primary | ICD-10-CM

## 2025-07-12 DIAGNOSIS — R65.21 SEPTIC SHOCK: ICD-10-CM

## 2025-07-12 DIAGNOSIS — R78.81 E COLI BACTEREMIA: ICD-10-CM

## 2025-07-12 DIAGNOSIS — B96.20 E COLI BACTEREMIA: ICD-10-CM

## 2025-07-12 DIAGNOSIS — I25.10 CORONARY ARTERY DISEASE INVOLVING NATIVE CORONARY ARTERY OF NATIVE HEART WITHOUT ANGINA PECTORIS: ICD-10-CM

## 2025-07-12 DIAGNOSIS — I48.0 PAROXYSMAL ATRIAL FIBRILLATION: ICD-10-CM

## 2025-07-12 DIAGNOSIS — I49.9 ARRHYTHMIA: ICD-10-CM

## 2025-07-12 DIAGNOSIS — D69.6 THROMBOCYTOPENIA: ICD-10-CM

## 2025-07-12 DIAGNOSIS — R65.21: Primary | ICD-10-CM

## 2025-07-12 DIAGNOSIS — R00.0 TACHYCARDIA: ICD-10-CM

## 2025-07-12 DIAGNOSIS — Z01.818 PRE-OP EVALUATION: ICD-10-CM

## 2025-07-12 PROBLEM — N20.1 LEFT URETERAL STONE: Status: ACTIVE | Noted: 2025-07-12

## 2025-07-12 PROBLEM — N13.9 ACUTE UNILATERAL OBSTRUCTIVE UROPATHY: Status: ACTIVE | Noted: 2025-07-12

## 2025-07-12 PROBLEM — N17.9 AKI (ACUTE KIDNEY INJURY): Status: ACTIVE | Noted: 2025-07-12

## 2025-07-12 PROBLEM — N17.9 SEPSIS WITH ACUTE RENAL FAILURE AND SEPTIC SHOCK: Status: ACTIVE | Noted: 2025-07-12

## 2025-07-12 LAB
ABO + RH BLD: NORMAL
ABO + RH BLD: NORMAL
ABSOLUTE EOSINOPHIL (OHS): 0 K/UL
ABSOLUTE MONOCYTE (OHS): 0.61 K/UL (ref 0.3–1)
ABSOLUTE NEUTROPHIL COUNT (OHS): 11.24 K/UL (ref 1.8–7.7)
ABSOLUTE NEUTROPHIL MANUAL (OHS): 10 K/UL
ABSOLUTE NEUTROPHIL MANUAL (OHS): 9.1 K/UL
ALBUMIN SERPL BCP-MCNC: 2.6 G/DL (ref 3.5–5.2)
ALLENS TEST: ABNORMAL
ALLENS TEST: ABNORMAL
ANION GAP (OHS): 12 MMOL/L (ref 8–16)
ANION GAP (OHS): 12 MMOL/L (ref 8–16)
ANISOCYTOSIS BLD QL SMEAR: SLIGHT
ANISOCYTOSIS BLD QL SMEAR: SLIGHT
BASOPHILS # BLD AUTO: 0.02 K/UL
BASOPHILS NFR BLD AUTO: 0.2 %
BLD PROD TYP BPU: NORMAL
BLD PROD TYP BPU: NORMAL
BLOOD UNIT EXPIRATION DATE: NORMAL
BLOOD UNIT EXPIRATION DATE: NORMAL
BLOOD UNIT TYPE CODE: 6200
BLOOD UNIT TYPE CODE: 7300
BUN SERPL-MCNC: 25 MG/DL (ref 8–23)
BUN SERPL-MCNC: 33 MG/DL (ref 8–23)
CALCIUM SERPL-MCNC: 7.4 MG/DL (ref 8.7–10.5)
CALCIUM SERPL-MCNC: 7.6 MG/DL (ref 8.7–10.5)
CHLORIDE SERPL-SCNC: 108 MMOL/L (ref 95–110)
CHLORIDE SERPL-SCNC: 109 MMOL/L (ref 95–110)
CO2 SERPL-SCNC: 16 MMOL/L (ref 23–29)
CO2 SERPL-SCNC: 17 MMOL/L (ref 23–29)
CREAT SERPL-MCNC: 1.5 MG/DL (ref 0.5–1.4)
CREAT SERPL-MCNC: 1.5 MG/DL (ref 0.5–1.4)
CROSSMATCH INTERPRETATION: NORMAL
CROSSMATCH INTERPRETATION: NORMAL
DELSYS: ABNORMAL
DISPENSE STATUS: NORMAL
DISPENSE STATUS: NORMAL
EAG (OHS): 97 MG/DL (ref 68–131)
ERYTHROCYTE [DISTWIDTH] IN BLOOD BY AUTOMATED COUNT: 16.2 % (ref 11.5–14.5)
ERYTHROCYTE [DISTWIDTH] IN BLOOD BY AUTOMATED COUNT: 16.7 % (ref 11.5–14.5)
GFR SERPLBLD CREATININE-BSD FMLA CKD-EPI: 34 ML/MIN/1.73/M2
GFR SERPLBLD CREATININE-BSD FMLA CKD-EPI: 34 ML/MIN/1.73/M2
GLUCOSE SERPL-MCNC: 124 MG/DL (ref 70–110)
GLUCOSE SERPL-MCNC: 93 MG/DL (ref 70–110)
HBA1C MFR BLD: 5 % (ref 4–5.6)
HCO3 UR-SCNC: 16.7 MMOL/L (ref 24–28)
HCO3 UR-SCNC: 17.7 MMOL/L (ref 24–28)
HCT VFR BLD AUTO: 31.6 % (ref 37–48.5)
HCT VFR BLD AUTO: 34 % (ref 37–48.5)
HGB BLD-MCNC: 10.2 GM/DL (ref 12–16)
HGB BLD-MCNC: 10.9 GM/DL (ref 12–16)
HOLD SPECIMEN: NORMAL
IMM GRANULOCYTES # BLD AUTO: 0.32 K/UL (ref 0–0.04)
IMM GRANULOCYTES NFR BLD AUTO: 2.5 % (ref 0–0.5)
INDIRECT COOMBS: NORMAL
INR PPP: 1.6 (ref 0.8–1.2)
LACTATE SERPL-SCNC: 3 MMOL/L (ref 0.5–2.2)
LACTATE SERPL-SCNC: 3 MMOL/L (ref 0.5–2.2)
LACTATE SERPL-SCNC: 3.5 MMOL/L (ref 0.5–2.2)
LACTATE SERPL-SCNC: 3.6 MMOL/L (ref 0.5–2.2)
LYMPHOCYTES # BLD AUTO: 0.4 K/UL (ref 1–4.8)
LYMPHOCYTES NFR BLD MANUAL: 2 % (ref 18–48)
LYMPHOCYTES NFR BLD MANUAL: 7 % (ref 18–48)
MAGNESIUM SERPL-MCNC: 1 MG/DL (ref 1.6–2.6)
MAGNESIUM SERPL-MCNC: 1.5 MG/DL (ref 1.6–2.6)
MCH RBC QN AUTO: 29.5 PG (ref 27–31)
MCH RBC QN AUTO: 29.5 PG (ref 27–31)
MCHC RBC AUTO-ENTMCNC: 32.1 G/DL (ref 32–36)
MCHC RBC AUTO-ENTMCNC: 32.3 G/DL (ref 32–36)
MCV RBC AUTO: 91 FL (ref 82–98)
MCV RBC AUTO: 92 FL (ref 82–98)
METAMYELOCYTES NFR BLD MANUAL: 15 %
METAMYELOCYTES NFR BLD MANUAL: 6 %
MODE: ABNORMAL
MONOCYTES NFR BLD MANUAL: 4 % (ref 4–15)
MONOCYTES NFR BLD MANUAL: 6 % (ref 4–15)
MYELOCYTES NFR BLD MANUAL: 5 %
NEUTROPHILS NFR BLD MANUAL: 54 % (ref 38–73)
NEUTROPHILS NFR BLD MANUAL: 57 % (ref 38–73)
NEUTS BAND NFR BLD MANUAL: 15 %
NEUTS BAND NFR BLD MANUAL: 29 %
NUCLEATED RBC (/100WBC) (OHS): 0 /100 WBC
NUCLEATED RBC (/100WBC) (OHS): 0 /100 WBC
PCO2 BLDA: 40.3 MMHG (ref 35–45)
PCO2 BLDA: 48.5 MMHG (ref 35–45)
PH SMN: 7.14 [PH] (ref 7.35–7.45)
PH SMN: 7.25 [PH] (ref 7.35–7.45)
PHOSPHATE SERPL-MCNC: 3 MG/DL (ref 2.7–4.5)
PHOSPHATE SERPL-MCNC: 4.3 MG/DL (ref 2.7–4.5)
PLATELET # BLD AUTO: 12 K/UL (ref 150–450)
PLATELET # BLD AUTO: 35 K/UL (ref 150–450)
PLATELET BLD QL SMEAR: ABNORMAL
PMV BLD AUTO: 9.9 FL (ref 9.2–12.9)
PMV BLD AUTO: ABNORMAL FL
PO2 BLDA: 70 MMHG (ref 80–100)
PO2 BLDA: 81 MMHG (ref 80–100)
POC BE: -12 MMOL/L (ref -2–2)
POC BE: -9 MMOL/L (ref -2–2)
POC SATURATED O2: 88 % (ref 95–100)
POC SATURATED O2: 94 % (ref 95–100)
POC TCO2: 18 MMOL/L (ref 23–27)
POC TCO2: 19 MMOL/L (ref 23–27)
POCT GLUCOSE: 94 MG/DL (ref 70–110)
POTASSIUM SERPL-SCNC: 3.6 MMOL/L (ref 3.5–5.1)
POTASSIUM SERPL-SCNC: 4.2 MMOL/L (ref 3.5–5.1)
PROCALCITONIN SERPL-MCNC: 33.26 NG/ML
PROCALCITONIN SERPL-MCNC: 8.53 NG/ML
PROTHROMBIN TIME: 17.2 SECONDS (ref 9–12.5)
RBC # BLD AUTO: 3.46 M/UL (ref 4–5.4)
RBC # BLD AUTO: 3.69 M/UL (ref 4–5.4)
RELATIVE EOSINOPHIL (OHS): 0 %
RELATIVE LYMPHOCYTE (OHS): 3.2 % (ref 18–48)
RELATIVE MONOCYTE (OHS): 4.8 % (ref 4–15)
RELATIVE NEUTROPHIL (OHS): 89.3 % (ref 38–73)
RH BLD: NORMAL
SAMPLE: ABNORMAL
SAMPLE: ABNORMAL
SITE: ABNORMAL
SITE: ABNORMAL
SODIUM SERPL-SCNC: 137 MMOL/L (ref 136–145)
SODIUM SERPL-SCNC: 137 MMOL/L (ref 136–145)
SPECIMEN OUTDATE: NORMAL
UNIT NUMBER: NORMAL
UNIT NUMBER: NORMAL
VANCOMYCIN SERPL-MCNC: 17.6 UG/ML (ref ?–80)
WBC # BLD AUTO: 12.01 K/UL (ref 3.9–12.7)
WBC # BLD AUTO: 12.59 K/UL (ref 3.9–12.7)
WBC TOXIC VACUOLES BLD QL SMEAR: PRESENT
WBC TOXIC VACUOLES BLD QL SMEAR: PRESENT

## 2025-07-12 PROCEDURE — 63600175 PHARM REV CODE 636 W HCPCS: Performed by: INTERNAL MEDICINE

## 2025-07-12 PROCEDURE — 36556 INSERT NON-TUNNEL CV CATH: CPT | Mod: RT

## 2025-07-12 PROCEDURE — 20000000 HC ICU ROOM

## 2025-07-12 PROCEDURE — P9037 PLATE PHERES LEUKOREDU IRRAD: HCPCS | Performed by: INTERNAL MEDICINE

## 2025-07-12 PROCEDURE — 96366 THER/PROPH/DIAG IV INF ADDON: CPT

## 2025-07-12 PROCEDURE — 63600175 PHARM REV CODE 636 W HCPCS

## 2025-07-12 PROCEDURE — 25000003 PHARM REV CODE 250: Performed by: HOSPITALIST

## 2025-07-12 PROCEDURE — 84145 PROCALCITONIN (PCT): CPT | Performed by: INTERNAL MEDICINE

## 2025-07-12 PROCEDURE — 93010 ELECTROCARDIOGRAM REPORT: CPT | Mod: ,,, | Performed by: INTERNAL MEDICINE

## 2025-07-12 PROCEDURE — 83605 ASSAY OF LACTIC ACID: CPT | Performed by: INTERNAL MEDICINE

## 2025-07-12 PROCEDURE — 96375 TX/PRO/DX INJ NEW DRUG ADDON: CPT

## 2025-07-12 PROCEDURE — BT1F1ZZ FLUOROSCOPY OF LEFT KIDNEY, URETER AND BLADDER USING LOW OSMOLAR CONTRAST: ICD-10-PCS | Performed by: STUDENT IN AN ORGANIZED HEALTH CARE EDUCATION/TRAINING PROGRAM

## 2025-07-12 PROCEDURE — 25000003 PHARM REV CODE 250

## 2025-07-12 PROCEDURE — 96365 THER/PROPH/DIAG IV INF INIT: CPT

## 2025-07-12 PROCEDURE — 85060 BLOOD SMEAR INTERPRETATION: CPT | Mod: ,,, | Performed by: PATHOLOGY

## 2025-07-12 PROCEDURE — C2617 STENT, NON-COR, TEM W/O DEL: HCPCS | Performed by: STUDENT IN AN ORGANIZED HEALTH CARE EDUCATION/TRAINING PROGRAM

## 2025-07-12 PROCEDURE — 93005 ELECTROCARDIOGRAM TRACING: CPT

## 2025-07-12 PROCEDURE — 25000003 PHARM REV CODE 250: Performed by: INTERNAL MEDICINE

## 2025-07-12 PROCEDURE — 84100 ASSAY OF PHOSPHORUS: CPT | Performed by: INTERNAL MEDICINE

## 2025-07-12 PROCEDURE — 99223 1ST HOSP IP/OBS HIGH 75: CPT | Mod: 25,,, | Performed by: STUDENT IN AN ORGANIZED HEALTH CARE EDUCATION/TRAINING PROGRAM

## 2025-07-12 PROCEDURE — 99900035 HC TECH TIME PER 15 MIN (STAT)

## 2025-07-12 PROCEDURE — 36000707: Performed by: STUDENT IN AN ORGANIZED HEALTH CARE EDUCATION/TRAINING PROGRAM

## 2025-07-12 PROCEDURE — 0T778DZ DILATION OF LEFT URETER WITH INTRALUMINAL DEVICE, VIA NATURAL OR ARTIFICIAL OPENING ENDOSCOPIC: ICD-10-PCS | Performed by: STUDENT IN AN ORGANIZED HEALTH CARE EDUCATION/TRAINING PROGRAM

## 2025-07-12 PROCEDURE — P9035 PLATELET PHERES LEUKOREDUCED: HCPCS | Performed by: ANESTHESIOLOGY

## 2025-07-12 PROCEDURE — 63600175 PHARM REV CODE 636 W HCPCS: Performed by: NURSE ANESTHETIST, CERTIFIED REGISTERED

## 2025-07-12 PROCEDURE — 86850 RBC ANTIBODY SCREEN: CPT | Performed by: INTERNAL MEDICINE

## 2025-07-12 PROCEDURE — 36620 INSERTION CATHETER ARTERY: CPT | Mod: ,,, | Performed by: ANESTHESIOLOGY

## 2025-07-12 PROCEDURE — 85025 COMPLETE CBC W/AUTO DIFF WBC: CPT | Performed by: INTERNAL MEDICINE

## 2025-07-12 PROCEDURE — 30233R1 TRANSFUSION OF NONAUTOLOGOUS PLATELETS INTO PERIPHERAL VEIN, PERCUTANEOUS APPROACH: ICD-10-PCS | Performed by: INTERNAL MEDICINE

## 2025-07-12 PROCEDURE — 76937 US GUIDE VASCULAR ACCESS: CPT | Performed by: ANESTHESIOLOGY

## 2025-07-12 PROCEDURE — 37799 UNLISTED PX VASCULAR SURGERY: CPT

## 2025-07-12 PROCEDURE — 83735 ASSAY OF MAGNESIUM: CPT | Performed by: INTERNAL MEDICINE

## 2025-07-12 PROCEDURE — 37000008 HC ANESTHESIA 1ST 15 MINUTES: Performed by: STUDENT IN AN ORGANIZED HEALTH CARE EDUCATION/TRAINING PROGRAM

## 2025-07-12 PROCEDURE — 87086 URINE CULTURE/COLONY COUNT: CPT | Performed by: STUDENT IN AN ORGANIZED HEALTH CARE EDUCATION/TRAINING PROGRAM

## 2025-07-12 PROCEDURE — 63600175 PHARM REV CODE 636 W HCPCS: Mod: JZ,TB | Performed by: SURGERY

## 2025-07-12 PROCEDURE — 36000706: Performed by: STUDENT IN AN ORGANIZED HEALTH CARE EDUCATION/TRAINING PROGRAM

## 2025-07-12 PROCEDURE — 83036 HEMOGLOBIN GLYCOSYLATED A1C: CPT | Performed by: INTERNAL MEDICINE

## 2025-07-12 PROCEDURE — 63600175 PHARM REV CODE 636 W HCPCS: Mod: JZ,TB | Performed by: HOSPITALIST

## 2025-07-12 PROCEDURE — C1769 GUIDE WIRE: HCPCS | Performed by: STUDENT IN AN ORGANIZED HEALTH CARE EDUCATION/TRAINING PROGRAM

## 2025-07-12 PROCEDURE — 74420 UROGRAPHY RTRGR +-KUB: CPT | Mod: 26,,, | Performed by: STUDENT IN AN ORGANIZED HEALTH CARE EDUCATION/TRAINING PROGRAM

## 2025-07-12 PROCEDURE — 80202 ASSAY OF VANCOMYCIN: CPT | Performed by: HOSPITALIST

## 2025-07-12 PROCEDURE — 37000009 HC ANESTHESIA EA ADD 15 MINS: Performed by: STUDENT IN AN ORGANIZED HEALTH CARE EDUCATION/TRAINING PROGRAM

## 2025-07-12 PROCEDURE — 52332 CYSTOSCOPY AND TREATMENT: CPT | Mod: RT,,, | Performed by: STUDENT IN AN ORGANIZED HEALTH CARE EDUCATION/TRAINING PROGRAM

## 2025-07-12 PROCEDURE — 25000003 PHARM REV CODE 250: Performed by: SURGERY

## 2025-07-12 PROCEDURE — 82947 ASSAY GLUCOSE BLOOD QUANT: CPT | Performed by: INTERNAL MEDICINE

## 2025-07-12 PROCEDURE — 85610 PROTHROMBIN TIME: CPT | Performed by: INTERNAL MEDICINE

## 2025-07-12 PROCEDURE — 25000003 PHARM REV CODE 250: Performed by: NURSE ANESTHETIST, CERTIFIED REGISTERED

## 2025-07-12 PROCEDURE — 36430 TRANSFUSION BLD/BLD COMPNT: CPT

## 2025-07-12 PROCEDURE — 82803 BLOOD GASES ANY COMBINATION: CPT

## 2025-07-12 DEVICE — STENT URET PERCUFLEX 6FR 26CM: Type: IMPLANTABLE DEVICE | Site: URETER | Status: FUNCTIONAL

## 2025-07-12 RX ORDER — KETOROLAC TROMETHAMINE 30 MG/ML
15 INJECTION, SOLUTION INTRAMUSCULAR; INTRAVENOUS
Status: COMPLETED | OUTPATIENT
Start: 2025-07-12 | End: 2025-07-12

## 2025-07-12 RX ORDER — NOREPINEPHRINE BITARTRATE/D5W 4MG/250ML
PLASTIC BAG, INJECTION (ML) INTRAVENOUS
Status: COMPLETED
Start: 2025-07-12 | End: 2025-07-12

## 2025-07-12 RX ORDER — GLUCAGON 1 MG
1 KIT INJECTION
Status: DISCONTINUED | OUTPATIENT
Start: 2025-07-12 | End: 2025-07-19 | Stop reason: HOSPADM

## 2025-07-12 RX ORDER — KETOROLAC TROMETHAMINE 30 MG/ML
15 INJECTION, SOLUTION INTRAMUSCULAR; INTRAVENOUS ONCE
Status: COMPLETED | OUTPATIENT
Start: 2025-07-12 | End: 2025-07-12

## 2025-07-12 RX ORDER — NOREPINEPHRINE BIT/0.9 % NACL 32MG/250ML
0-3 PLASTIC BAG, INJECTION (ML) INTRAVENOUS CONTINUOUS
Status: DISCONTINUED | OUTPATIENT
Start: 2025-07-12 | End: 2025-07-16

## 2025-07-12 RX ORDER — CALCIUM GLUCONATE 20 MG/ML
1 INJECTION, SOLUTION INTRAVENOUS ONCE
Status: COMPLETED | OUTPATIENT
Start: 2025-07-12 | End: 2025-07-12

## 2025-07-12 RX ORDER — ONDANSETRON HYDROCHLORIDE 2 MG/ML
INJECTION, SOLUTION INTRAVENOUS
Status: DISCONTINUED | OUTPATIENT
Start: 2025-07-12 | End: 2025-07-12

## 2025-07-12 RX ORDER — MEROPENEM 1 G/1
1 INJECTION, POWDER, FOR SOLUTION INTRAVENOUS EVERY 12 HOURS
Status: DISCONTINUED | OUTPATIENT
Start: 2025-07-12 | End: 2025-07-15

## 2025-07-12 RX ORDER — MAGNESIUM SULFATE HEPTAHYDRATE 40 MG/ML
INJECTION, SOLUTION INTRAVENOUS
Status: COMPLETED
Start: 2025-07-12 | End: 2025-07-13

## 2025-07-12 RX ORDER — FENTANYL CITRATE 50 UG/ML
INJECTION, SOLUTION INTRAMUSCULAR; INTRAVENOUS
Status: DISCONTINUED | OUTPATIENT
Start: 2025-07-12 | End: 2025-07-12

## 2025-07-12 RX ORDER — PHENYLEPHRINE HYDROCHLORIDE 10 MG/ML
INJECTION INTRAVENOUS
Status: DISCONTINUED | OUTPATIENT
Start: 2025-07-12 | End: 2025-07-12

## 2025-07-12 RX ORDER — NOREPINEPHRINE BITARTRATE/D5W 4MG/250ML
0-3 PLASTIC BAG, INJECTION (ML) INTRAVENOUS CONTINUOUS
Status: DISCONTINUED | OUTPATIENT
Start: 2025-07-12 | End: 2025-07-12

## 2025-07-12 RX ORDER — POTASSIUM CHLORIDE 7.45 MG/ML
10 INJECTION INTRAVENOUS ONCE
Status: COMPLETED | OUTPATIENT
Start: 2025-07-12 | End: 2025-07-12

## 2025-07-12 RX ORDER — CALCIUM GLUCONATE 20 MG/ML
1 INJECTION, SOLUTION INTRAVENOUS ONCE
Status: COMPLETED | OUTPATIENT
Start: 2025-07-13 | End: 2025-07-13

## 2025-07-12 RX ORDER — ALPRAZOLAM 0.5 MG/1
0.5 TABLET ORAL NIGHTLY PRN
Status: DISCONTINUED | OUTPATIENT
Start: 2025-07-12 | End: 2025-07-14

## 2025-07-12 RX ORDER — MAGNESIUM SULFATE HEPTAHYDRATE 40 MG/ML
2 INJECTION, SOLUTION INTRAVENOUS ONCE
Status: COMPLETED | OUTPATIENT
Start: 2025-07-12 | End: 2025-07-12

## 2025-07-12 RX ORDER — VASOPRESSIN 20 [USP'U]/ML
INJECTION, SOLUTION INTRAMUSCULAR; SUBCUTANEOUS
Status: DISCONTINUED | OUTPATIENT
Start: 2025-07-12 | End: 2025-07-12

## 2025-07-12 RX ORDER — LIDOCAINE HYDROCHLORIDE 20 MG/ML
INJECTION INTRAVENOUS
Status: DISPENSED
Start: 2025-07-12 | End: 2025-07-12

## 2025-07-12 RX ORDER — ONDANSETRON HYDROCHLORIDE 2 MG/ML
4 INJECTION, SOLUTION INTRAVENOUS EVERY 6 HOURS PRN
Status: DISCONTINUED | OUTPATIENT
Start: 2025-07-12 | End: 2025-07-19 | Stop reason: HOSPADM

## 2025-07-12 RX ORDER — HYDROXYZINE HYDROCHLORIDE 10 MG/1
10 TABLET, FILM COATED ORAL 3 TIMES DAILY PRN
Status: DISCONTINUED | OUTPATIENT
Start: 2025-07-12 | End: 2025-07-19 | Stop reason: HOSPADM

## 2025-07-12 RX ORDER — SIMETHICONE 80 MG
1 TABLET,CHEWABLE ORAL 4 TIMES DAILY PRN
Status: DISCONTINUED | OUTPATIENT
Start: 2025-07-12 | End: 2025-07-19 | Stop reason: HOSPADM

## 2025-07-12 RX ORDER — ETOMIDATE 2 MG/ML
INJECTION INTRAVENOUS
Status: DISCONTINUED | OUTPATIENT
Start: 2025-07-12 | End: 2025-07-12

## 2025-07-12 RX ORDER — HYDROCODONE BITARTRATE AND ACETAMINOPHEN 500; 5 MG/1; MG/1
TABLET ORAL
Status: DISCONTINUED | OUTPATIENT
Start: 2025-07-12 | End: 2025-07-14

## 2025-07-12 RX ORDER — LIDOCAINE HYDROCHLORIDE 20 MG/ML
INJECTION INTRAVENOUS
Status: DISCONTINUED | OUTPATIENT
Start: 2025-07-12 | End: 2025-07-12

## 2025-07-12 RX ORDER — MAGNESIUM SULFATE HEPTAHYDRATE 40 MG/ML
2 INJECTION, SOLUTION INTRAVENOUS ONCE
Status: COMPLETED | OUTPATIENT
Start: 2025-07-12 | End: 2025-07-13

## 2025-07-12 RX ORDER — ACETAMINOPHEN 325 MG/1
650 TABLET ORAL EVERY 4 HOURS PRN
Status: DISCONTINUED | OUTPATIENT
Start: 2025-07-12 | End: 2025-07-19 | Stop reason: HOSPADM

## 2025-07-12 RX ORDER — DEXMEDETOMIDINE HYDROCHLORIDE 4 UG/ML
0-1.4 INJECTION, SOLUTION INTRAVENOUS CONTINUOUS
Status: DISCONTINUED | OUTPATIENT
Start: 2025-07-12 | End: 2025-07-16

## 2025-07-12 RX ORDER — SODIUM CHLORIDE 0.9 % (FLUSH) 0.9 %
10 SYRINGE (ML) INJECTION EVERY 12 HOURS PRN
Status: DISCONTINUED | OUTPATIENT
Start: 2025-07-12 | End: 2025-07-19 | Stop reason: HOSPADM

## 2025-07-12 RX ORDER — HYDRALAZINE HYDROCHLORIDE 20 MG/ML
5 INJECTION INTRAMUSCULAR; INTRAVENOUS EVERY 6 HOURS PRN
Status: DISCONTINUED | OUTPATIENT
Start: 2025-07-12 | End: 2025-07-19 | Stop reason: HOSPADM

## 2025-07-12 RX ORDER — MUPIROCIN 20 MG/G
OINTMENT TOPICAL 2 TIMES DAILY
Status: DISPENSED | OUTPATIENT
Start: 2025-07-12 | End: 2025-07-17

## 2025-07-12 RX ORDER — INSULIN ASPART 100 [IU]/ML
0-5 INJECTION, SOLUTION INTRAVENOUS; SUBCUTANEOUS EVERY 6 HOURS PRN
Status: DISCONTINUED | OUTPATIENT
Start: 2025-07-12 | End: 2025-07-19 | Stop reason: HOSPADM

## 2025-07-12 RX ORDER — TALC
6 POWDER (GRAM) TOPICAL NIGHTLY PRN
Status: DISCONTINUED | OUTPATIENT
Start: 2025-07-12 | End: 2025-07-19 | Stop reason: HOSPADM

## 2025-07-12 RX ORDER — HYDROCODONE BITARTRATE AND ACETAMINOPHEN 500; 5 MG/1; MG/1
TABLET ORAL
Status: DISCONTINUED | OUTPATIENT
Start: 2025-07-12 | End: 2025-07-19 | Stop reason: HOSPADM

## 2025-07-12 RX ORDER — ESCITALOPRAM OXALATE 5 MG/1
5 TABLET ORAL DAILY
Status: DISCONTINUED | OUTPATIENT
Start: 2025-07-12 | End: 2025-07-19 | Stop reason: HOSPADM

## 2025-07-12 RX ORDER — SODIUM CHLORIDE 9 MG/ML
INJECTION, SOLUTION INTRAVENOUS CONTINUOUS
Status: DISCONTINUED | OUTPATIENT
Start: 2025-07-12 | End: 2025-07-14

## 2025-07-12 RX ORDER — ATORVASTATIN CALCIUM 10 MG/1
20 TABLET, FILM COATED ORAL NIGHTLY
Status: DISCONTINUED | OUTPATIENT
Start: 2025-07-12 | End: 2025-07-19 | Stop reason: HOSPADM

## 2025-07-12 RX ADMIN — NOREPINEPHRINE BITARTRATE 0.69 MCG/KG/MIN: 4 INJECTION, SOLUTION INTRAVENOUS at 05:07

## 2025-07-12 RX ADMIN — FENTANYL CITRATE 50 MCG: 0.05 INJECTION, SOLUTION INTRAMUSCULAR; INTRAVENOUS at 11:07

## 2025-07-12 RX ADMIN — VASOPRESSIN 1 UNITS: 20 INJECTION, SOLUTION INTRAMUSCULAR; SUBCUTANEOUS at 11:07

## 2025-07-12 RX ADMIN — SODIUM CHLORIDE: 9 INJECTION, SOLUTION INTRAVENOUS at 05:07

## 2025-07-12 RX ADMIN — DEXTROSE MONOHYDRATE 12.5 G: 25 INJECTION, SOLUTION INTRAVENOUS at 05:07

## 2025-07-12 RX ADMIN — VANCOMYCIN HYDROCHLORIDE 1000 MG: 1 INJECTION, POWDER, LYOPHILIZED, FOR SOLUTION INTRAVENOUS at 03:07

## 2025-07-12 RX ADMIN — NOREPINEPHRINE BITARTRATE 1 MCG/KG/MIN: 1 INJECTION, SOLUTION, CONCENTRATE INTRAVENOUS at 04:07

## 2025-07-12 RX ADMIN — SODIUM CHLORIDE 1000 ML: 9 INJECTION, SOLUTION INTRAVENOUS at 10:07

## 2025-07-12 RX ADMIN — MEROPENEM 1 G: 1 INJECTION, POWDER, FOR SOLUTION INTRAVENOUS at 05:07

## 2025-07-12 RX ADMIN — MAGNESIUM SULFATE HEPTAHYDRATE 2 G: 40 INJECTION, SOLUTION INTRAVENOUS at 08:07

## 2025-07-12 RX ADMIN — PHENYLEPHRINE HYDROCHLORIDE 100 MCG: 10 INJECTION INTRAVENOUS at 11:07

## 2025-07-12 RX ADMIN — MAGNESIUM SULFATE HEPTAHYDRATE 2 G: 40 INJECTION, SOLUTION INTRAVENOUS at 10:07

## 2025-07-12 RX ADMIN — ETOMIDATE 2 MG: 2 INJECTION, SOLUTION INTRAVENOUS at 11:07

## 2025-07-12 RX ADMIN — CALCIUM GLUCONATE 1 G: 20 INJECTION, SOLUTION INTRAVENOUS at 08:07

## 2025-07-12 RX ADMIN — DEXMEDETOMIDINE HYDROCHLORIDE 0.5 MCG/KG/HR: 4 INJECTION INTRAVENOUS at 12:07

## 2025-07-12 RX ADMIN — ETOMIDATE 4 MG: 2 INJECTION, SOLUTION INTRAVENOUS at 11:07

## 2025-07-12 RX ADMIN — MUPIROCIN: 20 OINTMENT TOPICAL at 09:07

## 2025-07-12 RX ADMIN — PHENYLEPHRINE HYDROCHLORIDE 200 MCG: 10 INJECTION INTRAVENOUS at 11:07

## 2025-07-12 RX ADMIN — DEXMEDETOMIDINE HYDROCHLORIDE 0.1 MCG/KG/HR: 4 INJECTION INTRAVENOUS at 08:07

## 2025-07-12 RX ADMIN — NOREPINEPHRINE BITARTRATE 0.69 MCG/KG/MIN: 4 INJECTION, SOLUTION INTRAVENOUS at 06:07

## 2025-07-12 RX ADMIN — KETOROLAC TROMETHAMINE 15 MG: 30 INJECTION, SOLUTION INTRAMUSCULAR; INTRAVENOUS at 08:07

## 2025-07-12 RX ADMIN — LIDOCAINE HYDROCHLORIDE 40 MG: 20 INJECTION, SOLUTION INTRAVENOUS at 11:07

## 2025-07-12 RX ADMIN — NOREPINEPHRINE BITARTRATE 4 MG: 4 INJECTION, SOLUTION INTRAVENOUS at 09:07

## 2025-07-12 RX ADMIN — NOREPINEPHRINE BITARTRATE 0.6 MCG/KG/MIN: 1 INJECTION, SOLUTION, CONCENTRATE INTRAVENOUS at 09:07

## 2025-07-12 RX ADMIN — NOREPINEPHRINE BITARTRATE 0.69 MCG/KG/MIN: 4 INJECTION, SOLUTION INTRAVENOUS at 07:07

## 2025-07-12 RX ADMIN — ONDANSETRON 4 MG: 2 INJECTION, SOLUTION INTRAMUSCULAR; INTRAVENOUS at 10:07

## 2025-07-12 RX ADMIN — POTASSIUM CHLORIDE 10 MEQ: 7.46 INJECTION, SOLUTION INTRAVENOUS at 08:07

## 2025-07-12 RX ADMIN — SODIUM CHLORIDE: 9 INJECTION, SOLUTION INTRAVENOUS at 01:07

## 2025-07-12 RX ADMIN — Medication 0.69 MCG/KG/MIN: at 05:07

## 2025-07-12 RX ADMIN — VANCOMYCIN HYDROCHLORIDE 1750 MG: 1.75 INJECTION, POWDER, LYOPHILIZED, FOR SOLUTION INTRAVENOUS at 12:07

## 2025-07-12 RX ADMIN — NOREPINEPHRINE BITARTRATE 0.69 MCG/KG/MIN: 4 INJECTION, SOLUTION INTRAVENOUS at 03:07

## 2025-07-12 RX ADMIN — MUPIROCIN: 20 OINTMENT TOPICAL at 08:07

## 2025-07-12 RX ADMIN — NOREPINEPHRINE BITARTRATE 0.69 MCG/KG/MIN: 4 INJECTION, SOLUTION INTRAVENOUS at 01:07

## 2025-07-12 RX ADMIN — KETOROLAC TROMETHAMINE 15 MG: 30 INJECTION, SOLUTION INTRAMUSCULAR; INTRAVENOUS at 12:07

## 2025-07-12 RX ADMIN — ESCITALOPRAM 5 MG: 5 TABLET, FILM COATED ORAL at 08:07

## 2025-07-12 NOTE — ASSESSMENT & PLAN NOTE
Patient's blood pressure range in the last 24 hours was: BP  Min: 69/40  Max: 143/63.The patient's inpatient anti-hypertensive regimen is listed below:  Current Antihypertensives  hydrALAZINE injection 5 mg, Every 6 hours PRN, Intravenous for SBP>180 or DBP>90   HOLD Metoprolol 50mg PO BID for now while on pressors.

## 2025-07-12 NOTE — ASSESSMENT & PLAN NOTE
F/u with urology. Type and screen ordered for possible need to transfuse plt, FFP, and blood. Consent obtained from family and patient.

## 2025-07-12 NOTE — EICU
"Intervention Initiated From:  [facility] / EICU      Virtual ICU Admission    Admit Date: 2025  LOS: 0  Code Status: Full Code   : 1940  Bed: W276/W276 A:     Diagnosis: <principal problem not specified>    Patient  has a past medical history of Anemia, Anxiety, Arthritis, Atherosclerotic heart disease native coronary artery w/angina pectoris, Back pain, Breast cyst, Cirrhosis, Depression, Diabetes mellitus, Disorder of kidney and ureter, Glaucoma, Hyperlipidemia, Hypertension, Trouble in sleeping, and Type 2 diabetes mellitus.    Last VS: BP (!) 99/55   Pulse (!) 119   Temp 99.1 °F (37.3 °C) (Oral)   Resp (!) 24   Ht 5' 4" (1.626 m)   Wt 71.1 kg (156 lb 12 oz)   SpO2 (!) 94%   Breastfeeding No   BMI 26.91 kg/m²       VICU Review    VICU nurse assessment :  [place] completed, LDA documentation reconciliation completed, and VTE prophylaxis review    Skin assessed with bedside team                   "

## 2025-07-12 NOTE — NURSING
[facility]  Nurses Note -- 4 Eyes      7/12/2025       Skin assessed on: Admit      [x] No Pressure Injuries Present    [x]Prevention Measures Documented    [] Yes LDA  for Pressure Injury Previously documented     [] Yes New Pressure Injury Discovered   [] LDA for New Pressure Injury Added      Attending RN:  Michelle Mena RN     Second RN:  Tabatha RN

## 2025-07-12 NOTE — EICU
New Patient Evaluation    HPI:  85 F history of hypertension, dyslipidemia, DM (A1C 5), cirrhosis, presented to [facility] ED with chills and altered sensorium. Febrile on presentation and hypotensive with SBP 70s.UA with 65 WBC, lacte 10.6. Abdominal CT with left hydronephrosis and stone in the UPJ. Stranding consistent with pyelonephritis. She was started on sepsis protocol given fluids and meropenem eventually requiring norepinephrine. Transferred to [facility] for urology evaluation with possible stent placement.    Camera Assessment:  Seen awake not in distress  BP 96/53  HR 88 O2 96%    Data:  WBC 12.59, H/H 10.9/34, platelets 35  PT 17.2, INR 1.6  Na 137, K 3.6, CO2 17, AG 12, BUN 25, creatinine 1.5  Lactate down to 3.5    Assessment and Plans:  Septic shock secondary to pyelonephritis with obstructing nephrolithiasis on broad spectrum antibiotics. Urology consulted for possible stenting for source control  Bicytopenia likely secondary to sepsis in the background of cirrhosis. No report of bleed. Continue to monitor  Discussed with BSRN

## 2025-07-12 NOTE — PROGRESS NOTES
Pharmacist Renal Dose Adjustment Note    Marine Mo is a 85 y.o. female being treated with the medication Meropenem    Patient Data:    Vital Signs (Most Recent):  Temp: 99.1 °F (37.3 °C) (07/12/25 0504) Vital Signs (72h Range):  Temp:  [98.1 °F (36.7 °C)-100 °F (37.8 °C)]   Pulse:  [86-96]   Resp:  [18-32]   BP: ()/(36-65)   SpO2:  [88 %-96 %]      Recent Labs   Lab 07/11/25 2036   CREATININE 1.5*     Serum creatinine: 1.5 mg/dL (H) 07/11/25 2036  Estimated creatinine clearance: 26.1 mL/min (A)    Meropenem 1 g every 8 hours will be renally adjusted to Meropenem 1 g every 12 hours    Pharmacist's Name: Michael Cohen  Pharmacist's Extension: [phone]

## 2025-07-12 NOTE — ASSESSMENT & PLAN NOTE
Chronic due to her DILLARD cirrhosis it appears. No acute signs of bleeding but may need transfuse for cysto today. Type and screen ordered and will defer to Urology the need for plt, ffp, or PRBC.

## 2025-07-12 NOTE — TRANSFER OF CARE
"Anesthesia Transfer of Care Note    Patient: Marine Mo    Procedure(s) Performed: Procedure(s):  CYSTOSCOPY, WITH URETERAL STENT INSERTION    Patient location: ICU    Anesthesia Type: MAC    Transport from OR: Transported from OR on 100% O2 by closed face mask with adequate spontaneous ventilation. Continuous ECG monitoring in transport. Continuous SpO2 monitoring in transport. Continuos invasive BP monitoring in transport    Post pain: adequate analgesia    Post assessment: no apparent anesthetic complications    Post vital signs: unstable    Level of consciousness: awake and confused    Nausea/Vomiting: no nausea/vomiting    Complications: none    Transfer of care protocol was followed    Last vitals: Visit Vitals  BP (!) 113/56 (BP Location: Left arm)   Pulse 91   Temp 36 °C (96.8 °F) (Skin)   Resp 17   Ht 5' 4" (1.626 m)   Wt 71.1 kg (156 lb 12 oz)   SpO2 95%   Breastfeeding No   BMI 26.91 kg/m²     "

## 2025-07-12 NOTE — PROGRESS NOTES
Pharmacokinetic Initial Assessment: IV Vancomycin    Assessment/Plan:    Initiate intravenous vancomycin with loading dose of 1750 mg once with subsequent doses when random concentrations are less than 20 mcg/mL  Desired empiric serum trough concentration is 10 to 20 mcg/mL  Draw vancomycin random level on 7/12 at 1200.  Pharmacy will continue to follow and monitor vancomycin.      Please contact pharmacy at extension [phone] with any questions regarding this assessment.     Thank you for the consult,   Michael Cohen       Patient brief summary:  Marine Mo is a 85 y.o. female initiated on antimicrobial therapy with IV Vancomycin for treatment of suspected sepsis    Drug Allergies:   Review of patient's allergies indicates:   Allergen Reactions    Pravastatin      Other Reaction(s): Propensity to adverse reactions to drug       Actual Body Weight:   71.1 kg    Renal Function:   Estimated Creatinine Clearance: 26.1 mL/min (A) (based on SCr of 1.5 mg/dL (H)).,     Dialysis Method (if applicable):  N/A    CBC (last 72 hours):  Recent Labs   Lab Result Units 07/10/25  0945 07/11/25 2036   WBC K/uL 2.53* 5.75   HGB gm/dL 10.9* 11.5*   HCT % 33.8* 34.7*   Platelet Count K/uL 69* 42*   Lymph % % 31.6 4.7*   Mono % % 11.1 2.1*   Eos % % 2.8 0.0   Basophil % % 0.4 0.2       Metabolic Panel (last 72 hours):  Recent Labs   Lab Result Units 07/10/25  0945 07/11/25 2026 07/11/25 2036   Sodium mmol/L  --   --  140   Potassium mmol/L  --   --  3.3*   Chloride mmol/L  --   --  105   CO2 mmol/L  --   --  18*   Glucose mg/dL  --   --  127*   Glucose, UA   --  Trace*  --    BUN mg/dL  --   --  21   Creatinine mg/dL  --   --  1.5*   Albumin g/dL 3.5  --  3.0*   Bilirubin Total mg/dL 0.5  --  1.3*   ALP unit/L 33*  --  53   AST unit/L 23  --  35   ALT unit/L 15  --  19   Magnesium  mg/dL  --   --  1.3*   Phosphorus Level mg/dL  --   --  1.2*       Drug levels (last 3 results):  No results for input(s):  ""VANCOMYCINRA", "VANCORANDOM", "VANCOMYCINPE", "VANCOPEAK", "VANCOMYCINTR", "VANCOTROUGH" in the last 72 hours.    Microbiologic Results:  Microbiology Results (last 7 days)       ** No results found for the last 168 hours. **            "

## 2025-07-12 NOTE — PROGRESS NOTES
"Pharmacokinetic Initial Assessment: IV Vancomycin    Assessment/Plan:    Initiate intravenous vancomycin with loading dose of 1750 mg once with subsequent doses when random concentrations are less than 20 mcg/mL  Desired empiric serum trough concentration is 10 to 20 mcg/mL  Draw vancomycin random level on 7/12 at 1200.  Pharmacy will continue to follow and monitor vancomycin.      Please contact pharmacy with any questions regarding this assessment.     Thank you for the consult,   Jaqueline Wall       Patient brief summary:  Marine Mo is a 85 y.o. female initiated on antimicrobial therapy with IV Vancomycin for treatment of suspected bacteremia    Drug Allergies:   Review of patient's allergies indicates:   Allergen Reactions    Pravastatin      Other Reaction(s): Propensity to adverse reactions to drug       Actual Body Weight:   68kg    Renal Function:   Estimated Creatinine Clearance: 25.5 mL/min (A) (based on SCr of 1.5 mg/dL (H)).,     Dialysis Method (if applicable):  N/A    CBC (last 72 hours):  Recent Labs   Lab Result Units 07/10/25  0945 07/11/25 2036   WBC K/uL 2.53* 5.75   HGB gm/dL 10.9* 11.5*   HCT % 33.8* 34.7*   Platelet Count K/uL 69* 42*   Lymph % % 31.6 4.7*   Mono % % 11.1 2.1*   Eos % % 2.8 0.0   Basophil % % 0.4 0.2       Metabolic Panel (last 72 hours):  Recent Labs   Lab Result Units 07/10/25  0945 07/11/25 2026 07/11/25 2036   Sodium mmol/L  --   --  140   Potassium mmol/L  --   --  3.3*   Chloride mmol/L  --   --  105   CO2 mmol/L  --   --  18*   Glucose mg/dL  --   --  127*   Glucose, UA   --  Trace*  --    BUN mg/dL  --   --  21   Creatinine mg/dL  --   --  1.5*   Albumin g/dL 3.5  --  3.0*   Bilirubin Total mg/dL 0.5  --  1.3*   ALP unit/L 33*  --  53   AST unit/L 23  --  35   ALT unit/L 15  --  19   Magnesium  mg/dL  --   --  1.3*   Phosphorus Level mg/dL  --   --  1.2*       Drug levels (last 3 results):  No results for input(s): "VANCOMYCINRA", "VANCORANDOM", " ""VANCOMYCINPE", "VANCOPEAK", "VANCOMYCINTR", "VANCOTROUGH" in the last 72 hours.    Microbiologic Results:  Microbiology Results (last 7 days)       Procedure Component Value Units Date/Time    Urine culture [[id number]] Collected: 07/11/25 2026    Order Status: Sent Specimen: Urine Updated: 07/11/25 2121    Blood culture [[id number]] Collected: 07/11/25 2038    Order Status: Sent Specimen: Blood from Peripheral, Hand, Right Updated: 07/11/25 2043    Blood culture [[id number]] Collected: 07/11/25 2037    Order Status: Sent Specimen: Blood from Peripheral, Hand, Left Updated: 07/11/25 2042    Influenza A & B by Molecular [[id number]]  (Normal) Collected: 07/11/25 2010    Order Status: Completed Specimen: Nasopharyngeal Swab from Nasal Swab Updated: 07/11/25 2036     INFLUENZA A MOLECULAR Negative     INFLUENZA B MOLECULAR  Negative    Group A Strep, Molecular [[id number]]  (Normal) Collected: 07/11/25 2010    Order Status: Completed Specimen: Throat Updated: 07/11/25 2028     Group A Strep Molecular Negative    Narrative:      Arcanobacterium haemolyticum and Beta Streptococcus group C and G will not be detected by this test method.  Please order Throat Culture ([id number]) if suspected.                "

## 2025-07-12 NOTE — PROGRESS NOTES
Reviewed imaging of nearly 2 cm stone at [facility], impacted  Patient on elevated pressor support, will see if IR is available to place nephrostomy tube

## 2025-07-12 NOTE — ANESTHESIA POSTPROCEDURE EVALUATION
Anesthesia Post Evaluation    Patient: Marine Mo    Procedure(s) Performed: Procedure(s):  CYSTOSCOPY, WITH URETERAL STENT INSERTION    Final Anesthesia Type: MAC      Patient location during evaluation: ICU  Patient participation: Yes- Able to Participate  Level of consciousness: awake and alert and oriented  Post-procedure vital signs: reviewed and stable  Pain management: adequate  Airway patency: patent    PONV status at discharge: No PONV  Anesthetic complications: no      Cardiovascular status: hemodynamically stable and blood pressure returned to baseline  Respiratory status: spontaneous ventilation, room air and unassisted  Hydration status: euvolemic  Follow-up not needed.              Vitals Value Taken Time   /81 07/12/25 11:50   Temp 36 °C (96.8 °F) 07/12/25 11:42   Pulse 118 07/12/25 12:02   Resp 24 07/12/25 12:02   SpO2 92 % 07/12/25 12:02   Vitals shown include unfiled device data.      No case tracking events are documented in the log.      Pain/Ion Score: Presence of Pain: complains of pain/discomfort (7/12/2025  3:37 AM)  Pain Rating Prior to Med Admin: 10 (7/12/2025  8:42 AM)  Pain Rating Post Med Admin: 0 (7/12/2025  9:12 AM)

## 2025-07-12 NOTE — ASSESSMENT & PLAN NOTE
Patient with known CAD s/p stent placement, which is controlled Will continue Statin and monitor for S/Sx of angina/ACS. Continue to monitor on telemetry. Resume ASA and Plavix when cleared by urology.

## 2025-07-12 NOTE — ASSESSMENT & PLAN NOTE
Marine Mo presents with sepsis with Acute kidney injury and shock requiring pressors secondary to Urinary Tract Infection and obstructive uropathy.     Interventions include:    Antibiotics:    meropenem injection 1 g, Every 12 hours, Intravenous  vancomycin - pharmacy to dose, pharmacy to manage frequency, Intravenous    Fluid Resuscitation:   Ideal Body Weight- The patient is obese (BMI>30) and their ideal body weight of Ideal body weight: 54.7 kg (120 lb 9.5 oz) was used to calculate fluid bolus of 30 ml/kg. This was given at the OSH ED     Labs and Imaging:   Recent Labs   Lab 07/11/25 2036 07/11/25 2328 07/12/25  0518   LACTATE 10.6* 3.6* 3.5*     Cultures blood, urine sent and pending at the [facility].   Hemodynamic Support and Monitoring:  Vasopressors were initiated to maintain MAP >65 due to persistent hypotension after appropriate fluid administration     The patient was re-evaluated at Admission Date and Time: Obs date: N/A 7/12/25  4:55 AM and patient and/or surrogate was updated on plan of care.  Advance Care Planning     Date: 07/12/2025    Code Status  I engaged the the patient and family in a voluntary conversation about the patient's preferences of care if she were to acutely decompensate while hospitalized here. Pt and family wish her to be a FULL CODE and to have CPR or other invasive treatments including intubation performed when her heart and/or breathing stops. A total of 16 min was spent on advance care planning, goals of care discussion     The following services were consulted:Urology.

## 2025-07-12 NOTE — ASSESSMENT & PLAN NOTE
Plan for L ureteral stent placement, if unable to place stent will defer to interventional radiology for nephrostomy tube placement. Daughter and IR aware.

## 2025-07-12 NOTE — BRIEF OP NOTE
[facility] - Intensive Care  Brief Operative Note    SUMMARY     Surgery Date: 7/12/2025     Surgeons and Role:     * Aliya Castañeda MD - Primary    Assisting Surgeon: None    Pre-op Diagnosis:  Septic shock [A41.9, R65.21]    Post-op Diagnosis:  Post-Op Diagnosis Codes:     * Septic shock [A41.9, R65.21]    Procedure(s):  CYSTOSCOPY, WITH URETERAL STENT INSERTION, retrograde pyelogram    Anesthesia: * No anesthesia type entered *    Implants:  Implant Name Type Inv. Item Serial No.  Lot No. LRB No. Used Action   STENT URET PERCUFLEX 6FR 26CM - [id number]  STENT URET PERCUFLEX 6FR 26CM  FT[employer] Software  Left 1 Implanted       Operative Findings:   Fluorscopy with retained contrast in the left kidney  Left ureteral stent placed after retrograde pyelogram performed  Silva placed to gravity drainage  Repeat urine culture obtained.     Estimated Blood Loss:  < 2cc    Estimated Blood Loss has been documented.         Specimens:   Urine culture    * No specimens in log *    [id number]

## 2025-07-12 NOTE — ED PROVIDER NOTES
Encounter Date: 7/11/2025       History     Chief Complaint   Patient presents with    Altered Mental Status    Hypotension     History of Present Illness  Marine Mo is a 85 y.o. female that presents with low blood pressure  Altered, febrile, low blood pressure, chills last night at home per the   Patient with left flank pain on initial interview, denies any hematuria on interview  Patient with no signs of acute abdomen, no signs of peritonitis on ER evaluation  Systolic blood pressure of 70, patient was not eating or drinking last 48 hours    The history is provided by the patient, the spouse and a relative.     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     Back pain     Breast cyst     Cirrhosis     Depression 01/30/2019    Diabetes mellitus     Disorder of kidney and ureter     Glaucoma     Hyperlipidemia     Hypertension     Trouble in sleeping     Type 2 diabetes mellitus      Past Surgical History:   Procedure Laterality Date    BACK SURGERY      BREAST CYST ASPIRATION  1982    CATARACT EXTRACTION Bilateral     don't remember date    CHOLECYSTECTOMY      COLONOSCOPY N/A 3/1/2018    Procedure: COLONOSCOPY;  Surgeon: Ren Newton MD;  Location: [address]);  Service: Endoscopy;  Laterality: N/A;    HYSTERECTOMY  age 70    bleeding    INSERTION, STENT, ARTERY  2022    OOPHORECTOMY      TONSILLECTOMY, ADENOIDECTOMY       Family History   Problem Relation Name Age of Onset    Kidney disease Father Cachorro     Heart attack Neg Hx      Heart disease Neg Hx      Breast cancer Neg Hx      Colon cancer Neg Hx      Ovarian cancer Neg Hx       Social History[1]    Review of Systems   Constitutional: Negative.    HENT: Negative.      Eyes: Negative.    Respiratory: Negative.     Cardiovascular:  Positive for palpitations.   Gastrointestinal: Negative.    Genitourinary:  Negative for dysuria, urgency and vaginal discharge.   Skin: Negative.    Neurological:  Positive for dizziness.   Psychiatric/Behavioral: Negative.     All other systems reviewed and are negative.      Physical Exam     Initial Vitals [07/11/25 1846]   BP Pulse Resp Temp SpO2   (!) 88/50 94 (!) 28 100 °F (37.8 °C) (!) 93 %      MAP       --         Physical Exam    Nursing note and vitals reviewed.  Constitutional: Vital signs are normal. She appears well-developed and well-nourished. She is cooperative.   HENT:   Head: Normocephalic and atraumatic.   Eyes: Conjunctivae, EOM and lids are normal. Pupils are equal, round, and reactive to light.   Neck: Trachea normal and phonation normal. Neck supple. No JVD present.   Normal range of motion.   Full passive range of motion without pain.     Cardiovascular:  Normal rate, regular rhythm, S1 normal, S2 normal, normal heart sounds, intact distal pulses and normal pulses.           Pulmonary/Chest: Effort normal and breath sounds normal.   Abdominal: Abdomen is soft and flat. Bowel sounds are normal.   Musculoskeletal:         General: Normal range of motion.      Cervical back: Full passive range of motion without pain, normal range of motion and neck supple.     Neurological: She is alert and oriented to person, place, and time. She has normal strength.   Skin: Skin is warm, dry and intact. Capillary refill takes less than 2 seconds.         ED Course   Critical Care    Date/Time: 7/12/2025 12:47 AM    Performed by: Stevan Barreto MD  Authorized by: Stevan Barreto MD  Direct patient critical care time: 25 minutes  Additional history critical care time: 5 minutes  Ordering / reviewing critical care time: 5 minutes  Documentation critical care time: 5 minutes  Consulting other physicians critical care time: 5 minutes  Total  critical care time (exclusive of procedural time) : 45 minutes  Critical care was necessary to treat or prevent imminent or life-threatening deterioration of the following conditions: sepsis.  Critical care was time spent personally by me on the following activities: blood draw for specimens, development of treatment plan with patient or surrogate, discussions with consultants, discussions with primary provider, interpretation of cardiac output measurements, evaluation of patient's response to treatment, examination of patient, ordering and review of laboratory studies, ordering and review of radiographic studies, obtaining history from patient or surrogate, ordering and performing treatments and interventions, re-evaluation of patient's condition and review of old charts.      Central Line    Date/Time: 7/12/2025 12:47 AM    Performed by: Stevan Barreto MD  Authorized by: Stevan Barreto MD    Location procedure was performed:  [facility] EMERGENCY DEPARTMENT  Consent Done ?:  Emergent Situation  Time out complete?: Verified correct patient, procedure, equipment, staff, and site/side    Indications:  Vascular access and med administration  Anesthesia:  Local infiltration  Local anesthetic:  Lidocaine 1% without epinephrine  Anesthetic total (ml):  5  Preparation:  Skin prepped with ChloraPrep  Skin prep agent dried: Skin prep agent completely dried prior to procedure    Sterile barriers: All five maximal sterile barriers used - gloves, gown, cap, mask and large sterile sheet    Hand hygiene: Hand hygiene performed immediately prior to central venous catheter insertion    Location:  Right internal jugular  Catheter type:  Triple lumen  Catheter size:  7.5 Fr  Ultrasound guidance: Yes    Vessel Caliber:  Medium   patent  Comprressibility:  Normal  Needle advanced into vessel with real time ultrasound guidance.    Guidewire confirmed in vessel.    Image recorded and saved.    Steril sheath on probe.    Sterile gel  used.  Manometry: No    Number of attempts:  1  Securement:  Line sutured and blood return through all ports  Complications: No    Specimens: No    Implants: No    Guidewire: guidewire removed intact    XRay:  Placement verified by x-ray and tip termination  Adverse Events:  NoneTermination Site: superior vena cava    Labs Reviewed   COMPREHENSIVE METABOLIC PANEL - Abnormal       Result Value    Sodium 140      Potassium 3.3 (*)     Chloride 105      CO2 18 (*)     Glucose 127 (*)     BUN 21      Creatinine 1.5 (*)     Calcium 9.5      Protein Total 6.0      Albumin 3.0 (*)     Bilirubin Total 1.3 (*)     ALP 53      AST 35      ALT 19      Anion Gap 17 (*)     eGFR 34 (*)    B-TYPE NATRIURETIC PEPTIDE - Abnormal     (*)    LACTIC ACID, PLASMA - Abnormal    Lactic Acid Level 10.6 (*)     Narrative:     Falsely low lactic acid results can be found in samples containing >=13.0 mg/dL total bilirubin and/or >=3.5 mg/dL direct bilirubin.   Falsely low lactic acid results can be found in samples containing >=13.0 mg/dL total bilirubin and/or >=3.5 mg/dL direct bilirubin.    PROCALCITONIN - Abnormal    Procalcitonin 22.72 (*)    URINALYSIS, REFLEX TO URINE CULTURE - Abnormal    Color, UA Yellow      Appearance, UA Hazy (*)     pH, UA 7.0      Spec Grav UA 1.015      Protein, UA 1+ (*)     Glucose, UA Trace (*)     Ketones, UA Negative      Bilirubin, UA Negative      Blood, UA 2+ (*)     Nitrites, UA Negative      Urobilinogen, UA Negative      Leukocyte Esterase, UA 1+ (*)    PHOSPHORUS - Abnormal    Phosphorus Level 1.2 (*)    MAGNESIUM - Abnormal    Magnesium  1.3 (*)    CBC WITH DIFFERENTIAL - Abnormal    WBC 5.75      RBC 3.85 (*)     HGB 11.5 (*)     HCT 34.7 (*)     MCV 90      MCH 29.9      MCHC 33.1      RDW 15.8 (*)     Platelet Count 42 (*)     MPV 9.1 (*)     Nucleated RBC 0      Neut % 92.0 (*)     Lymph % 4.7 (*)     Mono % 2.1 (*)     Eos % 0.0      Basophil % 0.2      Imm Grans % 1.0 (*)     Neut #  5.29      Lymph # 0.27 (*)     Mono # 0.12 (*)     Eos # 0.00      Baso # 0.01      Imm Grans # 0.06 (*)    PLATELET REVIEW - Abnormal    Platelet Estimate Decreased (*)    URINALYSIS MICROSCOPIC - Abnormal    RBC, UA 40 (*)     WBC, UA 65 (*)     WBC Clumps, UA Rare      Bacteria, UA Many (*)     Squamous Epithelial Cells, UA 1      Hyaline Casts, UA 15 (*)     Microscopic Comment Mucus present.     LACTIC ACID, PLASMA - Abnormal    Lactic Acid Level 3.6 (*)     Narrative:     Falsely low lactic acid results can be found in samples containing >=13.0 mg/dL total bilirubin and/or >=3.5 mg/dL direct bilirubin.    POCT GLUCOSE - Abnormal    POCT Glucose 158 (*)    INFLUENZA A & B BY MOLECULAR - Normal    INFLUENZA A MOLECULAR Negative      INFLUENZA B MOLECULAR  Negative     GROUP A STREP, MOLECULAR - Normal    Group A Strep Molecular Negative      Narrative:     Arcanobacterium haemolyticum and Beta Streptococcus group C and G will not be detected by this test method.  Please order Throat Culture ([id number]) if suspected.       SARS-COV-2 RNA AMPLIFICATION, QUAL - Normal    SARS COV-2 Molecular Negative     TROPONIN I - Normal    Troponin-I 0.023     CK - Normal    CPK 42     LIPASE - Normal    Lipase Level 33     CULTURE, BLOOD   CULTURE, BLOOD   CULTURE, URINE   CBC W/ AUTO DIFFERENTIAL    Narrative:     The following orders were created for panel order CBC Auto Differential.  Procedure                               Abnormality         Status                     ---------                               -----------         ------                     CBC with Differential[[id number]]       Abnormal            Final result                 Please view results for these tests on the individual orders.   [facility] [place] URINE HOLD     EKG Readings: (Independently Interpreted)   EKG performed July 11, 2025 on arrival with the ER  Normal sinus rhythm  Low voltage QRS  Incomplete right bundle branch block  Borderline Abnormal ECG  When  compared with ECG of 13-Oct-2020 12:14,  Incomplete right bundle branch block has replaced Right bundle branch block   Reviewed by Stevan Barreto M.D. 9:46 PM 7/11/2025             Imaging Results              CT Chest Abdomen Pelvis With IV Contrast (XPD) NO Oral Contrast (Final result)  Result time 07/12/25 00:27:49      Final result by Steve Quarles,  (07/12/25 00:27:49)                   Impression:      1. Moderate left-sided hydronephrosis secondary to a 1.7 cm calculus at the UPJ.  2. Wall thickening of the left renal pelvis with left perinephric fat stranding, suspicious for overlying infection/pyelitis or pyelonephritis.  Recommend correlation with urinalysis.  3. Hepatic cirrhosis.  4. Innumerable hepatic and renal cystic lesions as above, similar to the prior study.  5. Interlobular septal thickening in the bilateral lungs, can be seen with mild interstitial edema or interstitial pneumonia.  6. Chronic opacities in the right middle lobe and right lower lobe.      Electronically signed by: Steve Quarles  Date:    07/12/2025  Time:    00:27               Narrative:    EXAMINATION:  CT CHEST ABDOMEN PELVIS WITH IV CONTRAST (XPD)    CLINICAL HISTORY:  Sepsis;    TECHNIQUE:  Low dose axial images were obtained from the thoracic inlet to the pubic symphysis following the administration of 75 mL of Omnipaque 350 intravenous contrast.  Sagittal and coronal reformats were provided.    COMPARISON:  CT chest from 07/15/2023.  CT abdomen and pelvis from 01/22/2021.    FINDINGS:  The examination is limited by motion artifact.    Lungs: The lungs are hypoexpanded.  There is coarse interlobular septal thickening.  Chronic opacity again noted in the right lower lobe.  There is bronchiectasis in the right middle lobe, medial segment.    Airways: The large airways are clear.    Pleura: No fluid or thickening.  No pneumothorax.    Lymph nodes: No evidence of mediastinal, hilar, or axillary  lymphadenopathy.    Esophagus: Normal.    Heart: Heart size is normal.  No pericardial effusion.      Chest wall: Normal.    Liver: There are innumerable peripherally calcified cystic lesions throughout the liver.  The liver demonstrates a nodular contour, with enlargement of the caudate lobe, suspicious for hepatic cirrhosis.    Biliary tract: No intra or extrahepatic biliary ductal dilatation.    Gallbladder: No radiodense gallstone.  No wall thickening or pericholecystic fluid.    Pancreas: Normal. No pancreatic ductal dilation.    Spleen: Normal in size without focal lesion.    Adrenals: Normal.    Kidneys and urinary collecting systems: There is moderate left-sided hydronephrosis secondary to a 1.7 cm calculus at the UPJ.  There is wall thickening and enhancement of the left renal pelvis, with left perinephric fat stranding, suspicious for overlying infection.  There are numerous simple cysts in the bilateral kidneys.  There are numerous too small to characterize hypodensities in the bilateral kidneys.  No hydronephrosis or urolithiasis.    Stomach and bowel: No bowel obstruction or abnormal bowel wall thickening.  The appendix is normal.    Peritoneum and mesentery: There is trace perihepatic ascites.  There is no focal fluid collection within the abdomen or pelvis.  There is no free air.    Lymph nodes: No evidence of mesenteric or retroperitoneal lymphadenopathy.    Vasculature: There is moderate atherosclerosis.  There is no aneurysm.  There is a right internal jugular central venous catheter with the tip in the low SVC.  There is no evidence of a large central pulmonary embolism seen.    Urinary bladder: Normal.    Reproductive organs: The uterus is surgically absent    Body wall: No abnormality.    Musculoskeletal: There is no acute fracture or dislocation.  There is no aggressive osseous lesion.  There are degenerative changes of the spine.  There are postop changes of L3 through L5 laminectomies.                                        X-Ray Chest 1 View (Final result)  Result time 07/11/25 22:44:11      Final result by Steve Quarles DO (07/11/25 22:44:11)                   Impression:      Central venous catheter as above.      Electronically signed by: Steve Quarles  Date:    07/11/2025  Time:    22:44               Narrative:    EXAMINATION:  XR CHEST 1 VIEW    CLINICAL HISTORY:  right IJ;    TECHNIQUE:  Single frontal view of the chest was performed.    COMPARISON:  07/11/2025.    FINDINGS:  There is a right internal jugular central venous catheter with the tip overlying the mid SVC.  The lungs are hypoexpanded.  Mild coarse chronic interstitial thickening again noted, stable.  The pleural spaces are clear.  No new focal consolidation.  The cardiac silhouette is unremarkable.  There are calcifications of the aortic arch.  Osseous structures are intact.  Right upper quadrant surgical clips.                                       X-Ray Chest 1 View (Final result)  Result time 07/11/25 20:45:00      Final result by Steve Quarles DO (07/11/25 20:45:00)                   Impression:      No acute abnormality.      Electronically signed by: Steve Quarles  Date:    07/11/2025  Time:    20:45               Narrative:    EXAMINATION:  XR CHEST 1 VIEW    CLINICAL HISTORY:  COVID;    TECHNIQUE:  Single frontal view of the chest was performed.    COMPARISON:  09/30/2024.    FINDINGS:  The lungs are well expanded and clear.  No focal opacities are seen.  The pleural spaces are clear the cardiac silhouette is unremarkable.  There are calcifications of the aortic arch.  Osseous structures demonstrate degenerative changes.  There are right upper quadrant surgical clips.                                       Medications   vancomycin - pharmacy to dose (has no administration in time range)   NORepinephrine 4 mg in dextrose 5% 250 mL infusion (premix) (0.73 mcg/kg/min × 68 kg Intravenous Rate/Dose Change 7/12/25 0046)    vancomycin 1,750 mg in 0.9% NaCl 500 mL IVPB (admixture device) (1,750 mg Intravenous New Bag 7/12/25 0002)   sodium chloride 0.9% bolus 1,000 mL 1,000 mL (0 mLs Intravenous Stopped 7/11/25 2204)   piperacillin-tazobactam (ZOSYN) 4.5 g in D5W 100 mL IVPB (MB+) (0 g Intravenous Stopped 7/11/25 2344)   sodium chloride 0.9% bolus 3,000 mL 3,000 mL (0 mLs Intravenous Stopped 7/11/25 2319)   iohexoL (OMNIPAQUE 350) injection 75 mL (75 mLs Intravenous Given 7/11/25 2352)   ketorolac injection 15 mg (15 mg Intravenous Given 7/12/25 0032)     Marine Mo presents with sepsis with Acute kidney injury secondary to Urinary Tract Infection.    Interventions include:    Antibiotics:    vancomycin - pharmacy to dose, pharmacy to manage frequency, Intravenous    Fluid Resuscitation:   Ideal Body Weight- The patient is obese (BMI>30) and their ideal body weight of Ideal body weight: 54.7 kg (120 lb 9.5 oz) will be used to calculate fluid bolus of 30 ml/kg.     Labs and Imaging:   Recent Labs   Lab 07/11/25 2036 07/11/25 2328   LACTATE 10.6* 3.6*   .  Hemodynamic Support and Monitoring:  Vasopressors were initiated to maintain MAP >65 due to persistent hypotension after appropriate fluid administration     The patient was re-evaluated at Current time: 12:54 AM and patient and/or surrogate was updated on plan of care.    The following services were consulted:Critical Care Medicine.     Medical Decision Making  Differential includes sepsis, dehydration, acute kidney injury, urinary tract infection  Also includes that has not limited to end organ failure, SIRS, kidney stone, pyelonephritis    Problems Addressed:  Kidney stone: complicated acute illness or injury  Sepsis, due to unspecified organism, unspecified whether acute organ dysfunction present: complicated acute illness or injury  Urinary tract infection without hematuria, site unspecified: complicated acute illness or injury    Amount and/or Complexity of Data  Reviewed  Labs: ordered. Decision-making details documented in ED Course.  Radiology: ordered and independent interpretation performed.  ECG/medicine tests: ordered and independent interpretation performed.    ED Management & Risks of Complication, Morbidity, & Mortality:  Normal white count with a an elevated lactic acid & elevated procalcitonin  IV fluids administered, IV Zosyn, IV vancomycin, sepsis protocol in the ER  CT chest abdomen pelvis shows a 1.7 centimeter kidney stone on the left  Urinalysis shows urinary tract infection, UTI with kidney stone this evening  Central line placed, pressor support, transfer for higher level care immediately    Final diagnoses:  [A41.9] Sepsis, due to unspecified organism, unspecified whether acute organ dysfunction present (Primary)  [N39.0] Urinary tract infection without hematuria, site unspecified  [N20.0] Kidney stone          ED Disposition Condition    Transfer to Another Facility Stable                      [1]   Social History  Tobacco Use    Smoking status: Never     Passive exposure: Never    Smokeless tobacco: Never   Substance Use Topics    Alcohol use: No    Drug use: No        Stevan Barreto MD  07/12/25 0056

## 2025-07-12 NOTE — PLAN OF CARE
Pt remains in ICU, in 5L nasal cannula. Gtt. Levophed@0.98mcg, NS@100ml/hr, precedex@0.5mcg infusing continiously. Silva has been place in OR, draining bloody urine. POC explained to family at bedside. No falls, injuries and skin breakdown during this shift.  Problem: Diabetes Comorbidity  Goal: Blood Glucose Level Within Targeted Range  Outcome: Progressing     Problem: Adult Inpatient Plan of Care  Goal: Plan of Care Review  Outcome: Progressing  Goal: Patient-Specific Goal (Individualized)  Outcome: Progressing  Goal: Absence of Hospital-Acquired Illness or Injury  Outcome: Progressing  Goal: Optimal Comfort and Wellbeing  Outcome: Progressing  Goal: Readiness for Transition of Care  Outcome: Progressing     Problem: Infection  Goal: Absence of Infection Signs and Symptoms  Outcome: Progressing     Problem: Fall Injury Risk  Goal: Absence of Fall and Fall-Related Injury  Outcome: Progressing     Problem: Skin Injury Risk Increased  Goal: Skin Health and Integrity  Outcome: Progressing     Problem: Sepsis/Septic Shock  Goal: Optimal Coping  Outcome: Progressing  Goal: Absence of Bleeding  Outcome: Progressing  Goal: Blood Glucose Level Within Targeted Range  Outcome: Progressing  Goal: Absence of Infection Signs and Symptoms  Outcome: Progressing  Goal: Optimal Nutrition Intake  Outcome: Progressing     Problem: Acute Kidney Injury/Impairment  Goal: Fluid and Electrolyte Balance  Outcome: Progressing  Goal: Improved Oral Intake  Outcome: Progressing  Goal: Effective Renal Function  Outcome: Progressing

## 2025-07-12 NOTE — ASSESSMENT & PLAN NOTE
JAIRON is likely due to post-obstructive d/t kidney stone and acute UTI. Baseline creatinine is <1. Most recent creatinine and eGFR are listed below.  Recent Labs     07/11/25 2036 07/12/25  0518   CREATININE 1.5* 1.5*   EGFRNORACEVR 34* 34*      Plan  - JAIRON is stable  - Avoid nephrotoxins and renally dose meds for GFR listed above  - Monitor urine output, serial BMP, and adjust therapy as needed  -Support MAP>65 and oxygen>94%.

## 2025-07-12 NOTE — HPI
84 yo woman presents from [facility] for septic shock 2/2 to obstructing pyelonephritis from left 1.7cm UPJ stone. Patient somnolent and hard of hearing. Daughter and  at bedside. First time stone event. Patient requiring pressor support to maintain BP.

## 2025-07-12 NOTE — CONSULTS
[facility] - Intensive Care  Urology  Consult Note    Patient Name: Marine Mo  MRN: [id number]  Admission Date: 7/12/2025  Hospital Length of Stay: 0   Code Status: Full Code   Attending Provider: Sebastien Jeffery MD   Consulting Provider: Aliya Castañeda MD  Primary Care Physician: Serg Hwang MD  Principal Problem:Sepsis with acute renal failure and septic shock    Inpatient consult to Urology  Consult performed by: Aliya Castañeda MD  Consult ordered by: Daniela Gilmore MD  Reason for consult: obstructing pyelonephritis, left          Subjective:     HPI:  86 yo woman presents from [facility] for septic shock 2/2 to obstructing pyelonephritis from left 1.7cm UPJ stone. Patient somnolent and hard of hearing. Daughter and  at bedside. First time stone event. Patient requiring pressor support to maintain BP.     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     Back pain     Breast cyst     Cirrhosis     Depression 01/30/2019    Diabetes mellitus     Disorder of kidney and ureter     Glaucoma     Hyperlipidemia     Hypertension     Trouble in sleeping     Type 2 diabetes mellitus        Past Surgical History:   Procedure Laterality Date    BACK SURGERY      BREAST CYST ASPIRATION  1982    CATARACT EXTRACTION Bilateral     don't remember date    CHOLECYSTECTOMY      COLONOSCOPY N/A 3/1/2018    Procedure: COLONOSCOPY;  Surgeon: Ren Newton MD;  Location: [address];  Service: Endoscopy;  Laterality: N/A;    HYSTERECTOMY  age 70    bleeding    INSERTION, STENT, ARTERY  2022    OOPHORECTOMY      TONSILLECTOMY, ADENOIDECTOMY         Review of patient's allergies indicates:   Allergen Reactions    Pravastatin      Other Reaction(s): Propensity to adverse reactions to drug       Family History       Problem Relation (Age of Onset)    Kidney disease Father       "      Tobacco Use    Smoking status: Never     Passive exposure: Never    Smokeless tobacco: Never   Substance and Sexual Activity    Alcohol use: No    Drug use: No    Sexual activity: Yes     Partners: Male     Comment:        Review of Systems   Unable to perform ROS: Mental status change       Objective:     Temp:  [98.1 °F (36.7 °C)-100 °F (37.8 °C)] 98.2 °F (36.8 °C)  Pulse:  [] 105  Resp:  [18-32] 24  SpO2:  [88 %-96 %] 95 %  BP: ()/(36-66) 92/53  Weight: 71.1 kg (156 lb 12 oz)  Body mass index is 26.91 kg/m².           Drains       None                    Physical Exam  Constitutional:       Appearance: She is well-developed. She is ill-appearing and toxic-appearing.   HENT:      Head: Normocephalic and atraumatic.   Eyes:      Conjunctiva/sclera: Conjunctivae normal.   Pulmonary:      Effort: Pulmonary effort is normal. No respiratory distress.   Abdominal:      General: Abdomen is flat. There is no distension.      Tenderness: There is no abdominal tenderness.   Skin:     Findings: No rash.   Neurological:      Mental Status: She is disoriented.   Psychiatric:         Behavior: Behavior normal.          Significant Labs:    BMP:  Recent Labs   Lab 07/11/25 2036 07/12/25  0518    137   K 3.3* 3.6    108   CO2 18* 17*   BUN 21 25*   CREATININE 1.5* 1.5*   CALCIUM 9.5 7.4*       CBC:  Recent Labs   Lab 07/10/25  0945 07/11/25 2036 07/12/25  0518   WBC 2.53* 5.75 12.59   HGB 10.9* 11.5* 10.9*   HCT 33.8* 34.7* 34.0*   PLT 69* 42* 35*       Blood Culture: No results for input(s): "LABBLOO" in the last 168 hours.  Urine Culture: No results for input(s): "LABURIN" in the last 168 hours.  Urine Studies:   Recent Labs   Lab 07/11/25 2026   COLORU Yellow   APPEARANCEUA Hazy*   PHUR 7.0   SPECGRAV 1.015   PROTEINUA 1+*   GLUCUA Trace*   BILIRUBINUA Negative   OCCULTUA 2+*   NITRITE Negative   UROBILINOGEN Negative   LEUKOCYTESUR 1+*   RBCUA 40*   WBCUA 65*   BACTERIA Many* "   HYALINECASTS 15*       Significant Imaging:  CT: I have reviewed all results within the past 24 hours and my personal findings are:  1.7 cm obstructing UPJ stone on the left with hydronephrosis                    Assessment and Plan:     * Sepsis with acute renal failure and septic shock  Discussed with patient due to presentation of septic shock - renal failure/ hypotensive requiring pressor support recommend emergent Cystoscopy with Left  ureteral stent placement to decompress the left collecting system. Patient is critically ill  Discussed due to presence of infection, delayed definitive management of stone is recommended  They will need two weeks of culture directed antibiotics  Blood and urine cultures are pending  Appreciate ICU recommendations  Okay with placement of stent under MAC to minimize hemodynamic instability, discussed with anesthesia    Left ureteral stone  Plan for L ureteral stent placement, if unable to place stent will defer to interventional radiology for nephrostomy tube placement. Daughter and IR aware.         VTE Risk Mitigation (From admission, onward)           Ordered     IP VTE HIGH RISK PATIENT  Once         07/12/25 0457     Place sequential compression device  Until discontinued         07/12/25 0457     Reason for No Pharmacological VTE Prophylaxis  Once        Comments: Urology plans on cysto with stent placement   Question:  Reasons:  Answer:  Physician Provided (leave comment)    07/12/25 0457                    Thank you for your consult. I will follow-up with patient. Please contact us if you have any additional questions.    Aliya Castañeda MD  Urology  [facility] - Intensive Care

## 2025-07-12 NOTE — HPI
85 y.o. woman with h/o NIDDM type 2 (A1c 5 in Feb), Essential HTN, HLD, DILLARD cirrhosis with polycystic liver dz and chronic thrombocytopenia, Depression/General Anxiety d/o, CAD (h/o stent in LAD on ASA/Plavix), Anemia presents to the [place] ED with family c/o fevers and chills for the past 1-2 days. Was febrile at 100.1 and hypotensive. Started on IVF, IV vanc/zosyn and eventually started on peripheral levophed for Sepsis.     Labs noted for WBC 5 and stable H/H 11.5/34. plt 42. Initial LA ws 10.6 with a procal of 22. Received 30cc/kg sepsis fluids (3L) and repeat lactic improved to 3.6.   Lytes with hypokalemia, JAIRON with creatinine 1.5 (baseline <1). Bicarb 18. Mg 1.2 and phos 1.2.   UA concerning for UTI.     CT chest/abd/pelvis with IV contrast:   1. Moderate left-sided hydronephrosis secondary to a 1.7 cm calculus at the UPJ.  2. Wall thickening of the left renal pelvis with left perinephric fat stranding, suspicious for overlying infection/pyelitis or pyelonephritis.  Recommend correlation with urinalysis.  3. Hepatic cirrhosis.  4. Innumerable hepatic and renal cystic lesions as above, similar to the prior study.  5. Interlobular septal thickening in the bilateral lungs, can be seen with mild interstitial edema or interstitial pneumonia.  6. Chronic opacities in the right middle lobe and right lower lobe.    [place] transfer center contacted and case discussed with Dr. Castañeda with Urology who recommended cysto with stent placement and did not think that IR will be required. She agreed to see in consult once patient arrives. We have been consulted for further management. Family denies h/o kidney stones.

## 2025-07-12 NOTE — CLINICAL REVIEW
IP Sepsis Screen (most recent)       Sepsis Screen (IP) - 07/12/25 1014       Is the patient's history or complaint suggestive of a possible infection? Yes  -DD    Are there at least two of the following signs and symptoms present? Yes  -DD    Sepsis signs/symptoms - Tachycardia Tachycardia     >90  -DD    Sepsis signs/symptoms - Tachypnea Tachypnea     >20  -DD    Sepsis signs/symptoms - WBC WBC < 4,000 or WBC > 12,000  -DD    Are any of the following organ dysfunction criteria present and not considered to be due to a chronic condition? Yes  -DD    Organ Dysfunction Criteria Total Bilirubin > 2.0 Platelet count < 100,000  -DD    Organ Dysfunction Criteria Lactate > 2.0  -DD    Organ Dysfunction Criteria INR > 1.5 or aPTT > 60  -DD    Initiate Sepsis Protocol No  -DD    Reason sepsis not considered Pt. receiving appropriate management  -DD              User Key  (r) = Recorded By, (t) = Taken By, (c) = Cosigned By      Initials Name    Mo Eason RN

## 2025-07-12 NOTE — PROVIDER TRANSFER
Outside Transfer Acceptance Note / Regional Referral Center    Referring facility:  [facility]  Referring provider: CRUZ URBANO  Accepting facility: [facility]  Accepting provider: ABHISHEK REZA  Admitting provider: JORJE MONK/JENNIFER ANDREW  Reason for transfer: Higher Level of Care   Transfer diagnosis: Septic shock  Transfer specialty requested: Urology  Transfer specialty notified: Yes  Transfer level: NUMBER 1-5: 1  Bed type requested: ICU  Isolation status: No active isolations   Admission class or status: IP- Inpatient      Narrative     Ms. Mo is an 84 yo female with DM2, HLD, HTN, and cirrhosis who presented to ED with her  for evaluation of chills and AMS over the last 48 hrs. She was found to have a temperature of 100.1 F and systolic blood pressure in 70s. Her initial lactate was 10.6 with procalcitonin of 22. UA positive for infection. Blood and urine cultures were collected. CT shows 1.7 cm stone at left UPJ with moderate hydronephrosis and concern for pyelonephritis. She was given bolus IV fluids at 30 cc/kg and vancomycin/zosyn. Her BP remained low after the 3 liters of IV fluids so a central line was placed in left IJ and she was started on levophed gtt. Case discussed with Dr. Castañeda with Urology who recommends cysto with stent placement and does not think that IR will be required and agreed to consult once patient arrives. Transfer is requested for higher level of care and Urology consultation.     Objective     Vitals:   Temp: 98.9 °F (37.2 °C) (07/12/25 0115)  Pulse: 93 (07/12/25 0130)  Resp: 20 (07/12/25 0130)  BP: 139/63 (07/12/25 0130)  SpO2: (!) 94 % (07/12/25 0130)    Recent Labs:   CBC:   Recent Labs   Lab 07/10/25  0945 07/11/25  2036   WBC 2.53* 5.75   HGB 10.9* 11.5*   HCT 33.8* 34.7*   PLT 69* 42*     CMP:   Recent Labs   Lab 07/10/25  0945 07/11/25  2036   NA  --  140   K  --  3.3*   CL  --  105   CO2  --  18*    GLU  --  127*   BUN  --  21   CREATININE  --  1.5*   CALCIUM  --  9.5   PROT 6.7 6.0   ALBUMIN 3.5 3.0*   BILITOT 0.5 1.3*   ALKPHOS 33* 53   AST 23 35   ALT 15 19   ANIONGAP  --  17*     Cardiac Markers:   Recent Labs   Lab 07/11/25 2036   *     Lactic Acid:   Recent Labs   Lab 07/11/25 2036 07/11/25  2328   LACTATE 10.6* 3.6*     Urine Studies:   Recent Labs   Lab 07/11/25 2026   COLORU Yellow   APPEARANCEUA Hazy*   PHUR 7.0   SPECGRAV 1.015   PROTEINUA 1+*   GLUCUA Trace*   BILIRUBINUA Negative   OCCULTUA 2+*   NITRITE Negative   UROBILINOGEN Negative   LEUKOCYTESUR 1+*   RBCUA 40*   WBCUA 65*   BACTERIA Many*   HYALINECASTS 15*     Recent imaging:   Imaging Results              CT Chest Abdomen Pelvis With IV Contrast (XPD) NO Oral Contrast (Final result)  Result time 07/12/25 00:27:49      Final result by Steve Quarles,  (07/12/25 00:27:49)                   Impression:      1. Moderate left-sided hydronephrosis secondary to a 1.7 cm calculus at the UPJ.  2. Wall thickening of the left renal pelvis with left perinephric fat stranding, suspicious for overlying infection/pyelitis or pyelonephritis.  Recommend correlation with urinalysis.  3. Hepatic cirrhosis.  4. Innumerable hepatic and renal cystic lesions as above, similar to the prior study.  5. Interlobular septal thickening in the bilateral lungs, can be seen with mild interstitial edema or interstitial pneumonia.  6. Chronic opacities in the right middle lobe and right lower lobe.      Electronically signed by: Steve Quarles  Date:    07/12/2025  Time:    00:27               Narrative:    EXAMINATION:  CT CHEST ABDOMEN PELVIS WITH IV CONTRAST (XPD)    CLINICAL HISTORY:  Sepsis;    TECHNIQUE:  Low dose axial images were obtained from the thoracic inlet to the pubic symphysis following the administration of 75 mL of Omnipaque 350 intravenous contrast.  Sagittal and coronal reformats were provided.    COMPARISON:  CT chest from 07/15/2023.   CT abdomen and pelvis from 01/22/2021.    FINDINGS:  The examination is limited by motion artifact.    Lungs: The lungs are hypoexpanded.  There is coarse interlobular septal thickening.  Chronic opacity again noted in the right lower lobe.  There is bronchiectasis in the right middle lobe, medial segment.    Airways: The large airways are clear.    Pleura: No fluid or thickening.  No pneumothorax.    Lymph nodes: No evidence of mediastinal, hilar, or axillary lymphadenopathy.    Esophagus: Normal.    Heart: Heart size is normal.  No pericardial effusion.      Chest wall: Normal.    Liver: There are innumerable peripherally calcified cystic lesions throughout the liver.  The liver demonstrates a nodular contour, with enlargement of the caudate lobe, suspicious for hepatic cirrhosis.    Biliary tract: No intra or extrahepatic biliary ductal dilatation.    Gallbladder: No radiodense gallstone.  No wall thickening or pericholecystic fluid.    Pancreas: Normal. No pancreatic ductal dilation.    Spleen: Normal in size without focal lesion.    Adrenals: Normal.    Kidneys and urinary collecting systems: There is moderate left-sided hydronephrosis secondary to a 1.7 cm calculus at the UPJ.  There is wall thickening and enhancement of the left renal pelvis, with left perinephric fat stranding, suspicious for overlying infection.  There are numerous simple cysts in the bilateral kidneys.  There are numerous too small to characterize hypodensities in the bilateral kidneys.  No hydronephrosis or urolithiasis.    Stomach and bowel: No bowel obstruction or abnormal bowel wall thickening.  The appendix is normal.    Peritoneum and mesentery: There is trace perihepatic ascites.  There is no focal fluid collection within the abdomen or pelvis.  There is no free air.    Lymph nodes: No evidence of mesenteric or retroperitoneal lymphadenopathy.    Vasculature: There is moderate atherosclerosis.  There is no aneurysm.  There is a  right internal jugular central venous catheter with the tip in the low SVC.  There is no evidence of a large central pulmonary embolism seen.    Urinary bladder: Normal.    Reproductive organs: The uterus is surgically absent    Body wall: No abnormality.    Musculoskeletal: There is no acute fracture or dislocation.  There is no aggressive osseous lesion.  There are degenerative changes of the spine.  There are postop changes of L3 through L5 laminectomies.                                       X-Ray Chest 1 View (Final result)  Result time 07/11/25 22:44:11      Final result by Steve Quarles DO (07/11/25 22:44:11)                   Impression:      Central venous catheter as above.      Electronically signed by: Steve Quarles  Date:    07/11/2025  Time:    22:44               Narrative:    EXAMINATION:  XR CHEST 1 VIEW    CLINICAL HISTORY:  right IJ;    TECHNIQUE:  Single frontal view of the chest was performed.    COMPARISON:  07/11/2025.    FINDINGS:  There is a right internal jugular central venous catheter with the tip overlying the mid SVC.  The lungs are hypoexpanded.  Mild coarse chronic interstitial thickening again noted, stable.  The pleural spaces are clear.  No new focal consolidation.  The cardiac silhouette is unremarkable.  There are calcifications of the aortic arch.  Osseous structures are intact.  Right upper quadrant surgical clips.                                       X-Ray Chest 1 View (Final result)  Result time 07/11/25 20:45:00      Final result by Steve Quarles DO (07/11/25 20:45:00)                   Impression:      No acute abnormality.      Electronically signed by: Steve Quarles  Date:    07/11/2025  Time:    20:45               Narrative:    EXAMINATION:  XR CHEST 1 VIEW    CLINICAL HISTORY:  COVID;    TECHNIQUE:  Single frontal view of the chest was performed.    COMPARISON:  09/30/2024.    FINDINGS:  The lungs are well expanded and clear.  No focal opacities are seen.   The pleural spaces are clear the cardiac silhouette is unremarkable.  There are calcifications of the aortic arch.  Osseous structures demonstrate degenerative changes.  There are right upper quadrant surgical clips.                                        IV access: Right IJ CVL  Peripheral IV Single Lumen 07/11/25 1855 20 G Left Antecubital (Active)   Site Assessment Clean;Dry;Intact;No redness;No swelling 07/11/25 1855   Extremity Assessment Distal to IV No redness;No abnormal discoloration;No warmth;No swelling 07/11/25 1855   Proximal Lumen Status Blood return noted;Infusing 07/11/25 1855   Dressing Status Clean;Dry;Intact 07/11/25 1855   Dressing Intervention Integrity maintained 07/11/25 1855     Infusions: Norepinephrine gtt    Allergies:   Review of patient's allergies indicates:   Allergen Reactions    Pravastatin      Other Reaction(s): Propensity to adverse reactions to drug      NPO: No    Anticoagulation:   Anticoagulants       None             Instructions      Upon patient arrival to the ICU, please contact Hospital Medicine on call.

## 2025-07-12 NOTE — SUBJECTIVE & OBJECTIVE
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     Back pain     Breast cyst     Cirrhosis     Depression 01/30/2019    Diabetes mellitus     Disorder of kidney and ureter     Glaucoma     Hyperlipidemia     Hypertension     Trouble in sleeping     Type 2 diabetes mellitus        Past Surgical History:   Procedure Laterality Date    BACK SURGERY      BREAST CYST ASPIRATION  1982    CATARACT EXTRACTION Bilateral     don't remember date    CHOLECYSTECTOMY      COLONOSCOPY N/A 3/1/2018    Procedure: COLONOSCOPY;  Surgeon: Ren Newton MD;  Location: [address]);  Service: Endoscopy;  Laterality: N/A;    HYSTERECTOMY  age 70    bleeding    INSERTION, STENT, ARTERY  2022    OOPHORECTOMY      TONSILLECTOMY, ADENOIDECTOMY         Review of patient's allergies indicates:   Allergen Reactions    Pravastatin      Other Reaction(s): Propensity to adverse reactions to drug       Family History       Problem Relation (Age of Onset)    Kidney disease Father            Tobacco Use    Smoking status: Never     Passive exposure: Never    Smokeless tobacco: Never   Substance and Sexual Activity    Alcohol use: No    Drug use: No    Sexual activity: Yes     Partners: Male     Comment:        Review of Systems   Unable to perform ROS: Mental status change       Objective:     Temp:  [98.1 °F (36.7 °C)-100 °F (37.8 °C)] 98.2 °F (36.8 °C)  Pulse:  [] 105  Resp:  [18-32] 24  SpO2:  [88 %-96 %] 95 %  BP: ()/(36-66) 92/53  Weight: 71.1 kg (156 lb 12 oz)  Body mass index is 26.91 kg/m².           Drains       None                    Physical Exam  Constitutional:       Appearance: She is well-developed. She is ill-appearing and toxic-appearing.   HENT:      Head: Normocephalic and atraumatic.   Eyes:      Conjunctiva/sclera: Conjunctivae normal.   Pulmonary:      Effort: Pulmonary effort is normal. No  "respiratory distress.   Abdominal:      General: Abdomen is flat. There is no distension.      Tenderness: There is no abdominal tenderness.   Skin:     Findings: No rash.   Neurological:      Mental Status: She is disoriented.   Psychiatric:         Behavior: Behavior normal.          Significant Labs:    BMP:  Recent Labs   Lab 07/11/25 2036 07/12/25 0518    137   K 3.3* 3.6    108   CO2 18* 17*   BUN 21 25*   CREATININE 1.5* 1.5*   CALCIUM 9.5 7.4*       CBC:  Recent Labs   Lab 07/10/25  0945 07/11/25 2036 07/12/25 0518   WBC 2.53* 5.75 12.59   HGB 10.9* 11.5* 10.9*   HCT 33.8* 34.7* 34.0*   PLT 69* 42* 35*       Blood Culture: No results for input(s): "LABBLOO" in the last 168 hours.  Urine Culture: No results for input(s): "LABURIN" in the last 168 hours.  Urine Studies:   Recent Labs   Lab 07/11/25 2026   COLORU Yellow   APPEARANCEUA Hazy*   PHUR 7.0   SPECGRAV 1.015   PROTEINUA 1+*   GLUCUA Trace*   BILIRUBINUA Negative   OCCULTUA 2+*   NITRITE Negative   UROBILINOGEN Negative   LEUKOCYTESUR 1+*   RBCUA 40*   WBCUA 65*   BACTERIA Many*   HYALINECASTS 15*       Significant Imaging:  CT: I have reviewed all results within the past 24 hours and my personal findings are:  1.7 cm obstructing UPJ stone on the left with hydronephrosis                  "

## 2025-07-12 NOTE — SUBJECTIVE & OBJECTIVE
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     Back pain     Breast cyst     Cirrhosis     Depression 01/30/2019    Diabetes mellitus     Disorder of kidney and ureter     Glaucoma     Hyperlipidemia     Hypertension     Trouble in sleeping     Type 2 diabetes mellitus        Past Surgical History:   Procedure Laterality Date    BACK SURGERY      BREAST CYST ASPIRATION  1982    CATARACT EXTRACTION Bilateral     don't remember date    CHOLECYSTECTOMY      COLONOSCOPY N/A 3/1/2018    Procedure: COLONOSCOPY;  Surgeon: Ren Newton MD;  Location: [address]);  Service: Endoscopy;  Laterality: N/A;    HYSTERECTOMY  age 70    bleeding    INSERTION, STENT, ARTERY  2022    OOPHORECTOMY      TONSILLECTOMY, ADENOIDECTOMY         Review of patient's allergies indicates:   Allergen Reactions    Pravastatin      Other Reaction(s): Propensity to adverse reactions to drug       Current Facility-Administered Medications on File Prior to Encounter   Medication    [COMPLETED] iohexoL (OMNIPAQUE 350) injection 75 mL    [COMPLETED] ketorolac injection 15 mg    [COMPLETED] piperacillin-tazobactam (ZOSYN) 4.5 g in D5W 100 mL IVPB (MB+)    [COMPLETED] sodium chloride 0.9% bolus 1,000 mL 1,000 mL    [COMPLETED] sodium chloride 0.9% bolus 3,000 mL 3,000 mL    [COMPLETED] vancomycin 1,750 mg in 0.9% NaCl 500 mL IVPB (admixture device)    [DISCONTINUED] NORepinephrine 4 mg in dextrose 5% 250 mL infusion (premix)    [DISCONTINUED] vancomycin - pharmacy to dose     Current Outpatient Medications on File Prior to Encounter   Medication Sig    ALPRAZolam (XANAX) 0.5 MG tablet TAKE 1 TABLET BY MOUTH NIGHTLY AS NEEDED FOR ANXIETY    aspirin 81 MG Chew Take 81 mg by mouth once daily.    atorvastatin (LIPITOR) 20 MG tablet Take 20 mg by mouth.    azelastine (ASTELIN) 137 mcg (0.1 %) nasal spray     calcium carbonate (TUMS)  200 mg calcium (500 mg) chewable tablet Take 1 tablet by mouth once daily.    cholecalciferol, vitamin D3, (VITAMIN D3) 25 mcg (1,000 unit) capsule Take 2,000 Units by mouth once daily.    clopidogreL (PLAVIX) 75 mg tablet Take 1 tablet (75 mg total) by mouth once daily.    coenzyme Q10 100 mg capsule Take 100 mg by mouth once daily.    CONSTULOSE 10 gram/15 mL solution Take 30 mLs by mouth once daily. (Patient not taking: Reported on 3/21/2025)    diclofenac sodium (VOLTAREN ARTHRITIS PAIN) 1 % Gel Apply 2 g topically once daily.    docusate sodium (COLACE) 100 MG capsule Take 1 capsule (100 mg total) by mouth 2 (two) times daily as needed for Constipation.    EScitalopram oxalate (LEXAPRO) 5 MG Tab Take 1 tablet by mouth once daily    fluticasone propionate (FLONASE) 50 mcg/actuation nasal spray 1 spray (50 mcg total) by Each Nostril route once daily.    guaiFENesin 100 mg/5 ml (ROBITUSSIN) 100 mg/5 mL syrup Take 200 mg by mouth 3 (three) times daily as needed for Cough. (Patient not taking: Reported on 3/21/2025)    hydrocortisone 2.5 % cream Apply topically 2 (two) times daily. (Patient not taking: Reported on 3/21/2025)    hydrOXYzine HCL (ATARAX) 10 MG Tab 1-3 tablets every 8 hours as needed for itching    ipratropium (ATROVENT) 42 mcg (0.06 %) nasal spray 2 sprays by Each Nostril route daily as needed.    loratadine (CLARITIN) 10 mg tablet Take 1 tablet (10 mg total) by mouth daily as needed for Allergies (as need d for allergies , sneezing , runny nose, take in am).    metFORMIN (GLUCOPHAGE) 500 MG tablet Take 1 tablet (500 mg total) by mouth 2 (two) times daily with meals.    metoprolol tartrate (LOPRESSOR) 100 MG tablet Take 0.5 tablets (50 mg total) by mouth 2 (two) times daily.    mupirocin (BACTROBAN) 2 % ointment Apply topically 3 (three) times daily.    nitroGLYCERIN (NITROSTAT) 0.4 MG SL tablet Place 0.4 mg under the tongue every 5 (five) minutes as needed for Chest pain.    nystatin (MYCOSTATIN)  cream Apply topically 2 (two) times daily.    OMEGA 3-DHA-EPA-FISH OIL ORAL Take 1 capsule by mouth once daily. 700 mg    polyethylene glycol (GLYCOLAX) 17 gram/dose powder Take 17 g by mouth once daily. (Patient not taking: Reported on 3/21/2025)    pulse oximeter (PULSE OXIMETER) device by Apply Externally route 2 (two) times a day. Use twice daily at 8 AM and 3 PM and record the value in MyChart as directed. (Patient not taking: Reported on 3/21/2025)    sucralfate (CARAFATE) 1 gram tablet  (Patient not taking: Reported on 3/21/2025)    sucralfate (CARAFATE) 100 mg/mL suspension Take 10 mLs by mouth 2 (two) times daily.    timolol maleate 0.5% (TIMOPTIC) 0.5 % Drop INSTILL 1 DROP INTO EACH EYE TWICE DAILY    triamcinolone acetonide 0.1% (KENALOG) 0.1 % cream Apply topically 2 (two) times daily.     Family History       Problem Relation (Age of Onset)    Kidney disease Father          Tobacco Use    Smoking status: Never     Passive exposure: Never    Smokeless tobacco: Never   Substance and Sexual Activity    Alcohol use: No    Drug use: No    Sexual activity: Yes     Partners: Male     Comment:      Review of Systems   Constitutional:  Positive for activity change, appetite change, chills, fatigue and fever.   HENT:  Negative for congestion.    Respiratory:  Negative for cough, chest tightness, shortness of breath and wheezing.    Cardiovascular:  Negative for chest pain.   Gastrointestinal:  Positive for abdominal pain and nausea. Negative for constipation, diarrhea and vomiting.   Genitourinary:  Negative for decreased urine volume, dysuria and hematuria.   Musculoskeletal:  Positive for arthralgias and back pain.   Neurological:  Negative for dizziness, light-headedness and headaches.   Psychiatric/Behavioral:  The patient is not nervous/anxious.      Objective:     Vital Signs (Most Recent):  Temp: 99.1 °F (37.3 °C) (07/12/25 0504)  Pulse: 105 (07/12/25 0600)  Resp: (!) 24 (07/12/25 0600)  BP: (!)  92/53 (07/12/25 0600)  SpO2: 95 % (07/12/25 0600) Vital Signs (24h Range):  Temp:  [98.1 °F (36.7 °C)-100 °F (37.8 °C)] 99.1 °F (37.3 °C)  Pulse:  [] 105  Resp:  [18-32] 24  SpO2:  [88 %-96 %] 95 %  BP: ()/(36-66) 92/53     Weight: 71.1 kg (156 lb 12 oz)  Body mass index is 26.91 kg/m².     Physical Exam  Vitals and nursing note reviewed.   Constitutional:       General: She is not in acute distress.     Appearance: Normal appearance. She is obese. She is ill-appearing. She is not toxic-appearing or diaphoretic.   HENT:      Head: Normocephalic and atraumatic.      Nose: Nose normal. No congestion or rhinorrhea.      Mouth/Throat:      Mouth: Mucous membranes are dry.      Pharynx: Oropharynx is clear.   Eyes:      General: No scleral icterus.     Extraocular Movements: Extraocular movements intact.      Conjunctiva/sclera: Conjunctivae normal.      Pupils: Pupils are equal, round, and reactive to light.   Cardiovascular:      Rate and Rhythm: Normal rate and regular rhythm.      Pulses: Normal pulses.      Heart sounds: No murmur heard.     No friction rub. No gallop.   Pulmonary:      Effort: Pulmonary effort is normal. No respiratory distress.      Breath sounds: Normal breath sounds. No wheezing, rhonchi or rales.   Abdominal:      General: Bowel sounds are normal. There is no distension.      Palpations: Abdomen is soft.      Tenderness: There is abdominal tenderness (diffusely). There is no guarding or rebound.   Musculoskeletal:         General: No swelling. Normal range of motion.      Cervical back: Normal range of motion and neck supple.      Right lower leg: No edema.      Left lower leg: No edema.   Skin:     General: Skin is warm.      Capillary Refill: Capillary refill takes less than 2 seconds.      Coloration: Skin is pale.   Neurological:      Mental Status: She is alert and oriented to person, place, and time.      Cranial Nerves: No cranial nerve deficit.      Motor: No weakness.       Coordination: Coordination normal.   Psychiatric:         Mood and Affect: Mood normal.         Behavior: Behavior normal.              CRANIAL NERVES     CN III, IV, VI   Pupils are equal, round, and reactive to light.       Recent Results (from the past 24 hours)   POCT glucose    Collection Time: 07/11/25  6:41 PM   Result Value Ref Range    POCT Glucose 158 (H) 70 - 110 mg/dL   COVID-19 Rapid Screening    Collection Time: 07/11/25  8:10 PM   Result Value Ref Range    SARS COV-2 Molecular Negative Negative   Influenza A & B by Molecular    Collection Time: 07/11/25  8:10 PM    Specimen: Nasal Swab; Nasopharyngeal Swab   Result Value Ref Range    INFLUENZA A MOLECULAR Negative Negative    INFLUENZA B MOLECULAR  Negative Negative   Group A Strep, Molecular    Collection Time: 07/11/25  8:10 PM    Specimen: Throat   Result Value Ref Range    Group A Strep Molecular Negative Negative   Urinalysis, Reflex to Urine Culture Urine, Clean Catch    Collection Time: 07/11/25  8:26 PM    Specimen: Urine   Result Value Ref Range    Color, UA Yellow Straw, Celestina, Yellow, Light-Orange    Appearance, UA Hazy (A) Clear    pH, UA 7.0 5.0 - 8.0    Spec Grav UA 1.015 1.005 - 1.030    Protein, UA 1+ (A) Negative    Glucose, UA Trace (A) Negative    Ketones, UA Negative Negative    Bilirubin, UA Negative Negative    Blood, UA 2+ (A) Negative    Nitrites, UA Negative Negative    Urobilinogen, UA Negative <2.0 EU/dL    Leukocyte Esterase, UA 1+ (A) Negative   Urinalysis Microscopic    Collection Time: 07/11/25  8:26 PM   Result Value Ref Range    RBC, UA 40 (H) 0 - 4 /HPF    WBC, UA 65 (H) 0 - 5 /HPF    WBC Clumps, UA Rare None, Rare    Bacteria, UA Many (A) None, Rare, Occasional /HPF    Squamous Epithelial Cells, UA 1 <=5 /HPF    Hyaline Casts, UA 15 (H) 0 - 1 /LPF    Microscopic Comment Mucus present.    Troponin I    Collection Time: 07/11/25  8:36 PM   Result Value Ref Range    Troponin-I 0.023 <=0.026 ng/mL   Comprehensive  Metabolic Panel    Collection Time: 07/11/25  8:36 PM   Result Value Ref Range    Sodium 140 136 - 145 mmol/L    Potassium 3.3 (L) 3.5 - 5.1 mmol/L    Chloride 105 95 - 110 mmol/L    CO2 18 (L) 23 - 29 mmol/L    Glucose 127 (H) 70 - 110 mg/dL    BUN 21 8 - 23 mg/dL    Creatinine 1.5 (H) 0.5 - 1.4 mg/dL    Calcium 9.5 8.7 - 10.5 mg/dL    Protein Total 6.0 6.0 - 8.4 gm/dL    Albumin 3.0 (L) 3.5 - 5.2 g/dL    Bilirubin Total 1.3 (H) 0.1 - 1.0 mg/dL    ALP 53 40 - 150 unit/L    AST 35 11 - 45 unit/L    ALT 19 10 - 44 unit/L    Anion Gap 17 (H) 8 - 16 mmol/L    eGFR 34 (L) >60 mL/min/1.73/m2   CK    Collection Time: 07/11/25  8:36 PM   Result Value Ref Range    CPK 42 20 - 180 U/L   BNP    Collection Time: 07/11/25  8:36 PM   Result Value Ref Range     (H) 0 - 99 pg/mL   Lactic Acid, Plasma    Collection Time: 07/11/25  8:36 PM   Result Value Ref Range    Lactic Acid Level 10.6 (HH) 0.5 - 2.2 mmol/L   Procalcitonin    Collection Time: 07/11/25  8:36 PM   Result Value Ref Range    Procalcitonin 22.72 (H) <0.25 ng/mL   Lipase    Collection Time: 07/11/25  8:36 PM   Result Value Ref Range    Lipase Level 33 4 - 60 U/L   Phosphorus    Collection Time: 07/11/25  8:36 PM   Result Value Ref Range    Phosphorus Level 1.2 (L) 2.7 - 4.5 mg/dL   Magnesium    Collection Time: 07/11/25  8:36 PM   Result Value Ref Range    Magnesium  1.3 (L) 1.6 - 2.6 mg/dL   CBC with Differential    Collection Time: 07/11/25  8:36 PM   Result Value Ref Range    WBC 5.75 3.90 - 12.70 K/uL    RBC 3.85 (L) 4.00 - 5.40 M/uL    HGB 11.5 (L) 12.0 - 16.0 gm/dL    HCT 34.7 (L) 37.0 - 48.5 %    MCV 90 82 - 98 fL    MCH 29.9 27.0 - 31.0 pg    MCHC 33.1 32.0 - 36.0 g/dL    RDW 15.8 (H) 11.5 - 14.5 %    Platelet Count 42 (L) 150 - 450 K/uL    MPV 9.1 (L) 9.2 - 12.9 fL    Nucleated RBC 0 <=0 /100 WBC    Neut % 92.0 (H) 38 - 73 %    Lymph % 4.7 (L) 18 - 48 %    Mono % 2.1 (L) 4 - 15 %    Eos % 0.0 <=8 %    Basophil % 0.2 <=1.9 %    Imm Grans % 1.0 (H) 0.0 -  0.5 %    Neut # 5.29 1.8 - 7.7 K/uL    Lymph # 0.27 (L) 1 - 4.8 K/uL    Mono # 0.12 (L) 0.3 - 1 K/uL    Eos # 0.00 <=0.5 K/uL    Baso # 0.01 <=0.2 K/uL    Imm Grans # 0.06 (H) 0.00 - 0.04 K/uL   Platelet Review    Collection Time: 07/11/25  8:36 PM   Result Value Ref Range    Platelet Estimate Decreased (A)     Lactic Acid, Plasma    Collection Time: 07/11/25 11:28 PM   Result Value Ref Range    Lactic Acid Level 3.6 (HH) 0.5 - 2.2 mmol/L   Basic metabolic panel    Collection Time: 07/12/25  5:18 AM   Result Value Ref Range    Sodium 137 136 - 145 mmol/L    Potassium 3.6 3.5 - 5.1 mmol/L    Chloride 108 95 - 110 mmol/L    CO2 17 (L) 23 - 29 mmol/L    Glucose 124 (H) 70 - 110 mg/dL    BUN 25 (H) 8 - 23 mg/dL    Creatinine 1.5 (H) 0.5 - 1.4 mg/dL    Calcium 7.4 (L) 8.7 - 10.5 mg/dL    Anion Gap 12 8 - 16 mmol/L    eGFR 34 (L) >60 mL/min/1.73/m2   Magnesium    Collection Time: 07/12/25  5:18 AM   Result Value Ref Range    Magnesium  1.0 (L) 1.6 - 2.6 mg/dL   Lactic acid, plasma    Collection Time: 07/12/25  5:18 AM   Result Value Ref Range    Lactic Acid Level 3.5 (HH) 0.5 - 2.2 mmol/L   Phosphorus    Collection Time: 07/12/25  5:18 AM   Result Value Ref Range    Phosphorus Level 3.0 2.7 - 4.5 mg/dL   Protime-INR    Collection Time: 07/12/25  5:18 AM   Result Value Ref Range    PT 17.2 (H) 9.0 - 12.5 seconds    INR 1.6 (H) 0.8 - 1.2   Procalcitonin    Collection Time: 07/12/25  5:18 AM   Result Value Ref Range    Procalcitonin 33.26 (H) <0.25 ng/mL   CBC with Differential    Collection Time: 07/12/25  5:18 AM   Result Value Ref Range    WBC 12.59 3.90 - 12.70 K/uL    RBC 3.69 (L) 4.00 - 5.40 M/uL    HGB 10.9 (L) 12.0 - 16.0 gm/dL    HCT 34.0 (L) 37.0 - 48.5 %    MCV 92 82 - 98 fL    MCH 29.5 27.0 - 31.0 pg    MCHC 32.1 32.0 - 36.0 g/dL    RDW 16.2 (H) 11.5 - 14.5 %    Platelet Count 35 (LL) 150 - 450 K/uL    MPV 9.9 9.2 - 12.9 fL    Nucleated RBC 0 <=0 /100 WBC    Neut % 89.3 (H) 38 - 73 %    Lymph % 3.2 (L) 18 - 48 %     Mono % 4.8 4 - 15 %    Eos % 0.0 <=8 %    Basophil % 0.2 <=1.9 %    Imm Grans % 2.5 (H) 0.0 - 0.5 %    Neut # 11.24 (H) 1.8 - 7.7 K/uL    Lymph # 0.40 (L) 1 - 4.8 K/uL    Mono # 0.61 0.3 - 1 K/uL    Eos # 0.00 <=0.5 K/uL    Baso # 0.02 <=0.2 K/uL    Imm Grans # 0.32 (H) 0.00 - 0.04 K/uL   Platelet Review    Collection Time: 07/12/25  5:18 AM   Result Value Ref Range    Platelet Estimate Decreased (A)     Manual Differential    Collection Time: 07/12/25  5:18 AM   Result Value Ref Range    Gran # (ANC) 9.1 K/uL    Segmented Neutrophil % 57.0 38.0 - 73.0 %    Bands % 15.0 %    Lymphocyte % 2.0 (L) 18.0 - 48.0 %    Monocyte % 6.0 4.0 - 15.0 %    Metamyelocyte % 15.0 %    Myelocyte % 5.0 %    Platelet Estimate Decreased (A)      Anisocytosis Slight     Vacuolated Granulocytes Present        Microbiology Results (last 7 days)       ** No results found for the last 168 hours. **

## 2025-07-12 NOTE — EICU
Virtual ICU Quality Rounds    Admit Date: 7/12/2025  Hospital Day: 0    ICU Day: 9h    24H Vital Sign Range:  Temp:  [96.8 °F (36 °C)-100 °F (37.8 °C)]   Pulse:  []   Resp:  [15-32]   BP: ()/()   SpO2:  [88 %-98 %]   Arterial Line BP: (129)/(61)     VICU Surveillance Screening    Daily review of  line necessity(optional): Central line(s) in place and Urinary catheter in place    Central line type (optional): Triple lumen catheter    Central line indications : Vasopressors (in past 24/hrs)    Cash Indications : Post operative order specific to the cash catheter    LDA reconciliation : Yes

## 2025-07-12 NOTE — H&P
[facility] Medicine  History & Physical    Patient Name: Marine Mo  MRN: [id number]  Patient Class: IP- Inpatient  Admission Date: 7/12/2025  Attending Physician: Dr. Daniela Gilmore   Primary Care Provider: Serg Hwang MD  Patient information was obtained from patient, spouse/SO, relative(s), past medical records, and ER records.     Subjective:     Principal Problem:<principal problem not specified>    Chief Complaint: No chief complaint on file.       HPI: 85 y.o. woman with h/o NIDDM type 2 (A1c 5 in Feb), Essential HTN, HLD, DILLARD cirrhosis with polycystic liver dz and chronic thrombocytopenia, Depression/General Anxiety d/o, CAD (h/o stent in LAD on ASA/Plavix), Anemia presents to the [place] ED with family c/o fevers and chills for the past 1-2 days. Was febrile at 100.1 and hypotensive. Started on IVF, IV vanc/zosyn and eventually started on peripheral levophed for Sepsis.     Labs noted for WBC 5 and stable H/H 11.5/34. plt 42. Initial LA ws 10.6 with a procal of 22. Received 30cc/kg sepsis fluids (3L) and repeat lactic improved to 3.6.   Lytes with hypokalemia, JAIRON with creatinine 1.5 (baseline <1). Bicarb 18. Mg 1.2 and phos 1.2.   UA concerning for UTI.     CT chest/abd/pelvis with IV contrast:   1. Moderate left-sided hydronephrosis secondary to a 1.7 cm calculus at the UPJ.  2. Wall thickening of the left renal pelvis with left perinephric fat stranding, suspicious for overlying infection/pyelitis or pyelonephritis.  Recommend correlation with urinalysis.  3. Hepatic cirrhosis.  4. Innumerable hepatic and renal cystic lesions as above, similar to the prior study.  5. Interlobular septal thickening in the bilateral lungs, can be seen with mild interstitial edema or interstitial pneumonia.  6. Chronic opacities in the right middle lobe and right lower lobe.    [facility] contacted and case discussed with Dr. Castañeda with Urology who recommended  cysto with stent placement and did not think that IR will be required. She agreed to see in consult once patient arrives. We have been consulted for further management. Family denies h/o kidney stones.     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     Back pain     Breast cyst     Cirrhosis     Depression 01/30/2019    Diabetes mellitus     Disorder of kidney and ureter     Glaucoma     Hyperlipidemia     Hypertension     Trouble in sleeping     Type 2 diabetes mellitus        Past Surgical History:   Procedure Laterality Date    BACK SURGERY      BREAST CYST ASPIRATION  1982    CATARACT EXTRACTION Bilateral     don't remember date    CHOLECYSTECTOMY      COLONOSCOPY N/A 3/1/2018    Procedure: COLONOSCOPY;  Surgeon: Ren Newton MD;  Location: [facility] ([address] [place] [address]);  Service: Endoscopy;  Laterality: N/A;    HYSTERECTOMY  age 70    bleeding    INSERTION, STENT, ARTERY  2022    OOPHORECTOMY      TONSILLECTOMY, ADENOIDECTOMY         Review of patient's allergies indicates:   Allergen Reactions    Pravastatin      Other Reaction(s): Propensity to adverse reactions to drug       Current Facility-Administered Medications on File Prior to Encounter   Medication    [COMPLETED] iohexoL (OMNIPAQUE 350) injection 75 mL    [COMPLETED] ketorolac injection 15 mg    [COMPLETED] piperacillin-tazobactam (ZOSYN) 4.5 g in D5W 100 mL IVPB (MB+)    [COMPLETED] sodium chloride 0.9% bolus 1,000 mL 1,000 mL    [COMPLETED] sodium chloride 0.9% bolus 3,000 mL 3,000 mL    [COMPLETED] vancomycin 1,750 mg in 0.9% NaCl 500 mL IVPB (admixture device)    [DISCONTINUED] NORepinephrine 4 mg in dextrose 5% 250 mL infusion (premix)    [DISCONTINUED] vancomycin - pharmacy to dose     Current Outpatient Medications on File Prior to Encounter   Medication Sig    ALPRAZolam (XANAX) 0.5 MG tablet TAKE 1 TABLET BY MOUTH NIGHTLY AS NEEDED FOR  ANXIETY    aspirin 81 MG Chew Take 81 mg by mouth once daily.    atorvastatin (LIPITOR) 20 MG tablet Take 20 mg by mouth.    azelastine (ASTELIN) 137 mcg (0.1 %) nasal spray     calcium carbonate (TUMS) 200 mg calcium (500 mg) chewable tablet Take 1 tablet by mouth once daily.    cholecalciferol, vitamin D3, (VITAMIN D3) 25 mcg (1,000 unit) capsule Take 2,000 Units by mouth once daily.    clopidogreL (PLAVIX) 75 mg tablet Take 1 tablet (75 mg total) by mouth once daily.    coenzyme Q10 100 mg capsule Take 100 mg by mouth once daily.    CONSTULOSE 10 gram/15 mL solution Take 30 mLs by mouth once daily. (Patient not taking: Reported on 3/21/2025)    diclofenac sodium (VOLTAREN ARTHRITIS PAIN) 1 % Gel Apply 2 g topically once daily.    docusate sodium (COLACE) 100 MG capsule Take 1 capsule (100 mg total) by mouth 2 (two) times daily as needed for Constipation.    EScitalopram oxalate (LEXAPRO) 5 MG Tab Take 1 tablet by mouth once daily    fluticasone propionate (FLONASE) 50 mcg/actuation nasal spray 1 spray (50 mcg total) by Each Nostril route once daily.    guaiFENesin 100 mg/5 ml (ROBITUSSIN) 100 mg/5 mL syrup Take 200 mg by mouth 3 (three) times daily as needed for Cough. (Patient not taking: Reported on 3/21/2025)    hydrocortisone 2.5 % cream Apply topically 2 (two) times daily. (Patient not taking: Reported on 3/21/2025)    hydrOXYzine HCL (ATARAX) 10 MG Tab 1-3 tablets every 8 hours as needed for itching    ipratropium (ATROVENT) 42 mcg (0.06 %) nasal spray 2 sprays by Each Nostril route daily as needed.    loratadine (CLARITIN) 10 mg tablet Take 1 tablet (10 mg total) by mouth daily as needed for Allergies (as need d for allergies , sneezing , runny nose, take in am).    metFORMIN (GLUCOPHAGE) 500 MG tablet Take 1 tablet (500 mg total) by mouth 2 (two) times daily with meals.    metoprolol tartrate (LOPRESSOR) 100 MG tablet Take 0.5 tablets (50 mg total) by mouth 2 (two) times daily.    mupirocin (BACTROBAN) 2  % ointment Apply topically 3 (three) times daily.    nitroGLYCERIN (NITROSTAT) 0.4 MG SL tablet Place 0.4 mg under the tongue every 5 (five) minutes as needed for Chest pain.    nystatin (MYCOSTATIN) cream Apply topically 2 (two) times daily.    OMEGA 3-DHA-EPA-FISH OIL ORAL Take 1 capsule by mouth once daily. 700 mg    polyethylene glycol (GLYCOLAX) 17 gram/dose powder Take 17 g by mouth once daily. (Patient not taking: Reported on 3/21/2025)    pulse oximeter (PULSE OXIMETER) device by Apply Externally route 2 (two) times a day. Use twice daily at 8 AM and 3 PM and [email or web address] as directed. (Patient not taking: Reported on 3/21/2025)    sucralfate (CARAFATE) 1 gram tablet  (Patient not taking: Reported on 3/21/2025)    sucralfate (CARAFATE) 100 mg/mL suspension Take 10 mLs by mouth 2 (two) times daily.    timolol maleate 0.5% (TIMOPTIC) 0.5 % Drop INSTILL 1 DROP INTO EACH EYE TWICE DAILY    triamcinolone acetonide 0.1% (KENALOG) 0.1 % cream Apply topically 2 (two) times daily.     Family History       Problem Relation (Age of Onset)    Kidney disease Father          Tobacco Use    Smoking status: Never     Passive exposure: Never    Smokeless tobacco: Never   Substance and Sexual Activity    Alcohol use: No    Drug use: No    Sexual activity: Yes     Partners: Male     Comment:      Review of Systems   Constitutional:  Positive for activity change, appetite change, chills, fatigue and fever.   HENT:  Negative for congestion.    Respiratory:  Negative for cough, chest tightness, shortness of breath and wheezing.    Cardiovascular:  Negative for chest pain.   Gastrointestinal:  Positive for abdominal pain and nausea. Negative for constipation, diarrhea and vomiting.   Genitourinary:  Negative for decreased urine volume, dysuria and hematuria.   Musculoskeletal:  Positive for arthralgias and back pain.   Neurological:  Negative for dizziness, light-headedness and headaches.    Psychiatric/Behavioral:  The patient is not nervous/anxious.      Objective:     Vital Signs (Most Recent):  Temp: 99.1 °F (37.3 °C) (07/12/25 0504)  Pulse: 105 (07/12/25 0600)  Resp: (!) 24 (07/12/25 0600)  BP: (!) 92/53 (07/12/25 0600)  SpO2: 95 % (07/12/25 0600) Vital Signs (24h Range):  Temp:  [98.1 °F (36.7 °C)-100 °F (37.8 °C)] 99.1 °F (37.3 °C)  Pulse:  [] 105  Resp:  [18-32] 24  SpO2:  [88 %-96 %] 95 %  BP: ()/(36-66) 92/53     Weight: 71.1 kg (156 lb 12 oz)  Body mass index is 26.91 kg/m².     Physical Exam  Vitals and nursing note reviewed.   Constitutional:       General: She is not in acute distress.     Appearance: Normal appearance. She is obese. She is ill-appearing. She is not toxic-appearing or diaphoretic.   HENT:      Head: Normocephalic and atraumatic.      Nose: Nose normal. No congestion or rhinorrhea.      Mouth/Throat:      Mouth: Mucous membranes are dry.      Pharynx: Oropharynx is clear.   Eyes:      General: No scleral icterus.     Extraocular Movements: Extraocular movements intact.      Conjunctiva/sclera: Conjunctivae normal.      Pupils: Pupils are equal, round, and reactive to light.   Cardiovascular:      Rate and Rhythm: Normal rate and regular rhythm.      Pulses: Normal pulses.      Heart sounds: No murmur heard.     No friction rub. No gallop.   Pulmonary:      Effort: Pulmonary effort is normal. No respiratory distress.      Breath sounds: Normal breath sounds. No wheezing, rhonchi or rales.   Abdominal:      General: Bowel sounds are normal. There is no distension.      Palpations: Abdomen is soft.      Tenderness: There is abdominal tenderness (diffusely). There is no guarding or rebound.   Musculoskeletal:         General: No swelling. Normal range of motion.      Cervical back: Normal range of motion and neck supple.      Right lower leg: No edema.      Left lower leg: No edema.   Skin:     General: Skin is warm.      Capillary Refill: Capillary refill takes  less than 2 seconds.      Coloration: Skin is pale.   Neurological:      Mental Status: She is alert and oriented to person, place, and time.      Cranial Nerves: No cranial nerve deficit.      Motor: No weakness.      Coordination: Coordination normal.   Psychiatric:         Mood and Affect: Mood normal.         Behavior: Behavior normal.              CRANIAL NERVES     CN III, IV, VI   Pupils are equal, round, and reactive to light.       Recent Results (from the past 24 hours)   POCT glucose    Collection Time: 07/11/25  6:41 PM   Result Value Ref Range    POCT Glucose 158 (H) 70 - 110 mg/dL   COVID-19 Rapid Screening    Collection Time: 07/11/25  8:10 PM   Result Value Ref Range    SARS COV-2 Molecular Negative Negative   Influenza A & B by Molecular    Collection Time: 07/11/25  8:10 PM    Specimen: Nasal Swab; Nasopharyngeal Swab   Result Value Ref Range    INFLUENZA A MOLECULAR Negative Negative    INFLUENZA B MOLECULAR  Negative Negative   Group A Strep, Molecular    Collection Time: 07/11/25  8:10 PM    Specimen: Throat   Result Value Ref Range    Group A Strep Molecular Negative Negative   Urinalysis, Reflex to Urine Culture Urine, Clean Catch    Collection Time: 07/11/25  8:26 PM    Specimen: Urine   Result Value Ref Range    Color, UA Yellow Straw, Celestina, Yellow, Light-Orange    Appearance, UA Hazy (A) Clear    pH, UA 7.0 5.0 - 8.0    Spec Grav UA 1.015 1.005 - 1.030    Protein, UA 1+ (A) Negative    Glucose, UA Trace (A) Negative    Ketones, UA Negative Negative    Bilirubin, UA Negative Negative    Blood, UA 2+ (A) Negative    Nitrites, UA Negative Negative    Urobilinogen, UA Negative <2.0 EU/dL    Leukocyte Esterase, UA 1+ (A) Negative   Urinalysis Microscopic    Collection Time: 07/11/25  8:26 PM   Result Value Ref Range    RBC, UA 40 (H) 0 - 4 /HPF    WBC, UA 65 (H) 0 - 5 /HPF    WBC Clumps, UA Rare None, Rare    Bacteria, UA Many (A) None, Rare, Occasional /HPF    Squamous Epithelial Cells, UA 1  <=5 /HPF    Hyaline Casts, UA 15 (H) 0 - 1 /LPF    Microscopic Comment Mucus present.    Troponin I    Collection Time: 07/11/25  8:36 PM   Result Value Ref Range    Troponin-I 0.023 <=0.026 ng/mL   Comprehensive Metabolic Panel    Collection Time: 07/11/25  8:36 PM   Result Value Ref Range    Sodium 140 136 - 145 mmol/L    Potassium 3.3 (L) 3.5 - 5.1 mmol/L    Chloride 105 95 - 110 mmol/L    CO2 18 (L) 23 - 29 mmol/L    Glucose 127 (H) 70 - 110 mg/dL    BUN 21 8 - 23 mg/dL    Creatinine 1.5 (H) 0.5 - 1.4 mg/dL    Calcium 9.5 8.7 - 10.5 mg/dL    Protein Total 6.0 6.0 - 8.4 gm/dL    Albumin 3.0 (L) 3.5 - 5.2 g/dL    Bilirubin Total 1.3 (H) 0.1 - 1.0 mg/dL    ALP 53 40 - 150 unit/L    AST 35 11 - 45 unit/L    ALT 19 10 - 44 unit/L    Anion Gap 17 (H) 8 - 16 mmol/L    eGFR 34 (L) >60 mL/min/1.73/m2   CK    Collection Time: 07/11/25  8:36 PM   Result Value Ref Range    CPK 42 20 - 180 U/L   BNP    Collection Time: 07/11/25  8:36 PM   Result Value Ref Range     (H) 0 - 99 pg/mL   Lactic Acid, Plasma    Collection Time: 07/11/25  8:36 PM   Result Value Ref Range    Lactic Acid Level 10.6 (HH) 0.5 - 2.2 mmol/L   Procalcitonin    Collection Time: 07/11/25  8:36 PM   Result Value Ref Range    Procalcitonin 22.72 (H) <0.25 ng/mL   Lipase    Collection Time: 07/11/25  8:36 PM   Result Value Ref Range    Lipase Level 33 4 - 60 U/L   Phosphorus    Collection Time: 07/11/25  8:36 PM   Result Value Ref Range    Phosphorus Level 1.2 (L) 2.7 - 4.5 mg/dL   Magnesium    Collection Time: 07/11/25  8:36 PM   Result Value Ref Range    Magnesium  1.3 (L) 1.6 - 2.6 mg/dL   CBC with Differential    Collection Time: 07/11/25  8:36 PM   Result Value Ref Range    WBC 5.75 3.90 - 12.70 K/uL    RBC 3.85 (L) 4.00 - 5.40 M/uL    HGB 11.5 (L) 12.0 - 16.0 gm/dL    HCT 34.7 (L) 37.0 - 48.5 %    MCV 90 82 - 98 fL    MCH 29.9 27.0 - 31.0 pg    MCHC 33.1 32.0 - 36.0 g/dL    RDW 15.8 (H) 11.5 - 14.5 %    Platelet Count 42 (L) 150 - 450 K/uL    MPV  9.1 (L) 9.2 - 12.9 fL    Nucleated RBC 0 <=0 /100 WBC    Neut % 92.0 (H) 38 - 73 %    Lymph % 4.7 (L) 18 - 48 %    Mono % 2.1 (L) 4 - 15 %    Eos % 0.0 <=8 %    Basophil % 0.2 <=1.9 %    Imm Grans % 1.0 (H) 0.0 - 0.5 %    Neut # 5.29 1.8 - 7.7 K/uL    Lymph # 0.27 (L) 1 - 4.8 K/uL    Mono # 0.12 (L) 0.3 - 1 K/uL    Eos # 0.00 <=0.5 K/uL    Baso # 0.01 <=0.2 K/uL    Imm Grans # 0.06 (H) 0.00 - 0.04 K/uL   Platelet Review    Collection Time: 07/11/25  8:36 PM   Result Value Ref Range    Platelet Estimate Decreased (A)     Lactic Acid, Plasma    Collection Time: 07/11/25 11:28 PM   Result Value Ref Range    Lactic Acid Level 3.6 (HH) 0.5 - 2.2 mmol/L   Basic metabolic panel    Collection Time: 07/12/25  5:18 AM   Result Value Ref Range    Sodium 137 136 - 145 mmol/L    Potassium 3.6 3.5 - 5.1 mmol/L    Chloride 108 95 - 110 mmol/L    CO2 17 (L) 23 - 29 mmol/L    Glucose 124 (H) 70 - 110 mg/dL    BUN 25 (H) 8 - 23 mg/dL    Creatinine 1.5 (H) 0.5 - 1.4 mg/dL    Calcium 7.4 (L) 8.7 - 10.5 mg/dL    Anion Gap 12 8 - 16 mmol/L    eGFR 34 (L) >60 mL/min/1.73/m2   Magnesium    Collection Time: 07/12/25  5:18 AM   Result Value Ref Range    Magnesium  1.0 (L) 1.6 - 2.6 mg/dL   Lactic acid, plasma    Collection Time: 07/12/25  5:18 AM   Result Value Ref Range    Lactic Acid Level 3.5 (HH) 0.5 - 2.2 mmol/L   Phosphorus    Collection Time: 07/12/25  5:18 AM   Result Value Ref Range    Phosphorus Level 3.0 2.7 - 4.5 mg/dL   Protime-INR    Collection Time: 07/12/25  5:18 AM   Result Value Ref Range    PT 17.2 (H) 9.0 - 12.5 seconds    INR 1.6 (H) 0.8 - 1.2   Procalcitonin    Collection Time: 07/12/25  5:18 AM   Result Value Ref Range    Procalcitonin 33.26 (H) <0.25 ng/mL   CBC with Differential    Collection Time: 07/12/25  5:18 AM   Result Value Ref Range    WBC 12.59 3.90 - 12.70 K/uL    RBC 3.69 (L) 4.00 - 5.40 M/uL    HGB 10.9 (L) 12.0 - 16.0 gm/dL    HCT 34.0 (L) 37.0 - 48.5 %    MCV 92 82 - 98 fL    MCH 29.5 27.0 - 31.0 pg     MCHC 32.1 32.0 - 36.0 g/dL    RDW 16.2 (H) 11.5 - 14.5 %    Platelet Count 35 (LL) 150 - 450 K/uL    MPV 9.9 9.2 - 12.9 fL    Nucleated RBC 0 <=0 /100 WBC    Neut % 89.3 (H) 38 - 73 %    Lymph % 3.2 (L) 18 - 48 %    Mono % 4.8 4 - 15 %    Eos % 0.0 <=8 %    Basophil % 0.2 <=1.9 %    Imm Grans % 2.5 (H) 0.0 - 0.5 %    Neut # 11.24 (H) 1.8 - 7.7 K/uL    Lymph # 0.40 (L) 1 - 4.8 K/uL    Mono # 0.61 0.3 - 1 K/uL    Eos # 0.00 <=0.5 K/uL    Baso # 0.02 <=0.2 K/uL    Imm Grans # 0.32 (H) 0.00 - 0.04 K/uL   Platelet Review    Collection Time: 07/12/25  5:18 AM   Result Value Ref Range    Platelet Estimate Decreased (A)     Manual Differential    Collection Time: 07/12/25  5:18 AM   Result Value Ref Range    Gran # (ANC) 9.1 K/uL    Segmented Neutrophil % 57.0 38.0 - 73.0 %    Bands % 15.0 %    Lymphocyte % 2.0 (L) 18.0 - 48.0 %    Monocyte % 6.0 4.0 - 15.0 %    Metamyelocyte % 15.0 %    Myelocyte % 5.0 %    Platelet Estimate Decreased (A)      Anisocytosis Slight     Vacuolated Granulocytes Present        Microbiology Results (last 7 days)       ** No results found for the last 168 hours. **                  Assessment/Plan:     Assessment & Plan  Sepsis with acute renal failure and septic shock  Marine Mo presents with sepsis with Acute kidney injury and shock requiring pressors secondary to Urinary Tract Infection and obstructive uropathy.     Interventions include:    Antibiotics:    meropenem injection 1 g, Every 12 hours, Intravenous  vancomycin - pharmacy to dose, pharmacy to manage frequency, Intravenous    Fluid Resuscitation:   Ideal Body Weight- The patient is obese (BMI>30) and their ideal body weight of Ideal body weight: 54.7 kg (120 lb 9.5 oz) was used to calculate fluid bolus of 30 ml/kg. This was given at the OSH ED     Labs and Imaging:   Recent Labs   Lab 07/11/25  2036 07/11/25  2328 07/12/25  0518   LACTATE 10.6* 3.6* 3.5*     Cultures blood, urine sent and pending at the [facility].    Hemodynamic Support and Monitoring:  Vasopressors were initiated to maintain MAP >65 due to persistent hypotension after appropriate fluid administration     The patient was re-evaluated at Admission Date and Time: Obs date: N/A 7/12/25  4:55 AM and patient and/or surrogate was updated on plan of care.  Advance Care Planning     Date: 07/12/2025    Code Status  I engaged the the patient and family in a voluntary conversation about the patient's preferences of care if she were to acutely decompensate while hospitalized here. Pt and family wish her to be a FULL CODE and to have CPR or other invasive treatments including intubation performed when her heart and/or breathing stops. A total of 16 min was spent on advance care planning, goals of care discussion     The following services were consulted:Urology.      Acute unilateral obstructive uropathy  F/u with urology. Type and screen ordered for possible need to transfuse plt, FFP, and blood. Consent obtained from family and patient.     JAIRON (acute kidney injury)  JAIRON is likely due to post-obstructive d/t kidney stone and acute UTI. Baseline creatinine is <1. Most recent creatinine and eGFR are listed below.  Recent Labs     07/11/25 2036 07/12/25  0518   CREATININE 1.5* 1.5*   EGFRNORACEVR 34* 34*      Plan  - JAIRON is stable  - Avoid nephrotoxins and renally dose meds for GFR listed above  - Monitor urine output, serial BMP, and adjust therapy as needed  -Support MAP>65 and oxygen>94%.   Thrombocytopenia  Chronic due to her DILLARD cirrhosis it appears. No acute signs of bleeding but may need transfuse for cysto today. Type and screen ordered and will defer to Urology the need for plt, ffp, or PRBC.   Coronary artery disease involving native coronary artery of native heart without angina pectoris  Patient with known CAD s/p stent placement, which is controlled Will continue Statin and monitor for S/Sx of angina/ACS. Continue to monitor on telemetry. Resume ASA and  Plavix when cleared by urology.   Type 2 diabetes mellitus, controlled  Patient's FSGs are controlled on current medication regimen.  Last A1c reviewed-   Lab Results   Component Value Date    HGBA1C 5.0 02/19/2025   Repeat A1c ordered and pending.   Most recent fingerstick glucose reviewed-   Recent Labs   Lab 07/11/25  1841   POCTGLUCOSE 158*     Current correctional scale  Low  Maintain anti-hyperglycemic dose as follows-   Antihyperglycemics (From admission, onward)      Start     Stop Route Frequency Ordered    07/12/25 0457  insulin aspart U-100 pen 0-5 Units         -- SubQ Every 6 hours PRN 07/12/25 0457          Hold Oral hypoglycemics while patient is in the hospital: metformin 500mg PO BID but family states that lately she has been having low sugars and this has recently been d/c.   Essential hypertension  Patient's blood pressure range in the last 24 hours was: BP  Min: 69/40  Max: 143/63.The patient's inpatient anti-hypertensive regimen is listed below:  Current Antihypertensives  hydrALAZINE injection 5 mg, Every 6 hours PRN, Intravenous for SBP>180 or DBP>90   HOLD Metoprolol 50mg PO BID for now while on pressors.   VTE Risk Mitigation (From admission, onward)           Ordered     IP VTE HIGH RISK PATIENT  Once         07/12/25 0457     Place sequential compression device  Until discontinued         07/12/25 0457     Reason for No Pharmacological VTE Prophylaxis  Once        Comments: Urology plans on cysto with stent placement   Question:  Reasons:  Answer:  Physician Provided (leave comment)    07/12/25 0457                  CODE STATUS: FULL CODE     Critical care time spent on the evaluation and treatment of severe organ dysfunction, review of pertinent labs and imaging studies, discussions with consulting providers and discussions with patient/family: 70 minutes.     Pharmacokinetic Initial Assessment: IV Vancomycin    Assessment/Plan:    Initiate intravenous vancomycin with loading dose of 1750  mg once with subsequent doses when random concentrations are less than 20 mcg/mL  Desired empiric serum trough concentration is 10 to 20 mcg/mL  Draw vancomycin random level on 7/12 at 1200.  Pharmacy will continue to follow and monitor vancomycin.      Please contact pharmacy at extension [phone] with any questions regarding this assessment.     Thank you for the consult,   Michael Cohen       Patient brief summary:  Marine Mo is a 85 y.o. female initiated on antimicrobial therapy with IV Vancomycin for treatment of suspected sepsis         Daniela Gilmore MD  Department of Hospital Medicine  [facility] [place] - Intensive Care

## 2025-07-12 NOTE — ANESTHESIA PROCEDURE NOTES
Arterial    Diagnosis: Sepsis    Patient location during procedure: ICU  Timeout: 7/12/2025 10:15 AM  Procedure end time: 7/12/2025 10:26 AM    Staffing  Authorizing Provider: Josue Garcia MD  Performing Provider: Josue Garcia MD    Staffing  Performed by: Josue Garcia MD  Authorized by: Josue Garcia MD    Anesthesiologist was present at the time of the procedure.    Preanesthetic Checklist  Completed: patient identified, IV checked, site marked, risks and benefits discussed, surgical consent, monitors and equipment checked, pre-op evaluation, timeout performed and anesthesia consent givenArterial  Skin Prep: chlorhexidine gluconate  Local Infiltration: lidocaine  Orientation: left  Location: radial    Catheter Size: 20 G  Catheter placement by Ultrasound guidance. Heme positive aspiration all ports.   Vessel Caliber: small, patent, compressibility normal  Vascular Doppler:  not done  Needle advanced into vessel with real time Ultrasound guidance.  Guidewire confirmed in vessel.  Sterile sheath used.  Image recorded and saved.Insertion Attempts: 2  Assessment  Dressing: secured with tape and tegaderm and sutured in place and taped  Additional Notes  Attempted R radial artery and unable to thread catheter. Decision to switch to L side and places successfully.

## 2025-07-12 NOTE — CARE UPDATE
Patient seen and examined.  See H/P, treatment and plan per Dr. Gilmore.  85 y.o. woman with h/o NIDDM type 2 (A1c 5 in Feb), Essential HTN, HLD, DILLARD cirrhosis with polycystic liver dz and chronic thrombocytopenia, Depression/General Anxiety d/o, CAD (h/o stent in LAD on ASA/Plavix), Anemia presents to the [facility] ED with family c/o fevers and chills for the past 1-2 days. Was febrile at 100.1 and hypotensive. Started on IVF, IV vanc/zosyn and eventually started on peripheral levophed for Sepsis.   Septic shock with urinary source and obstructive uropathy.  Started on Vanc and Meropenem.  Patient remains on Levophed.  Discussed with Urology.  Plan to take today for cystoscopy and stent placement.    Continue current management.  Follow up cultures.

## 2025-07-12 NOTE — ANESTHESIA PREPROCEDURE EVALUATION
07/12/2025  Marine Mo is a 85 y.o., female.  To undergo Procedure(s) (LRB):  CYSTOSCOPY, WITH URETERAL STENT INSERTION (Left)     Denies CP/SOB/GERD/MI/CVA/URI symptoms.  METS < 4  NPO Adequate    Past Medical History:  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     Back pain     Breast cyst     Cirrhosis     Depression 01/30/2019    Diabetes mellitus     Disorder of kidney and ureter     Glaucoma     Hyperlipidemia     Hypertension     Trouble in sleeping     Type 2 diabetes mellitus        Past Surgical History:  Past Surgical History:   Procedure Laterality Date    BACK SURGERY      BREAST CYST ASPIRATION  1982    CATARACT EXTRACTION Bilateral     don't remember date    CHOLECYSTECTOMY      COLONOSCOPY N/A 3/1/2018    Procedure: COLONOSCOPY;  Surgeon: Ren Newton MD;  Location: [facility] ([address] [place] [address]);  Service: Endoscopy;  Laterality: N/A;    HYSTERECTOMY  age 70    bleeding    INSERTION, STENT, ARTERY  2022    OOPHORECTOMY      TONSILLECTOMY, ADENOIDECTOMY         Social History:  Social History[1]    Medications:  Medications Ordered Prior to Encounter[2]    Allergies:  Review of patient's allergies indicates:   Allergen Reactions    Pravastatin      Other Reaction(s): Propensity to adverse reactions to drug       Active Problems:  Problem List[3]    Diagnostic Studies:   Latest Reference Range & Units 07/12/25 05:18   WBC 3.90 - 12.70 K/uL 12.59   RBC 4.00 - 5.40 M/uL 3.69 (L)   Hemoglobin 12.0 - 16.0 gm/dL 10.9 (L)   Hematocrit 37.0 - 48.5 % 34.0 (L)   MCV 82 - 98 fL 92   MCH 27.0 - 31.0 pg 29.5   MCHC 32.0 - 36.0 g/dL 32.1   RDW 11.5 - 14.5 % 16.2 (H)   Platelet Count 150 - 450 K/uL 35 (LL)      Latest Reference Range & Units 07/12/25 05:18   Sodium 136 - 145 mmol/L 137    Potassium 3.5 - 5.1 mmol/L 3.6   Chloride 95 - 110 mmol/L 108   CO2 23 - 29 mmol/L 17 (L)   Anion Gap 8 - 16 mmol/L 12   BUN 8 - 23 mg/dL 25 (H)   Creatinine 0.5 - 1.4 mg/dL 1.5 (H)   eGFR >60 mL/min/1.73/m2 34 (L)      Latest Reference Range & Units 07/12/25 05:18   PT 9.0 - 12.5 seconds 17.2 (H)   INR 0.8 - 1.2  1.6 (H)     EKG (7/12/25):  NSR, RBBB, possible inferior infarct    24 Hour Vitals:  Temp:  [36.7 °C (98.1 °F)-37.8 °C (100 °F)] 37.3 °C (99.1 °F)  Pulse:  [] 105  Resp:  [18-32] 24  SpO2:  [88 %-96 %] 95 %  BP: ()/(36-66) 92/53   See Nursing Charting For Additional Vitals      Pre-op Assessment    I have reviewed the Patient Summary Reports.     I have reviewed the Nursing Notes.       Review of Systems  Anesthesia Hx:  No problems with previous Anesthesia               Denies Personal Hx of Anesthesia complications.                    Social:  Non-Smoker, No Alcohol Use       Hematology/Oncology:       -- Anemia:               Hematology Comments: Thrombocytopenia    Sepsis                    Cardiovascular:  Exercise tolerance: poor   Hypertension   CAD          PVD hyperlipidemia   ECG has been reviewed.                            Pulmonary:  Pulmonary Normal                       Renal/:  Chronic Renal Disease, ARF   Pyelonephritis             Hepatic/GI:     GERD Liver Disease,  DILLARD cirrhosis             Musculoskeletal:  Arthritis          Spine Disorders: lumbar Disc disease and Degenerative disease           Neurological:  Neurology Normal                                      Endocrine:  Diabetes           Psych:   anxiety depression                Physical Exam  General: Well nourished and Cooperative    Airway:  Mallampati: III   Mouth Opening: Small, but > 3cm  TM Distance: Normal    Chest/Lungs:  Clear to auscultation, Normal Respiratory Rate    Heart:  Rate: Normal  Rhythm: Regular Rhythm        Anesthesia Plan  Type of Anesthesia, risks & benefits discussed:    Anesthesia  Type: Gen ETT, MAC  Intra-op Monitoring Plan: Standard ASA Monitors and Art Line  Post Op Pain Control Plan: multimodal analgesia and IV/PO Opioids PRN  Induction:  IV  Airway Plan: Direct and Video, Post-Induction  Informed Consent: Informed consent signed with the Patient representative and all parties understand the risks and agree with anesthesia plan.  All questions answered. Patient consented to blood products? Yes  ASA Score: 3 Emergent  Anesthesia Plan Notes: Will place arterial line and proceed with procedure under MAC sedation.    Ready For Surgery From Anesthesia Perspective.     .           [1]   Social History  Socioeconomic History    Marital status:    Tobacco Use    Smoking status: Never     Passive exposure: Never    Smokeless tobacco: Never   Substance and Sexual Activity    Alcohol use: No    Drug use: No    Sexual activity: Yes     Partners: Male     Comment:      Social Drivers of Health     Financial Resource Strain: Low Risk  (5/6/2024)    Overall Financial Resource Strain (CARDIA)     Difficulty of Paying Living Expenses: Not hard at all   Food Insecurity: No Food Insecurity (5/6/2024)    Hunger Vital Sign     Worried About Running Out of Food in the Last Year: Never true     Ran Out of Food in the Last Year: Never true   Transportation Needs: No Transportation Needs (5/6/2024)    PRAPARE - Transportation     Lack of Transportation (Medical): No     Lack of Transportation (Non-Medical): No   Physical Activity: Insufficiently Active (5/6/2024)    Exercise Vital Sign     Days of Exercise per Week: 2 days     Minutes of Exercise per Session: 20 min   Stress: Stress Concern Present (5/6/2024)    [employer] - Occupational Stress Questionnaire     Feeling of Stress : To some extent   Housing Stability: Unknown (5/6/2024)    Housing Stability Vital Sign     Unable to Pay for Housing in the Last Year: No     Homeless in the Last Year: No   [2]   No current  facility-administered medications on file prior to encounter.     Current Outpatient Medications on File Prior to Encounter   Medication Sig Dispense Refill    ALPRAZolam (XANAX) 0.5 MG tablet TAKE 1 TABLET BY MOUTH NIGHTLY AS NEEDED FOR ANXIETY 30 tablet 0    aspirin 81 MG Chew Take 81 mg by mouth once daily.      atorvastatin (LIPITOR) 20 MG tablet Take 20 mg by mouth.      azelastine (ASTELIN) 137 mcg (0.1 %) nasal spray       calcium carbonate (TUMS) 200 mg calcium (500 mg) chewable tablet Take 1 tablet by mouth once daily.      cholecalciferol, vitamin D3, (VITAMIN D3) 25 mcg (1,000 unit) capsule Take 2,000 Units by mouth once daily.      clopidogreL (PLAVIX) 75 mg tablet Take 1 tablet (75 mg total) by mouth once daily. 90 tablet 1    coenzyme Q10 100 mg capsule Take 100 mg by mouth once daily.      CONSTULOSE 10 gram/15 mL solution Take 30 mLs by mouth once daily. (Patient not taking: Reported on 3/21/2025)      diclofenac sodium (VOLTAREN ARTHRITIS PAIN) 1 % Gel Apply 2 g topically once daily. 100 g 1    docusate sodium (COLACE) 100 MG capsule Take 1 capsule (100 mg total) by mouth 2 (two) times daily as needed for Constipation. 30 capsule 0    EScitalopram oxalate (LEXAPRO) 5 MG Tab Take 1 tablet by mouth once daily 90 tablet 3    fluticasone propionate (FLONASE) 50 mcg/actuation nasal spray 1 spray (50 mcg total) by Each Nostril route once daily. 16 g 0    guaiFENesin 100 mg/5 ml (ROBITUSSIN) 100 mg/5 mL syrup Take 200 mg by mouth 3 (three) times daily as needed for Cough. (Patient not taking: Reported on 3/21/2025)      hydrocortisone 2.5 % cream Apply topically 2 (two) times daily. (Patient not taking: Reported on 3/21/2025) 20 g 0    hydrOXYzine HCL (ATARAX) 10 MG Tab 1-3 tablets every 8 hours as needed for itching 45 tablet 0    ipratropium (ATROVENT) 42 mcg (0.06 %) nasal spray 2 sprays by Each Nostril route daily as needed.      loratadine (CLARITIN) 10 mg tablet Take 1 tablet (10 mg total) by mouth  daily as needed for Allergies (as need d for allergies , sneezing , runny nose, take in am). 30 tablet 0    metFORMIN (GLUCOPHAGE) 500 MG tablet Take 1 tablet (500 mg total) by mouth 2 (two) times daily with meals. 180 tablet 1    metoprolol tartrate (LOPRESSOR) 100 MG tablet Take 0.5 tablets (50 mg total) by mouth 2 (two) times daily. 90 tablet 1    mupirocin (BACTROBAN) 2 % ointment Apply topically 3 (three) times daily. 30 g 0    nitroGLYCERIN (NITROSTAT) 0.4 MG SL tablet Place 0.4 mg under the tongue every 5 (five) minutes as needed for Chest pain.      nystatin (MYCOSTATIN) cream Apply topically 2 (two) times daily. 30 g 0    OMEGA 3-DHA-EPA-FISH OIL ORAL Take 1 capsule by mouth once daily. 700 mg      polyethylene glycol (GLYCOLAX) 17 gram/dose powder Take 17 g by mouth once daily. (Patient not taking: Reported on 3/21/2025) 500 g 0    pulse oximeter (PULSE OXIMETER) device by Apply Externally route 2 (two) times a day. Use twice daily at 8 AM and 3 PM and record the value in MyChart as directed. (Patient not taking: Reported on 3/21/2025) 1 each 0    sucralfate (CARAFATE) 1 gram tablet  (Patient not taking: Reported on 3/21/2025)      sucralfate (CARAFATE) 100 mg/mL suspension Take 10 mLs by mouth 2 (two) times daily.      timolol maleate 0.5% (TIMOPTIC) 0.5 % Drop INSTILL 1 DROP INTO EACH EYE TWICE DAILY      triamcinolone acetonide 0.1% (KENALOG) 0.1 % cream Apply topically 2 (two) times daily. 28.4 g 0   [3]   Patient Active Problem List  Diagnosis    Spinal stenosis, lumbar    DDD (degenerative disc disease), lumbar    Low back pain without sciatica    Lumbar radiculopathy    Neurogenic claudication    Hyperlipidemia    Obesity (BMI 30.0-34.9)    Thrombocytopenia    Type 2 diabetes mellitus, controlled    Chronic insomnia    Hereditary and idiopathic peripheral neuropathy    Essential hypertension    Diabetes mellitus with neuropathy    Hepatomegaly    Splenomegaly    Other pancytopenia    Upper GI bleed     Polycystic liver disease    Portal hypertension    Iron deficiency anemia    Other chest pain    Dyspnea on exertion    Depression    Postmenopausal state    Gastroesophageal reflux disease without esophagitis    Aortic atherosclerosis    Coronary artery disease involving native coronary artery of native heart without angina pectoris    RAFIQ (generalized anxiety disorder)    Sacroiliitis, not elsewhere classified    Leukopenia    Other cirrhosis of liver    PAD (peripheral artery disease)    Primary open angle glaucoma (POAG) of both eyes, mild stage    Benzodiazepine dependence    Sepsis with acute renal failure and septic shock    Acute unilateral obstructive uropathy    JAIRON (acute kidney injury)

## 2025-07-12 NOTE — ASSESSMENT & PLAN NOTE
Discussed with patient due to presentation of septic shock - renal failure/ hypotensive requiring pressor support recommend emergent Cystoscopy with Left  ureteral stent placement to decompress the left collecting system. Patient is critically ill  Discussed due to presence of infection, delayed definitive management of stone is recommended  They will need two weeks of culture directed antibiotics  Blood and urine cultures are pending  Appreciate ICU recommendations  Okay with placement of stent under MAC to minimize hemodynamic instability, discussed with anesthesia

## 2025-07-12 NOTE — ASSESSMENT & PLAN NOTE
Patient's FSGs are controlled on current medication regimen.  Last A1c reviewed-   Lab Results   Component Value Date    HGBA1C 5.0 02/19/2025   Repeat A1c ordered and pending.   Most recent fingerstick glucose reviewed-   Recent Labs   Lab 07/11/25  1841   POCTGLUCOSE 158*     Current correctional scale  Low  Maintain anti-hyperglycemic dose as follows-   Antihyperglycemics (From admission, onward)      Start     Stop Route Frequency Ordered    07/12/25 0457  insulin aspart U-100 pen 0-5 Units         -- SubQ Every 6 hours PRN 07/12/25 0457          Hold Oral hypoglycemics while patient is in the hospital: metformin 500mg PO BID but family states that lately she has been having low sugars and this has recently been d/c.

## 2025-07-12 NOTE — OP NOTE
Surgery Date: 7/12/2025    Preoperative Dx: septic shock, left ureteral stone     Postoperative Dx: as above     Surgeon: Aliya Castañeda MD    Anesthesia:  CHOICE     Procedure:  CYSTOSCOPY, WITH URETERAL STENT INSERTION, retrograde pyelogram - left      Estimated Blood Loss:  < 2cc         Specimens Removed:   Urine culture     Indications: Septic shock 2/2 obstructing left ureteral stone in setting of pyelonephritis. Patient's daughter provided consent to place stent and proceed with delayed management of stone at a later date after infection has been treated.        Drains: 6 fr x 26 cm ureteral stent with 16 fr cash catheter    Procedure details: After informed consent was obtained the patient was taken to the cystoscopy suite and placed in supine position.  The genitalia was prepped and draped  in the usual sterile fashion.  Patient was positioned in the dorsal lithotomy position. After time out was performed procedure commenced. Fluoroscopy revealed a delayed left nephrogram from prior contrast administration. A 22 fr cystourethroscope was placed into the urethra and passed into the bladder visualizing the urethra along its entire course. The dome, anterior, posterior and lateral walls of the bladder were examined systematically.  The ureteral orifices were in their usual position, close to the bladder neck, the ureters were noted to be effluxing clear yellow urine.  I used a 5 fr open ended ureteral catheter to intubate the left ureter, a retrograde pyelogram was obtained which showed filling defect at the level of the ureteropelvic junction. . I placed a 0.038 zip wire into the left renal pelvis through the open ended ureteral catheter. I then removed the open ended ureteral catheter and scope leaving the wire in place. I then passed a 6 fr x 26 cm untethethered double J stent up to the left kidney. I confirmed the proximal curl was in the left kidney and the distal curl in the urinary bladder.  Drainage  observed, a urine culture was obtained. A 16 fr cash was placed for maximal urinary drainage. Patient's anesthesia reversed and she was taken back to the ICU    Disposition:  ICU

## 2025-07-13 PROBLEM — R41.0 DELIRIUM: Status: ACTIVE | Noted: 2025-07-13

## 2025-07-13 PROBLEM — I47.10 PSVT (PAROXYSMAL SUPRAVENTRICULAR TACHYCARDIA): Status: ACTIVE | Noted: 2025-07-13

## 2025-07-13 LAB
ABSOLUTE EOSINOPHIL (OHS): 0.01 K/UL
ABSOLUTE MONOCYTE (OHS): 1.11 K/UL (ref 0.3–1)
ABSOLUTE NEUTROPHIL COUNT (OHS): 11 K/UL (ref 1.8–7.7)
ABSOLUTE NEUTROPHIL MANUAL (OHS): 11.7 K/UL
ACB COMPLEX DNA BLD POS QL NAA+NON-PROBE: NOT DETECTED
ALLENS TEST: ABNORMAL
ANION GAP (OHS): 16 MMOL/L (ref 8–16)
ANISOCYTOSIS BLD QL SMEAR: SLIGHT
AORTIC SIZE INDEX (SOV): 1.9 CM/M2
AORTIC SIZE INDEX: 1.9 CM/M2
ASCENDING AORTA: 3.3 CM
AV INDEX (PROSTH): 0.73
AV MEAN GRADIENT: 6 MMHG
AV PEAK GRADIENT: 13 MMHG
AV VALVE AREA BY VELOCITY RATIO: 2.3 CM²
AV VALVE AREA: 2.3 CM²
AV VELOCITY RATIO: 0.72
B FRAGILIS DNA BLD POS QL NAA+PROBE: NOT DETECTED
BASOPHILS # BLD AUTO: 0.07 K/UL
BASOPHILS NFR BLD AUTO: 0.5 %
BSA FOR ECHO PROCEDURE: 1.79 M2
BUN SERPL-MCNC: 36 MG/DL (ref 8–23)
BURR CELLS BLD QL SMEAR: ABNORMAL
C ALBICANS DNA BLD POS QL NAA+PROBE: NOT DETECTED
C AURIS DNA BLD POS QL NAA+NON-PROBE: NOT DETECTED
C GATTII+NEOFOR DNA CSF QL NAA+NON-PROBE: NOT DETECTED
C GLABRATA DNA BLD POS QL NAA+PROBE: NOT DETECTED
C KRUSEI DNA BLD POS QL NAA+PROBE: NOT DETECTED
C PARAP DNA BLD POS QL NAA+PROBE: NOT DETECTED
C TROPICLS DNA BLD POS QL NAA+PROBE: NOT DETECTED
CALCIUM SERPL-MCNC: 7.7 MG/DL (ref 8.7–10.5)
CHLORIDE SERPL-SCNC: 109 MMOL/L (ref 95–110)
CO2 SERPL-SCNC: 12 MMOL/L (ref 23–29)
COLISTIN RES MCR-1 ISLT/SPM QL: NOT DETECTED
CREAT SERPL-MCNC: 1.4 MG/DL (ref 0.5–1.4)
CV ECHO LV RWT: 0.49 CM
DELSYS: ABNORMAL
DOP CALC AO PEAK VEL: 1.8 M/S
DOP CALC AO VTI: 35.2 CM
DOP CALC LVOT AREA: 3.1 CM2
DOP CALC LVOT DIAMETER: 2 CM
DOP CALC LVOT PEAK VEL: 1.3 M/S
DOP CALC LVOT STROKE VOLUME: 80.7 CM3
DOP CALC MV VTI: 38 CM
DOP CALCLVOT PEAK VEL VTI: 25.7 CM
E CLOAC COMP DNA BLD POS QL NAA+PROBE: NOT DETECTED
E COLI DNA BLD POS QL NAA+PROBE: DETECTED
E FAECALIS+OTHR E SP RRNA BLD POS FISH: NOT DETECTED
E FAECIUM HSP60 BLD POS QL PROBE: NOT DETECTED
E WAVE DECELERATION TIME: 290 MSEC
E/A RATIO: 0.84
E/E' RATIO: 13 M/S
ECHO LV POSTERIOR WALL: 1.1 CM (ref 0.6–1.1)
ENTEROBACTERALES DNA BLD POS NAA+N-PRB: ABNORMAL
ERYTHROCYTE [DISTWIDTH] IN BLOOD BY AUTOMATED COUNT: 17.1 % (ref 11.5–14.5)
ESBL CFT TO CFT-CLAV IC RTO BD POS IMP: NOT DETECTED
FRACTIONAL SHORTENING: 40 % (ref 28–44)
GFR SERPLBLD CREATININE-BSD FMLA CKD-EPI: 37 ML/MIN/1.73/M2
GLUCOSE SERPL-MCNC: 116 MG/DL (ref 70–110)
GP B STREP DNA CSF QL NAA+NON-PROBE: NOT DETECTED
HAEM INFLU DNA CSF QL NAA+NON-PROBE: NOT DETECTED
HCO3 UR-SCNC: 13.9 MMOL/L (ref 24–28)
HCT VFR BLD AUTO: 30.2 % (ref 37–48.5)
HGB BLD-MCNC: 9.7 GM/DL (ref 12–16)
IMM GRANULOCYTES # BLD AUTO: 0.2 K/UL (ref 0–0.04)
IMM GRANULOCYTES NFR BLD AUTO: 1.5 % (ref 0–0.5)
IMP CARBAPENEMASE ISLT QL IA.RAPID: NOT DETECTED
INDIRECT COOMBS: NORMAL
INTERVENTRICULAR SEPTUM: 1.2 CM (ref 0.6–1.1)
IVC DIAMETER: 2.1 CM
K OXYTOCA OMPA BLD POS QL PROBE: NOT DETECTED
KLEBSIELLA SP DNA BLD POS QL NAA+NON-PRB: NOT DETECTED
KLEBSIELLA SP DNA BLD POS QL NAA+NON-PRB: NOT DETECTED
KPC CARBAPENEMASE ISLT QL IA.RAPID: NOT DETECTED
L MONOCYTOG DNA CSF QL NAA+NON-PROBE: NOT DETECTED
LA MAJOR: 5.1 CM
LA MINOR: 5.5 CM
LA WIDTH: 4.3 CM
LACTATE SERPL-SCNC: 3.3 MMOL/L (ref 0.5–2.2)
LEFT ATRIUM SIZE: 3.5 CM
LEFT ATRIUM VOLUME INDEX: 38 ML/M2
LEFT ATRIUM VOLUME: 68 CM3
LEFT INTERNAL DIMENSION IN SYSTOLE: 2.7 CM (ref 2.1–4)
LEFT VENTRICLE DIASTOLIC VOLUME INDEX: 52.27 ML/M2
LEFT VENTRICLE DIASTOLIC VOLUME: 92 ML
LEFT VENTRICLE MASS INDEX: 105.9 G/M2
LEFT VENTRICLE SYSTOLIC VOLUME INDEX: 15.3 ML/M2
LEFT VENTRICLE SYSTOLIC VOLUME: 27 ML
LEFT VENTRICULAR INTERNAL DIMENSION IN DIASTOLE: 4.5 CM (ref 3.5–6)
LEFT VENTRICULAR MASS: 186.4 G
LV LATERAL E/E' RATIO: 13.4 M/S
LV SEPTAL E/E' RATIO: 13.4 M/S
LVED V (TEICH): 92.47 ML
LVES V (TEICH): 27.36 ML
LVOT MG: 3.33 MMHG
LVOT MV: 0.87 CM/S
LYMPHOCYTES # BLD AUTO: 0.65 K/UL (ref 1–4.8)
LYMPHOCYTES NFR BLD MANUAL: 5 % (ref 18–48)
MCH RBC QN AUTO: 29.3 PG (ref 27–31)
MCHC RBC AUTO-ENTMCNC: 32.1 G/DL (ref 32–36)
MCV RBC AUTO: 91 FL (ref 82–98)
MECA+MECC NOSE QL NAA+PROBE: ABNORMAL
MECA+MECC+MREJ ISLT/SPM QL: ABNORMAL
METAMYELOCYTES NFR BLD MANUAL: 2 %
MONOCYTES NFR BLD MANUAL: 3 % (ref 4–15)
MV MEAN GRADIENT: 4 MMHG
MV PEAK A VEL: 1.27 M/S
MV PEAK E VEL: 1.07 M/S
MV PEAK GRADIENT: 7 MMHG
MV STENOSIS PRESSURE HALF TIME: 84.18 MS
MV VALVE AREA BY CONTINUITY EQUATION: 2.12 CM2
MV VALVE AREA P 1/2 METHOD: 2.61 CM2
N MEN DNA CSF QL NAA+NON-PROBE: NOT DETECTED
NDM CARBAPENEMASE ISLT QL IA.RAPID: NOT DETECTED
NEUTROPHILS NFR BLD MANUAL: 59 % (ref 38–73)
NEUTS BAND NFR BLD MANUAL: 31 %
NUCLEATED RBC (/100WBC) (OHS): 0 /100 WBC
OHS CV RV/LV RATIO: 0.67 CM
OHS QRS DURATION: 128 MS
OHS QTC CALCULATION: 469 MS
OXA-48-LIKE CRBPNASE ISLT QL IA.RAPID: NOT DETECTED
P AERUGINOSA DNA BLD POS QL NAA+PROBE: NOT DETECTED
PCO2 BLDA: 32.7 MMHG (ref 35–45)
PH SMN: 7.24 [PH] (ref 7.35–7.45)
PISA TR MAX VEL: 3.6 M/S
PLATELET # BLD AUTO: 27 K/UL (ref 150–450)
PLATELET BLD QL SMEAR: ABNORMAL
PLATELET BLD QL SMEAR: ABNORMAL
PMV BLD AUTO: 12.4 FL (ref 9.2–12.9)
PO2 BLDA: 107 MMHG (ref 80–100)
POC BE: -12 MMOL/L (ref -2–2)
POC SATURATED O2: 97 % (ref 95–100)
POC TCO2: 15 MMOL/L (ref 23–27)
POCT GLUCOSE: 108 MG/DL (ref 70–110)
POCT GLUCOSE: 110 MG/DL (ref 70–110)
POCT GLUCOSE: 60 MG/DL (ref 70–110)
POCT GLUCOSE: 64 MG/DL (ref 70–110)
POCT GLUCOSE: 71 MG/DL (ref 70–110)
POCT GLUCOSE: 71 MG/DL (ref 70–110)
POCT GLUCOSE: 77 MG/DL (ref 70–110)
POCT GLUCOSE: 96 MG/DL (ref 70–110)
POTASSIUM SERPL-SCNC: 4.2 MMOL/L (ref 3.5–5.1)
PROCALCITONIN SERPL-MCNC: 22.48 NG/ML
PROTEUS SP DNA BLD POS QL NAA+PROBE: NOT DETECTED
PV PEAK GRADIENT: 3 MMHG
PV PEAK VELOCITY: 0.93 M/S
RA MAJOR: 4.98 CM
RA PRESSURE ESTIMATED: 15 MMHG
RA WIDTH: 3.7 CM
RBC # BLD AUTO: 3.31 M/UL (ref 4–5.4)
RELATIVE EOSINOPHIL (OHS): 0.1 %
RELATIVE LYMPHOCYTE (OHS): 5 % (ref 18–48)
RELATIVE MONOCYTE (OHS): 8.5 % (ref 4–15)
RELATIVE NEUTROPHIL (OHS): 84.4 % (ref 38–73)
RH BLD: NORMAL
RIGHT VENTRICLE DIASTOLIC BASEL DIMENSION: 3 CM
RIGHT VENTRICULAR END-DIASTOLIC DIMENSION: 2.96 CM
RV TB RVSP: 19 MMHG
RV TISSUE DOPPLER FREE WALL SYSTOLIC VELOCITY 1 (APICAL 4 CHAMBER VIEW): 13.4 CM/S
S AUREUS DNA BLD POS QL NAA+PROBE: NOT DETECTED
S ENT+BONG DNA STL QL NAA+NON-PROBE: NOT DETECTED
S EPIDERMIDIS HSP60 BLD POS QL PROBE: NOT DETECTED
S LUGDUNENSIS SODA BLD POS QL PROBE: NOT DETECTED
S MALTOPH DNA BLD POS QL NAA+PROBE: NOT DETECTED
S MARCESCENS DNA BLD POS QL NAA+PROBE: NOT DETECTED
S PNEUM DNA CSF QL NAA+NON-PROBE: NOT DETECTED
S PYOGENES HSP60 BLD POS QL PROBE: NOT DETECTED
SAMPLE: ABNORMAL
SINUS: 3.4 CM
SITE: ABNORMAL
SODIUM SERPL-SCNC: 137 MMOL/L (ref 136–145)
SPECIMEN OUTDATE: NORMAL
STAPH SP TUF BLD POS QL PROBE: NOT DETECTED
STJ: 2.4 CM
STREPTOCOCCUS SP TUF BLD POS QL PROBE: NOT DETECTED
TDI LATERAL: 0.08 M/S
TDI SEPTAL: 0.08 M/S
TDI: 0.08 M/S
TR MAX PG: 52 MMHG
TRICUSPID ANNULAR PLANE SYSTOLIC EXCURSION: 1.9 CM
TROPONIN I SERPL DL<=0.01 NG/ML-MCNC: 0.13 NG/ML
TROPONIN I SERPL DL<=0.01 NG/ML-MCNC: 0.15 NG/ML
TV REST PULMONARY ARTERY PRESSURE: 67 MMHG
VAN(A+B+C1+C2) GENES ISLT/SPM: ABNORMAL
VANCOMYCIN SERPL-MCNC: 19.3 UG/ML (ref ?–80)
VIM CARBAPENEMASE ISLT QL IA.RAPID: NOT DETECTED
WBC # BLD AUTO: 13.04 K/UL (ref 3.9–12.7)
WBC TOXIC VACUOLES BLD QL SMEAR: PRESENT
Z-SCORE OF LEFT VENTRICULAR DIMENSION IN END DIASTOLE: -0.78
Z-SCORE OF LEFT VENTRICULAR DIMENSION IN END SYSTOLE: -0.86

## 2025-07-13 PROCEDURE — 25000003 PHARM REV CODE 250: Performed by: HOSPITALIST

## 2025-07-13 PROCEDURE — 82803 BLOOD GASES ANY COMBINATION: CPT

## 2025-07-13 PROCEDURE — 25000003 PHARM REV CODE 250: Performed by: INTERNAL MEDICINE

## 2025-07-13 PROCEDURE — 63600175 PHARM REV CODE 636 W HCPCS: Performed by: INTERNAL MEDICINE

## 2025-07-13 PROCEDURE — 80202 ASSAY OF VANCOMYCIN: CPT | Performed by: HOSPITALIST

## 2025-07-13 PROCEDURE — 84484 ASSAY OF TROPONIN QUANT: CPT | Performed by: INTERNAL MEDICINE

## 2025-07-13 PROCEDURE — 86850 RBC ANTIBODY SCREEN: CPT | Performed by: INTERNAL MEDICINE

## 2025-07-13 PROCEDURE — 94761 N-INVAS EAR/PLS OXIMETRY MLT: CPT | Mod: XB

## 2025-07-13 PROCEDURE — 27100171 HC OXYGEN HIGH FLOW UP TO 24 HOURS

## 2025-07-13 PROCEDURE — 63600175 PHARM REV CODE 636 W HCPCS

## 2025-07-13 PROCEDURE — 80048 BASIC METABOLIC PNL TOTAL CA: CPT | Performed by: INTERNAL MEDICINE

## 2025-07-13 PROCEDURE — 63600175 PHARM REV CODE 636 W HCPCS: Performed by: HOSPITALIST

## 2025-07-13 PROCEDURE — 94799 UNLISTED PULMONARY SVC/PX: CPT

## 2025-07-13 PROCEDURE — 99900035 HC TECH TIME PER 15 MIN (STAT)

## 2025-07-13 PROCEDURE — 93005 ELECTROCARDIOGRAM TRACING: CPT

## 2025-07-13 PROCEDURE — 99291 CRITICAL CARE FIRST HOUR: CPT | Mod: ,,, | Performed by: INTERNAL MEDICINE

## 2025-07-13 PROCEDURE — 84145 PROCALCITONIN (PCT): CPT | Performed by: INTERNAL MEDICINE

## 2025-07-13 PROCEDURE — 93010 ELECTROCARDIOGRAM REPORT: CPT | Mod: ,,, | Performed by: INTERNAL MEDICINE

## 2025-07-13 PROCEDURE — 83605 ASSAY OF LACTIC ACID: CPT | Performed by: INTERNAL MEDICINE

## 2025-07-13 PROCEDURE — 37799 UNLISTED PX VASCULAR SURGERY: CPT

## 2025-07-13 PROCEDURE — 85025 COMPLETE CBC W/AUTO DIFF WBC: CPT | Performed by: INTERNAL MEDICINE

## 2025-07-13 PROCEDURE — 20000000 HC ICU ROOM

## 2025-07-13 RX ORDER — LEVALBUTEROL 1.25 MG/.5ML
1.25 SOLUTION, CONCENTRATE RESPIRATORY (INHALATION) EVERY 4 HOURS PRN
Status: DISCONTINUED | OUTPATIENT
Start: 2025-07-13 | End: 2025-07-19 | Stop reason: HOSPADM

## 2025-07-13 RX ORDER — IPRATROPIUM BROMIDE 0.5 MG/2.5ML
0.5 SOLUTION RESPIRATORY (INHALATION) EVERY 4 HOURS PRN
Status: DISCONTINUED | OUTPATIENT
Start: 2025-07-13 | End: 2025-07-19 | Stop reason: HOSPADM

## 2025-07-13 RX ADMIN — MUPIROCIN: 20 OINTMENT TOPICAL at 08:07

## 2025-07-13 RX ADMIN — Medication 6 MG: at 09:07

## 2025-07-13 RX ADMIN — VASOPRESSIN 0.04 UNITS/MIN: 20 INJECTION INTRAVENOUS at 01:07

## 2025-07-13 RX ADMIN — MEROPENEM 1 G: 1 INJECTION, POWDER, FOR SOLUTION INTRAVENOUS at 04:07

## 2025-07-13 RX ADMIN — CALCIUM GLUCONATE 1 G: 20 INJECTION, SOLUTION INTRAVENOUS at 12:07

## 2025-07-13 RX ADMIN — ACETAMINOPHEN 650 MG: 325 TABLET ORAL at 09:07

## 2025-07-13 RX ADMIN — ONDANSETRON 4 MG: 2 INJECTION INTRAMUSCULAR; INTRAVENOUS at 09:07

## 2025-07-13 RX ADMIN — VANCOMYCIN HYDROCHLORIDE 750 MG: 750 INJECTION, POWDER, LYOPHILIZED, FOR SOLUTION INTRAVENOUS at 06:07

## 2025-07-13 RX ADMIN — MEROPENEM 1 G: 1 INJECTION, POWDER, FOR SOLUTION INTRAVENOUS at 05:07

## 2025-07-13 RX ADMIN — ATORVASTATIN CALCIUM 20 MG: 10 TABLET, FILM COATED ORAL at 09:07

## 2025-07-13 RX ADMIN — VASOPRESSIN 0.04 UNITS/MIN: 20 INJECTION INTRAVENOUS at 08:07

## 2025-07-13 RX ADMIN — NOREPINEPHRINE BITARTRATE 0.52 MCG/KG/MIN: 1 INJECTION, SOLUTION, CONCENTRATE INTRAVENOUS at 02:07

## 2025-07-13 RX ADMIN — AMIODARONE HYDROCHLORIDE 1 MG/MIN: 1.8 INJECTION, SOLUTION INTRAVENOUS at 01:07

## 2025-07-13 RX ADMIN — AMIODARONE HYDROCHLORIDE 150 MG: 1.5 INJECTION, SOLUTION INTRAVENOUS at 12:07

## 2025-07-13 RX ADMIN — AMIODARONE HYDROCHLORIDE 0.5 MG/MIN: 1.8 INJECTION, SOLUTION INTRAVENOUS at 06:07

## 2025-07-13 RX ADMIN — ALPRAZOLAM 0.5 MG: 0.5 TABLET ORAL at 09:07

## 2025-07-13 RX ADMIN — NOREPINEPHRINE BITARTRATE 0.86 MCG/KG/MIN: 1 INJECTION, SOLUTION, CONCENTRATE INTRAVENOUS at 01:07

## 2025-07-13 NOTE — HOSPITAL COURSE
Mrs. Mo is a 84 yo F with history of hypertension, cirrhosis, thrombocytopenia and CAD on Plavix and aspirin who was admitted with septic shock and obstructive uropathy.  Placed in ICU.  Urology consulted underwent cystoscopy with stent placement.  Urine and blood cultures growing pansensitive E coli.  Now on ceftriaxone.  Pressors have been weaned.  Developed AFib with RVR, started on IV amiodarone.  Now transitioned to p.o. amiodarone.  Platelets still less than 50,000, not candidate for oral anticoagulation at this time.  Still with bilateral pitting edema, due to volume overload.  P.r.n. Lasix and elevation.  QT within normal limits.  No evidence of aneurysms.  Would plan to discharge home with Levaquin until outpatient follow-up with Urology for definitive stone treatment.    E.Coli bacteremia, pansensitive on CTX.  PT/OT consulted. Urology and Card follow up as outpatient

## 2025-07-13 NOTE — ASSESSMENT & PLAN NOTE
Chronic due to her DILLARD cirrhosis it appears. No acute signs of bleeding but may need transfuse for cysto today. Type and screen ordered and will defer to Urology the need for plt, ffp, or PRBC.   Plts lower than baseline, most likely related to septic shock.  Patient was transfused platelets for procedure.

## 2025-07-13 NOTE — EICU
Virtual ICU Quality Rounds    Admit Date: 7/12/2025  Hospital Day: 1    ICU Day: 1d 13h    24H Vital Sign Range:  Temp:  [97.4 °F (36.3 °C)-99.1 °F (37.3 °C)]   Pulse:  []   Resp:  [17-35]   BP: ()/(46-79)   SpO2:  [84 %-99 %]   Arterial Line BP: ()/(37-65)     VICU Surveillance Screening    Daily review of  line necessity(optional): Central line(s) in place and Urinary catheter in place    Central line type (optional): Triple lumen catheter    Central line indications : Vasopressors (in past 24/hrs)    Silva Indications : Critically ill in the intensive care unit requiring hourly urine output monitoring     LDA reconciliation : Yes

## 2025-07-13 NOTE — PROGRESS NOTES
HOSPITALIST ON CALL NOTE:    Contacted by the RN regarding regarding tachypnea and concerns regarding her breathing. Went to bedside (see exam below) and also d/w family at bedside updates. Will check CVP and labs to assess if sepsis is improving. Weaning down on levo as able. Has not had much to eat or drink today s/p cysto with stent placement in the left ureter. Plan for stone removal at a later date when pt is improved/stable per urology notes.   AB.25/40/81/17 (improving from last check 7.1/48/70/16).   Lactic still elevated at 3 unchanged from last. Procal improving to 8.53 from 33.26  CBC stable from last check but plt now 12 and given bloody urine will transfuse 1 pack plt. (H/o thrombocytopenia-chronic).   Lytes with elevated creatinine but still stable at 1.5. mg low at 1.5.   Calcium low even after correction.   Blood cutures growing GNRods (on meropenem currently monitor closely given thrombocytopenia. May need to adjust to zosyn if continues to remain low after transfusion and if GNR is sensitive to PCN)   Went to assess patient at bedside:    Vitals:    25 2115 25 2130 25 2134 25 2145   BP:       BP Location:       Patient Position:       Pulse: (!) 126 (!) 126 (!) 129 (!) 120   Resp: (!) 23 (!) 23 (!) 35 20   Temp:       TempSrc:       SpO2: 98% 98% 96% 96%   Weight:       Height:              Physical Exam  Vitals and nursing note reviewed.   Constitutional:       General: She is not in acute distress.     Appearance: Normal appearance. She is not ill-appearing, toxic-appearing or diaphoretic.      Comments: Easily arousable weaning down on her precedex drip. No signs of distress.  Frail/tired appearing   HENT:      Head: Normocephalic and atraumatic.      Nose: Nose normal. No congestion or rhinorrhea.      Mouth/Throat:      Mouth: Mucous membranes are moist.      Pharynx: Oropharynx is clear. No oropharyngeal exudate or posterior oropharyngeal erythema.   Eyes:       General: No scleral icterus.     Extraocular Movements: Extraocular movements intact.      Pupils: Pupils are equal, round, and reactive to light.   Neck:      Vascular: No carotid bruit.      Comments: NO JVD  Cardiovascular:      Rate and Rhythm: Regular rhythm. Tachycardia present.      Pulses: Normal pulses.      Heart sounds: No murmur heard.     No friction rub. No gallop.   Pulmonary:      Effort: No respiratory distress.      Breath sounds: Normal breath sounds. No wheezing, rhonchi or rales.      Comments: Tachypnic but no distress breathing in the lower 20s. Does display some belly breathing when asleep but no while awake.   Abdominal:      General: Bowel sounds are normal. There is no distension.      Palpations: Abdomen is soft.      Tenderness: There is no abdominal tenderness. There is no guarding or rebound.   Genitourinary:     Comments: Silva with mixed bloody/clear concentrated urine noted. UOP >30cc/hr.   Musculoskeletal:         General: No swelling. Normal range of motion.      Cervical back: Normal range of motion and neck supple.      Right lower leg: No edema.      Left lower leg: No edema.   Skin:     General: Skin is warm.      Capillary Refill: Capillary refill takes less than 2 seconds.      Coloration: Skin is pale.   Neurological:      Mental Status: She is oriented to person, place, and time.      Cranial Nerves: No cranial nerve deficit.   Psychiatric:      Comments: Anxious when weaned too much off the precedex.

## 2025-07-13 NOTE — PLAN OF CARE
Pt remains in ICU, awake, in 5L high flow nasal cannula. Pt in NSR in the monitor. Gtt. Levophed @0.48mcg infsuing continuously. Pt has been more perky today after turning off the precedex. Pt was able to drink water and orange juice with spoon. Pt constantly expresses that she wants to go home .Q2 hrly turned. Silva intact with fair urine output. POC explained to family at bedside. No falls, injuries and skin breakdown during this shift.   Problem: Diabetes Comorbidity  Goal: Blood Glucose Level Within Targeted Range  Outcome: Progressing     Problem: Adult Inpatient Plan of Care  Goal: Plan of Care Review  Outcome: Progressing  Goal: Patient-Specific Goal (Individualized)  Outcome: Progressing  Goal: Absence of Hospital-Acquired Illness or Injury  Outcome: Progressing  Goal: Optimal Comfort and Wellbeing  Outcome: Progressing  Goal: Readiness for Transition of Care  Outcome: Progressing     Problem: Infection  Goal: Absence of Infection Signs and Symptoms  Outcome: Progressing     Problem: Fall Injury Risk  Goal: Absence of Fall and Fall-Related Injury  Outcome: Progressing     Problem: Skin Injury Risk Increased  Goal: Skin Health and Integrity  Outcome: Progressing     Problem: Sepsis/Septic Shock  Goal: Optimal Coping  Outcome: Progressing  Goal: Absence of Bleeding  Outcome: Progressing  Goal: Blood Glucose Level Within Targeted Range  Outcome: Progressing  Goal: Absence of Infection Signs and Symptoms  Outcome: Progressing  Goal: Optimal Nutrition Intake  Outcome: Progressing     Problem: Acute Kidney Injury/Impairment  Goal: Fluid and Electrolyte Balance  Outcome: Progressing  Goal: Improved Oral Intake  Outcome: Progressing  Goal: Effective Renal Function  Outcome: Progressing     Problem: Delirium  Goal: Optimal Coping  Outcome: Progressing  Goal: Improved Behavioral Control  Outcome: Progressing  Goal: Improved Attention and Thought Clarity  Outcome: Progressing  Goal: Improved Sleep  Outcome:  Progressing     Problem: Coping Ineffective  Goal: Effective Coping  Outcome: Progressing

## 2025-07-13 NOTE — PROGRESS NOTES
Therapy with vancomycim complete and/or consult discontinued by provider.  Pharmacy will sign off, please re-consult as needed.

## 2025-07-13 NOTE — EICU
,/53  On norepinephrine infusion at 0.8mcg/kg/minute  Spo2 93% on NC 5L/ Minute  RR19/minute  Glucose 71    CVP 14-18 mm of HG  I/O positive 5L   Urine output 450 ml in 6 hours    Chest xray with increasing interstitial changes suggestive of pulmonary edema  EKG Narrow complex tachycardia at 134,RBBB most likely flutter    Plan:  Vasopressin infusion ordered to help wean norepinephrine infusion  Amiodarone bolus for now as hypotension ensued with the tachycardia  May need ventilator support if hypoxia worsens.

## 2025-07-13 NOTE — ASSESSMENT & PLAN NOTE
JAIRON is likely due to post-obstructive d/t kidney stone and acute UTI. Baseline creatinine is <1. Most recent creatinine and eGFR are listed below.  Recent Labs     07/12/25  0518 07/12/25  2104 07/13/25  0421   CREATININE 1.5* 1.5* 1.4   EGFRNORACEVR 34* 34* 37*      Plan  - JAIRON is stable  - Avoid nephrotoxins and renally dose meds for GFR listed above  - Monitor urine output, serial BMP, and adjust therapy as needed  -Support MAP>65 and oxygen>94%.

## 2025-07-13 NOTE — CLINICAL REVIEW
IP Sepsis Screen (most recent)       Sepsis Screen (IP) - 07/13/25 1520       Is the patient's history or complaint suggestive of a possible infection? Yes  -WL    Are there at least two of the following signs and symptoms present? Yes  -WL    Sepsis signs/symptoms - Tachycardia Tachycardia     >90  -WL    Sepsis signs/symptoms - WBC WBC < 4,000 or WBC > 12,000  -WL    Are any of the following organ dysfunction criteria present and not considered to be due to a chronic condition? Yes  -WL    Organ Dysfunction Criteria Total Bilirubin > 2.0 Platelet count < 100,000  -WL    Organ Dysfunction Criteria Lactate > 2.0  -WL    Organ Dysfunction Criteria INR > 1.5 or aPTT > 60  -WL    Initiate Sepsis Protocol No  -WL    Reason sepsis not considered Pt. receiving appropriate management   obstructing pyelonephritis, BCx2 + GNR, on IVAB, LAx2, -WL              User Key  (r) = Recorded By, (t) = Taken By, (c) = Cosigned By      Initials Name    Lisa Mendiola RN

## 2025-07-13 NOTE — PROGRESS NOTES
Pharmacokinetic Assessment Follow Up: IV Vancomycin    Vancomycin serum concentration assessment(s):    The random level was drawn correctly and can be used to guide therapy at this time. The measurement is above the desired definitive target range of 10 to 20 mcg/mL.    Vancomycin Regimen Plan:    Give Vancomycin 750 mg x1 dose and obtain random level 7/14 at 0300    Drug levels (last 3 results):  Recent Labs   Lab Result Units 07/12/25  1250 07/13/25  0421   Vancomycin Random ug/ml 17.6 19.3       Pharmacy will continue to follow and monitor vancomycin.    Please contact pharmacy at extension [phone] for questions regarding this assessment.    Thank you for the consult,   Michael Cohen       Patient brief summary:  Marine Mo is a 85 y.o. female initiated on antimicrobial therapy with IV Vancomycin for treatment of sepsis    Drug Allergies:   Review of patient's allergies indicates:   Allergen Reactions    Pravastatin      Other Reaction(s): Propensity to adverse reactions to drug       Actual Body Weight:   71.1 kg    Renal Function:   Estimated Creatinine Clearance: 27.9 mL/min (based on SCr of 1.4 mg/dL).,     Dialysis Method (if applicable):  N/A    CBC (last 72 hours):  Recent Labs   Lab Result Units 07/10/25  0945 07/11/25  2036 07/12/25  0518 07/12/25  2104 07/13/25  0421   WBC K/uL 2.53* 5.75 12.59 12.01 13.04*   HGB gm/dL 10.9* 11.5* 10.9* 10.2* 9.7*   Hemoglobin A1c %  --   --  5.0  --   --    HCT % 33.8* 34.7* 34.0* 31.6* 30.2*   Platelet Count K/uL 69* 42* 35* 12* 27*   Lymph % % 31.6 4.7* 3.2*  --  5.0*   Lymphocyte % %  --   --  2.0* 7.0* 5.0*   Mono % % 11.1 2.1* 4.8  --  8.5   Monocyte % %  --   --  6.0 4.0 3.0*   Eos % % 2.8 0.0 0.0  --  0.1   Basophil % % 0.4 0.2 0.2  --  0.5       Metabolic Panel (last 72 hours):  Recent Labs   Lab Result Units 07/10/25  0945 07/11/25 2026 07/11/25 2036 07/12/25 0518 07/12/25 2104 07/13/25  0421   Sodium mmol/L  --   --  140 137 137 137    Potassium mmol/L  --   --  3.3* 3.6 4.2 4.2   Chloride mmol/L  --   --  105 108 109 109   CO2 mmol/L  --   --  18* 17* 16* 12*   Glucose mg/dL  --   --  127* 124* 93 116*   Glucose, UA   --  Trace*  --   --   --   --    BUN mg/dL  --   --  21 25* 33* 36*   Creatinine mg/dL  --   --  1.5* 1.5* 1.5* 1.4   Albumin g/dL 3.5  --  3.0*  --  2.6*  --    Bilirubin Total mg/dL 0.5  --  1.3*  --   --   --    ALP unit/L 33*  --  53  --   --   --    AST unit/L 23  --  35  --   --   --    ALT unit/L 15  --  19  --   --   --    Magnesium  mg/dL  --   --  1.3* 1.0* 1.5*  --    Phosphorus Level mg/dL  --   --  1.2* 3.0 4.3  --        Vancomycin Administrations:  vancomycin given in the last 96 hours                     vancomycin (VANCOCIN) 1,000 mg in 0.9% NaCl 250 mL IVPB (admixture device) (mg) 1,000 mg New Bag 07/12/25 1518    vancomycin 1,750 mg in 0.9% NaCl 500 mL IVPB (admixture device) (mg) 1,750 mg New Bag 07/12/25 0002                    Microbiologic Results:  Microbiology Results (last 7 days)       Procedure Component Value Units Date/Time    Urine Culture High Risk [[id number]] Collected: 07/12/25 1207    Order Status: Sent Specimen: Urine, Catheterized Updated: 07/12/25 6336

## 2025-07-13 NOTE — EICU
Noted spo2 88-94% on SPO2  On NC 6L/minute    Patient noted to be restless earlier requiring  increase of precedex infusion    Chest xray with poor inspiration     Plan:  Check ABG  Wean sedation as tolerated to allow lung expansion maneuvers.

## 2025-07-13 NOTE — PROGRESS NOTES
[facility] Medicine  Progress Note    Patient Name: Marine Mo  MRN: [id number]  Patient Class: IP- Inpatient   Admission Date: 7/12/2025  Length of Stay: 1 days  Attending Physician: Sebastien Jeffery MD  Primary Care Provider: Serg Hwang MD        Subjective     Principal Problem:Sepsis with acute renal failure and septic shock        HPI:  85 y.o. woman with h/o NIDDM type 2 (A1c 5 in Feb), Essential HTN, HLD, DILLARD cirrhosis with polycystic liver dz and chronic thrombocytopenia, Depression/General Anxiety d/o, CAD (h/o stent in LAD on ASA/Plavix), Anemia presents to the [place] ED with family c/o fevers and chills for the past 1-2 days. Was febrile at 100.1 and hypotensive. Started on IVF, IV vanc/zosyn and eventually started on peripheral levophed for Sepsis.     Labs noted for WBC 5 and stable H/H 11.5/34. plt 42. Initial LA ws 10.6 with a procal of 22. Received 30cc/kg sepsis fluids (3L) and repeat lactic improved to 3.6.   Lytes with hypokalemia, JAIRON with creatinine 1.5 (baseline <1). Bicarb 18. Mg 1.2 and phos 1.2.   UA concerning for UTI.     CT chest/abd/pelvis with IV contrast:   1. Moderate left-sided hydronephrosis secondary to a 1.7 cm calculus at the UPJ.  2. Wall thickening of the left renal pelvis with left perinephric fat stranding, suspicious for overlying infection/pyelitis or pyelonephritis.  Recommend correlation with urinalysis.  3. Hepatic cirrhosis.  4. Innumerable hepatic and renal cystic lesions as above, similar to the prior study.  5. Interlobular septal thickening in the bilateral lungs, can be seen with mild interstitial edema or interstitial pneumonia.  6. Chronic opacities in the right middle lobe and right lower lobe.    [place] transfer center contacted and case discussed with Dr. Castañeda with Urology who recommended cysto with stent placement and did not think that IR will be required. She agreed to see in consult once patient arrives.  We have been consulted for further management. Family denies h/o kidney stones.     Overview/Hospital Course:  85 y.o. woman with h/o NIDDM type 2 (A1c 5 in Feb), Essential HTN, HLD, DILLARD cirrhosis with polycystic liver dz and chronic thrombocytopenia, Depression/General Anxiety d/o, CAD (h/o stent in LAD on ASA/Plavix), Anemia presents to the [place] ED with family c/o fevers and chills for the past 1-2 days. Was febrile at 100.1 and hypotensive. Started on IVF, IV ABx's and eventually started on levophed for septic shock.  Septic shock with urinary source and obstructive uropathy.  Started on Vanc and Meropenem.  Patient remains on Levophed.  Urology consulted.  Underwent cystoscopy with stent placement.  Growing GNR in BCx.    Interval History: remains levophed.  Started on precedex secondary to restlessness and delirium.    Review of Systems   Unable to perform ROS: Mental status change     Objective:     Vital Signs (Most Recent):  Temp: 97.8 °F (36.6 °C) (07/13/25 1101)  Pulse: 82 (07/13/25 1101)  Resp: 18 (07/13/25 1101)  BP: (!) 135/57 (07/13/25 1101)  SpO2: (!) 94 % (07/13/25 1101) Vital Signs (24h Range):  Temp:  [97.4 °F (36.3 °C)-99.1 °F (37.3 °C)] 97.8 °F (36.6 °C)  Pulse:  [] 82  Resp:  [18-35] 18  SpO2:  [84 %-98 %] 94 %  BP: ()/(46-79) 135/57  Arterial Line BP: ()/(37-65) 126/49     Weight: 71.1 kg (156 lb 12 oz)  Body mass index is 26.91 kg/m².    Intake/Output Summary (Last 24 hours) at 7/13/2025 1403  Last data filed at 7/13/2025 1101  Gross per 24 hour   Intake 3094.22 ml   Output 925 ml   Net 2169.22 ml         Physical Exam  Constitutional:       Appearance: She is ill-appearing. She is not diaphoretic.   HENT:      Head: Normocephalic and atraumatic.      Mouth/Throat:      Mouth: Mucous membranes are dry.      Pharynx: No oropharyngeal exudate or posterior oropharyngeal erythema.   Cardiovascular:      Rate and Rhythm: Normal rate and regular rhythm.   Pulmonary:      Breath  sounds: No wheezing.      Comments: Slight tachypnea  Abdominal:      General: Bowel sounds are normal.      Palpations: Abdomen is soft.   Musculoskeletal:         General: No deformity or signs of injury.   Skin:     General: Skin is warm and dry.   Neurological:      General: No focal deficit present.      Mental Status: She is disoriented.               Significant Labs: All pertinent labs within the past 24 hours have been reviewed.  BMP:   Recent Labs   Lab 07/12/25 2104 07/13/25 0421   GLU 93 116*    137   K 4.2 4.2    109   CO2 16* 12*   BUN 33* 36*   CREATININE 1.5* 1.4   CALCIUM 7.6* 7.7*   MG 1.5*  --      CBC:   Recent Labs   Lab 07/12/25  0518 07/12/25 2104 07/13/25 0421   WBC 12.59 12.01 13.04*   HGB 10.9* 10.2* 9.7*   HCT 34.0* 31.6* 30.2*   PLT 35* 12* 27*       Significant Imaging: I have reviewed all pertinent imaging results/findings within the past 24 hours.      Assessment & Plan  Sepsis with acute renal failure and septic shock  Marine Mo presents with sepsis with Acute kidney injury and shock requiring pressors secondary to Urinary Tract Infection and obstructive uropathy.     Interventions include:    Antibiotics:    meropenem injection 1 g, Every 12 hours, Intravenous    Fluid Resuscitation:   Ideal Body Weight- The patient is obese (BMI>30) and their ideal body weight of Ideal body weight: 54.7 kg (120 lb 9.5 oz) was used to calculate fluid bolus of 30 ml/kg. This was given at the OSH ED     Labs and Imaging:   Recent Labs   Lab 07/12/25  0804 07/12/25 2104 07/13/25 0421   LACTATE 3.0* 3.0* 3.3*   Septic shock with urinary source and bacteremia.  Growing GNR in BCx.  Will stop Vanc.  Continue Meropenem.  Wean Levophed as possible.  Follow up on final cultures.      Acute unilateral obstructive uropathy  S/p cystoscopy with stent placement.    JAIRON (acute kidney injury)  JAIRON is likely due to post-obstructive d/t kidney stone and acute UTI. Baseline creatinine  is <1. Most recent creatinine and eGFR are listed below.  Recent Labs     07/12/25  0518 07/12/25  2104 07/13/25  0421   CREATININE 1.5* 1.5* 1.4   EGFRNORACEVR 34* 34* 37*      Plan  - JAIRON is stable  - Avoid nephrotoxins and renally dose meds for GFR listed above  - Monitor urine output, serial BMP, and adjust therapy as needed  -Support MAP>65 and oxygen>94%.   Thrombocytopenia  Chronic due to her DILLARD cirrhosis it appears. No acute signs of bleeding but may need transfuse for cysto today. Type and screen ordered and will defer to Urology the need for plt, ffp, or PRBC.   Plts lower than baseline, most likely related to septic shock.  Patient was transfused platelets for procedure.  Coronary artery disease involving native coronary artery of native heart without angina pectoris  Patient with known CAD s/p stent placement, which is controlled Will continue Statin and monitor for S/Sx of angina/ACS. Continue to monitor on telemetry. Resume ASA and Plavix when cleared by urology.   Holding ASA and Plavix secondary to severe thrombocytopenia.  Type 2 diabetes mellitus, controlled  Patient's FSGs are controlled on current medication regimen.  Last A1c reviewed-   Lab Results   Component Value Date    HGBA1C 5.0 07/12/2025   Repeat A1c ordered and pending.   Most recent fingerstick glucose reviewed-   Recent Labs   Lab 07/13/25  0009 07/13/25  0057 07/13/25  0643 07/13/25  1147   POCTGLUCOSE 71 71 96 108     Current correctional scale  Low  Maintain anti-hyperglycemic dose as follows-   Antihyperglycemics (From admission, onward)      Start     Stop Route Frequency Ordered    07/12/25 0457  insulin aspart U-100 pen 0-5 Units         -- SubQ Every 6 hours PRN 07/12/25 0457          Hold Oral hypoglycemics while patient is in the hospital: metformin 500mg PO BID but family states that lately she has been having low sugars and this has recently been d/c.   Essential hypertension  Patient's blood pressure range in the last  24 hours was: BP  Min: 96/46  Max: 161/68.The patient's inpatient anti-hypertensive regimen is listed below:  Current Antihypertensives  hydrALAZINE injection 5 mg, Every 6 hours PRN, Intravenous for SBP>180 or DBP>90   Medications on hold as patient is in shock.  Left ureteral stone      PSVT (paroxysmal supraventricular tachycardia)  Cardiology consulted.    Delirium  Placed on Precedex drip.    VTE Risk Mitigation (From admission, onward)           Ordered     IP VTE HIGH RISK PATIENT  Once         07/12/25 0457     Place sequential compression device  Until discontinued         07/12/25 0457     Reason for No Pharmacological VTE Prophylaxis  Once        Comments: Urology plans on cysto with stent placement   Question:  Reasons:  Answer:  Physician Provided (leave comment)    07/12/25 0457                    Discharge Planning   CHRISTIANNE:      Code Status: Full Code   Medical Readiness for Discharge Date:                  Critical care time spent on the evaluation and treatment of severe organ dysfunction, review of pertinent labs and imaging studies, discussions with consulting providers and discussions with patient/family: 35 minutes.          Sebastien Jeffery MD  Department of Hospital Medicine   [facility] [place] [facility]

## 2025-07-13 NOTE — PLAN OF CARE
Case Management Assessment     PCP: Serg Hwang MD    Pharmacy:   [facility] [address] [facility] [address]  [address]  [place] [address]  Phone: [phone] Fax: [phone]    Patient Arrived From: Home with spouse  Existing Help at Home: spouse and daughter    Barriers to Discharge: none    Discharge Plan:    A. home with home health   B. home with family      Discharge planning ssessment completed with assistance from patient's spouse.  Patient will need f/u with PCP/Urology and additional appointments as ordered by the discharging provider.  Family to provide transportation home.         07/13/25 1550   Discharge Assessment   Assessment Type Discharge Planning Assessment   Confirmed/corrected address, phone number and insurance Yes   Confirmed Demographics Correct on Facesheet   Source of Information family   If unable to respond/provide information was family/caregiver contacted? Yes   Contact Name/Number spouse:  Josue Mo;  [phone]   Communicated CHRISTIANNE with patient/caregiver Other (see comments)   People in Home child(lila), dependent;spouse   Name(s) of People in Home Josue Mo; spouse   Facility Arrived From: Transferred from [facility];   Do you expect to return to your current living situation? Yes   Do you have help at home or someone to help you manage your care at home? Yes   Who are your caregiver(s) and their phone number(s)? spouse:  Josue hCepe;  [phone]   Prior to hospitilization cognitive status: Alert/Oriented   Current cognitive status: Alert/Oriented   Walking or Climbing Stairs Difficulty yes   Walking or Climbing Stairs ambulation difficulty, requires equipment   Mobility Management cane, RW   Equipment Currently Used at Home walker, rolling;cane, straight   Readmission within 30 days? No   Patient currently being followed by outpatient case management? No   Do you currently have service(s) that help you manage your care at home? No   Do you  take prescription medications? Yes   Do you have prescription coverage? Yes   Do you have any problems affording any of your prescribed medications? No   Is the patient taking medications as prescribed? yes   Who is going to help you get home at discharge? Daughter or grandson.  (Daughter:  Robyn Martinez  [phone])   How do you get to doctors appointments? family or friend will provide   Are you on dialysis? No   Do you take coumadin? No   Discharge Plan A Home Health   Discharge Plan B Home with family   DME Needed Upon Discharge  none   Discharge Plan discussed with: Spouse/sig other   Name(s) and Number(s) Josue Mo   Transition of Care Barriers None

## 2025-07-13 NOTE — HPI
"85 y.o. woman with h/o NIDDM type 2 (A1c 5 in Feb), Essential HTN, HLD, DILLARD cirrhosis with polycystic liver dz and chronic thrombocytopenia, Depression/General Anxiety d/o, CAD (h/o stent in LAD on ASA/Plavix), Anemia presents to the [place] ED with family c/o fevers and chills for the past 1-2 days. Was febrile at 100.1 and hypotensive. Started on IVF, IV vanc/zosyn and eventually started on peripheral levophed for Sepsis.     Patient follows with Nabil Griffin MD ([facility])    Cardiology consulted for "atrial flutter vs. tachyarrythmia on amiodarone".    The patient is seen in the intensive care unit.  Several family members are at the bedside and provided most of the history.  Cardiology is consulted for tachyarrhythmia concerning for atrial flutter.  On my review of available telemetry, as well as electrocardiography, the patient appears to be in sinus rhythm and sinus tachycardia.  Telemetry does note intermittent paroxysmal supraventricular tachycardia, but nothing sustained.  She was placed on an amiodarone drip which we will discontinue.  The patient does have a prior history of coronary stent placement, but has not recently having any heart failure or anginal complaints.  Nuclear stress testing from 2021 was negative.  Note is made of a mildly elevated troponin with a flat pattern in the setting of JAIRON and sepsis.  ACS appears unlikely.  "

## 2025-07-13 NOTE — ASSESSMENT & PLAN NOTE
Marine Mo presents with sepsis with Acute kidney injury and shock requiring pressors secondary to Urinary Tract Infection and obstructive uropathy.     Interventions include:    Antibiotics:    meropenem injection 1 g, Every 12 hours, Intravenous    Fluid Resuscitation:   Ideal Body Weight- The patient is obese (BMI>30) and their ideal body weight of Ideal body weight: 54.7 kg (120 lb 9.5 oz) was used to calculate fluid bolus of 30 ml/kg. This was given at the OSH ED     Labs and Imaging:   Recent Labs   Lab 07/12/25  0804 07/12/25  2104 07/13/25  0421   LACTATE 3.0* 3.0* 3.3*   Septic shock with urinary source and bacteremia.  Growing GNR in BCx.  Will stop Vanc.  Continue Meropenem.  Wean Levophed as possible.  Follow up on final cultures.

## 2025-07-13 NOTE — CONSULTS
"[facility] [place] - Intensive Care  Cardiology  Consult Note    Patient Name: Marine Mo  MRN: [id number]  Admission Date: 7/12/2025  Hospital Length of Stay: 1 days  Code Status: Full Code   Attending Provider: Sebastien Jeffery MD   Consulting Provider: Javid Saucedo MD  Primary Care Physician: Serg Hwang MD  Principal Problem:Sepsis with acute renal failure and septic shock    Patient information was obtained from relative(s), ER records, and primary team.     Inpatient consult to Cardiology  Consult performed by: Javid Saucedo MD  Consult ordered by: Daniela Gilmore MD  Reason for consult: tachyarrhythmia        Subjective:     Chief Complaint:  sepsis     HPI:   85 y.o. woman with h/o NIDDM type 2 (A1c 5 in Feb), Essential HTN, HLD, DILLARD cirrhosis with polycystic liver dz and chronic thrombocytopenia, Depression/General Anxiety d/o, CAD (h/o stent in LAD on ASA/Plavix), Anemia presents to the [facility] ED with family c/o fevers and chills for the past 1-2 days. Was febrile at 100.1 and hypotensive. Started on IVF, IV vanc/zosyn and eventually started on peripheral levophed for Sepsis.     Patient follows with Nabil Griffin MD ([facility])    Cardiology consulted for "atrial flutter vs. tachyarrythmia on amiodarone".    The patient is seen in the intensive care unit.  Several family members are at the bedside and provided most of the history.  Cardiology is consulted for tachyarrhythmia concerning for atrial flutter.  On my review of available telemetry, as well as electrocardiography, the patient appears to be in sinus rhythm and sinus tachycardia.  Telemetry does note intermittent paroxysmal supraventricular tachycardia, but nothing sustained.  She was placed on an amiodarone drip which we will discontinue.  The patient does have a prior history of coronary stent placement, but has not recently having any heart failure or anginal complaints.  Nuclear stress testing from 2021 was negative.  " Note is made of a mildly elevated troponin with a flat pattern in the setting of JAIRON and sepsis.  ACS appears unlikely.    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     Back pain     Breast cyst     Cirrhosis     Depression 01/30/2019    Diabetes mellitus     Disorder of kidney and ureter     Glaucoma     Hyperlipidemia     Hypertension     Trouble in sleeping     Type 2 diabetes mellitus        Past Surgical History:   Procedure Laterality Date    BACK SURGERY      BREAST CYST ASPIRATION  1982    CATARACT EXTRACTION Bilateral     don't remember date    CHOLECYSTECTOMY      COLONOSCOPY N/A 3/1/2018    Procedure: COLONOSCOPY;  Surgeon: Ren Newton MD;  Location: [address]);  Service: Endoscopy;  Laterality: N/A;    HYSTERECTOMY  age 70    bleeding    INSERTION, STENT, ARTERY  2022    OOPHORECTOMY      TONSILLECTOMY, ADENOIDECTOMY         Review of patient's allergies indicates:   Allergen Reactions    Pravastatin      Other Reaction(s): Propensity to adverse reactions to drug       No current facility-administered medications on file prior to encounter.     Current Outpatient Medications on File Prior to Encounter   Medication Sig    ALPRAZolam (XANAX) 0.5 MG tablet TAKE 1 TABLET BY MOUTH NIGHTLY AS NEEDED FOR ANXIETY    aspirin 81 MG Chew Take 81 mg by mouth once daily.    atorvastatin (LIPITOR) 20 MG tablet Take 20 mg by mouth.    azelastine (ASTELIN) 137 mcg (0.1 %) nasal spray     calcium carbonate (TUMS) 200 mg calcium (500 mg) chewable tablet Take 1 tablet by mouth once daily.    cholecalciferol, vitamin D3, (VITAMIN D3) 25 mcg (1,000 unit) capsule Take 2,000 Units by mouth once daily.    clopidogreL (PLAVIX) 75 mg tablet Take 1 tablet (75 mg total) by mouth once daily.    coenzyme Q10 100 mg capsule Take 100 mg by mouth once daily.    CONSTULOSE 10 gram/15 mL solution Take 30 mLs by  mouth once daily. (Patient not taking: Reported on 3/21/2025)    diclofenac sodium (VOLTAREN ARTHRITIS PAIN) 1 % Gel Apply 2 g topically once daily.    docusate sodium (COLACE) 100 MG capsule Take 1 capsule (100 mg total) by mouth 2 (two) times daily as needed for Constipation.    EScitalopram oxalate (LEXAPRO) 5 MG Tab Take 1 tablet by mouth once daily    fluticasone propionate (FLONASE) 50 mcg/actuation nasal spray 1 spray (50 mcg total) by Each Nostril route once daily.    guaiFENesin 100 mg/5 ml (ROBITUSSIN) 100 mg/5 mL syrup Take 200 mg by mouth 3 (three) times daily as needed for Cough. (Patient not taking: Reported on 3/21/2025)    hydrocortisone 2.5 % cream Apply topically 2 (two) times daily. (Patient not taking: Reported on 3/21/2025)    hydrOXYzine HCL (ATARAX) 10 MG Tab 1-3 tablets every 8 hours as needed for itching    ipratropium (ATROVENT) 42 mcg (0.06 %) nasal spray 2 sprays by Each Nostril route daily as needed.    loratadine (CLARITIN) 10 mg tablet Take 1 tablet (10 mg total) by mouth daily as needed for Allergies (as need d for allergies , sneezing , runny nose, take in am).    metFORMIN (GLUCOPHAGE) 500 MG tablet Take 1 tablet (500 mg total) by mouth 2 (two) times daily with meals.    metoprolol tartrate (LOPRESSOR) 100 MG tablet Take 0.5 tablets (50 mg total) by mouth 2 (two) times daily.    mupirocin (BACTROBAN) 2 % ointment Apply topically 3 (three) times daily.    nitroGLYCERIN (NITROSTAT) 0.4 MG SL tablet Place 0.4 mg under the tongue every 5 (five) minutes as needed for Chest pain.    nystatin (MYCOSTATIN) cream Apply topically 2 (two) times daily.    OMEGA 3-DHA-EPA-FISH OIL ORAL Take 1 capsule by mouth once daily. 700 mg    polyethylene glycol (GLYCOLAX) 17 gram/dose powder Take 17 g by mouth once daily. (Patient not taking: Reported on 3/21/2025)    pulse oximeter (PULSE OXIMETER) device by Apply Externally route 2 (two) times a day. Use twice daily at 8 AM and 3 PM and [facility]. (Patient not taking: Reported on 3/21/2025)    sucralfate (CARAFATE) 1 gram tablet  (Patient not taking: Reported on 3/21/2025)    sucralfate (CARAFATE) 100 mg/mL suspension Take 10 mLs by mouth 2 (two) times daily.    timolol maleate 0.5% (TIMOPTIC) 0.5 % Drop INSTILL 1 DROP INTO EACH EYE TWICE DAILY    triamcinolone acetonide 0.1% (KENALOG) 0.1 % cream Apply topically 2 (two) times daily.     Family History       Problem Relation (Age of Onset)    Kidney disease Father          Tobacco Use    Smoking status: Never     Passive exposure: Never    Smokeless tobacco: Never   Substance and Sexual Activity    Alcohol use: No    Drug use: No    Sexual activity: Yes     Partners: Male     Comment:      Review of Systems   Unable to perform ROS: Mental status change     Objective:     Vital Signs (Most Recent):  Temp: 97.4 °F (36.3 °C) (07/13/25 0701)  Pulse: 84 (07/13/25 0800)  Resp: 19 (07/13/25 0800)  BP: 135/61 (07/13/25 0800)  SpO2: 96 % (07/13/25 0800) Vital Signs (24h Range):  Temp:  [96.8 °F (36 °C)-99.1 °F (37.3 °C)] 97.4 °F (36.3 °C)  Pulse:  [] 84  Resp:  [15-35] 19  SpO2:  [84 %-98 %] 96 %  BP: ()/() 135/61  Arterial Line BP: ()/(33-65) 133/59     Weight: 71.1 kg (156 lb 12 oz)  Body mass index is 26.91 kg/m².    SpO2: 96 %         Intake/Output Summary (Last 24 hours) at 7/13/2025 0817  Last data filed at 7/13/2025 0800  Gross per 24 hour   Intake 6397.6 ml   Output 925 ml   Net 5472.6 ml       Lines/Drains/Airways       Central Venous Catheter Line  Duration             Percutaneous Central Line Triple Lumen 07/11/25 2137 Internal Jugular Right 1 day              Drain  Duration                  Ureteral Drain/Stent 07/12/25 1110 Left ureter 6 Fr. <1 day         Urethral Catheter 07/12/25 1111 Double-lumen;Non-latex 16 Fr. <1 day              Arterial Line  Duration             Arterial Line 07/12/25 1016 Left Radial <1 day              Peripheral  Intravenous Line  Duration             Peripheral IV Single Lumen 07/11/25 2585 20 G Left Antecubital 1 day                     Physical Exam  Constitutional:       General: She is not in acute distress.     Appearance: Normal appearance. She is normal weight. She is not diaphoretic.   HENT:      Head: Normocephalic and atraumatic.   Cardiovascular:      Rate and Rhythm: Normal rate and regular rhythm.      Heart sounds: S1 normal and S2 normal. No murmur heard.     No friction rub. No gallop.   Pulmonary:      Effort: Pulmonary effort is normal. No respiratory distress.      Breath sounds: Normal breath sounds.   Abdominal:      General: There is no distension.      Palpations: Abdomen is soft.   Musculoskeletal:         General: No swelling.      Cervical back: Neck supple. No rigidity.      Right lower leg: No edema.      Left lower leg: No edema.   Skin:     General: Skin is warm and dry.      Coloration: Skin is not jaundiced.   Neurological:      Comments: sedated   Psychiatric:      Comments: sedated          Current Medications:   atorvastatin  20 mg Oral QHS    EScitalopram oxalate  5 mg Oral Daily    meropenem IV (PEDS and ADULTS)  1 g Intravenous Q12H    mupirocin   Nasal BID    vancomycin (VANCOCIN) IV (PEDS and ADULTS)  750 mg Intravenous Once      0.9% NaCl   Intravenous Continuous   Stopped at 07/13/25 0029    amiodarone in dextrose 5%  0.5 mg/min Intravenous Continuous 16.7 mL/hr at 07/13/25 0800 0.5 mg/min at 07/13/25 0800    dexmedeTOMIDine (Precedex) infusion (titrating)  0-1.4 mcg/kg/hr Intravenous Continuous 3.56 mL/hr at 07/13/25 0800 0.2 mcg/kg/hr at 07/13/25 0800    NORepinephrine bitartrate-D5W  0-3 mcg/kg/min Intravenous Continuous 16.7 mL/hr at 07/13/25 0800 0.5 mcg/kg/min at 07/13/25 0800    vasopressin  0.04 Units/min Intravenous Continuous 12 mL/hr at 07/13/25 0801 0.04 Units/min at 07/13/25 0801       Current Facility-Administered Medications:     0.9%  NaCl infusion (for blood  administration), , Intravenous, Q24H PRN    0.9%  NaCl infusion (for blood administration), , Intravenous, Q24H PRN    acetaminophen, 650 mg, Oral, Q4H PRN    ALPRAZolam, 0.5 mg, Oral, Nightly PRN    dextrose 50%, 12.5 g, Intravenous, PRN    glucagon (human recombinant), 1 mg, Intramuscular, PRN    hydrALAZINE, 5 mg, Intravenous, Q6H PRN    hydrOXYzine HCL, 10 mg, Oral, TID PRN    insulin aspart U-100, 0-5 Units, Subcutaneous, Q6H PRN    ipratropium, 0.5 mg, Nebulization, Q4H PRN    levalbuterol, 1.25 mg, Nebulization, Q4H PRN    melatonin, 6 mg, Oral, Nightly PRN    ondansetron, 4 mg, Intravenous, Q6H PRN    simethicone, 1 tablet, Oral, QID PRN    sodium chloride 0.9%, 10 mL, Intravenous, Q12H PRN    Pharmacy to dose Vancomycin consult, , , Once **AND** vancomycin - pharmacy to dose, , Intravenous, pharmacy to manage frequency    Laboratory (all labs reviewed):  CBC:  Recent Labs   Lab 07/10/25  0945 07/11/25 2036 07/12/25 0518 07/12/25 2104 07/13/25  0421   WBC 2.53 L 5.75 12.59 12.01 13.04 H   HGB 10.9 L 11.5 L 10.9 L 10.2 L 9.7 L   HCT 33.8 L 34.7 L 34.0 L 31.6 L 30.2 L   Platelet Count 69 L 42 L 35 LL 12 LL 27 LL       CHEMISTRIES:  Recent Labs   Lab 02/19/25  0715 07/11/25 2036 07/12/25  0518 07/12/25 2104 07/13/25  0421   Glucose 105 127 H 124 H 93 116 H   Sodium 142 140 137 137 137   Potassium 4.3 3.3 L 3.6 4.2 4.2   BUN 15 21 25 H 33 H 36 H   Creatinine 0.8 1.5 H 1.5 H 1.5 H 1.4   eGFR >60 34 L 34 L 34 L 37 L   Calcium 9.7 9.5 7.4 L 7.6 L 7.7 L   Magnesium   --  1.3 L 1.0 L 1.5 L  --        CARDIAC BIOMARKERS:  Recent Labs   Lab 07/11/25  2036 07/13/25  0421 07/13/25  0700   CPK 42  --   --    Troponin-I 0.023 0.154 H 0.127 H       COAGS:  Recent Labs   Lab 07/12/25  0518   INR 1.6 H       LIPIDS/LFTS:  Recent Labs   Lab 08/09/22  0711 02/08/23  0710 08/11/23  0755 02/01/24  0000 05/07/24  0721 08/19/24  0705 02/19/25  0715 07/10/25  0945 07/11/25  2036   Cholesterol 150 147 127 124  --  111 L 120  --    --    Triglycerides 202 H 192 H 144 97  --  119 108  --   --    HDL 35 L 36 L 36 L 47  --  41 42  --   --    LDL Cholesterol 74.6 72.6 62.2 L  --   --  46.2 L 56.4 L  --   --    Non-HDL Cholesterol 115 111 91  --   --  70 78  --   --    Non HDL Chol. (LDL+VLDL)  --   --   --  77  --   --   --   --   --    AST 21 20 22  --  22 19 24 23 35   ALT 14 14 16  --  14 10 20 15 19       BNP:  Recent Labs   Lab 07/11/25 2036    H       TSH:  Recent Labs   Lab 08/09/22  0711 02/08/23  0710 08/11/23  0755 08/19/24  0705 02/19/25  0715   TSH 2.903 2.063 1.834 1.219 1.814       Free T4:        Diagnostic Results:  ECG (personally reviewed and interpreted tracing(s)):  7/11/25 1955 SR 89, RBBB  7/13/25 0027 , RBBB    Chest X-Ray (personally reviewed and interpreted image(s)): 7/13/25 low volumes, ?CHF, R IJV TLC    Echo: ordered    CV testing (Melida Griffin MD note 2/7/24)      Assessment and Plan:     * Sepsis with acute renal failure and septic shock  Mgmt per IM/urology  Seems warm/well perfused    Acute unilateral obstructive uropathy  Mgmt per urology    JAIRON (acute kidney injury)  Noted on admission    Thrombocytopenia  Chronic  Not currently receiving antiplat meds (prev on Plavix)    Essential hypertension  Med rx on hold    Coronary artery disease involving native coronary artery of native heart without angina pectoris  Known CAD s/p PCI several yrs ago, MPI 2021 neg.  Antiplt meds on hold with thrombocytopenia  Cont statin  No anginal/CHF sxs noted.  Minimal flat trop elev noted in setting of sepsis/JAIRON, ACS unlikely.  Check echo  No plan for inpat isch eval    PSVT (paroxysmal supraventricular tachycardia)  Noted overnight, ?PSVT.  No evidence of sustained AFL/AF  Stop amio  Cont tele        VTE Risk Mitigation (From admission, onward)           Ordered     IP VTE HIGH RISK PATIENT  Once         07/12/25 0457     Place sequential compression device  Until discontinued         07/12/25 0457     Reason for  No Pharmacological VTE Prophylaxis  Once        Comments: Urology plans on cysto with stent placement   Question:  Reasons:  Answer:  Physician Provided (leave comment)    07/12/25 0457                  Case d/w Dr. Gilmore and RN.  Critical care time 40min.    Thank you for your consult. I will follow-up with patient. Please contact us if you have any additional questions.    Javid Saucedo MD  Cardiology   [facility] - Intensive Care

## 2025-07-13 NOTE — SUBJECTIVE & OBJECTIVE
Interval History: remains levophed.  Started on precedex secondary to restlessness and delirium.    Review of Systems   Unable to perform ROS: Mental status change     Objective:     Vital Signs (Most Recent):  Temp: 97.8 °F (36.6 °C) (07/13/25 1101)  Pulse: 82 (07/13/25 1101)  Resp: 18 (07/13/25 1101)  BP: (!) 135/57 (07/13/25 1101)  SpO2: (!) 94 % (07/13/25 1101) Vital Signs (24h Range):  Temp:  [97.4 °F (36.3 °C)-99.1 °F (37.3 °C)] 97.8 °F (36.6 °C)  Pulse:  [] 82  Resp:  [18-35] 18  SpO2:  [84 %-98 %] 94 %  BP: ()/(46-79) 135/57  Arterial Line BP: ()/(37-65) 126/49     Weight: 71.1 kg (156 lb 12 oz)  Body mass index is 26.91 kg/m².    Intake/Output Summary (Last 24 hours) at 7/13/2025 1403  Last data filed at 7/13/2025 1101  Gross per 24 hour   Intake 3094.22 ml   Output 925 ml   Net 2169.22 ml         Physical Exam  Constitutional:       Appearance: She is ill-appearing. She is not diaphoretic.   HENT:      Head: Normocephalic and atraumatic.      Mouth/Throat:      Mouth: Mucous membranes are dry.      Pharynx: No oropharyngeal exudate or posterior oropharyngeal erythema.   Cardiovascular:      Rate and Rhythm: Normal rate and regular rhythm.   Pulmonary:      Breath sounds: No wheezing.      Comments: Slight tachypnea  Abdominal:      General: Bowel sounds are normal.      Palpations: Abdomen is soft.   Musculoskeletal:         General: No deformity or signs of injury.   Skin:     General: Skin is warm and dry.   Neurological:      General: No focal deficit present.      Mental Status: She is disoriented.               Significant Labs: All pertinent labs within the past 24 hours have been reviewed.  BMP:   Recent Labs   Lab 07/12/25  2104 07/13/25  0421   GLU 93 116*    137   K 4.2 4.2    109   CO2 16* 12*   BUN 33* 36*   CREATININE 1.5* 1.4   CALCIUM 7.6* 7.7*   MG 1.5*  --      CBC:   Recent Labs   Lab 07/12/25  0518 07/12/25  2104 07/13/25  0421   WBC 12.59 12.01 13.04*   HGB  10.9* 10.2* 9.7*   HCT 34.0* 31.6* 30.2*   PLT 35* 12* 27*       Significant Imaging: I have reviewed all pertinent imaging results/findings within the past 24 hours.

## 2025-07-13 NOTE — ASSESSMENT & PLAN NOTE
Patient's FSGs are controlled on current medication regimen.  Last A1c reviewed-   Lab Results   Component Value Date    HGBA1C 5.0 07/12/2025   Repeat A1c ordered and pending.   Most recent fingerstick glucose reviewed-   Recent Labs   Lab 07/13/25  0009 07/13/25  0057 07/13/25  0643 07/13/25  1147   POCTGLUCOSE 71 71 96 108     Current correctional scale  Low  Maintain anti-hyperglycemic dose as follows-   Antihyperglycemics (From admission, onward)      Start     Stop Route Frequency Ordered    07/12/25 0457  insulin aspart U-100 pen 0-5 Units         -- SubQ Every 6 hours PRN 07/12/25 0457          Hold Oral hypoglycemics while patient is in the hospital: metformin 500mg PO BID but family states that lately she has been having low sugars and this has recently been d/c.

## 2025-07-13 NOTE — ASSESSMENT & PLAN NOTE
Patient with known CAD s/p stent placement, which is controlled Will continue Statin and monitor for S/Sx of angina/ACS. Continue to monitor on telemetry. Resume ASA and Plavix when cleared by urology.   Holding ASA and Plavix secondary to severe thrombocytopenia.

## 2025-07-13 NOTE — ASSESSMENT & PLAN NOTE
Known CAD s/p PCI several yrs ago, MPI 2021 neg.  Antiplt meds on hold with thrombocytopenia  Cont statin  No anginal/CHF sxs noted.  Minimal flat trop elev noted in setting of sepsis/JAIRON, ACS unlikely.  Check echo  No plan for inpat isch eval

## 2025-07-13 NOTE — SUBJECTIVE & OBJECTIVE
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     Back pain     Breast cyst     Cirrhosis     Depression 01/30/2019    Diabetes mellitus     Disorder of kidney and ureter     Glaucoma     Hyperlipidemia     Hypertension     Trouble in sleeping     Type 2 diabetes mellitus        Past Surgical History:   Procedure Laterality Date    BACK SURGERY      BREAST CYST ASPIRATION  1982    CATARACT EXTRACTION Bilateral     don't remember date    CHOLECYSTECTOMY      COLONOSCOPY N/A 3/1/2018    Procedure: COLONOSCOPY;  Surgeon: Ren Newton MD;  Location: [address]);  Service: Endoscopy;  Laterality: N/A;    HYSTERECTOMY  age 70    bleeding    INSERTION, STENT, ARTERY  2022    OOPHORECTOMY      TONSILLECTOMY, ADENOIDECTOMY         Review of patient's allergies indicates:   Allergen Reactions    Pravastatin      Other Reaction(s): Propensity to adverse reactions to drug       No current facility-administered medications on file prior to encounter.     Current Outpatient Medications on File Prior to Encounter   Medication Sig    ALPRAZolam (XANAX) 0.5 MG tablet TAKE 1 TABLET BY MOUTH NIGHTLY AS NEEDED FOR ANXIETY    aspirin 81 MG Chew Take 81 mg by mouth once daily.    atorvastatin (LIPITOR) 20 MG tablet Take 20 mg by mouth.    azelastine (ASTELIN) 137 mcg (0.1 %) nasal spray     calcium carbonate (TUMS) 200 mg calcium (500 mg) chewable tablet Take 1 tablet by mouth once daily.    cholecalciferol, vitamin D3, (VITAMIN D3) 25 mcg (1,000 unit) capsule Take 2,000 Units by mouth once daily.    clopidogreL (PLAVIX) 75 mg tablet Take 1 tablet (75 mg total) by mouth once daily.    coenzyme Q10 100 mg capsule Take 100 mg by mouth once daily.    CONSTULOSE 10 gram/15 mL solution Take 30 mLs by mouth once daily. (Patient not taking: Reported on 3/21/2025)    diclofenac sodium (VOLTAREN ARTHRITIS PAIN) 1 % Gel Apply  2 g topically once daily.    docusate sodium (COLACE) 100 MG capsule Take 1 capsule (100 mg total) by mouth 2 (two) times daily as needed for Constipation.    EScitalopram oxalate (LEXAPRO) 5 MG Tab Take 1 tablet by mouth once daily    fluticasone propionate (FLONASE) 50 mcg/actuation nasal spray 1 spray (50 mcg total) by Each Nostril route once daily.    guaiFENesin 100 mg/5 ml (ROBITUSSIN) 100 mg/5 mL syrup Take 200 mg by mouth 3 (three) times daily as needed for Cough. (Patient not taking: Reported on 3/21/2025)    hydrocortisone 2.5 % cream Apply topically 2 (two) times daily. (Patient not taking: Reported on 3/21/2025)    hydrOXYzine HCL (ATARAX) 10 MG Tab 1-3 tablets every 8 hours as needed for itching    ipratropium (ATROVENT) 42 mcg (0.06 %) nasal spray 2 sprays by Each Nostril route daily as needed.    loratadine (CLARITIN) 10 mg tablet Take 1 tablet (10 mg total) by mouth daily as needed for Allergies (as need d for allergies , sneezing , runny nose, take in am).    metFORMIN (GLUCOPHAGE) 500 MG tablet Take 1 tablet (500 mg total) by mouth 2 (two) times daily with meals.    metoprolol tartrate (LOPRESSOR) 100 MG tablet Take 0.5 tablets (50 mg total) by mouth 2 (two) times daily.    mupirocin (BACTROBAN) 2 % ointment Apply topically 3 (three) times daily.    nitroGLYCERIN (NITROSTAT) 0.4 MG SL tablet Place 0.4 mg under the tongue every 5 (five) minutes as needed for Chest pain.    nystatin (MYCOSTATIN) cream Apply topically 2 (two) times daily.    OMEGA 3-DHA-EPA-FISH OIL ORAL Take 1 capsule by mouth once daily. 700 mg    polyethylene glycol (GLYCOLAX) 17 gram/dose powder Take 17 g by mouth once daily. (Patient not taking: Reported on 3/21/2025)    pulse oximeter (PULSE OXIMETER) device by Apply Externally route 2 (two) times a day. Use twice daily at 8 AM and 3 PM and record the value in [facility] as directed. (Patient not taking: Reported on 3/21/2025)    sucralfate (CARAFATE) 1 gram tablet  (Patient not  taking: Reported on 3/21/2025)    sucralfate (CARAFATE) 100 mg/mL suspension Take 10 mLs by mouth 2 (two) times daily.    timolol maleate 0.5% (TIMOPTIC) 0.5 % Drop INSTILL 1 DROP INTO EACH EYE TWICE DAILY    triamcinolone acetonide 0.1% (KENALOG) 0.1 % cream Apply topically 2 (two) times daily.     Family History       Problem Relation (Age of Onset)    Kidney disease Father          Tobacco Use    Smoking status: Never     Passive exposure: Never    Smokeless tobacco: Never   Substance and Sexual Activity    Alcohol use: No    Drug use: No    Sexual activity: Yes     Partners: Male     Comment:      Review of Systems   Unable to perform ROS: Mental status change     Objective:     Vital Signs (Most Recent):  Temp: 97.4 °F (36.3 °C) (07/13/25 0701)  Pulse: 84 (07/13/25 0800)  Resp: 19 (07/13/25 0800)  BP: 135/61 (07/13/25 0800)  SpO2: 96 % (07/13/25 0800) Vital Signs (24h Range):  Temp:  [96.8 °F (36 °C)-99.1 °F (37.3 °C)] 97.4 °F (36.3 °C)  Pulse:  [] 84  Resp:  [15-35] 19  SpO2:  [84 %-98 %] 96 %  BP: ()/() 135/61  Arterial Line BP: ()/(33-65) 133/59     Weight: 71.1 kg (156 lb 12 oz)  Body mass index is 26.91 kg/m².    SpO2: 96 %         Intake/Output Summary (Last 24 hours) at 7/13/2025 0817  Last data filed at 7/13/2025 0800  Gross per 24 hour   Intake 6397.6 ml   Output 925 ml   Net 5472.6 ml       Lines/Drains/Airways       Central Venous Catheter Line  Duration             Percutaneous Central Line Triple Lumen 07/11/25 2137 Internal Jugular Right 1 day              Drain  Duration                  Ureteral Drain/Stent 07/12/25 1110 Left ureter 6 Fr. <1 day         Urethral Catheter 07/12/25 1111 Double-lumen;Non-latex 16 Fr. <1 day              Arterial Line  Duration             Arterial Line 07/12/25 1016 Left Radial <1 day              Peripheral Intravenous Line  Duration             Peripheral IV Single Lumen 07/11/25 1855 20 G Left Antecubital 1 day                      Physical Exam  Constitutional:       General: She is not in acute distress.     Appearance: Normal appearance. She is normal weight. She is not diaphoretic.   HENT:      Head: Normocephalic and atraumatic.   Cardiovascular:      Rate and Rhythm: Normal rate and regular rhythm.      Heart sounds: S1 normal and S2 normal. No murmur heard.     No friction rub. No gallop.   Pulmonary:      Effort: Pulmonary effort is normal. No respiratory distress.      Breath sounds: Normal breath sounds.   Abdominal:      General: There is no distension.      Palpations: Abdomen is soft.   Musculoskeletal:         General: No swelling.      Cervical back: Neck supple. No rigidity.      Right lower leg: No edema.      Left lower leg: No edema.   Skin:     General: Skin is warm and dry.      Coloration: Skin is not jaundiced.   Neurological:      Comments: sedated   Psychiatric:      Comments: sedated          Current Medications:   atorvastatin  20 mg Oral QHS    EScitalopram oxalate  5 mg Oral Daily    meropenem IV (PEDS and ADULTS)  1 g Intravenous Q12H    mupirocin   Nasal BID    vancomycin (VANCOCIN) IV (PEDS and ADULTS)  750 mg Intravenous Once      0.9% NaCl   Intravenous Continuous   Stopped at 07/13/25 0029    amiodarone in dextrose 5%  0.5 mg/min Intravenous Continuous 16.7 mL/hr at 07/13/25 0800 0.5 mg/min at 07/13/25 0800    dexmedeTOMIDine (Precedex) infusion (titrating)  0-1.4 mcg/kg/hr Intravenous Continuous 3.56 mL/hr at 07/13/25 0800 0.2 mcg/kg/hr at 07/13/25 0800    NORepinephrine bitartrate-D5W  0-3 mcg/kg/min Intravenous Continuous 16.7 mL/hr at 07/13/25 0800 0.5 mcg/kg/min at 07/13/25 0800    vasopressin  0.04 Units/min Intravenous Continuous 12 mL/hr at 07/13/25 0801 0.04 Units/min at 07/13/25 0801       Current Facility-Administered Medications:     0.9%  NaCl infusion (for blood administration), , Intravenous, Q24H PRN    0.9%  NaCl infusion (for blood administration), , Intravenous, Q24H PRN    acetaminophen,  650 mg, Oral, Q4H PRN    ALPRAZolam, 0.5 mg, Oral, Nightly PRN    dextrose 50%, 12.5 g, Intravenous, PRN    glucagon (human recombinant), 1 mg, Intramuscular, PRN    hydrALAZINE, 5 mg, Intravenous, Q6H PRN    hydrOXYzine HCL, 10 mg, Oral, TID PRN    insulin aspart U-100, 0-5 Units, Subcutaneous, Q6H PRN    ipratropium, 0.5 mg, Nebulization, Q4H PRN    levalbuterol, 1.25 mg, Nebulization, Q4H PRN    melatonin, 6 mg, Oral, Nightly PRN    ondansetron, 4 mg, Intravenous, Q6H PRN    simethicone, 1 tablet, Oral, QID PRN    sodium chloride 0.9%, 10 mL, Intravenous, Q12H PRN    Pharmacy to dose Vancomycin consult, , , Once **AND** vancomycin - pharmacy to dose, , Intravenous, pharmacy to manage frequency    Laboratory (all labs reviewed):  CBC:  Recent Labs   Lab 07/10/25  0945 07/11/25 2036 07/12/25 0518 07/12/25 2104 07/13/25  0421   WBC 2.53 L 5.75 12.59 12.01 13.04 H   HGB 10.9 L 11.5 L 10.9 L 10.2 L 9.7 L   HCT 33.8 L 34.7 L 34.0 L 31.6 L 30.2 L   Platelet Count 69 L 42 L 35 LL 12 LL 27 LL       CHEMISTRIES:  Recent Labs   Lab 02/19/25  0715 07/11/25 2036 07/12/25 0518 07/12/25 2104 07/13/25  0421   Glucose 105 127 H 124 H 93 116 H   Sodium 142 140 137 137 137   Potassium 4.3 3.3 L 3.6 4.2 4.2   BUN 15 21 25 H 33 H 36 H   Creatinine 0.8 1.5 H 1.5 H 1.5 H 1.4   eGFR >60 34 L 34 L 34 L 37 L   Calcium 9.7 9.5 7.4 L 7.6 L 7.7 L   Magnesium   --  1.3 L 1.0 L 1.5 L  --        CARDIAC BIOMARKERS:  Recent Labs   Lab 07/11/25 2036 07/13/25  0421 07/13/25  0700   CPK 42  --   --    Troponin-I 0.023 0.154 H 0.127 H       COAGS:  Recent Labs   Lab 07/12/25  0518   INR 1.6 H       LIPIDS/LFTS:  Recent Labs   Lab 08/09/22  0711 02/08/23  0710 08/11/23  0755 02/01/24  0000 05/07/24  0721 08/19/24  0705 02/19/25  0715 07/10/25  0945 07/11/25 2036   Cholesterol 150 147 127 124  --  111 L 120  --   --    Triglycerides 202 H 192 H 144 97  --  119 108  --   --    HDL 35 L 36 L 36 L 47  --  41 42  --   --    LDL Cholesterol 74.6  72.6 62.2 L  --   --  46.2 L 56.4 L  --   --    Non-HDL Cholesterol 115 111 91  --   --  70 78  --   --    Non HDL Chol. (LDL+VLDL)  --   --   --  77  --   --   --   --   --    AST 21 20 22  --  22 19 24 23 35   ALT 14 14 16  --  14 10 20 15 19       BNP:  Recent Labs   Lab 07/11/25  2036    H       TSH:  Recent Labs   Lab 08/09/22  0711 02/08/23  0710 08/11/23  0755 08/19/24  0705 02/19/25  0715   TSH 2.903 2.063 1.834 1.219 1.814       Free T4:        Diagnostic Results:  ECG (personally reviewed and interpreted tracing(s)):  7/11/25 1955 SR 89, RBBB  7/13/25 0027 , RBBB    Chest X-Ray (personally reviewed and interpreted image(s)): 7/13/25 low volumes, ?CHF, R IJV TLC    Echo: ordered    CV testing (Melida Griffin MD note 2/7/24)

## 2025-07-13 NOTE — ASSESSMENT & PLAN NOTE
Patient's blood pressure range in the last 24 hours was: BP  Min: 96/46  Max: 161/68.The patient's inpatient anti-hypertensive regimen is listed below:  Current Antihypertensives  hydrALAZINE injection 5 mg, Every 6 hours PRN, Intravenous for SBP>180 or DBP>90   Medications on hold as patient is in shock.

## 2025-07-13 NOTE — NURSING
Brusing of Rt hand noticied, and the finger tips are blue. Pt on Pressors support. Probably due to BPcuff and low platelet count and pressors. BP cuff removed. Palpable pulse, Pt temp is 98.6 MD notified.

## 2025-07-14 PROBLEM — Z71.89 ACP (ADVANCE CARE PLANNING): Status: ACTIVE | Noted: 2025-07-14

## 2025-07-14 LAB
ABSOLUTE EOSINOPHIL (OHS): 0.02 K/UL
ABSOLUTE MONOCYTE (OHS): 0.43 K/UL (ref 0.3–1)
ABSOLUTE NEUTROPHIL COUNT (OHS): 5.03 K/UL (ref 1.8–7.7)
ALBUMIN SERPL BCP-MCNC: 2.2 G/DL (ref 3.5–5.2)
ALP SERPL-CCNC: 63 UNIT/L (ref 40–150)
ALT SERPL W/O P-5'-P-CCNC: 20 UNIT/L (ref 10–44)
ANION GAP (OHS): 9 MMOL/L (ref 8–16)
AST SERPL-CCNC: 25 UNIT/L (ref 11–45)
BACTERIA UR CULT: NO GROWTH
BASOPHILS # BLD AUTO: 0.02 K/UL
BASOPHILS NFR BLD AUTO: 0.3 %
BILIRUB SERPL-MCNC: 1 MG/DL (ref 0.1–1)
BUN SERPL-MCNC: 36 MG/DL (ref 8–23)
CALCIUM SERPL-MCNC: 7.9 MG/DL (ref 8.7–10.5)
CHLORIDE SERPL-SCNC: 112 MMOL/L (ref 95–110)
CO2 SERPL-SCNC: 19 MMOL/L (ref 23–29)
CREAT SERPL-MCNC: 0.8 MG/DL (ref 0.5–1.4)
ERYTHROCYTE [DISTWIDTH] IN BLOOD BY AUTOMATED COUNT: 17.1 % (ref 11.5–14.5)
GFR SERPLBLD CREATININE-BSD FMLA CKD-EPI: >60 ML/MIN/1.73/M2
GLUCOSE SERPL-MCNC: 102 MG/DL (ref 70–110)
HCT VFR BLD AUTO: 26.2 % (ref 37–48.5)
HGB BLD-MCNC: 8.5 GM/DL (ref 12–16)
IMM GRANULOCYTES # BLD AUTO: 0.29 K/UL (ref 0–0.04)
IMM GRANULOCYTES NFR BLD AUTO: 4.7 % (ref 0–0.5)
LACTATE SERPL-SCNC: 2.2 MMOL/L (ref 0.5–2.2)
LYMPHOCYTES # BLD AUTO: 0.35 K/UL (ref 1–4.8)
MAGNESIUM SERPL-MCNC: 1.9 MG/DL (ref 1.6–2.6)
MCH RBC QN AUTO: 28.9 PG (ref 27–31)
MCHC RBC AUTO-ENTMCNC: 32.4 G/DL (ref 32–36)
MCV RBC AUTO: 89 FL (ref 82–98)
NUCLEATED RBC (/100WBC) (OHS): 0 /100 WBC
OHS QRS DURATION: 112 MS
OHS QTC CALCULATION: 442 MS
PATHOLOGIST REVIEW - CBC/DIFF (OHS): NORMAL
PHOSPHATE SERPL-MCNC: 2.7 MG/DL (ref 2.7–4.5)
PLATELET # BLD AUTO: 15 K/UL (ref 150–450)
PLATELET BLD QL SMEAR: ABNORMAL
PMV BLD AUTO: 11.3 FL (ref 9.2–12.9)
POCT GLUCOSE: 128 MG/DL (ref 70–110)
POCT GLUCOSE: 144 MG/DL (ref 70–110)
POCT GLUCOSE: 58 MG/DL (ref 70–110)
POCT GLUCOSE: 78 MG/DL (ref 70–110)
POCT GLUCOSE: 96 MG/DL (ref 70–110)
POCT GLUCOSE: 99 MG/DL (ref 70–110)
POTASSIUM SERPL-SCNC: 3.3 MMOL/L (ref 3.5–5.1)
PROT SERPL-MCNC: 5.2 GM/DL (ref 6–8.4)
RBC # BLD AUTO: 2.94 M/UL (ref 4–5.4)
RELATIVE EOSINOPHIL (OHS): 0.3 %
RELATIVE LYMPHOCYTE (OHS): 5.7 % (ref 18–48)
RELATIVE MONOCYTE (OHS): 7 % (ref 4–15)
RELATIVE NEUTROPHIL (OHS): 82 % (ref 38–73)
SODIUM SERPL-SCNC: 140 MMOL/L (ref 136–145)
WBC # BLD AUTO: 6.14 K/UL (ref 3.9–12.7)

## 2025-07-14 PROCEDURE — 99232 SBSQ HOSP IP/OBS MODERATE 35: CPT | Mod: ,,, | Performed by: STUDENT IN AN ORGANIZED HEALTH CARE EDUCATION/TRAINING PROGRAM

## 2025-07-14 PROCEDURE — 80053 COMPREHEN METABOLIC PANEL: CPT | Performed by: HOSPITALIST

## 2025-07-14 PROCEDURE — 99900035 HC TECH TIME PER 15 MIN (STAT)

## 2025-07-14 PROCEDURE — 63600175 PHARM REV CODE 636 W HCPCS: Performed by: INTERNAL MEDICINE

## 2025-07-14 PROCEDURE — 99223 1ST HOSP IP/OBS HIGH 75: CPT | Mod: 25,,, | Performed by: REGISTERED NURSE

## 2025-07-14 PROCEDURE — 99497 ADVNCD CARE PLAN 30 MIN: CPT | Mod: 25,,, | Performed by: REGISTERED NURSE

## 2025-07-14 PROCEDURE — 85025 COMPLETE CBC W/AUTO DIFF WBC: CPT | Performed by: HOSPITALIST

## 2025-07-14 PROCEDURE — 99291 CRITICAL CARE FIRST HOUR: CPT | Mod: ,,, | Performed by: INTERNAL MEDICINE

## 2025-07-14 PROCEDURE — 27000221 HC OXYGEN, UP TO 24 HOURS

## 2025-07-14 PROCEDURE — 25000003 PHARM REV CODE 250: Performed by: INTERNAL MEDICINE

## 2025-07-14 PROCEDURE — 25000003 PHARM REV CODE 250: Performed by: HOSPITALIST

## 2025-07-14 PROCEDURE — 94761 N-INVAS EAR/PLS OXIMETRY MLT: CPT

## 2025-07-14 PROCEDURE — 20000000 HC ICU ROOM

## 2025-07-14 PROCEDURE — 84100 ASSAY OF PHOSPHORUS: CPT | Performed by: HOSPITALIST

## 2025-07-14 PROCEDURE — 83605 ASSAY OF LACTIC ACID: CPT | Performed by: HOSPITALIST

## 2025-07-14 PROCEDURE — 83735 ASSAY OF MAGNESIUM: CPT | Performed by: HOSPITALIST

## 2025-07-14 RX ORDER — ALPRAZOLAM 0.25 MG/1
0.25 TABLET ORAL NIGHTLY PRN
Status: DISCONTINUED | OUTPATIENT
Start: 2025-07-14 | End: 2025-07-19 | Stop reason: HOSPADM

## 2025-07-14 RX ADMIN — ESCITALOPRAM 5 MG: 5 TABLET, FILM COATED ORAL at 08:07

## 2025-07-14 RX ADMIN — ATORVASTATIN CALCIUM 20 MG: 10 TABLET, FILM COATED ORAL at 09:07

## 2025-07-14 RX ADMIN — MEROPENEM 1 G: 1 INJECTION, POWDER, FOR SOLUTION INTRAVENOUS at 05:07

## 2025-07-14 RX ADMIN — MUPIROCIN: 20 OINTMENT TOPICAL at 08:07

## 2025-07-14 RX ADMIN — SODIUM CHLORIDE: 9 INJECTION, SOLUTION INTRAVENOUS at 08:07

## 2025-07-14 RX ADMIN — MEROPENEM 1 G: 1 INJECTION, POWDER, FOR SOLUTION INTRAVENOUS at 04:07

## 2025-07-14 RX ADMIN — MUPIROCIN: 20 OINTMENT TOPICAL at 09:07

## 2025-07-14 NOTE — SUBJECTIVE & OBJECTIVE
Interval History: more awake than yesterday.  Remains confused.    Review of Systems   Unable to perform ROS: Mental status change     Objective:     Vital Signs (Most Recent):  Temp: 97.5 °F (36.4 °C) (07/14/25 1101)  Pulse: 82 (07/14/25 1230)  Resp: 18 (07/14/25 1230)  BP: (!) 137/54 (07/13/25 1700)  SpO2: 98 % (07/14/25 1230) Vital Signs (24h Range):  Temp:  [97.5 °F (36.4 °C)-98.3 °F (36.8 °C)] 97.5 °F (36.4 °C)  Pulse:  [] 82  Resp:  [16-40] 18  SpO2:  [91 %-99 %] 98 %  BP: (137-160)/(54-70) 137/54  Arterial Line BP: ()/(39-64) 134/48     Weight: 71.1 kg (156 lb 12 oz)  Body mass index is 26.91 kg/m².    Intake/Output Summary (Last 24 hours) at 7/14/2025 1358  Last data filed at 7/14/2025 1200  Gross per 24 hour   Intake 549.01 ml   Output 844 ml   Net -294.99 ml         Physical Exam  Constitutional:       Appearance: She is ill-appearing. She is not diaphoretic.   HENT:      Head: Normocephalic and atraumatic.      Mouth/Throat:      Mouth: Mucous membranes are dry.      Pharynx: No oropharyngeal exudate or posterior oropharyngeal erythema.   Cardiovascular:      Rate and Rhythm: Normal rate and regular rhythm.   Pulmonary:      Effort: Pulmonary effort is normal.      Breath sounds: No wheezing.   Abdominal:      General: Bowel sounds are normal.      Palpations: Abdomen is soft.   Musculoskeletal:         General: No deformity or signs of injury.   Skin:     General: Skin is warm and dry.   Neurological:      General: No focal deficit present.      Mental Status: She is disoriented.               Significant Labs: All pertinent labs within the past 24 hours have been reviewed.  BMP:   Recent Labs   Lab 07/14/25  0903         K 3.3*   *   CO2 19*   BUN 36*   CREATININE 0.8   CALCIUM 7.9*   MG 1.9     CBC:   Recent Labs   Lab 07/12/25  2104 07/13/25  0421 07/14/25  0903   WBC 12.01 13.04* 6.14   HGB 10.2* 9.7* 8.5*   HCT 31.6* 30.2* 26.2*   PLT 12* 27* 15*       Significant  Imaging: I have reviewed all pertinent imaging results/findings within the past 24 hours.

## 2025-07-14 NOTE — ASSESSMENT & PLAN NOTE
- chronic condition noted; DILLARD cirrhosis   - no acute bleeding concerns per MDT; cont mgmt per HM

## 2025-07-14 NOTE — ASSESSMENT & PLAN NOTE
- noted; currently weaning precedex   - age, severity of condition, and ICU warrants hospital delirium; suspect also related to abrupt stop of chronic benzo use   -  shares that although home RX alprazolam  is 0.5 mg, she only takes 1/2 a tablet nightly; discussed with HM for dose adjustment   - while benzo's are not ideal given age, agree with restarting to see if improves delirium to allow for better assessment of acute neuro changes related to sepsis as opposed to medication changes   - would recommend OP palliative involvement to discuss/assist with transition from regular benzo use when in better health   - cont mgmt per HM

## 2025-07-14 NOTE — ASSESSMENT & PLAN NOTE
Noted overnight, ?PSVT.  No evidence of sustained AFL/AF  Amio stopped  Cont tele  Consider BBL if BP allows

## 2025-07-14 NOTE — ASSESSMENT & PLAN NOTE
Consult - 7/14/2025  - consult received; interval chart reviewed in detail; discussed pt with MDT during ICU rounds   - met with patient and , Josue, at bedside; introduction to palliative medicine team and role in current care and admission   - pt currently without capacity for GOC/ACP discussion or decision making; , Josue ([phone]), is legal surrogate decision maker   - learned more about pt outside of current admission; she is typically very active and able bodied, aside of use of walker; she is able to perform ADLs and does all household chores, cooking, cleaning, laundry per ; they share 2 children Josue Johnson And Robyn who both live in [place] as well    - GOC/ACP discussion  - to 's knowledge they have living [place] at home; reviewed prior wishes and GOC discussions   - informed Mr. Salas that the wishes he expressed were consistent with DNR/DNI; discussed this in detail and he confirmed wishes for DNR/DNI; confirmed that a DNR order would be placed in chart (updated RN and HM)   - discussed availability of LAPOST document for these wishes and plan for LAPOST completion when pt is able to participate in the discussion/before discharge   - GOC for pt to be able to return to prior hospitalization abilities/baseline; will require working with PT/OT when able  -  initially lacked insight into severity of pt's condition, seeming shocked that pt was not potentially going home today or tomorrow; reviewed details of pt's care, including pressor requirements and sepsis affects on the body; explained progress that has been made with source identification and control, but explained the necessary steps between now and being ready for discharge   - offered to call pt's daughter who has been involved in admission as well, Mr. Salas wishes to update her when she relieves him this afternoon (she has been staying with pt at night and Mr. Salas has been staying during the day)   -  emotional support provided   - Allowed time for questions/concerns; all addressed; expressed availability of myself/palliative team for additional questions/concerns   - updated MDT

## 2025-07-14 NOTE — ASSESSMENT & PLAN NOTE
- noted, suspected source of sepsis; s/p cysto/stent  - urology also visited during initial consult, and shared plan for OP follow up at [facility] for additional procedure   - urology following; cont mgmt per HM and urology

## 2025-07-14 NOTE — SUBJECTIVE & OBJECTIVE
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     Back pain     Breast cyst     Cirrhosis     Depression 01/30/2019    Diabetes mellitus     Disorder of kidney and ureter     Glaucoma     Hyperlipidemia     Hypertension     Trouble in sleeping     Type 2 diabetes mellitus        Past Surgical History:   Procedure Laterality Date    BACK SURGERY      BREAST CYST ASPIRATION  1982    CATARACT EXTRACTION Bilateral     don't remember date    CHOLECYSTECTOMY      COLONOSCOPY N/A 3/1/2018    Procedure: COLONOSCOPY;  Surgeon: Ren Newton MD;  Location: [address]);  Service: Endoscopy;  Laterality: N/A;    HYSTERECTOMY  age 70    bleeding    INSERTION, STENT, ARTERY  2022    OOPHORECTOMY      TONSILLECTOMY, ADENOIDECTOMY         Review of patient's allergies indicates:   Allergen Reactions    Pravastatin      Other Reaction(s): Propensity to adverse reactions to drug       Medications:  Continuous Infusions:   0.9% NaCl   Intravenous Continuous 100 mL/hr at 07/14/25 1000 Rate Verify at 07/14/25 1000    dexmedeTOMIDine (Precedex) infusion (titrating)  0-1.4 mcg/kg/hr Intravenous Continuous   Stopped at 07/13/25 0922    NORepinephrine bitartrate-D5W  0-3 mcg/kg/min Intravenous Continuous 2.3 mL/hr at 07/14/25 1000 0.07 mcg/kg/min at 07/14/25 1000    vasopressin  0.04 Units/min Intravenous Continuous   Stopped at 07/13/25 0831     Scheduled Meds:   atorvastatin  20 mg Oral QHS    EScitalopram oxalate  5 mg Oral Daily    meropenem IV (PEDS and ADULTS)  1 g Intravenous Q12H    mupirocin   Nasal BID     PRN Meds:  Current Facility-Administered Medications:     0.9%  NaCl infusion (for blood administration), , Intravenous, Q24H PRN    acetaminophen, 650 mg, Oral, Q4H PRN    ALPRAZolam, 0.5 mg, Oral, Nightly PRN    dextrose 50%, 12.5 g, Intravenous, PRN    glucagon (human recombinant), 1 mg, Intramuscular,  PRN    hydrALAZINE, 5 mg, Intravenous, Q6H PRN    hydrOXYzine HCL, 10 mg, Oral, TID PRN    insulin aspart U-100, 0-5 Units, Subcutaneous, Q6H PRN    ipratropium, 0.5 mg, Nebulization, Q4H PRN    levalbuterol, 1.25 mg, Nebulization, Q4H PRN    melatonin, 6 mg, Oral, Nightly PRN    ondansetron, 4 mg, Intravenous, Q6H PRN    simethicone, 1 tablet, Oral, QID PRN    sodium chloride 0.9%, 10 mL, Intravenous, Q12H PRN    Family History       Problem Relation (Age of Onset)    Kidney disease Father          Tobacco Use    Smoking status: Never     Passive exposure: Never    Smokeless tobacco: Never   Substance and Sexual Activity    Alcohol use: No    Drug use: No    Sexual activity: Yes     Partners: Male     Comment:        Review of Systems   Unable to perform ROS: Mental status change     Objective:     Vital Signs (Most Recent):  Temp: 97.5 °F (36.4 °C) (07/14/25 0715)  Pulse: 83 (07/14/25 1015)  Resp: (!) 24 (07/14/25 1015)  BP: (!) 137/54 (07/13/25 1700)  SpO2: 97 % (07/14/25 1015) Vital Signs (24h Range):  Temp:  [97.5 °F (36.4 °C)-98.3 °F (36.8 °C)] 97.5 °F (36.4 °C)  Pulse:  [] 83  Resp:  [16-40] 24  SpO2:  [91 %-99 %] 97 %  BP: (135-160)/(54-70) 137/54  Arterial Line BP: ()/(39-64) 127/48     Weight: 71.1 kg (156 lb 12 oz)  Body mass index is 26.91 kg/m².       Physical Exam  Vitals and nursing note reviewed.   Constitutional:       Interventions: Nasal cannula in place.   Pulmonary:      Effort: Pulmonary effort is normal. No respiratory distress.   Neurological:      Mental Status: She is easily aroused. She is lethargic and disoriented.   Psychiatric:         Behavior: Behavior is cooperative.       Advance Care Planning   Advance Directives:   Living Will: No    LaPOST: No (plan for completion prior to discharge)    Do Not Resuscitate Status: Yes    Medical Power of : No ( Josue is legal surrogate decision maker)      Decision Making:  Family answered questions  Goals of  Care: The family endorses that what is most important right now is to focus on spending time at home, avoiding the hospital, remaining as independent as possible, and symptom/pain control    Accordingly, we have decided that the best plan to meet the patient's goals includes continuing with treatment         Significant Labs: All pertinent labs within the past 24 hours have been reviewed.  CBC:   Recent Labs   Lab 07/14/25  0903   WBC 6.14   HGB 8.5*   HCT 26.2*   MCV 89   PLT 15*     BMP:  Recent Labs   Lab 07/14/25  0903         K 3.3*   *   CO2 19*   BUN 36*   CREATININE 0.8   CALCIUM 7.9*   MG 1.9     LFT:  Lab Results   Component Value Date    AST 25 07/14/2025    GGT 29 10/19/2020    ALKPHOS 63 07/14/2025    BILITOT 1.0 07/14/2025     Albumin:   Albumin   Date Value Ref Range Status   07/14/2025 2.2 (L) 3.5 - 5.2 g/dL Final   02/19/2025 3.7 3.5 - 5.2 g/dL Final     Protein:   Protein Total   Date Value Ref Range Status   07/14/2025 5.2 (L) 6.0 - 8.4 gm/dL Final     Total Protein   Date Value Ref Range Status   02/19/2025 7.1 6.0 - 8.4 g/dL Final     Lactic acid:   Lab Results   Component Value Date    LACTATE 2.2 07/14/2025    LACTATE 3.3 (H) 07/13/2025       Significant Imaging: I have reviewed all pertinent imaging results/findings within the past 24 hours.

## 2025-07-14 NOTE — ASSESSMENT & PLAN NOTE
- noted; requiring ICU level care, pressors, and IV Abx this admission   - renal source s/p cysto/stent placement; urology following   - in depth discussion with  regarding sepsis and pt's current condition, as there was initially concern for lack of insight into severity of condition at this time  - cont mgmt per HM

## 2025-07-14 NOTE — PROGRESS NOTES
[facility] [place] Intensive Care  Urology  Progress Note    Patient Name: Marine Mo  MRN: [id number]  Admission Date: 7/12/2025  Hospital Length of Stay: 2 days  Code Status: DNR   Attending Provider: Sebastien Jeffery MD   Primary Care Physician: Serg Hwang MD    Subjective:     HPI:  84 yo woman presents from [facility] for septic shock 2/2 to obstructing pyelonephritis from left 1.7cm UPJ stone. Patient somnolent and hard of hearing. Daughter and  at bedside. First time stone event. Patient requiring pressor support to maintain BP.     Interval History: no stent related concerns, Cr improved,  without questions     Review of Systems   Constitutional:  Negative for activity change, appetite change, chills, diaphoresis and fever.   HENT:  Negative for hearing loss.    Eyes:  Negative for visual disturbance.   Respiratory:  Negative for cough.    Gastrointestinal:  Negative for abdominal distention, abdominal pain, constipation, nausea and vomiting.   Musculoskeletal:  Negative for neck pain and neck stiffness.   Neurological:  Negative for dizziness and weakness.     Objective:     Temp:  [97.5 °F (36.4 °C)-98.3 °F (36.8 °C)] 97.5 °F (36.4 °C)  Pulse:  [] 82  Resp:  [16-40] 18  SpO2:  [91 %-99 %] 98 %  BP: (137-160)/(54-70) 137/54  Arterial Line BP: ()/(39-64) 134/48     Body mass index is 26.91 kg/m².    Date 07/14/25 0700 - 07/15/25 0659   Shift 7375-0347 6713-7541 1431-6829 24 Hour Total   INTAKE   I.V.(mL/kg) 383.3(5.4)   383.3(5.4)   Shift Total(mL/kg) 383.3(5.4)   383.3(5.4)   OUTPUT   Urine(mL/kg/hr) 400   400   Shift Total(mL/kg) 400(5.6)   400(5.6)   Weight (kg) 71.1 71.1 71.1 71.1          Drains       Drain  Duration                  Ureteral Drain/Stent 07/12/25 1110 Left ureter 6 Fr. 2 days         Urethral Catheter 07/12/25 1111 Double-lumen;Non-latex 16 Fr. 2 days                     Physical Exam  Constitutional:       Appearance: She is well-developed.   HENT:       Head: Normocephalic and atraumatic.   Eyes:      Conjunctiva/sclera: Conjunctivae normal.   Pulmonary:      Effort: Pulmonary effort is normal. No respiratory distress.   Abdominal:      General: Abdomen is flat. There is no distension.      Tenderness: There is no abdominal tenderness.   Skin:     Findings: No rash.   Neurological:      Mental Status: She is alert. Mental status is at baseline.   Psychiatric:         Behavior: Behavior normal.           Significant Labs:    BMP:  Recent Labs   Lab 07/12/25 2104 07/13/25 0421 07/14/25  0903    137 140   K 4.2 4.2 3.3*    109 112*   CO2 16* 12* 19*   BUN 33* 36* 36*   CREATININE 1.5* 1.4 0.8   CALCIUM 7.6* 7.7* 7.9*       CBC:   Recent Labs   Lab 07/12/25 2104 07/13/25 0421 07/14/25  0903   WBC 12.01 13.04* 6.14   HGB 10.2* 9.7* 8.5*   HCT 31.6* 30.2* 26.2*   PLT 12* 27* 15*                 Assessment/Plan:     * Sepsis with acute renal failure and septic shock  S/p L ureteral stent placement due to septic shock, obstructing stone in the left kidney/ UPJ  Discussed due to presence of infection, delayed definitive management of stone is recommended  They will need two weeks of culture directed antibiotics  Discussed they will follow up locally with Dr. Villaseñor in [facility], message sent to facilitate ease of transfer of care  Silva per primary service   Urine and blood cultures pending  Discussed care w/ primary service    Left ureteral stone  S/p L ureteral stent        VTE Risk Mitigation (From admission, onward)           Ordered     IP VTE HIGH RISK PATIENT  Once         07/12/25 0457     Place sequential compression device  Until discontinued         07/12/25 0457     Reason for No Pharmacological VTE Prophylaxis  Once        Comments: Urology plans on cysto with stent placement   Question:  Reasons:  Answer:  Physician Provided (leave comment)    07/12/25 0457                    Aliya Castañeda MD  Urology  [facility] Intensive Care

## 2025-07-14 NOTE — HPI
"From H&P: "85 y.o. woman with h/o NIDDM type 2 (A1c 5 in Feb), Essential HTN, HLD, DILLARD cirrhosis with polycystic liver dz and chronic thrombocytopenia, Depression/General Anxiety d/o, CAD (h/o stent in LAD on ASA/Plavix), Anemia presents to the [place] ED with family c/o fevers and chills for the past 1-2 days. Was febrile at 100.1 and hypotensive. Started on IVF, IV vanc/zosyn and eventually started on peripheral levophed for Sepsis. "    Palliative medicine consulted for goals of care discussion and advance care planning; for details of visit, see advance care planning section of plan.     "

## 2025-07-14 NOTE — EICU
Intervention Initiated From:  COR / DESIREEU    Marcos intervened regarding:  Rounding (Video assessment)    VICU Night Rounds Checklist  24H Vital Sign Range:  Temp:  [97.4 °F (36.3 °C)-98.3 °F (36.8 °C)]   Pulse:  []   Resp:  [17-35]   BP: ()/(46-79)   SpO2:  [84 %-99 %]   Arterial Line BP: ()/(37-65)     Video rounds and LDA reconciliation

## 2025-07-14 NOTE — NURSING
[facility] [place]  Nurses Note -- 4 Eyes      7/14/2025       Skin assessed on: Q Shift      [x] No Pressure Injuries Present    [x]Prevention Measures Documented    [] Yes LDA  for Pressure Injury Previously documented     [] Yes New Pressure Injury Discovered   [] LDA for New Pressure Injury Added      Attending RN:  Kimberli Aguiar, RN     Second RN:  Steve

## 2025-07-14 NOTE — ASSESSMENT & PLAN NOTE
- chronic cardiac conditions noted; echo this admission with preserved EF, G1DD and pul HTN also present   - cont mgmt per HM and cardiology

## 2025-07-14 NOTE — SUBJECTIVE & OBJECTIVE
Interval History: no stent related concerns, Cr improved,  without questions     Review of Systems   Constitutional:  Negative for activity change, appetite change, chills, diaphoresis and fever.   HENT:  Negative for hearing loss.    Eyes:  Negative for visual disturbance.   Respiratory:  Negative for cough.    Gastrointestinal:  Negative for abdominal distention, abdominal pain, constipation, nausea and vomiting.   Musculoskeletal:  Negative for neck pain and neck stiffness.   Neurological:  Negative for dizziness and weakness.     Objective:     Temp:  [97.5 °F (36.4 °C)-98.3 °F (36.8 °C)] 97.5 °F (36.4 °C)  Pulse:  [] 82  Resp:  [16-40] 18  SpO2:  [91 %-99 %] 98 %  BP: (137-160)/(54-70) 137/54  Arterial Line BP: ()/(39-64) 134/48     Body mass index is 26.91 kg/m².    Date 07/14/25 0700 - 07/15/25 0659   Shift 9070-1023 8848-3743 1667-3717 24 Hour Total   INTAKE   I.V.(mL/kg) 383.3(5.4)   383.3(5.4)   Shift Total(mL/kg) 383.3(5.4)   383.3(5.4)   OUTPUT   Urine(mL/kg/hr) 400   400   Shift Total(mL/kg) 400(5.6)   400(5.6)   Weight (kg) 71.1 71.1 71.1 71.1          Drains       Drain  Duration                  Ureteral Drain/Stent 07/12/25 1110 Left ureter 6 Fr. 2 days         Urethral Catheter 07/12/25 1111 Double-lumen;Non-latex 16 Fr. 2 days                     Physical Exam  Constitutional:       Appearance: She is well-developed.   HENT:      Head: Normocephalic and atraumatic.   Eyes:      Conjunctiva/sclera: Conjunctivae normal.   Pulmonary:      Effort: Pulmonary effort is normal. No respiratory distress.   Abdominal:      General: Abdomen is flat. There is no distension.      Tenderness: There is no abdominal tenderness.   Skin:     Findings: No rash.   Neurological:      Mental Status: She is alert. Mental status is at baseline.   Psychiatric:         Behavior: Behavior normal.           Significant Labs:    BMP:  Recent Labs   Lab 07/12/25  2104 07/13/25  0421 07/14/25  0903     137 140   K 4.2 4.2 3.3*    109 112*   CO2 16* 12* 19*   BUN 33* 36* 36*   CREATININE 1.5* 1.4 0.8   CALCIUM 7.6* 7.7* 7.9*       CBC:   Recent Labs   Lab 07/12/25  2104 07/13/25  0421 07/14/25  0903   WBC 12.01 13.04* 6.14   HGB 10.2* 9.7* 8.5*   HCT 31.6* 30.2* 26.2*   PLT 12* 27* 15*

## 2025-07-14 NOTE — SUBJECTIVE & OBJECTIVE
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     Back pain     Breast cyst     Cirrhosis     Depression 01/30/2019    Diabetes mellitus     Disorder of kidney and ureter     Glaucoma     Hyperlipidemia     Hypertension     Trouble in sleeping     Type 2 diabetes mellitus        Past Surgical History:   Procedure Laterality Date    BACK SURGERY      BREAST CYST ASPIRATION  1982    CATARACT EXTRACTION Bilateral     don't remember date    CHOLECYSTECTOMY      COLONOSCOPY N/A 3/1/2018    Procedure: COLONOSCOPY;  Surgeon: Ren Newton MD;  Location: [address]);  Service: Endoscopy;  Laterality: N/A;    HYSTERECTOMY  age 70    bleeding    INSERTION, STENT, ARTERY  2022    OOPHORECTOMY      TONSILLECTOMY, ADENOIDECTOMY         Review of patient's allergies indicates:   Allergen Reactions    Pravastatin      Other Reaction(s): Propensity to adverse reactions to drug       No current facility-administered medications on file prior to encounter.     Current Outpatient Medications on File Prior to Encounter   Medication Sig    ALPRAZolam (XANAX) 0.5 MG tablet TAKE 1 TABLET BY MOUTH NIGHTLY AS NEEDED FOR ANXIETY    aspirin 81 MG Chew Take 81 mg by mouth once daily.    atorvastatin (LIPITOR) 20 MG tablet Take 20 mg by mouth.    azelastine (ASTELIN) 137 mcg (0.1 %) nasal spray     calcium carbonate (TUMS) 200 mg calcium (500 mg) chewable tablet Take 1 tablet by mouth once daily.    cholecalciferol, vitamin D3, (VITAMIN D3) 25 mcg (1,000 unit) capsule Take 2,000 Units by mouth once daily.    clopidogreL (PLAVIX) 75 mg tablet Take 1 tablet (75 mg total) by mouth once daily.    coenzyme Q10 100 mg capsule Take 100 mg by mouth once daily.    CONSTULOSE 10 gram/15 mL solution Take 30 mLs by mouth once daily. (Patient not taking: Reported on 3/21/2025)    diclofenac sodium (VOLTAREN ARTHRITIS PAIN) 1 % Gel Apply  2 g topically once daily.    docusate sodium (COLACE) 100 MG capsule Take 1 capsule (100 mg total) by mouth 2 (two) times daily as needed for Constipation.    EScitalopram oxalate (LEXAPRO) 5 MG Tab Take 1 tablet by mouth once daily    fluticasone propionate (FLONASE) 50 mcg/actuation nasal spray 1 spray (50 mcg total) by Each Nostril route once daily.    guaiFENesin 100 mg/5 ml (ROBITUSSIN) 100 mg/5 mL syrup Take 200 mg by mouth 3 (three) times daily as needed for Cough. (Patient not taking: Reported on 3/21/2025)    hydrocortisone 2.5 % cream Apply topically 2 (two) times daily. (Patient not taking: Reported on 3/21/2025)    hydrOXYzine HCL (ATARAX) 10 MG Tab 1-3 tablets every 8 hours as needed for itching    ipratropium (ATROVENT) 42 mcg (0.06 %) nasal spray 2 sprays by Each Nostril route daily as needed.    loratadine (CLARITIN) 10 mg tablet Take 1 tablet (10 mg total) by mouth daily as needed for Allergies (as need d for allergies , sneezing , runny nose, take in am).    metFORMIN (GLUCOPHAGE) 500 MG tablet Take 1 tablet (500 mg total) by mouth 2 (two) times daily with meals.    metoprolol tartrate (LOPRESSOR) 100 MG tablet Take 0.5 tablets (50 mg total) by mouth 2 (two) times daily.    mupirocin (BACTROBAN) 2 % ointment Apply topically 3 (three) times daily.    nitroGLYCERIN (NITROSTAT) 0.4 MG SL tablet Place 0.4 mg under the tongue every 5 (five) minutes as needed for Chest pain.    nystatin (MYCOSTATIN) cream Apply topically 2 (two) times daily.    OMEGA 3-DHA-EPA-FISH OIL ORAL Take 1 capsule by mouth once daily. 700 mg    polyethylene glycol (GLYCOLAX) 17 gram/dose powder Take 17 g by mouth once daily. (Patient not taking: Reported on 3/21/2025)    pulse oximeter (PULSE OXIMETER) device by Apply Externally route 2 (two) times a day. Use twice daily at 8 AM and 3 PM and record the value in [facility] as directed. (Patient not taking: Reported on 3/21/2025)    sucralfate (CARAFATE) 1 gram tablet  (Patient not  taking: Reported on 3/21/2025)    sucralfate (CARAFATE) 100 mg/mL suspension Take 10 mLs by mouth 2 (two) times daily.    timolol maleate 0.5% (TIMOPTIC) 0.5 % Drop INSTILL 1 DROP INTO EACH EYE TWICE DAILY    triamcinolone acetonide 0.1% (KENALOG) 0.1 % cream Apply topically 2 (two) times daily.     Family History       Problem Relation (Age of Onset)    Kidney disease Father          Tobacco Use    Smoking status: Never     Passive exposure: Never    Smokeless tobacco: Never   Substance and Sexual Activity    Alcohol use: No    Drug use: No    Sexual activity: Yes     Partners: Male     Comment:      Review of Systems   Unable to perform ROS: Mental status change     Objective:     Vital Signs (Most Recent):  Temp: 97.5 °F (36.4 °C) (07/14/25 0715)  Pulse: 79 (07/14/25 0825)  Resp: 19 (07/14/25 0825)  BP: (!) 137/54 (07/13/25 1700)  SpO2: 97 % (07/14/25 0825) Vital Signs (24h Range):  Temp:  [97.5 °F (36.4 °C)-98.3 °F (36.8 °C)] 97.5 °F (36.4 °C)  Pulse:  [] 79  Resp:  [16-40] 19  SpO2:  [91 %-99 %] 97 %  BP: (130-160)/(54-70) 137/54  Arterial Line BP: ()/(39-64) 122/47     Weight: 71.1 kg (156 lb 12 oz)  Body mass index is 26.91 kg/m².    SpO2: 97 %         Intake/Output Summary (Last 24 hours) at 7/14/2025 0847  Last data filed at 7/14/2025 0800  Gross per 24 hour   Intake 356.5 ml   Output 909 ml   Net -552.5 ml       Lines/Drains/Airways       Central Venous Catheter Line  Duration             Percutaneous Central Line Triple Lumen 07/11/25 2137 Internal Jugular Right 2 days              Drain  Duration                  Ureteral Drain/Stent 07/12/25 1110 Left ureter 6 Fr. 1 day         Urethral Catheter 07/12/25 1111 Double-lumen;Non-latex 16 Fr. 1 day              Arterial Line  Duration             Arterial Line 07/12/25 1016 Left Radial 1 day              Peripheral Intravenous Line  Duration             Peripheral IV Single Lumen 07/11/25 1855 20 G Left Antecubital 2 days                    Exam unchanged vs 7/13/25  Physical Exam  Constitutional:       General: She is not in acute distress.     Appearance: Normal appearance. She is normal weight. She is not diaphoretic.   HENT:      Head: Normocephalic and atraumatic.   Cardiovascular:      Rate and Rhythm: Normal rate and regular rhythm.      Heart sounds: S1 normal and S2 normal. No murmur heard.     No friction rub. No gallop.   Pulmonary:      Effort: Pulmonary effort is normal. No respiratory distress.      Breath sounds: Normal breath sounds.   Abdominal:      General: There is no distension.      Palpations: Abdomen is soft.   Musculoskeletal:         General: No swelling.      Cervical back: Neck supple. No rigidity.      Right lower leg: No edema.      Left lower leg: No edema.   Skin:     General: Skin is warm and dry.      Coloration: Skin is not jaundiced.   Neurological:      Comments: sedated   Psychiatric:      Comments: sedated          Current Medications:   atorvastatin  20 mg Oral QHS    EScitalopram oxalate  5 mg Oral Daily    meropenem IV (PEDS and ADULTS)  1 g Intravenous Q12H    mupirocin   Nasal BID      0.9% NaCl   Intravenous Continuous 100 mL/hr at 07/14/25 0816 New Bag at 07/14/25 0816    dexmedeTOMIDine (Precedex) infusion (titrating)  0-1.4 mcg/kg/hr Intravenous Continuous   Stopped at 07/13/25 0922    NORepinephrine bitartrate-D5W  0-3 mcg/kg/min Intravenous Continuous 1.7 mL/hr at 07/14/25 0800 0.05 mcg/kg/min at 07/14/25 0800    vasopressin  0.04 Units/min Intravenous Continuous   Stopped at 07/13/25 0831       Current Facility-Administered Medications:     0.9%  NaCl infusion (for blood administration), , Intravenous, Q24H PRN    acetaminophen, 650 mg, Oral, Q4H PRN    ALPRAZolam, 0.5 mg, Oral, Nightly PRN    dextrose 50%, 12.5 g, Intravenous, PRN    glucagon (human recombinant), 1 mg, Intramuscular, PRN    hydrALAZINE, 5 mg, Intravenous, Q6H PRN    hydrOXYzine HCL, 10 mg, Oral, TID PRN    insulin aspart U-100,  0-5 Units, Subcutaneous, Q6H PRN    ipratropium, 0.5 mg, Nebulization, Q4H PRN    levalbuterol, 1.25 mg, Nebulization, Q4H PRN    melatonin, 6 mg, Oral, Nightly PRN    ondansetron, 4 mg, Intravenous, Q6H PRN    simethicone, 1 tablet, Oral, QID PRN    sodium chloride 0.9%, 10 mL, Intravenous, Q12H PRN    Laboratory (all labs reviewed):  CBC:  Recent Labs   Lab 07/10/25  0945 07/11/25 2036 07/12/25  0518 07/12/25 2104 07/13/25  0421   WBC 2.53 L 5.75 12.59 12.01 13.04 H   HGB 10.9 L 11.5 L 10.9 L 10.2 L 9.7 L   HCT 33.8 L 34.7 L 34.0 L 31.6 L 30.2 L   Platelet Count 69 L 42 L 35 LL 12 LL 27 LL       CHEMISTRIES:  Recent Labs   Lab 02/19/25  0715 07/11/25 2036 07/12/25 0518 07/12/25 2104 07/13/25  0421   Glucose 105 127 H 124 H 93 116 H   Sodium 142 140 137 137 137   Potassium 4.3 3.3 L 3.6 4.2 4.2   BUN 15 21 25 H 33 H 36 H   Creatinine 0.8 1.5 H 1.5 H 1.5 H 1.4   eGFR >60 34 L 34 L 34 L 37 L   Calcium 9.7 9.5 7.4 L 7.6 L 7.7 L   Magnesium   --  1.3 L 1.0 L 1.5 L  --        CARDIAC BIOMARKERS:  Recent Labs   Lab 07/11/25 2036 07/13/25  0421 07/13/25  0700   CPK 42  --   --    Troponin-I 0.023 0.154 H 0.127 H       COAGS:  Recent Labs   Lab 07/12/25  0518   INR 1.6 H       LIPIDS/LFTS:  Recent Labs   Lab 08/09/22  0711 02/08/23  0710 08/11/23  0755 02/01/24  0000 05/07/24  0721 08/19/24  0705 02/19/25  0715 07/10/25  0945 07/11/25 2036   Cholesterol 150 147 127 124  --  111 L 120  --   --    Triglycerides 202 H 192 H 144 97  --  119 108  --   --    HDL 35 L 36 L 36 L 47  --  41 42  --   --    LDL Cholesterol 74.6 72.6 62.2 L  --   --  46.2 L 56.4 L  --   --    Non-HDL Cholesterol 115 111 91  --   --  70 78  --   --    Non HDL Chol. (LDL+VLDL)  --   --   --  77  --   --   --   --   --    AST 21 20 22  --  22 19 24 23 35   ALT 14 14 16  --  14 10 20 15 19       BNP:  Recent Labs   Lab 07/11/25 2036    H       TSH:  Recent Labs   Lab 08/09/22  0711 02/08/23  0710 08/11/23  0755 08/19/24  0705 02/19/25  0715    TSH 2.903 2.063 1.834 1.219 1.814       Free T4:        Diagnostic Results:  ECG (personally reviewed and interpreted tracing(s)):  7/11/25 1955 SR 89, RBBB  7/13/25 0027 , RBBB    Chest X-Ray (personally reviewed and interpreted image(s)): 7/13/25 low volumes, ?CHF, R IJV TLC    Echo: 7/13/25 (images personally reviewed and interpreted)    Left Ventricle: The left ventricle is normal in size. Mildly increased wall thickness. There is mild concentric hypertrophy. There is normal systolic function with a visually estimated ejection fraction of 60 - 65%. Grade I diastolic dysfunction.    Right Ventricle: The right ventricle is normal in size measuring 3.0 cm. Systolic function is normal.    Tricuspid Valve: There is mild to moderate regurgitation.    Pulmonary Artery: The estimated pulmonary artery systolic pressure is 67 mmHg.    CV testing (Melida Griffin MD note 2/7/24)

## 2025-07-14 NOTE — ASSESSMENT & PLAN NOTE
Marine Mo presents with sepsis with Acute kidney injury and shock requiring pressors secondary to Urinary Tract Infection and obstructive uropathy.     Interventions include:    Antibiotics:    meropenem injection 1 g, Every 12 hours, Intravenous    Fluid Resuscitation:   Ideal Body Weight- The patient is obese (BMI>30) and their ideal body weight of Ideal body weight: 54.7 kg (120 lb 9.5 oz) was used to calculate fluid bolus of 30 ml/kg. This was given at the OSH ED     Labs and Imaging:   Recent Labs   Lab 07/12/25  2104 07/13/25  0421 07/14/25  0011   LACTATE 3.0* 3.3* 2.2   Septic shock with urinary source and bacteremia.  Growing GNR in BCx.  Will stop Vanc.  Continue Meropenem.  Wean Levophed as possible.  Follow up on final cultures.

## 2025-07-14 NOTE — ASSESSMENT & PLAN NOTE
Known CAD s/p PCI several yrs ago, MPI 2021 neg.  Antiplt meds on hold with thrombocytopenia  Cont statin  No anginal/CHF sxs noted.  Minimal flat trop elev noted in setting of sepsis/JAIRON, ACS unlikely.  EF normal on echo  No plan for inpat isch eval

## 2025-07-14 NOTE — ASSESSMENT & PLAN NOTE
JAIRON is likely due to post-obstructive d/t kidney stone and acute UTI. Baseline creatinine is <1. Most recent creatinine and eGFR are listed below.  Recent Labs     07/12/25  2104 07/13/25  0421 07/14/25  0903   CREATININE 1.5* 1.4 0.8   EGFRNORACEVR 34* 37* >60      Plan  - Avoid nephrotoxins and renally dose meds for GFR listed above  - Monitor urine output, serial BMP, and adjust therapy as needed  -Support MAP>65 and oxygen>94%.   Now resolved.

## 2025-07-14 NOTE — PLAN OF CARE
Problem: Diabetes Comorbidity  Goal: Blood Glucose Level Within Targeted Range  Outcome: Progressing     Problem: Adult Inpatient Plan of Care  Goal: Plan of Care Review  Outcome: Progressing  Goal: Patient-Specific Goal (Individualized)  Outcome: Progressing  Goal: Absence of Hospital-Acquired Illness or Injury  Outcome: Progressing  Goal: Optimal Comfort and Wellbeing  Outcome: Progressing  Goal: Readiness for Transition of Care  Outcome: Progressing     Problem: Infection  Goal: Absence of Infection Signs and Symptoms  Outcome: Progressing     Problem: Fall Injury Risk  Goal: Absence of Fall and Fall-Related Injury  Outcome: Progressing     Problem: Skin Injury Risk Increased  Goal: Skin Health and Integrity  Outcome: Progressing     Problem: Sepsis/Septic Shock  Goal: Optimal Coping  Outcome: Progressing  Goal: Absence of Bleeding  Outcome: Progressing  Goal: Blood Glucose Level Within Targeted Range  Outcome: Progressing  Goal: Absence of Infection Signs and Symptoms  Outcome: Progressing  Goal: Optimal Nutrition Intake  Outcome: Progressing     Problem: Acute Kidney Injury/Impairment  Goal: Fluid and Electrolyte Balance  Outcome: Progressing  Goal: Improved Oral Intake  Outcome: Progressing  Goal: Effective Renal Function  Outcome: Progressing     Problem: Delirium  Goal: Optimal Coping  Outcome: Progressing  Goal: Improved Behavioral Control  Outcome: Progressing  Goal: Improved Attention and Thought Clarity  Outcome: Progressing  Goal: Improved Sleep  Outcome: Progressing     Problem: Coping Ineffective  Goal: Effective Coping  Outcome: Progressing

## 2025-07-14 NOTE — EICU
Virtual ICU Quality Rounds    Admit Date: 7/12/2025  Hospital Day: 2    ICU Day: 2d 5h    24H Vital Sign Range:  Temp:  [97.5 °F (36.4 °C)-98.3 °F (36.8 °C)]   Pulse:  []   Resp:  [16-40]   BP: (135-160)/(54-70)   SpO2:  [91 %-99 %]   Arterial Line BP: ()/(39-64)     VICU Surveillance Screening  Daily review of  line necessity(optional): Central line(s) in place and Urinary catheter in place  Central line type (optional): Triple lumen catheter  Central line indications : Vasopressors (in past 24/hrs)  Silva Indications : Critically ill in the intensive care unit requiring hourly urine output monitoring / Ordered / placed by urologist.  LDA reconciliation : Yes

## 2025-07-14 NOTE — PROGRESS NOTES
[facility] Medicine  Progress Note    Patient Name: Marine Mo  MRN: [id number]  Patient Class: IP- Inpatient   Admission Date: 7/12/2025  Length of Stay: 2 days  Attending Physician: Sebastien Jeffery MD  Primary Care Provider: Serg Hwang MD        Subjective     Principal Problem:Sepsis with acute renal failure and septic shock        HPI:  85 y.o. woman with h/o NIDDM type 2 (A1c 5 in Feb), Essential HTN, HLD, DILLARD cirrhosis with polycystic liver dz and chronic thrombocytopenia, Depression/General Anxiety d/o, CAD (h/o stent in LAD on ASA/Plavix), Anemia presents to the [place] ED with family c/o fevers and chills for the past 1-2 days. Was febrile at 100.1 and hypotensive. Started on IVF, IV vanc/zosyn and eventually started on peripheral levophed for Sepsis.     Labs noted for WBC 5 and stable H/H 11.5/34. plt 42. Initial LA ws 10.6 with a procal of 22. Received 30cc/kg sepsis fluids (3L) and repeat lactic improved to 3.6.   Lytes with hypokalemia, JAIRON with creatinine 1.5 (baseline <1). Bicarb 18. Mg 1.2 and phos 1.2.   UA concerning for UTI.     CT chest/abd/pelvis with IV contrast:   1. Moderate left-sided hydronephrosis secondary to a 1.7 cm calculus at the UPJ.  2. Wall thickening of the left renal pelvis with left perinephric fat stranding, suspicious for overlying infection/pyelitis or pyelonephritis.  Recommend correlation with urinalysis.  3. Hepatic cirrhosis.  4. Innumerable hepatic and renal cystic lesions as above, similar to the prior study.  5. Interlobular septal thickening in the bilateral lungs, can be seen with mild interstitial edema or interstitial pneumonia.  6. Chronic opacities in the right middle lobe and right lower lobe.    [facility] transfer [facility] contacted and case discussed with Dr. Castañeda with Urology who recommended cysto with stent placement and did not think that IR will be required. She agreed to see in consult once patient arrives.  We have been consulted for further management. Family denies h/o kidney stones.     Overview/Hospital Course:  85 y.o. woman with h/o NIDDM type 2 (A1c 5 in Feb), Essential HTN, HLD, DILLARD cirrhosis with polycystic liver dz and chronic thrombocytopenia, Depression/General Anxiety d/o, CAD (h/o stent in LAD on ASA/Plavix), Anemia presents to the [place] ED with family c/o fevers and chills for the past 1-2 days. Was febrile at 100.1 and hypotensive. Started on IVF, IV ABx's and eventually started on levophed for septic shock.  Septic shock with urinary source and obstructive uropathy.  Started on Vanc and Meropenem.  Patient remains on Levophed.  Urology consulted.  Underwent cystoscopy with stent placement.  Growing GNR in BCx.  Vanc stopped.    Interval History: more awake than yesterday.  Remains confused.    Review of Systems   Unable to perform ROS: Mental status change     Objective:     Vital Signs (Most Recent):  Temp: 97.5 °F (36.4 °C) (07/14/25 1101)  Pulse: 82 (07/14/25 1230)  Resp: 18 (07/14/25 1230)  BP: (!) 137/54 (07/13/25 1700)  SpO2: 98 % (07/14/25 1230) Vital Signs (24h Range):  Temp:  [97.5 °F (36.4 °C)-98.3 °F (36.8 °C)] 97.5 °F (36.4 °C)  Pulse:  [] 82  Resp:  [16-40] 18  SpO2:  [91 %-99 %] 98 %  BP: (137-160)/(54-70) 137/54  Arterial Line BP: ()/(39-64) 134/48     Weight: 71.1 kg (156 lb 12 oz)  Body mass index is 26.91 kg/m².    Intake/Output Summary (Last 24 hours) at 7/14/2025 1358  Last data filed at 7/14/2025 1200  Gross per 24 hour   Intake 549.01 ml   Output 844 ml   Net -294.99 ml         Physical Exam  Constitutional:       Appearance: She is ill-appearing. She is not diaphoretic.   HENT:      Head: Normocephalic and atraumatic.      Mouth/Throat:      Mouth: Mucous membranes are dry.      Pharynx: No oropharyngeal exudate or posterior oropharyngeal erythema.   Cardiovascular:      Rate and Rhythm: Normal rate and regular rhythm.   Pulmonary:      Effort: Pulmonary effort is  normal.      Breath sounds: No wheezing.   Abdominal:      General: Bowel sounds are normal.      Palpations: Abdomen is soft.   Musculoskeletal:         General: No deformity or signs of injury.   Skin:     General: Skin is warm and dry.   Neurological:      General: No focal deficit present.      Mental Status: She is disoriented.               Significant Labs: All pertinent labs within the past 24 hours have been reviewed.  BMP:   Recent Labs   Lab 07/14/25  0903         K 3.3*   *   CO2 19*   BUN 36*   CREATININE 0.8   CALCIUM 7.9*   MG 1.9     CBC:   Recent Labs   Lab 07/12/25  2104 07/13/25  0421 07/14/25  0903   WBC 12.01 13.04* 6.14   HGB 10.2* 9.7* 8.5*   HCT 31.6* 30.2* 26.2*   PLT 12* 27* 15*       Significant Imaging: I have reviewed all pertinent imaging results/findings within the past 24 hours.      Assessment & Plan  Sepsis with acute renal failure and septic shock  Marine Mo presents with sepsis with Acute kidney injury and shock requiring pressors secondary to Urinary Tract Infection and obstructive uropathy.     Interventions include:    Antibiotics:    meropenem injection 1 g, Every 12 hours, Intravenous    Fluid Resuscitation:   Ideal Body Weight- The patient is obese (BMI>30) and their ideal body weight of Ideal body weight: 54.7 kg (120 lb 9.5 oz) was used to calculate fluid bolus of 30 ml/kg. This was given at the OSH ED     Labs and Imaging:   Recent Labs   Lab 07/12/25  2104 07/13/25  0421 07/14/25  0011   LACTATE 3.0* 3.3* 2.2   Septic shock with urinary source and bacteremia.  Growing GNR in BCx.  Will stop Vanc.  Continue Meropenem.  Wean Levophed as possible.  Follow up on final cultures.      Acute unilateral obstructive uropathy  S/p cystoscopy with stent placement.    JAIRON (acute kidney injury)  JAIRON is likely due to post-obstructive d/t kidney stone and acute UTI. Baseline creatinine is <1. Most recent creatinine and eGFR are listed below.  Recent  Labs     07/12/25  2104 07/13/25  0421 07/14/25  0903   CREATININE 1.5* 1.4 0.8   EGFRNORACEVR 34* 37* >60      Plan  - Avoid nephrotoxins and renally dose meds for GFR listed above  - Monitor urine output, serial BMP, and adjust therapy as needed  -Support MAP>65 and oxygen>94%.   Now resolved.  Thrombocytopenia  Chronic due to her DILLARD cirrhosis it appears. No acute signs of bleeding but may need transfuse for cysto today. Type and screen ordered and will defer to Urology the need for plt, ffp, or PRBC.   Plts lower than baseline, most likely related to septic shock.  Patient was transfused platelets for procedure.  Coronary artery disease involving native coronary artery of native heart without angina pectoris  Patient with known CAD s/p stent placement, which is controlled Will continue Statin and monitor for S/Sx of angina/ACS. Continue to monitor on telemetry. Resume ASA and Plavix when cleared by urology.   Holding ASA and Plavix secondary to severe thrombocytopenia.  Type 2 diabetes mellitus, controlled  Patient's FSGs are controlled on current medication regimen.  Last A1c reviewed-   Lab Results   Component Value Date    HGBA1C 5.0 07/12/2025   Repeat A1c ordered and pending.   Most recent fingerstick glucose reviewed-   Recent Labs   Lab 07/13/25  1738 07/14/25  0012 07/14/25  0543   POCTGLUCOSE 110 128* 144*     Current correctional scale  Low  Maintain anti-hyperglycemic dose as follows-   Antihyperglycemics (From admission, onward)      Start     Stop Route Frequency Ordered    07/12/25 0457  insulin aspart U-100 pen 0-5 Units         -- SubQ Every 6 hours PRN 07/12/25 0457          Hold Oral hypoglycemics while patient is in the hospital: metformin 500mg PO BID but family states that lately she has been having low sugars and this has recently been d/c.   Essential hypertension  Patient's blood pressure range in the last 24 hours was: BP  Min: 137/54  Max: 160/70.The patient's inpatient  anti-hypertensive regimen is listed below:  Current Antihypertensives  hydrALAZINE injection 5 mg, Every 6 hours PRN, Intravenous for SBP>180 or DBP>90   Medications on hold as patient is in shock.  Left ureteral stone      PSVT (paroxysmal supraventricular tachycardia)  Cardiology consulted.  Was on Amio drip.  Now resolved and off drip.    Delirium  Delirium precautions.  Chronically on benzo's.  Resume home medications.    ACP (advance care planning)      VTE Risk Mitigation (From admission, onward)           Ordered     IP VTE HIGH RISK PATIENT  Once         07/12/25 0457     Place sequential compression device  Until discontinued         07/12/25 0457     Reason for No Pharmacological VTE Prophylaxis  Once        Comments: Urology plans on cysto with stent placement   Question:  Reasons:  Answer:  Physician Provided (leave comment)    07/12/25 0457                    Discharge Planning   CHRISTIANNE:      Code Status: Full Code   Medical Readiness for Discharge Date:   Discharge Plan A: Home Health              Critical care time spent on the evaluation and treatment of severe organ dysfunction, review of pertinent labs and imaging studies, discussions with consulting providers and discussions with patient/family: 35 minutes.          Sebastien Jeffery MD  Department of Hospital Medicine   [facility] - Intensive Care

## 2025-07-14 NOTE — NURSING
[facility] [place]  Nurses Note -- 4 Eyes      7/14/2025       Skin assessed on: Q Shift      [x] No Pressure Injuries Present    [x]Prevention Measures Documented    [] Yes LDA  for Pressure Injury Previously documented     [] Yes New Pressure Injury Discovered   [] LDA for New Pressure Injury Added      Attending RN:  Jerrell Causey RN     Second RN:  Eliane RN

## 2025-07-14 NOTE — ASSESSMENT & PLAN NOTE
Patient's FSGs are controlled on current medication regimen.  Last A1c reviewed-   Lab Results   Component Value Date    HGBA1C 5.0 07/12/2025   Repeat A1c ordered and pending.   Most recent fingerstick glucose reviewed-   Recent Labs   Lab 07/13/25  1738 07/14/25  0012 07/14/25  0543   POCTGLUCOSE 110 128* 144*     Current correctional scale  Low  Maintain anti-hyperglycemic dose as follows-   Antihyperglycemics (From admission, onward)      Start     Stop Route Frequency Ordered    07/12/25 0457  insulin aspart U-100 pen 0-5 Units         -- SubQ Every 6 hours PRN 07/12/25 0457          Hold Oral hypoglycemics while patient is in the hospital: metformin 500mg PO BID but family states that lately she has been having low sugars and this has recently been d/c.

## 2025-07-14 NOTE — HOSPITAL COURSE
Interval Hx: No cp/sob.   at bedside.  Maintaining SR. Unable to anticoag with chr severe thrombocytopenia.    Tele: SR 80s, PSVT (personally reviewed and interpreted)

## 2025-07-14 NOTE — ASSESSMENT & PLAN NOTE
Patient's blood pressure range in the last 24 hours was: BP  Min: 137/54  Max: 160/70.The patient's inpatient anti-hypertensive regimen is listed below:  Current Antihypertensives  hydrALAZINE injection 5 mg, Every 6 hours PRN, Intravenous for SBP>180 or DBP>90   Medications on hold as patient is in shock.

## 2025-07-14 NOTE — CONSULTS
[facility] - Intensive Care  Palliative Medicine  Consult Note    Patient Name: Marine Mo  MRN: [id number]  Admission Date: 7/12/2025  Hospital Length of Stay: 2 days  Code Status: DNR   Attending Provider: Sebastien Jeffery MD  Consulting Provider: Casi Murillo NP  Primary Care Physician: Serg Hwang MD  Principal Problem:Sepsis with acute renal failure and septic shock    Patient information was obtained from spouse/SO, past medical records, ER records, and primary team.      Inpatient consult to Palliative Care  Consult performed by: Casi Murillo NP  Consult ordered by: Sebastien Jeffery MD  Reason for consult: goals of care and advanced care planning        Assessment/Plan:   Palliative Care  Advance Care Planning   Consult - 7/14/2025  - consult received; interval chart reviewed in detail; discussed pt with MDT during ICU rounds   - met with patient and , Josue, at bedside; introduction to palliative medicine team and role in current care and admission   - pt currently without capacity for GOC/ACP discussion or decision making; , Josue ([phone]), is legal surrogate decision maker   - learned more about pt outside of current admission; she is typically very active and able bodied, aside of use of walker; she is able to perform ADLs and does all household chores, cooking, cleaning, laundry per ; they share 2 children Josue Johnson And Robyn who both live in [place] as well    - GOC/ACP discussion  - to 's knowledge they have living [place] at home; reviewed prior wishes and GOC discussions   - informed Mr. Salas that the wishes he expressed were consistent with DNR/DNI; discussed this in detail and he confirmed wishes for DNR/DNI; confirmed that a DNR order would be placed in chart (updated RN and HM)   - discussed availability of LAPOST document for these wishes and plan for LAPOST completion when pt is able to participate in the  discussion/before discharge   - GOC for pt to be able to return to prior hospitalization abilities/baseline; will require working with PT/OT when able  -  initially lacked insight into severity of pt's condition, seeming shocked that pt was not potentially going home today or tomorrow; reviewed details of pt's care, including pressor requirements and sepsis affects on the body; explained progress that has been made with source identification and control, but explained the necessary steps between now and being ready for discharge   - offered to call pt's daughter who has been involved in admission as well, Mr. Salas wishes to update her when she relieves him this afternoon (she has been staying with pt at night and Mr. Salas has been staying during the day)   - emotional support provided   - Allowed time for questions/concerns; all addressed; expressed availability of myself/palliative team for additional questions/concerns   - updated MDT  Neuro  Delirium  - noted; currently weaning precedex   - age, severity of condition, and ICU warrants hospital delirium; suspect also related to abrupt stop of chronic benzo use   -  shares that although home RX alprazolam  is 0.5 mg, she only takes 1/2 a tablet nightly; discussed with HM for dose adjustment   - while benzo's are not ideal given age, agree with restarting to see if improves delirium to allow for better assessment of acute neuro changes related to sepsis as opposed to medication changes   - would recommend OP palliative involvement to discuss/assist with transition from regular benzo use when in better health   - cont mgmt per      Cardiac/Vascular  PSVT (paroxysmal supraventricular tachycardia)  - noted; resolved currently with cardiology following   - cont mgmt per  and cardiology     Coronary artery disease involving native coronary artery of native heart without angina pectoris  - chronic cardiac conditions noted; echo this admission with  "preserved EF, G1DD and pul HTN also present   - cont mgmt per HM and cardiology     Renal/  Left ureteral stone  - noted, suspected source of sepsis; s/p cysto/stent  - urology also visited during initial consult, and shared plan for OP follow up at [place] for additional procedure   - urology following; cont mgmt per HM and urology     JAIRON (acute kidney injury)  - noted, along with sepsis   - cont mgmt per HM     Acute unilateral obstructive uropathy  - see ureteral stone     ID  * Sepsis with acute renal failure and septic shock  - noted; requiring ICU level care, pressors, and IV Abx this admission   - renal source s/p cysto/stent placement; urology following   - in depth discussion with  regarding sepsis and pt's current condition, as there was initially concern for lack of insight into severity of condition at this time  - cont mgmt per HM    Hematology  Thrombocytopenia  - chronic condition noted; DILLARD cirrhosis   - no acute bleeding concerns per MDT; cont mgmt per HM           Thank you for your consult. I will follow-up with patient. Please contact us if you have any additional questions.    Subjective:     HPI:   From H&P: "85 y.o. woman with h/o NIDDM type 2 (A1c 5 in Feb), Essential HTN, HLD, DILLARD cirrhosis with polycystic liver dz and chronic thrombocytopenia, Depression/General Anxiety d/o, CAD (h/o stent in LAD on ASA/Plavix), Anemia presents to the [place] ED with family c/o fevers and chills for the past 1-2 days. Was febrile at 100.1 and hypotensive. Started on IVF, IV vanc/zosyn and eventually started on peripheral levophed for Sepsis. "    Palliative medicine consulted for goals of care discussion and advance care planning; for details of visit, see advance care planning section of plan.       Hospital Course:  No notes on file      Past Medical History:   Diagnosis Date    Anemia 02/27/2018    Anxiety     Arthritis     Atherosclerotic heart disease native coronary artery w/angina " pectoris     Back pain     Breast cyst     Cirrhosis     Depression 01/30/2019    Diabetes mellitus     Disorder of kidney and ureter     Glaucoma     Hyperlipidemia     Hypertension     Trouble in sleeping     Type 2 diabetes mellitus        Past Surgical History:   Procedure Laterality Date    BACK SURGERY      BREAST CYST ASPIRATION  1982    CATARACT EXTRACTION Bilateral     don't remember date    CHOLECYSTECTOMY      COLONOSCOPY N/A 3/1/2018    Procedure: COLONOSCOPY;  Surgeon: Ren Newton MD;  Location: [facility] ([address] [place] [address]);  Service: Endoscopy;  Laterality: N/A;    HYSTERECTOMY  age 70    bleeding    INSERTION, STENT, ARTERY  2022    OOPHORECTOMY      TONSILLECTOMY, ADENOIDECTOMY         Review of patient's allergies indicates:   Allergen Reactions    Pravastatin      Other Reaction(s): Propensity to adverse reactions to drug       Medications:  Continuous Infusions:   0.9% NaCl   Intravenous Continuous 100 mL/hr at 07/14/25 1000 Rate Verify at 07/14/25 1000    dexmedeTOMIDine (Precedex) infusion (titrating)  0-1.4 mcg/kg/hr Intravenous Continuous   Stopped at 07/13/25 0922    NORepinephrine bitartrate-D5W  0-3 mcg/kg/min Intravenous Continuous 2.3 mL/hr at 07/14/25 1000 0.07 mcg/kg/min at 07/14/25 1000    vasopressin  0.04 Units/min Intravenous Continuous   Stopped at 07/13/25 0831     Scheduled Meds:   atorvastatin  20 mg Oral QHS    EScitalopram oxalate  5 mg Oral Daily    meropenem IV (PEDS and ADULTS)  1 g Intravenous Q12H    mupirocin   Nasal BID     PRN Meds:  Current Facility-Administered Medications:     0.9%  NaCl infusion (for blood administration), , Intravenous, Q24H PRN    acetaminophen, 650 mg, Oral, Q4H PRN    ALPRAZolam, 0.5 mg, Oral, Nightly PRN    dextrose 50%, 12.5 g, Intravenous, PRN    glucagon (human recombinant), 1 mg, Intramuscular, PRN    hydrALAZINE, 5 mg, Intravenous, Q6H PRN    hydrOXYzine HCL, 10 mg, Oral, TID PRN    insulin aspart U-100, 0-5 Units, Subcutaneous, Q6H PRN     ipratropium, 0.5 mg, Nebulization, Q4H PRN    levalbuterol, 1.25 mg, Nebulization, Q4H PRN    melatonin, 6 mg, Oral, Nightly PRN    ondansetron, 4 mg, Intravenous, Q6H PRN    simethicone, 1 tablet, Oral, QID PRN    sodium chloride 0.9%, 10 mL, Intravenous, Q12H PRN    Family History       Problem Relation (Age of Onset)    Kidney disease Father          Tobacco Use    Smoking status: Never     Passive exposure: Never    Smokeless tobacco: Never   Substance and Sexual Activity    Alcohol use: No    Drug use: No    Sexual activity: Yes     Partners: Male     Comment:        Review of Systems   Unable to perform ROS: Mental status change     Objective:     Vital Signs (Most Recent):  Temp: 97.5 °F (36.4 °C) (07/14/25 0715)  Pulse: 83 (07/14/25 1015)  Resp: (!) 24 (07/14/25 1015)  BP: (!) 137/54 (07/13/25 1700)  SpO2: 97 % (07/14/25 1015) Vital Signs (24h Range):  Temp:  [97.5 °F (36.4 °C)-98.3 °F (36.8 °C)] 97.5 °F (36.4 °C)  Pulse:  [] 83  Resp:  [16-40] 24  SpO2:  [91 %-99 %] 97 %  BP: (135-160)/(54-70) 137/54  Arterial Line BP: ()/(39-64) 127/48     Weight: 71.1 kg (156 lb 12 oz)  Body mass index is 26.91 kg/m².       Physical Exam  Vitals and nursing note reviewed.   Constitutional:       Interventions: Nasal cannula in place.   Pulmonary:      Effort: Pulmonary effort is normal. No respiratory distress.   Neurological:      Mental Status: She is easily aroused. She is lethargic and disoriented.   Psychiatric:         Behavior: Behavior is cooperative.       Advance Care Planning   Advance Directives:   Living Will: No    LaPOST: No (plan for completion prior to discharge)    Do Not Resuscitate Status: Yes    Medical Power of : No ( Josue is legal surrogate decision maker)      Decision Making:  Family answered questions  Goals of Care: The family endorses that what is most important right now is to focus on spending time at home, avoiding the hospital, remaining as independent as  possible, and symptom/pain control    Accordingly, we have decided that the best plan to meet the patient's goals includes continuing with treatment         Significant Labs: All pertinent labs within the past 24 hours have been reviewed.  CBC:   Recent Labs   Lab 07/14/25  0903   WBC 6.14   HGB 8.5*   HCT 26.2*   MCV 89   PLT 15*     BMP:  Recent Labs   Lab 07/14/25  0903         K 3.3*   *   CO2 19*   BUN 36*   CREATININE 0.8   CALCIUM 7.9*   MG 1.9     LFT:  Lab Results   Component Value Date    AST 25 07/14/2025    GGT 29 10/19/2020    ALKPHOS 63 07/14/2025    BILITOT 1.0 07/14/2025     Albumin:   Albumin   Date Value Ref Range Status   07/14/2025 2.2 (L) 3.5 - 5.2 g/dL Final   02/19/2025 3.7 3.5 - 5.2 g/dL Final     Protein:   Protein Total   Date Value Ref Range Status   07/14/2025 5.2 (L) 6.0 - 8.4 gm/dL Final     Total Protein   Date Value Ref Range Status   02/19/2025 7.1 6.0 - 8.4 g/dL Final     Lactic acid:   Lab Results   Component Value Date    LACTATE 2.2 07/14/2025    LACTATE 3.3 (H) 07/13/2025       Significant Imaging: I have reviewed all pertinent imaging results/findings within the past 24 hours.      I spent a total of 86 minutes on the day of the visit. This includes face to face time in discussion of goals of care, symptom assessment, coordination of care and emotional support.  This also includes non-face to face time preparing to see the patient (eg, review of tests/imaging), obtaining and/or reviewing separately obtained history, documenting clinical information in the electronic or other health record, independently interpreting results and communicating results to the patient/family/caregiver, or care coordinator.    Casi Murillo NP  Palliative Medicine  [facility] [place] - Intensive Care

## 2025-07-14 NOTE — PROGRESS NOTES
[facility]  Cardiology  Progress Note    Patient Name: Marine Mo  MRN: [id number]  Admission Date: 7/12/2025  Hospital Length of Stay: 2 days  Code Status: Full Code   Attending Physician: Sebastien Jeffery MD   Primary Care Physician: Serg Hwang MD  Expected Discharge Date:   Principal Problem:Sepsis with acute renal failure and septic shock    Subjective:     Interval Hx: pt seen in ICU.  No events overnight, sleeping at present, still with dec MS.   at bedside.  Case d/w RN.    Tele: SR 80s, PSVT (personally reviewed and interpreted)      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     Back pain     Breast cyst     Cirrhosis     Depression 01/30/2019    Diabetes mellitus     Disorder of kidney and ureter     Glaucoma     Hyperlipidemia     Hypertension     Trouble in sleeping     Type 2 diabetes mellitus        Past Surgical History:   Procedure Laterality Date    BACK SURGERY      BREAST CYST ASPIRATION  1982    CATARACT EXTRACTION Bilateral     don't remember date    CHOLECYSTECTOMY      COLONOSCOPY N/A 3/1/2018    Procedure: COLONOSCOPY;  Surgeon: Ren Newton MD;  Location: [address];  Service: Endoscopy;  Laterality: N/A;    HYSTERECTOMY  age 70    bleeding    INSERTION, STENT, ARTERY  2022    OOPHORECTOMY      TONSILLECTOMY, ADENOIDECTOMY         Review of patient's allergies indicates:   Allergen Reactions    Pravastatin      Other Reaction(s): Propensity to adverse reactions to drug       No current facility-administered medications on file prior to encounter.     Current Outpatient Medications on File Prior to Encounter   Medication Sig    ALPRAZolam (XANAX) 0.5 MG tablet TAKE 1 TABLET BY MOUTH NIGHTLY AS NEEDED FOR ANXIETY    aspirin 81 MG Chew Take 81 mg by mouth once daily.    atorvastatin (LIPITOR) 20 MG tablet Take 20 mg by mouth.     azelastine (ASTELIN) 137 mcg (0.1 %) nasal spray     calcium carbonate (TUMS) 200 mg calcium (500 mg) chewable tablet Take 1 tablet by mouth once daily.    cholecalciferol, vitamin D3, (VITAMIN D3) 25 mcg (1,000 unit) capsule Take 2,000 Units by mouth once daily.    clopidogreL (PLAVIX) 75 mg tablet Take 1 tablet (75 mg total) by mouth once daily.    coenzyme Q10 100 mg capsule Take 100 mg by mouth once daily.    CONSTULOSE 10 gram/15 mL solution Take 30 mLs by mouth once daily. (Patient not taking: Reported on 3/21/2025)    diclofenac sodium (VOLTAREN ARTHRITIS PAIN) 1 % Gel Apply 2 g topically once daily.    docusate sodium (COLACE) 100 MG capsule Take 1 capsule (100 mg total) by mouth 2 (two) times daily as needed for Constipation.    EScitalopram oxalate (LEXAPRO) 5 MG Tab Take 1 tablet by mouth once daily    fluticasone propionate (FLONASE) 50 mcg/actuation nasal spray 1 spray (50 mcg total) by Each Nostril route once daily.    guaiFENesin 100 mg/5 ml (ROBITUSSIN) 100 mg/5 mL syrup Take 200 mg by mouth 3 (three) times daily as needed for Cough. (Patient not taking: Reported on 3/21/2025)    hydrocortisone 2.5 % cream Apply topically 2 (two) times daily. (Patient not taking: Reported on 3/21/2025)    hydrOXYzine HCL (ATARAX) 10 MG Tab 1-3 tablets every 8 hours as needed for itching    ipratropium (ATROVENT) 42 mcg (0.06 %) nasal spray 2 sprays by Each Nostril route daily as needed.    loratadine (CLARITIN) 10 mg tablet Take 1 tablet (10 mg total) by mouth daily as needed for Allergies (as need d for allergies , sneezing , runny nose, take in am).    metFORMIN (GLUCOPHAGE) 500 MG tablet Take 1 tablet (500 mg total) by mouth 2 (two) times daily with meals.    metoprolol tartrate (LOPRESSOR) 100 MG tablet Take 0.5 tablets (50 mg total) by mouth 2 (two) times daily.    mupirocin (BACTROBAN) 2 % ointment Apply topically 3 (three) times daily.    nitroGLYCERIN (NITROSTAT) 0.4 MG SL tablet Place 0.4 mg under the  tongue every 5 (five) minutes as needed for Chest pain.    nystatin (MYCOSTATIN) cream Apply topically 2 (two) times daily.    OMEGA 3-DHA-EPA-FISH OIL ORAL Take 1 capsule by mouth once daily. 700 mg    polyethylene glycol (GLYCOLAX) 17 gram/dose powder Take 17 g by mouth once daily. (Patient not taking: Reported on 3/21/2025)    pulse oximeter (PULSE OXIMETER) device by Apply Externally route 2 (two) times a day. Use twice daily at 8 AM and 3 PM and record the value in MyChart as directed. (Patient not taking: Reported on 3/21/2025)    sucralfate (CARAFATE) 1 gram tablet  (Patient not taking: Reported on 3/21/2025)    sucralfate (CARAFATE) 100 mg/mL suspension Take 10 mLs by mouth 2 (two) times daily.    timolol maleate 0.5% (TIMOPTIC) 0.5 % Drop INSTILL 1 DROP INTO EACH EYE TWICE DAILY    triamcinolone acetonide 0.1% (KENALOG) 0.1 % cream Apply topically 2 (two) times daily.     Family History       Problem Relation (Age of Onset)    Kidney disease Father          Tobacco Use    Smoking status: Never     Passive exposure: Never    Smokeless tobacco: Never   Substance and Sexual Activity    Alcohol use: No    Drug use: No    Sexual activity: Yes     Partners: Male     Comment:      Review of Systems   Unable to perform ROS: Mental status change     Objective:     Vital Signs (Most Recent):  Temp: 97.5 °F (36.4 °C) (07/14/25 0715)  Pulse: 79 (07/14/25 0825)  Resp: 19 (07/14/25 0825)  BP: (!) 137/54 (07/13/25 1700)  SpO2: 97 % (07/14/25 0825) Vital Signs (24h Range):  Temp:  [97.5 °F (36.4 °C)-98.3 °F (36.8 °C)] 97.5 °F (36.4 °C)  Pulse:  [] 79  Resp:  [16-40] 19  SpO2:  [91 %-99 %] 97 %  BP: (130-160)/(54-70) 137/54  Arterial Line BP: ()/(39-64) 122/47     Weight: 71.1 kg (156 lb 12 oz)  Body mass index is 26.91 kg/m².    SpO2: 97 %         Intake/Output Summary (Last 24 hours) at 7/14/2025 0847  Last data filed at 7/14/2025 0800  Gross per 24 hour   Intake 356.5 ml   Output 909 ml   Net -552.5 ml        Lines/Drains/Airways       Central Venous Catheter Line  Duration             Percutaneous Central Line Triple Lumen 07/11/25 2137 Internal Jugular Right 2 days              Drain  Duration                  Ureteral Drain/Stent 07/12/25 1110 Left ureter 6 Fr. 1 day         Urethral Catheter 07/12/25 1111 Double-lumen;Non-latex 16 Fr. 1 day              Arterial Line  Duration             Arterial Line 07/12/25 1016 Left Radial 1 day              Peripheral Intravenous Line  Duration             Peripheral IV Single Lumen 07/11/25 1855 20 G Left Antecubital 2 days                   Exam unchanged vs 7/13/25  Physical Exam  Constitutional:       General: She is not in acute distress.     Appearance: Normal appearance. She is normal weight. She is not diaphoretic.   HENT:      Head: Normocephalic and atraumatic.   Cardiovascular:      Rate and Rhythm: Normal rate and regular rhythm.      Heart sounds: S1 normal and S2 normal. No murmur heard.     No friction rub. No gallop.   Pulmonary:      Effort: Pulmonary effort is normal. No respiratory distress.      Breath sounds: Normal breath sounds.   Abdominal:      General: There is no distension.      Palpations: Abdomen is soft.   Musculoskeletal:         General: No swelling.      Cervical back: Neck supple. No rigidity.      Right lower leg: No edema.      Left lower leg: No edema.   Skin:     General: Skin is warm and dry.      Coloration: Skin is not jaundiced.   Neurological:      Comments: sedated   Psychiatric:      Comments: sedated          Current Medications:   atorvastatin  20 mg Oral QHS    EScitalopram oxalate  5 mg Oral Daily    meropenem IV (PEDS and ADULTS)  1 g Intravenous Q12H    mupirocin   Nasal BID      0.9% NaCl   Intravenous Continuous 100 mL/hr at 07/14/25 0816 New Bag at 07/14/25 0816    dexmedeTOMIDine (Precedex) infusion (titrating)  0-1.4 mcg/kg/hr Intravenous Continuous   Stopped at 07/13/25 0922    NORepinephrine bitartrate-D5W  0-3  mcg/kg/min Intravenous Continuous 1.7 mL/hr at 07/14/25 0800 0.05 mcg/kg/min at 07/14/25 0800    vasopressin  0.04 Units/min Intravenous Continuous   Stopped at 07/13/25 0831       Current Facility-Administered Medications:     0.9%  NaCl infusion (for blood administration), , Intravenous, Q24H PRN    acetaminophen, 650 mg, Oral, Q4H PRN    ALPRAZolam, 0.5 mg, Oral, Nightly PRN    dextrose 50%, 12.5 g, Intravenous, PRN    glucagon (human recombinant), 1 mg, Intramuscular, PRN    hydrALAZINE, 5 mg, Intravenous, Q6H PRN    hydrOXYzine HCL, 10 mg, Oral, TID PRN    insulin aspart U-100, 0-5 Units, Subcutaneous, Q6H PRN    ipratropium, 0.5 mg, Nebulization, Q4H PRN    levalbuterol, 1.25 mg, Nebulization, Q4H PRN    melatonin, 6 mg, Oral, Nightly PRN    ondansetron, 4 mg, Intravenous, Q6H PRN    simethicone, 1 tablet, Oral, QID PRN    sodium chloride 0.9%, 10 mL, Intravenous, Q12H PRN    Laboratory (all labs reviewed):  CBC:  Recent Labs   Lab 07/10/25  0945 07/11/25 2036 07/12/25 0518 07/12/25 2104 07/13/25  0421   WBC 2.53 L 5.75 12.59 12.01 13.04 H   HGB 10.9 L 11.5 L 10.9 L 10.2 L 9.7 L   HCT 33.8 L 34.7 L 34.0 L 31.6 L 30.2 L   Platelet Count 69 L 42 L 35 LL 12 LL 27 LL       CHEMISTRIES:  Recent Labs   Lab 02/19/25  0715 07/11/25 2036 07/12/25 0518 07/12/25 2104 07/13/25  0421   Glucose 105 127 H 124 H 93 116 H   Sodium 142 140 137 137 137   Potassium 4.3 3.3 L 3.6 4.2 4.2   BUN 15 21 25 H 33 H 36 H   Creatinine 0.8 1.5 H 1.5 H 1.5 H 1.4   eGFR >60 34 L 34 L 34 L 37 L   Calcium 9.7 9.5 7.4 L 7.6 L 7.7 L   Magnesium   --  1.3 L 1.0 L 1.5 L  --        CARDIAC BIOMARKERS:  Recent Labs   Lab 07/11/25  2036 07/13/25  0421 07/13/25  0700   CPK 42  --   --    Troponin-I 0.023 0.154 H 0.127 H       COAGS:  Recent Labs   Lab 07/12/25  0518   INR 1.6 H       LIPIDS/LFTS:  Recent Labs   Lab 08/09/22  0711 02/08/23  0710 08/11/23  0755 02/01/24  0000 05/07/24  0721 08/19/24  0705 02/19/25  0715 07/10/25  0945  07/11/25 2036   Cholesterol 150 147 127 124  --  111 L 120  --   --    Triglycerides 202 H 192 H 144 97  --  119 108  --   --    HDL 35 L 36 L 36 L 47  --  41 42  --   --    LDL Cholesterol 74.6 72.6 62.2 L  --   --  46.2 L 56.4 L  --   --    Non-HDL Cholesterol 115 111 91  --   --  70 78  --   --    Non HDL Chol. (LDL+VLDL)  --   --   --  77  --   --   --   --   --    AST 21 20 22  --  22 19 24 23 35   ALT 14 14 16  --  14 10 20 15 19       BNP:  Recent Labs   Lab 07/11/25 2036    H       TSH:  Recent Labs   Lab 08/09/22  0711 02/08/23  0710 08/11/23  0755 08/19/24  0705 02/19/25  0715   TSH 2.903 2.063 1.834 1.219 1.814       Free T4:        Diagnostic Results:  ECG (personally reviewed and interpreted tracing(s)):  7/11/25 1955 SR 89, RBBB  7/13/25 0027 , RBBB    Chest X-Ray (personally reviewed and interpreted image(s)): 7/13/25 low volumes, ?CHF, R IJV TLC    Echo: 7/13/25 (images personally reviewed and interpreted)    Left Ventricle: The left ventricle is normal in size. Mildly increased wall thickness. There is mild concentric hypertrophy. There is normal systolic function with a visually estimated ejection fraction of 60 - 65%. Grade I diastolic dysfunction.    Right Ventricle: The right ventricle is normal in size measuring 3.0 cm. Systolic function is normal.    Tricuspid Valve: There is mild to moderate regurgitation.    Pulmonary Artery: The estimated pulmonary artery systolic pressure is 67 mmHg.    CV testing (Melida Griffin MD note 2/7/24)      Assessment and Plan:     * Sepsis with acute renal failure and septic shock  Mgmt per IM/urology  Seems warm/well perfused  EF normal on echo 7/13/25    Acute unilateral obstructive uropathy  Mgmt per urology    JAIRON (acute kidney injury)  Noted on admission  Creat stable    Thrombocytopenia  Chronic  Not currently receiving antiplat meds (prev on Plavix)    Essential hypertension  Med rx on hold    Coronary artery disease involving native  coronary artery of native heart without angina pectoris  Known CAD s/p PCI several yrs ago, MPI 2021 neg.  Antiplt meds on hold with thrombocytopenia  Cont statin  No anginal/CHF sxs noted.  Minimal flat trop elev noted in setting of sepsis/JAIRON, ACS unlikely.  EF normal on echo  No plan for inpat isch eval    PSVT (paroxysmal supraventricular tachycardia)  Noted overnight, ?PSVT.  No evidence of sustained AFL/AF  Amio stopped  Cont tele  Consider BBL if BP allows        VTE Risk Mitigation (From admission, onward)           Ordered     IP VTE HIGH RISK PATIENT  Once         07/12/25 0457     Place sequential compression device  Until discontinued         07/12/25 0457     Reason for No Pharmacological VTE Prophylaxis  Once        Comments: Urology plans on cysto with stent placement   Question:  Reasons:  Answer:  Physician Provided (leave comment)    07/12/25 0457                  Pt seen in ICU, critical care time 35min.  Cardiology will sign off, pls call with questions.    Javid Saucedo MD  Cardiology  [facility] - Intensive Care

## 2025-07-14 NOTE — ASSESSMENT & PLAN NOTE
S/p L ureteral stent placement due to septic shock, obstructing stone in the left kidney/ UPJ  Discussed due to presence of infection, delayed definitive management of stone is recommended  They will need two weeks of culture directed antibiotics  Discussed they will follow up locally with Dr. Villaseñor in [facility], message sent to facilitate ease of transfer of care  Silva per primary service   Urine and blood cultures pending  Discussed care w/ primary service

## 2025-07-15 PROBLEM — I48.0 PAROXYSMAL ATRIAL FIBRILLATION WITH RVR: Status: ACTIVE | Noted: 2025-07-13

## 2025-07-15 LAB
ABO + RH BLD: NORMAL
ABSOLUTE NEUTROPHIL MANUAL (OHS): 3.7 K/UL
ALBUMIN SERPL BCP-MCNC: 2.3 G/DL (ref 3.5–5.2)
ALP SERPL-CCNC: 55 UNIT/L (ref 40–150)
ALT SERPL W/O P-5'-P-CCNC: 16 UNIT/L (ref 10–44)
ANION GAP (OHS): 8 MMOL/L (ref 8–16)
ANISOCYTOSIS BLD QL SMEAR: SLIGHT
AST SERPL-CCNC: 23 UNIT/L (ref 11–45)
BACTERIA BLD CULT: ABNORMAL
BACTERIA UR CULT: ABNORMAL
BILIRUB SERPL-MCNC: 1 MG/DL (ref 0.1–1)
BLD PROD TYP BPU: NORMAL
BLOOD UNIT EXPIRATION DATE: NORMAL
BLOOD UNIT TYPE CODE: 6200
BUN SERPL-MCNC: 28 MG/DL (ref 8–23)
CALCIUM SERPL-MCNC: 8.1 MG/DL (ref 8.7–10.5)
CHLORIDE SERPL-SCNC: 113 MMOL/L (ref 95–110)
CO2 SERPL-SCNC: 21 MMOL/L (ref 23–29)
CREAT SERPL-MCNC: 0.7 MG/DL (ref 0.5–1.4)
CROSSMATCH INTERPRETATION: NORMAL
DISPENSE STATUS: NORMAL
EOSINOPHIL NFR BLD MANUAL: 1 % (ref 0–8)
ERYTHROCYTE [DISTWIDTH] IN BLOOD BY AUTOMATED COUNT: 17 % (ref 11.5–14.5)
GFR SERPLBLD CREATININE-BSD FMLA CKD-EPI: >60 ML/MIN/1.73/M2
GLUCOSE SERPL-MCNC: 92 MG/DL (ref 70–110)
GRAM STN SPEC: ABNORMAL
HCT VFR BLD AUTO: 26.9 % (ref 37–48.5)
HGB BLD-MCNC: 8.7 GM/DL (ref 12–16)
LYMPHOCYTES NFR BLD MANUAL: 8 % (ref 18–48)
MCH RBC QN AUTO: 28.9 PG (ref 27–31)
MCHC RBC AUTO-ENTMCNC: 32.3 G/DL (ref 32–36)
MCV RBC AUTO: 89 FL (ref 82–98)
MONOCYTES NFR BLD MANUAL: 6 % (ref 4–15)
NEUTROPHILS NFR BLD MANUAL: 80 % (ref 38–73)
NEUTS BAND NFR BLD MANUAL: 5 %
NUCLEATED RBC (/100WBC) (OHS): 0 /100 WBC
OHS QRS DURATION: 114 MS
OHS QRS DURATION: 122 MS
OHS QRS DURATION: 124 MS
OHS QTC CALCULATION: 409 MS
OHS QTC CALCULATION: 459 MS
OHS QTC CALCULATION: 495 MS
PLATELET # BLD AUTO: 13 K/UL (ref 150–450)
PLATELET BLD QL SMEAR: ABNORMAL
PMV BLD AUTO: 9.4 FL (ref 9.2–12.9)
POCT GLUCOSE: 113 MG/DL (ref 70–110)
POCT GLUCOSE: 115 MG/DL (ref 70–110)
POCT GLUCOSE: 124 MG/DL (ref 70–110)
POTASSIUM SERPL-SCNC: 3.1 MMOL/L (ref 3.5–5.1)
PROT SERPL-MCNC: 5.1 GM/DL (ref 6–8.4)
RBC # BLD AUTO: 3.01 M/UL (ref 4–5.4)
SODIUM SERPL-SCNC: 142 MMOL/L (ref 136–145)
UNIT NUMBER: NORMAL
WBC # BLD AUTO: 4.4 K/UL (ref 3.9–12.7)

## 2025-07-15 PROCEDURE — 63600175 PHARM REV CODE 636 W HCPCS: Performed by: STUDENT IN AN ORGANIZED HEALTH CARE EDUCATION/TRAINING PROGRAM

## 2025-07-15 PROCEDURE — 93010 ELECTROCARDIOGRAM REPORT: CPT | Mod: ,,, | Performed by: INTERNAL MEDICINE

## 2025-07-15 PROCEDURE — 20000000 HC ICU ROOM

## 2025-07-15 PROCEDURE — 85025 COMPLETE CBC W/AUTO DIFF WBC: CPT | Performed by: HOSPITALIST

## 2025-07-15 PROCEDURE — 36430 TRANSFUSION BLD/BLD COMPNT: CPT

## 2025-07-15 PROCEDURE — 63600175 PHARM REV CODE 636 W HCPCS: Performed by: INTERNAL MEDICINE

## 2025-07-15 PROCEDURE — 99232 SBSQ HOSP IP/OBS MODERATE 35: CPT | Mod: ,,, | Performed by: UROLOGY

## 2025-07-15 PROCEDURE — 99900035 HC TECH TIME PER 15 MIN (STAT)

## 2025-07-15 PROCEDURE — 80053 COMPREHEN METABOLIC PANEL: CPT | Performed by: HOSPITALIST

## 2025-07-15 PROCEDURE — 93005 ELECTROCARDIOGRAM TRACING: CPT

## 2025-07-15 PROCEDURE — 25000003 PHARM REV CODE 250: Performed by: INTERNAL MEDICINE

## 2025-07-15 PROCEDURE — P9035 PLATELET PHERES LEUKOREDUCED: HCPCS | Performed by: HOSPITALIST

## 2025-07-15 PROCEDURE — 99291 CRITICAL CARE FIRST HOUR: CPT | Mod: ,,, | Performed by: INTERNAL MEDICINE

## 2025-07-15 PROCEDURE — 27000221 HC OXYGEN, UP TO 24 HOURS

## 2025-07-15 RX ORDER — CEFTRIAXONE 2 G/1
2 INJECTION, POWDER, FOR SOLUTION INTRAMUSCULAR; INTRAVENOUS
Status: DISCONTINUED | OUTPATIENT
Start: 2025-07-15 | End: 2025-07-19

## 2025-07-15 RX ORDER — MEROPENEM 1 G/1
1 INJECTION, POWDER, FOR SOLUTION INTRAVENOUS
Status: DISCONTINUED | OUTPATIENT
Start: 2025-07-15 | End: 2025-07-15

## 2025-07-15 RX ORDER — HYDROCODONE BITARTRATE AND ACETAMINOPHEN 500; 5 MG/1; MG/1
TABLET ORAL ONCE
OUTPATIENT
Start: 2025-07-15 | End: 2025-07-15

## 2025-07-15 RX ORDER — POTASSIUM CHLORIDE 14.9 MG/ML
20 INJECTION INTRAVENOUS
Status: COMPLETED | OUTPATIENT
Start: 2025-07-15 | End: 2025-07-15

## 2025-07-15 RX ADMIN — CEFTRIAXONE 2 G: 2 INJECTION, POWDER, FOR SOLUTION INTRAMUSCULAR; INTRAVENOUS at 03:07

## 2025-07-15 RX ADMIN — AMIODARONE HYDROCHLORIDE 150 MG: 1.5 INJECTION, SOLUTION INTRAVENOUS at 09:07

## 2025-07-15 RX ADMIN — ESCITALOPRAM 5 MG: 5 TABLET, FILM COATED ORAL at 08:07

## 2025-07-15 RX ADMIN — MUPIROCIN: 20 OINTMENT TOPICAL at 09:07

## 2025-07-15 RX ADMIN — AMIODARONE HYDROCHLORIDE 0.5 MG/MIN: 1.8 INJECTION, SOLUTION INTRAVENOUS at 03:07

## 2025-07-15 RX ADMIN — AMIODARONE HYDROCHLORIDE 1 MG/MIN: 1.8 INJECTION, SOLUTION INTRAVENOUS at 09:07

## 2025-07-15 RX ADMIN — ATORVASTATIN CALCIUM 20 MG: 10 TABLET, FILM COATED ORAL at 09:07

## 2025-07-15 RX ADMIN — MEROPENEM 1 G: 1 INJECTION, POWDER, FOR SOLUTION INTRAVENOUS at 01:07

## 2025-07-15 RX ADMIN — POTASSIUM CHLORIDE 20 MEQ: 14.9 INJECTION, SOLUTION INTRAVENOUS at 07:07

## 2025-07-15 RX ADMIN — POTASSIUM CHLORIDE 20 MEQ: 14.9 INJECTION, SOLUTION INTRAVENOUS at 05:07

## 2025-07-15 RX ADMIN — MEROPENEM 1 G: 1 INJECTION, POWDER, FOR SOLUTION INTRAVENOUS at 05:07

## 2025-07-15 RX ADMIN — MUPIROCIN: 20 OINTMENT TOPICAL at 08:07

## 2025-07-15 NOTE — PROGRESS NOTES
Pharmacist Renal Dose Adjustment Note    Marine Mo is a 85 y.o. female being treated with the medication Meropenem    Patient Data:    Vital Signs (Most Recent):  Temp: 97.6 °F (36.4 °C) (07/15/25 0710)  Pulse: (!) 147 (07/15/25 0745)  Resp: (!) 23 (07/15/25 0745)  BP: (!) 137/51 (07/14/25 1401)  SpO2: (!) 94 % (07/15/25 0745) Vital Signs (72h Range):  Temp:  [96.8 °F (36 °C)-99.1 °F (37.3 °C)]   Pulse:  []   Resp:  [15-40]   BP: ()/()   SpO2:  [84 %-99 %]   Arterial Line BP: ()/(33-65)      Recent Labs   Lab 07/13/25  0421 07/14/25  0903 07/15/25  0228   CREATININE 1.4 0.8 0.7     Serum creatinine: 0.7 mg/dL 07/15/25 0228  Estimated creatinine clearance: 55.8 mL/min    Medication:Meropenem 1 gram every 12 hours will be changed to Meropenem 1 gram every 8 hours    Pharmacist's Name: Jersey Jeffries Jr  Pharmacist's Extension: [phone]

## 2025-07-15 NOTE — ASSESSMENT & PLAN NOTE
Patient's blood pressure range in the last 24 hours was: BP  Min: 102/52  Max: 124/58.The patient's inpatient anti-hypertensive regimen is listed below:  Current Antihypertensives  hydrALAZINE injection 5 mg, Every 6 hours PRN, Intravenous for SBP>180 or DBP>90   Medications on hold as patient is in shock.

## 2025-07-15 NOTE — PROGRESS NOTES
[facility]ensive [facility]  Urology  Progress Note    Patient Name: Marine Mo  MRN: [id number]  Admission Date: 7/12/2025  Hospital Length of Stay: 3 days  Code Status: DNR   Attending Provider: Efe Fernandes MD   Primary Care Physician: Serg Hwang MD    Subjective:     HPI:  84 yo woman presents from [facility] for septic shock 2/2 to obstructing pyelonephritis from left 1.7cm UPJ stone. Patient somnolent and hard of hearing. Daughter and  at bedside. First time stone event. Patient requiring pressor support to maintain BP.     Interval History: Silva draining well. No flank pain. Remains in ICU.     Review of Systems   Constitutional:  Negative for activity change, appetite change, chills, diaphoresis and fever.   HENT:  Negative for hearing loss.    Eyes:  Negative for visual disturbance.   Respiratory:  Negative for cough.    Gastrointestinal:  Negative for abdominal distention, abdominal pain, constipation, nausea and vomiting.   Musculoskeletal:  Negative for neck pain and neck stiffness.   Neurological:  Negative for dizziness and weakness.     Objective:     Temp:  [97.6 °F (36.4 °C)-98.1 °F (36.7 °C)] 98 °F (36.7 °C)  Pulse:  [] 90  Resp:  [15-30] 18  SpO2:  [91 %-98 %] 96 %  BP: (102-137)/(45-53) 111/53  Arterial Line BP: (108-145)/(41-62) 135/62     Body mass index is 26.91 kg/m².    Date 07/15/25 0700 - 07/16/25 0659   Shift 5046-6569 7268-9725 1374-6586 24 Hour Total   INTAKE   IV Piggyback 89.1   89.1   Shift Total(mL/kg) 89.1(1.3)   89.1(1.3)   OUTPUT   Urine(mL/kg/hr) 200   200   Shift Total(mL/kg) 200(2.8)   200(2.8)   Weight (kg) 71.1 71.1 71.1 71.1          Drains       Drain  Duration                  Ureteral Drain/Stent 07/12/25 1110 Left ureter 6 Fr. 3 days    Female External Urinary Catheter w/ Suction 07/15/25 1000 <1 day                     Physical Exam  Constitutional:       Appearance: She is well-developed.   HENT:      Head: Normocephalic and  "atraumatic.   Eyes:      Conjunctiva/sclera: Conjunctivae normal.   Pulmonary:      Effort: Pulmonary effort is normal. No respiratory distress.   Abdominal:      General: Abdomen is flat. There is no distension.      Tenderness: There is no abdominal tenderness.   Genitourinary:     Comments: Silva - clear yellow urine  Skin:     Findings: No rash.   Neurological:      Mental Status: She is alert. Mental status is at baseline.   Psychiatric:         Behavior: Behavior normal.           Significant Labs:    BMP:  Recent Labs   Lab 07/13/25 0421 07/14/25  0903 07/15/25  0228    140 142   K 4.2 3.3* 3.1*    112* 113*   CO2 12* 19* 21*   BUN 36* 36* 28*   CREATININE 1.4 0.8 0.7   CALCIUM 7.7* 7.9* 8.1*       CBC:   Recent Labs   Lab 07/13/25  0421 07/14/25  0903 07/15/25  0228   WBC 13.04* 6.14 4.40   HGB 9.7* 8.5* 8.7*   HCT 30.2* 26.2* 26.9*   PLT 27* 15* 13*       Blood Culture: No results for input(s): "LABBLOO" in the last 168 hours.  Urine Culture:   Recent Labs   Lab 07/11/25 2026 07/12/25  1207   LABURIN >100,000 cfu/ml Escherichia coli* No Growth     Urine Studies:   Recent Labs   Lab 07/11/25 2026   COLORU Yellow   APPEARANCEUA Hazy*   PHUR 7.0   SPECGRAV 1.015   PROTEINUA 1+*   GLUCUA Trace*   BILIRUBINUA Negative   OCCULTUA 2+*   NITRITE Negative   UROBILINOGEN Negative   LEUKOCYTESUR 1+*   RBCUA 40*   WBCUA 65*   BACTERIA Many*   HYALINECASTS 15*       Significant Imaging:  All pertinent imaging results/findings from the past 24 hours have been reviewed.        Assessment/Plan:     * Sepsis with acute renal failure and septic shock  S/p L ureteral stent placement due to septic shock, obstructing stone in the left kidney/ UPJ  Discussed due to presence of infection, delayed definitive management of stone is recommended  They will need two weeks of culture directed antibiotics  Discussed they will follow up locally with Dr. Villaseñor in [facility], message sent by Dr Castañeda to facilitate ease of " transfer of care  Silva per primary service   Urine and blood cultures - E coli      Left ureteral stone  S/p L ureteral stent        VTE Risk Mitigation (From admission, onward)           Ordered     IP VTE HIGH RISK PATIENT  Once         07/12/25 0457     Place sequential compression device  Until discontinued         07/12/25 0457     Reason for No Pharmacological VTE Prophylaxis  Once        Comments: Urology plans on cysto with stent placement   Question:  Reasons:  Answer:  Physician Provided (leave comment)    07/12/25 0457                    Reshma Tom MD  Urology  [facility] - Intensive Care

## 2025-07-15 NOTE — SUBJECTIVE & OBJECTIVE
Interval History: weaned off pressors this am and went into afib with RVR. Discussed with patient and family at bedside. Discussed with Cardiology.     Review of Systems   Unable to perform ROS: Mental status change     Objective:     Vital Signs (Most Recent):  Temp: 98 °F (36.7 °C) (07/15/25 1112)  Pulse: 88 (07/15/25 1400)  Resp: 19 (07/15/25 1400)  BP: (!) 109/53 (07/15/25 1400)  SpO2: 96 % (07/15/25 1400) Vital Signs (24h Range):  Temp:  [97.6 °F (36.4 °C)-98.1 °F (36.7 °C)] 98 °F (36.7 °C)  Pulse:  [] 88  Resp:  [15-28] 19  SpO2:  [91 %-98 %] 96 %  BP: (102-124)/(52-58) 109/53  Arterial Line BP: (108-145)/(41-62) 135/62     Weight: 71.1 kg (156 lb 12 oz)  Body mass index is 26.91 kg/m².    Intake/Output Summary (Last 24 hours) at 7/15/2025 1414  Last data filed at 7/15/2025 0944  Gross per 24 hour   Intake 354.38 ml   Output 1160 ml   Net -805.62 ml         Physical Exam  Constitutional:       Appearance: She is ill-appearing. She is not diaphoretic.   HENT:      Head: Normocephalic and atraumatic.      Mouth/Throat:      Mouth: Mucous membranes are dry.      Pharynx: No oropharyngeal exudate or posterior oropharyngeal erythema.   Cardiovascular:      Rate and Rhythm: Tachycardia present. Rhythm irregular.   Pulmonary:      Effort: Pulmonary effort is normal.      Breath sounds: No wheezing.   Abdominal:      General: Bowel sounds are normal.      Palpations: Abdomen is soft.   Musculoskeletal:         General: No deformity or signs of injury.   Skin:     General: Skin is warm and dry.   Neurological:      General: No focal deficit present.      Mental Status: She is disoriented.               Significant Labs: All pertinent labs within the past 24 hours have been reviewed.  BMP:   Recent Labs   Lab 07/14/25  0903 07/15/25  0228    92    142   K 3.3* 3.1*   * 113*   CO2 19* 21*   BUN 36* 28*   CREATININE 0.8 0.7   CALCIUM 7.9* 8.1*   MG 1.9  --      CBC:   Recent Labs   Lab  07/14/25  0903 07/15/25  0228   WBC 6.14 4.40   HGB 8.5* 8.7*   HCT 26.2* 26.9*   PLT 15* 13*       Significant Imaging: I have reviewed all pertinent imaging results/findings within the past 24 hours.

## 2025-07-15 NOTE — ASSESSMENT & PLAN NOTE
JAIRON is likely due to post-obstructive d/t kidney stone and acute UTI. Baseline creatinine is <1. Most recent creatinine and eGFR are listed below.  Recent Labs     07/13/25  0421 07/14/25  0903 07/15/25  0228   CREATININE 1.4 0.8 0.7   EGFRNORACEVR 37* >60 >60      Plan  - Avoid nephrotoxins and renally dose meds for GFR listed above  - Monitor urine output, serial BMP, and adjust therapy as needed  -Support MAP>65 and oxygen>94%.   Now resolved.

## 2025-07-15 NOTE — SUBJECTIVE & OBJECTIVE
Interval History: Silva draining well. No flank pain. Remains in ICU.     Review of Systems   Constitutional:  Negative for activity change, appetite change, chills, diaphoresis and fever.   HENT:  Negative for hearing loss.    Eyes:  Negative for visual disturbance.   Respiratory:  Negative for cough.    Gastrointestinal:  Negative for abdominal distention, abdominal pain, constipation, nausea and vomiting.   Musculoskeletal:  Negative for neck pain and neck stiffness.   Neurological:  Negative for dizziness and weakness.     Objective:     Temp:  [97.6 °F (36.4 °C)-98.1 °F (36.7 °C)] 98 °F (36.7 °C)  Pulse:  [] 90  Resp:  [15-30] 18  SpO2:  [91 %-98 %] 96 %  BP: (102-137)/(45-53) 111/53  Arterial Line BP: (108-145)/(41-62) 135/62     Body mass index is 26.91 kg/m².    Date 07/15/25 0700 - 07/16/25 0659   Shift 0459-7873 5062-0920 9472-8434 24 Hour Total   INTAKE   IV Piggyback 89.1   89.1   Shift Total(mL/kg) 89.1(1.3)   89.1(1.3)   OUTPUT   Urine(mL/kg/hr) 200   200   Shift Total(mL/kg) 200(2.8)   200(2.8)   Weight (kg) 71.1 71.1 71.1 71.1          Drains       Drain  Duration                  Ureteral Drain/Stent 07/12/25 1110 Left ureter 6 Fr. 3 days    Female External Urinary Catheter w/ Suction 07/15/25 1000 <1 day                     Physical Exam  Constitutional:       Appearance: She is well-developed.   HENT:      Head: Normocephalic and atraumatic.   Eyes:      Conjunctiva/sclera: Conjunctivae normal.   Pulmonary:      Effort: Pulmonary effort is normal. No respiratory distress.   Abdominal:      General: Abdomen is flat. There is no distension.      Tenderness: There is no abdominal tenderness.   Genitourinary:     Comments: Silva - clear yellow urine  Skin:     Findings: No rash.   Neurological:      Mental Status: She is alert. Mental status is at baseline.   Psychiatric:         Behavior: Behavior normal.           Significant Labs:    BMP:  Recent Labs   Lab 07/13/25  0421 07/14/25  0903  "07/15/25  0228    140 142   K 4.2 3.3* 3.1*    112* 113*   CO2 12* 19* 21*   BUN 36* 36* 28*   CREATININE 1.4 0.8 0.7   CALCIUM 7.7* 7.9* 8.1*       CBC:   Recent Labs   Lab 07/13/25  0421 07/14/25  0903 07/15/25  0228   WBC 13.04* 6.14 4.40   HGB 9.7* 8.5* 8.7*   HCT 30.2* 26.2* 26.9*   PLT 27* 15* 13*       Blood Culture: No results for input(s): "LABBLOO" in the last 168 hours.  Urine Culture:   Recent Labs   Lab 07/11/25 2026 07/12/25  1207   LABURIN >100,000 cfu/ml Escherichia coli* No Growth     Urine Studies:   Recent Labs   Lab 07/11/25 2026   COLORU Yellow   APPEARANCEUA Hazy*   PHUR 7.0   SPECGRAV 1.015   PROTEINUA 1+*   GLUCUA Trace*   BILIRUBINUA Negative   OCCULTUA 2+*   NITRITE Negative   UROBILINOGEN Negative   LEUKOCYTESUR 1+*   RBCUA 40*   WBCUA 65*   BACTERIA Many*   HYALINECASTS 15*       Significant Imaging:  All pertinent imaging results/findings from the past 24 hours have been reviewed.      "

## 2025-07-15 NOTE — ASSESSMENT & PLAN NOTE
Cardiology consulted.  Was on Amio drip.  Now resolved and off drip.  - developed afib rvr again on 7/15 -- on IV amiodarone  - unable to anticoagulate given platelets

## 2025-07-15 NOTE — NURSING
[facility]  Nurses Note -- 4 Eyes      7/15/2025       Skin assessed on: Q Shift      [x] No Pressure Injuries Present    [x]Prevention Measures Documented    [] Yes LDA  for Pressure Injury Previously documented     [] Yes New Pressure Injury Discovered   [] LDA for New Pressure Injury Added      Attending RN:  Kimberli Aguiar, RN     Second RN:  Steve

## 2025-07-15 NOTE — EICU
VICU Night Rounds Checklist  24H Vital Sign Range:  Temp:  [97.5 °F (36.4 °C)-98.1 °F (36.7 °C)]   Pulse:  []   Resp:  [16-30]   BP: (124-137)/(45-51)   SpO2:  [91 %-99 %]   Arterial Line BP: ()/(39-52)     Video rounds and LDA reconciliation

## 2025-07-15 NOTE — ASSESSMENT & PLAN NOTE
Patient's FSGs are controlled on current medication regimen.  Last A1c reviewed-   Lab Results   Component Value Date    HGBA1C 5.0 07/12/2025   Repeat A1c ordered and pending.   Most recent fingerstick glucose reviewed-   Recent Labs   Lab 07/14/25  1512 07/14/25  1513 07/14/25  2152 07/15/25  0730   POCTGLUCOSE 58* 99 96 113*     Current correctional scale  Low  Maintain anti-hyperglycemic dose as follows-   Antihyperglycemics (From admission, onward)      Start     Stop Route Frequency Ordered    07/12/25 0457  insulin aspart U-100 pen 0-5 Units         -- SubQ Every 6 hours PRN 07/12/25 0457          Hold Oral hypoglycemics while patient is in the hospital: metformin 500mg PO BID but family states that lately she has been having low sugars and this has recently been d/c.

## 2025-07-15 NOTE — PROGRESS NOTES
[facility] Intensive Care  Cardiology  Progress Note    Patient Name: Marine Mo  MRN: [id number]  Admission Date: 7/12/2025  Hospital Length of Stay: 3 days  Code Status: DNR   Attending Physician: Efe Fernandes MD   Primary Care Physician: Serg Hwang MD  Expected Discharge Date:   Principal Problem:Sepsis with acute renal failure and septic shock    Subjective:     Interval Hx: pt seen in ICU, case d/w Dr. Fernandes.  Reconsulted for AFL. Now back in SR on IV amio.  Unable to anticoag with plts<20k.    Tele: SR 90s(personally reviewed and interpreted)      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     Back pain     Breast cyst     Cirrhosis     Depression 01/30/2019    Diabetes mellitus     Disorder of kidney and ureter     Glaucoma     Hyperlipidemia     Hypertension     Trouble in sleeping     Type 2 diabetes mellitus        Past Surgical History:   Procedure Laterality Date    BACK SURGERY      BREAST CYST ASPIRATION  1982    CATARACT EXTRACTION Bilateral     don't remember date    CHOLECYSTECTOMY      COLONOSCOPY N/A 3/1/2018    Procedure: COLONOSCOPY;  Surgeon: Ren Newton MD;  Location: [address]);  Service: Endoscopy;  Laterality: N/A;    CYSTOSCOPY WITH URETEROSCOPY, RETROGRADE PYELOGRAPHY, AND INSERTION OF STENT Left 7/12/2025    Procedure: CYSTOSCOPY, WITH RETROGRADE PYELOGRAM AND URETERAL STENT INSERTION;  Surgeon: Aliya Castañeda MD;  Location: [facility];  Service: Urology;  Laterality: Left;    HYSTERECTOMY  age 70    bleeding    INSERTION, STENT, ARTERY  2022    OOPHORECTOMY      TONSILLECTOMY, ADENOIDECTOMY         Review of patient's allergies indicates:   Allergen Reactions    Pravastatin      Other Reaction(s): Propensity to adverse reactions to drug       No current facility-administered medications on file prior to encounter.     Current  Outpatient Medications on File Prior to Encounter   Medication Sig    ALPRAZolam (XANAX) 0.5 MG tablet TAKE 1 TABLET BY MOUTH NIGHTLY AS NEEDED FOR ANXIETY    aspirin 81 MG Chew Take 81 mg by mouth once daily.    atorvastatin (LIPITOR) 20 MG tablet Take 20 mg by mouth.    azelastine (ASTELIN) 137 mcg (0.1 %) nasal spray     calcium carbonate (TUMS) 200 mg calcium (500 mg) chewable tablet Take 1 tablet by mouth once daily.    cholecalciferol, vitamin D3, (VITAMIN D3) 25 mcg (1,000 unit) capsule Take 2,000 Units by mouth once daily.    clopidogreL (PLAVIX) 75 mg tablet Take 1 tablet (75 mg total) by mouth once daily.    coenzyme Q10 100 mg capsule Take 100 mg by mouth once daily.    CONSTULOSE 10 gram/15 mL solution Take 30 mLs by mouth once daily. (Patient not taking: Reported on 3/21/2025)    diclofenac sodium (VOLTAREN ARTHRITIS PAIN) 1 % Gel Apply 2 g topically once daily.    docusate sodium (COLACE) 100 MG capsule Take 1 capsule (100 mg total) by mouth 2 (two) times daily as needed for Constipation.    EScitalopram oxalate (LEXAPRO) 5 MG Tab Take 1 tablet by mouth once daily    fluticasone propionate (FLONASE) 50 mcg/actuation nasal spray 1 spray (50 mcg total) by Each Nostril route once daily.    guaiFENesin 100 mg/5 ml (ROBITUSSIN) 100 mg/5 mL syrup Take 200 mg by mouth 3 (three) times daily as needed for Cough. (Patient not taking: Reported on 3/21/2025)    hydrocortisone 2.5 % cream Apply topically 2 (two) times daily. (Patient not taking: Reported on 3/21/2025)    hydrOXYzine HCL (ATARAX) 10 MG Tab 1-3 tablets every 8 hours as needed for itching    ipratropium (ATROVENT) 42 mcg (0.06 %) nasal spray 2 sprays by Each Nostril route daily as needed.    loratadine (CLARITIN) 10 mg tablet Take 1 tablet (10 mg total) by mouth daily as needed for Allergies (as need d for allergies , sneezing , runny nose, take in am).    metFORMIN (GLUCOPHAGE) 500 MG tablet Take 1 tablet (500 mg total) by mouth 2 (two) times daily  with meals.    metoprolol tartrate (LOPRESSOR) 100 MG tablet Take 0.5 tablets (50 mg total) by mouth 2 (two) times daily.    mupirocin (BACTROBAN) 2 % ointment Apply topically 3 (three) times daily.    nitroGLYCERIN (NITROSTAT) 0.4 MG SL tablet Place 0.4 mg under the tongue every 5 (five) minutes as needed for Chest pain.    nystatin (MYCOSTATIN) cream Apply topically 2 (two) times daily.    OMEGA 3-DHA-EPA-FISH OIL ORAL Take 1 capsule by mouth once daily. 700 mg    polyethylene glycol (GLYCOLAX) 17 gram/dose powder Take 17 g by mouth once daily. (Patient not taking: Reported on 3/21/2025)    pulse oximeter (PULSE OXIMETER) device by Apply Externally route 2 (two) times a day. Use twice daily at 8 AM and 3 PM and record the value in MyChart as directed. (Patient not taking: Reported on 3/21/2025)    sucralfate (CARAFATE) 1 gram tablet  (Patient not taking: Reported on 3/21/2025)    sucralfate (CARAFATE) 100 mg/mL suspension Take 10 mLs by mouth 2 (two) times daily.    timolol maleate 0.5% (TIMOPTIC) 0.5 % Drop INSTILL 1 DROP INTO EACH EYE TWICE DAILY    triamcinolone acetonide 0.1% (KENALOG) 0.1 % cream Apply topically 2 (two) times daily.     Family History       Problem Relation (Age of Onset)    Kidney disease Father          Tobacco Use    Smoking status: Never     Passive exposure: Never    Smokeless tobacco: Never   Substance and Sexual Activity    Alcohol use: No    Drug use: No    Sexual activity: Yes     Partners: Male     Comment:      Review of Systems   Gastrointestinal:  Negative for melena.   Genitourinary:  Negative for hematuria.     Objective:     Vital Signs (Most Recent):  Temp: 97.6 °F (36.4 °C) (07/15/25 0710)  Pulse: (!) 146 (07/15/25 0753)  Resp: (!) 23 (07/15/25 0753)  BP: (!) 137/51 (07/14/25 1401)  SpO2: (!) 93 % (07/15/25 0753) Vital Signs (24h Range):  Temp:  [97.5 °F (36.4 °C)-98.1 °F (36.7 °C)] 97.6 °F (36.4 °C)  Pulse:  [] 146  Resp:  [15-30] 23  SpO2:  [91 %-98 %] 93  %  BP: (124-137)/(45-51) 137/51  Arterial Line BP: (108-145)/(41-62) 135/62     Weight: 71.1 kg (156 lb 12 oz)  Body mass index is 26.91 kg/m².    SpO2: (!) 93 %         Intake/Output Summary (Last 24 hours) at 7/15/2025 1042  Last data filed at 7/15/2025 0809  Gross per 24 hour   Intake 558.89 ml   Output 1460 ml   Net -901.11 ml       Lines/Drains/Airways       Central Venous Catheter Line  Duration             Percutaneous Central Line Triple Lumen 07/11/25 2137 Internal Jugular Right 3 days              Drain  Duration                  Ureteral Drain/Stent 07/12/25 1110 Left ureter 6 Fr. 2 days    Female External Urinary Catheter w/ Suction 07/15/25 1000 <1 day              Peripheral Intravenous Line  Duration             Peripheral IV Single Lumen 07/11/25 1855 20 G Left Antecubital 3 days                     Physical Exam  Constitutional:       General: She is not in acute distress.     Appearance: Normal appearance. She is well-developed and normal weight. She is not ill-appearing, toxic-appearing or diaphoretic.   HENT:      Head: Normocephalic and atraumatic.   Eyes:      General: No scleral icterus.     Extraocular Movements: Extraocular movements intact.      Conjunctiva/sclera: Conjunctivae normal.      Pupils: Pupils are equal, round, and reactive to light.   Neck:      Vascular: No JVD.      Trachea: No tracheal deviation.   Cardiovascular:      Rate and Rhythm: Normal rate and regular rhythm.      Heart sounds: S1 normal and S2 normal. No murmur heard.     No friction rub. No gallop.   Pulmonary:      Effort: Pulmonary effort is normal. No respiratory distress.      Breath sounds: Normal breath sounds. No stridor. No wheezing, rhonchi or rales.   Chest:      Chest wall: No tenderness.   Abdominal:      General: There is no distension.      Palpations: Abdomen is soft.   Musculoskeletal:         General: No swelling or tenderness. Normal range of motion.      Cervical back: Normal range of motion and  neck supple. No rigidity.      Right lower leg: No edema.      Left lower leg: No edema.   Skin:     General: Skin is warm and dry.      Coloration: Skin is not jaundiced.      Findings: Bruising present.   Neurological:      General: No focal deficit present.      Mental Status: She is alert and oriented to person, place, and time.      Cranial Nerves: No cranial nerve deficit.   Psychiatric:         Mood and Affect: Mood normal.         Behavior: Behavior normal.          Current Medications:   atorvastatin  20 mg Oral QHS    EScitalopram oxalate  5 mg Oral Daily    meropenem IV (PEDS and ADULTS)  1 g Intravenous Q8H    mupirocin   Nasal BID      amiodarone in dextrose 5%  1 mg/min Intravenous Continuous 33.3 mL/hr at 07/15/25 0944 1 mg/min at 07/15/25 0944    amiodarone in dextrose 5%  0.5 mg/min Intravenous Continuous        dexmedeTOMIDine (Precedex) infusion (titrating)  0-1.4 mcg/kg/hr Intravenous Continuous   Stopped at 07/13/25 0922    NORepinephrine bitartrate-D5W  0-3 mcg/kg/min Intravenous Continuous   Stopped at 07/14/25 1751       Current Facility-Administered Medications:     0.9%  NaCl infusion (for blood administration), , Intravenous, Q24H PRN    acetaminophen, 650 mg, Oral, Q4H PRN    ALPRAZolam, 0.25 mg, Oral, Nightly PRN    dextrose 50%, 12.5 g, Intravenous, PRN    glucagon (human recombinant), 1 mg, Intramuscular, PRN    hydrALAZINE, 5 mg, Intravenous, Q6H PRN    hydrOXYzine HCL, 10 mg, Oral, TID PRN    insulin aspart U-100, 0-5 Units, Subcutaneous, Q6H PRN    ipratropium, 0.5 mg, Nebulization, Q4H PRN    levalbuterol, 1.25 mg, Nebulization, Q4H PRN    melatonin, 6 mg, Oral, Nightly PRN    ondansetron, 4 mg, Intravenous, Q6H PRN    simethicone, 1 tablet, Oral, QID PRN    sodium chloride 0.9%, 10 mL, Intravenous, Q12H PRN    Laboratory (all labs reviewed):  CBC:  Recent Labs   Lab 07/12/25  0518 07/12/25  2104 07/13/25  0421 07/14/25  0903 07/15/25  0228   WBC 12.59 12.01 13.04 H 6.14 4.40   HGB  10.9 L 10.2 L 9.7 L 8.5 L 8.7 L   HCT 34.0 L 31.6 L 30.2 L 26.2 L 26.9 L   Platelet Count 35 LL 12 LL 27 LL 15 LL 13 LL       CHEMISTRIES:  Recent Labs   Lab 07/11/25 2036 07/12/25  0518 07/12/25  2104 07/13/25  0421 07/14/25  0903 07/15/25  0228   Glucose 127 H 124 H 93 116 H 102 92   Sodium 140 137 137 137 140 142   Potassium 3.3 L 3.6 4.2 4.2 3.3 L 3.1 L   BUN 21 25 H 33 H 36 H 36 H 28 H   Creatinine 1.5 H 1.5 H 1.5 H 1.4 0.8 0.7   eGFR 34 L 34 L 34 L 37 L >60 >60   Calcium 9.5 7.4 L 7.6 L 7.7 L 7.9 L 8.1 L   Magnesium  1.3 L 1.0 L 1.5 L  --  1.9  --        CARDIAC BIOMARKERS:  Recent Labs   Lab 07/11/25 2036 07/13/25  0421 07/13/25  0700   CPK 42  --   --    Troponin-I 0.023 0.154 H 0.127 H       COAGS:  Recent Labs   Lab 07/12/25  0518   INR 1.6 H       LIPIDS/LFTS:  Recent Labs   Lab 08/09/22  0711 02/08/23  0710 08/11/23  0755 02/01/24  0000 05/07/24  0721 08/19/24  0705 02/19/25  0715 07/10/25  0945 07/11/25 2036 07/14/25  0903 07/15/25  0228   Cholesterol 150 147 127 124  --  111 L 120  --   --   --   --    Triglycerides 202 H 192 H 144 97  --  119 108  --   --   --   --    HDL 35 L 36 L 36 L 47  --  41 42  --   --   --   --    LDL Cholesterol 74.6 72.6 62.2 L  --   --  46.2 L 56.4 L  --   --   --   --    Non-HDL Cholesterol 115 111 91  --   --  70 78  --   --   --   --    Non HDL Chol. (LDL+VLDL)  --   --   --  77  --   --   --   --   --   --   --    AST 21 20 22  --    < > 19 24 23 35 25 23   ALT 14 14 16  --    < > 10 20 15 19 20 16    < > = values in this interval not displayed.       BNP:  Recent Labs   Lab 07/11/25 2036    H       TSH:  Recent Labs   Lab 08/09/22  0711 02/08/23  0710 08/11/23  0755 08/19/24  0705 02/19/25  0715   TSH 2.903 2.063 1.834 1.219 1.814       Free T4:        Diagnostic Results:  ECG (personally reviewed and interpreted tracing(s)):  7/11/25 1955 SR 89, RBBB  7/13/25 1616 , RBBB  7/15/25 0835 , RBBB  7/15/25 1048 SR 94 RBBB    Chest X-Ray (personally  reviewed and interpreted image(s)): 7/13/25 low volumes, ?CHF, R IJV TLC    Echo: 7/13/25 (images prev personally reviewed and interpreted)    Left Ventricle: The left ventricle is normal in size. Mildly increased wall thickness. There is mild concentric hypertrophy. There is normal systolic function with a visually estimated ejection fraction of 60 - 65%. Grade I diastolic dysfunction.    Right Ventricle: The right ventricle is normal in size measuring 3.0 cm. Systolic function is normal.    Tricuspid Valve: There is mild to moderate regurgitation.    Pulmonary Artery: The estimated pulmonary artery systolic pressure is 67 mmHg.    CV testing (Melida Griffin MD note 2/7/24)      Assessment and Plan:     * Sepsis with acute renal failure and septic shock  Mgmt per IM/urology  Seems warm/well perfused  EF normal on echo 7/13/25    Acute unilateral obstructive uropathy  Mgmt per urology    JAIRON (acute kidney injury)  Noted on admission  Creat normalized    Thrombocytopenia  Chronic  Not currently receiving antiplat meds (prev on Plavix)    Essential hypertension  Med rx on hold    Coronary artery disease involving native coronary artery of native heart without angina pectoris  Known CAD s/p PCI several yrs ago, MPI 2021 neg.  Antiplt meds on hold with thrombocytopenia  Cont statin  No anginal/CHF sxs noted.  Minimal flat trop elev noted in setting of sepsis/JAIRON, ACS unlikely.  EF normal on echo  No plan for inpat isch eval    Paroxysmal atrial fibrillation with RVR  AF/RVR this am, now back in SR on amio infusion  Cont IV amio load, transition to PO in am  Cont tele  Consider BBL if BP allows  Unable to start DOAC with plts<20K        VTE Risk Mitigation (From admission, onward)           Ordered     IP VTE HIGH RISK PATIENT  Once         07/12/25 0457     Place sequential compression device  Until discontinued         07/12/25 0457     Reason for No Pharmacological VTE Prophylaxis  Once        Comments: Urology plans  on cysto with stent placement   Question:  Reasons:  Answer:  Physician Provided (leave comment)    07/12/25 5753                  Pt seen in ICU, case d/w Dr. Fernandes and RN staff,  at bedside.  Critical care time 35min.    Javid Saucedo MD  Cardiology  [facility] [place] [facility] [facility]

## 2025-07-15 NOTE — PLAN OF CARE
Family requesting to speak with urologist regarding plan of care. Patient remained of levo throughout the night. Increased swelling to bilateral arms, no pain noted. Platelets transfused. Potassium in process of replacement

## 2025-07-15 NOTE — ASSESSMENT & PLAN NOTE
AF/RVR this am, now back in SR on amio infusion  Cont IV amio load, transition to PO in am  Cont tele  Consider BBL if BP allows  Unable to start DOAC with plts<20K

## 2025-07-15 NOTE — EICU
Virtual ICU Quality Rounds    Admit Date: 7/12/2025  Hospital Day: 3    ICU Day: 3d 3h    24H Vital Sign Range:  Temp:  [97.5 °F (36.4 °C)-98.1 °F (36.7 °C)]   Pulse:  []   Resp:  [15-30]   BP: (124-137)/(45-51)   SpO2:  [91 %-98 %]   Arterial Line BP: (106-145)/(41-62)     VICU Surveillance Screening  Daily review of  line necessity(optional): Central line(s) in place and Urinary catheter in place  Central line type (optional): Triple lumen catheter  Central line indications : Vasopressors (in past 24/hrs) and Multiple infusions  Silva Indications : Ordered or placed by Urology service  LDA reconciliation : Yes

## 2025-07-15 NOTE — PROGRESS NOTES
[facility]  [facility]  Progress Note    Patient Name: Marine Mo  MRN: [id number]  Patient Class: IP- Inpatient   Admission Date: 7/12/2025  Length of Stay: 3 days  Attending Physician: Efe Fernandes MD  Primary Care Provider: Serg Hwang MD        Subjective     Principal Problem:Sepsis with acute renal failure and septic shock        HPI:  85 y.o. woman with h/o NIDDM type 2 (A1c 5 in Feb), Essential HTN, HLD, DILLARD cirrhosis with polycystic liver dz and chronic thrombocytopenia, Depression/General Anxiety d/o, CAD (h/o stent in LAD on ASA/Plavix), Anemia presents to the [place] ED with family c/o fevers and chills for the past 1-2 days. Was febrile at 100.1 and hypotensive. Started on IVF, IV vanc/zosyn and eventually started on peripheral levophed for Sepsis.     Labs noted for WBC 5 and stable H/H 11.5/34. plt 42. Initial LA ws 10.6 with a procal of 22. Received 30cc/kg sepsis fluids (3L) and repeat lactic improved to 3.6.   Lytes with hypokalemia, JAIRON with creatinine 1.5 (baseline <1). Bicarb 18. Mg 1.2 and phos 1.2.   UA concerning for UTI.     CT chest/abd/pelvis with IV contrast:   1. Moderate left-sided hydronephrosis secondary to a 1.7 cm calculus at the UPJ.  2. Wall thickening of the left renal pelvis with left perinephric fat stranding, suspicious for overlying infection/pyelitis or pyelonephritis.  Recommend correlation with urinalysis.  3. Hepatic cirrhosis.  4. Innumerable hepatic and renal cystic lesions as above, similar to the prior study.  5. Interlobular septal thickening in the bilateral lungs, can be seen with mild interstitial edema or interstitial pneumonia.  6. Chronic opacities in the right middle lobe and right lower lobe.     transfer center contacted and case discussed with Dr. Castañeda with Urology who recommended cysto with stent placement and did not think that IR will be required. She agreed to see in consult once patient arrives. We  have been consulted for further management. Family denies h/o kidney stones.     Overview/Hospital Course:  85 y.o. woman with h/o NIDDM type 2 (A1c 5 in Feb), Essential HTN, HLD, DILLARD cirrhosis with polycystic liver dz and chronic thrombocytopenia, Depression/General Anxiety d/o, CAD (h/o stent in LAD on ASA/Plavix), Anemia presents to the [place] ED with family c/o fevers and chills for the past 1-2 days. Was febrile at 100.1 and hypotensive. Started on IVF, IV ABx's and eventually started on levophed for septic shock.  Septic shock with urinary source and obstructive uropathy.  Started on Vanc and Meropenem.  Patient remains on Levophed.  Urology consulted.  Underwent cystoscopy with stent placement.  Growing GNR in BCx.  Vanc stopped. E Coli in blood cultures.     Interval History: weaned off pressors this am and went into afib with RVR. Discussed with patient and family at bedside. Discussed with Cardiology.     Review of Systems   Unable to perform ROS: Mental status change     Objective:     Vital Signs (Most Recent):  Temp: 98 °F (36.7 °C) (07/15/25 1112)  Pulse: 88 (07/15/25 1400)  Resp: 19 (07/15/25 1400)  BP: (!) 109/53 (07/15/25 1400)  SpO2: 96 % (07/15/25 1400) Vital Signs (24h Range):  Temp:  [97.6 °F (36.4 °C)-98.1 °F (36.7 °C)] 98 °F (36.7 °C)  Pulse:  [] 88  Resp:  [15-28] 19  SpO2:  [91 %-98 %] 96 %  BP: (102-124)/(52-58) 109/53  Arterial Line BP: (108-145)/(41-62) 135/62     Weight: 71.1 kg (156 lb 12 oz)  Body mass index is 26.91 kg/m².    Intake/Output Summary (Last 24 hours) at 7/15/2025 1414  Last data filed at 7/15/2025 0944  Gross per 24 hour   Intake 354.38 ml   Output 1160 ml   Net -805.62 ml         Physical Exam  Constitutional:       Appearance: She is ill-appearing. She is not diaphoretic.   HENT:      Head: Normocephalic and atraumatic.      Mouth/Throat:      Mouth: Mucous membranes are dry.      Pharynx: No oropharyngeal exudate or posterior oropharyngeal erythema.    Cardiovascular:      Rate and Rhythm: Tachycardia present. Rhythm irregular.   Pulmonary:      Effort: Pulmonary effort is normal.      Breath sounds: No wheezing.   Abdominal:      General: Bowel sounds are normal.      Palpations: Abdomen is soft.   Musculoskeletal:         General: No deformity or signs of injury.   Skin:     General: Skin is warm and dry.   Neurological:      General: No focal deficit present.      Mental Status: She is disoriented.               Significant Labs: All pertinent labs within the past 24 hours have been reviewed.  BMP:   Recent Labs   Lab 07/14/25  0903 07/15/25  0228    92    142   K 3.3* 3.1*   * 113*   CO2 19* 21*   BUN 36* 28*   CREATININE 0.8 0.7   CALCIUM 7.9* 8.1*   MG 1.9  --      CBC:   Recent Labs   Lab 07/14/25  0903 07/15/25  0228   WBC 6.14 4.40   HGB 8.5* 8.7*   HCT 26.2* 26.9*   PLT 15* 13*       Significant Imaging: I have reviewed all pertinent imaging results/findings within the past 24 hours.      Assessment & Plan  Sepsis with acute renal failure and septic shock  Marine Mo presents with sepsis with Acute kidney injury and shock requiring pressors secondary to Urinary Tract Infection and obstructive uropathy.     Interventions include:    Antibiotics:    cefTRIAXone injection 2 g, Every 24 hours (non-standard times), Intravenous    Fluid Resuscitation:   Ideal Body Weight- The patient is obese (BMI>30) and their ideal body weight of Ideal body weight: 54.7 kg (120 lb 9.5 oz) was used to calculate fluid bolus of 30 ml/kg. This was given at the OSH ED     Labs and Imaging:   Recent Labs   Lab 07/12/25  2104 07/13/25  0421 07/14/25  0011   LACTATE 3.0* 3.3* 2.2   Septic shock with urinary source and bacteremia.  Growing GNR in BCx.  Will stop Vanc/meropenem. ID/S back - transition to IV ceftriaxone while inpatient.  - no aneurysms on imaging, QT ok - likely PO antibiotics at discharge  - shock resolved      Acute unilateral  obstructive uropathy  S/p cystoscopy with stent placement.  - will need definitive treatment of stone as outpatient    JAIRON (acute kidney injury)  JAIRON is likely due to post-obstructive d/t kidney stone and acute UTI. Baseline creatinine is <1. Most recent creatinine and eGFR are listed below.  Recent Labs     07/13/25  0421 07/14/25  0903 07/15/25  0228   CREATININE 1.4 0.8 0.7   EGFRNORACEVR 37* >60 >60      Plan  - Avoid nephrotoxins and renally dose meds for GFR listed above  - Monitor urine output, serial BMP, and adjust therapy as needed  -Support MAP>65 and oxygen>94%.   Now resolved.  Thrombocytopenia  Chronic due to her DILLARD cirrhosis it appears. No acute signs of bleeding but may need transfuse for cysto today. Type and screen ordered and will defer to Urology the need for plt, ffp, or PRBC.   Plts lower than baseline, most likely related to septic shock.  Patient was transfused platelets for procedure.  Coronary artery disease involving native coronary artery of native heart without angina pectoris  Patient with known CAD s/p stent placement, which is controlled Will continue Statin and monitor for S/Sx of angina/ACS. Continue to monitor on telemetry. Resume ASA and Plavix when cleared by urology.   Holding ASA and Plavix secondary to severe thrombocytopenia.  Type 2 diabetes mellitus, controlled  Patient's FSGs are controlled on current medication regimen.  Last A1c reviewed-   Lab Results   Component Value Date    HGBA1C 5.0 07/12/2025   Repeat A1c ordered and pending.   Most recent fingerstick glucose reviewed-   Recent Labs   Lab 07/14/25  1512 07/14/25  1513 07/14/25  2152 07/15/25  0730   POCTGLUCOSE 58* 99 96 113*     Current correctional scale  Low  Maintain anti-hyperglycemic dose as follows-   Antihyperglycemics (From admission, onward)      Start     Stop Route Frequency Ordered    07/12/25 0457  insulin aspart U-100 pen 0-5 Units         -- SubQ Every 6 hours PRN 07/12/25 0457          Hold Oral  hypoglycemics while patient is in the hospital: metformin 500mg PO BID but family states that lately she has been having low sugars and this has recently been d/c.   Essential hypertension  Patient's blood pressure range in the last 24 hours was: BP  Min: 102/52  Max: 124/58.The patient's inpatient anti-hypertensive regimen is listed below:  Current Antihypertensives  hydrALAZINE injection 5 mg, Every 6 hours PRN, Intravenous for SBP>180 or DBP>90   Medications on hold as patient is in shock.  Left ureteral stone      Paroxysmal atrial fibrillation with RVR  Cardiology consulted.  Was on Amio drip.  Now resolved and off drip.  - developed afib rvr again on 7/15 -- on IV amiodarone  - unable to anticoagulate given platelets    Delirium  Delirium precautions.  Chronically on benzo's.  Resume home medications.    ACP (advance care planning)      VTE Risk Mitigation (From admission, onward)           Ordered     IP VTE HIGH RISK PATIENT  Once         07/12/25 0457     Place sequential compression device  Until discontinued         07/12/25 0457     Reason for No Pharmacological VTE Prophylaxis  Once        Comments: Urology plans on cysto with stent placement   Question:  Reasons:  Answer:  Physician Provided (leave comment)    07/12/25 0457                    Discharge Planning   CHRISTIANNE:      Code Status: DNR   Medical Readiness for Discharge Date:   Discharge Plan A: Home Health              Critical care time spent on the evaluation and treatment of severe organ dysfunction, review of pertinent labs and imaging studies, discussions with consulting providers and discussions with patient/family: 20 minutes.          Efe Fernandes MD  Department of Hospital Medicine   [facility] - Intensive Care

## 2025-07-15 NOTE — NURSING
[facility] [place]  Nurses Note -- 4 Eyes      7/14/2025       Skin assessed on: Q Shift      [x] No Pressure Injuries Present    [x]Prevention Measures Documented    [] Yes LDA  for Pressure Injury Previously documented     [] Yes New Pressure Injury Discovered   [] LDA for New Pressure Injury Added      Attending RN:  Jerrell Causey RN     Second RN:  Kimberli

## 2025-07-15 NOTE — ASSESSMENT & PLAN NOTE
S/p L ureteral stent placement due to septic shock, obstructing stone in the left kidney/ UPJ  Discussed due to presence of infection, delayed definitive management of stone is recommended  They will need two weeks of culture directed antibiotics  Discussed they will follow up locally with Dr. Villaseñor in [facility], message sent by Dr Castañeda to facilitate ease of transfer of care  Silva per primary service   Urine and blood cultures - E coli

## 2025-07-15 NOTE — PROGRESS NOTES
Platelet 13k, trending down   HAS BLED score of 3, which is high risk   - so even though platelets > 10k, since there is high risk of bleeding with plat being < 20k, we will go ahead with 1 unit platelet transfusion for now

## 2025-07-15 NOTE — ASSESSMENT & PLAN NOTE
Marine Mo presents with sepsis with Acute kidney injury and shock requiring pressors secondary to Urinary Tract Infection and obstructive uropathy.     Interventions include:    Antibiotics:    cefTRIAXone injection 2 g, Every 24 hours (non-standard times), Intravenous    Fluid Resuscitation:   Ideal Body Weight- The patient is obese (BMI>30) and their ideal body weight of Ideal body weight: 54.7 kg (120 lb 9.5 oz) was used to calculate fluid bolus of 30 ml/kg. This was given at the OSH ED     Labs and Imaging:   Recent Labs   Lab 07/12/25  2104 07/13/25  0421 07/14/25  0011   LACTATE 3.0* 3.3* 2.2   Septic shock with urinary source and bacteremia.  Growing GNR in BCx.  Will stop Vanc/meropenem. ID/S back - transition to IV ceftriaxone while inpatient.  - no aneurysms on imaging, QT ok - likely PO antibiotics at discharge  - shock resolved

## 2025-07-15 NOTE — SUBJECTIVE & OBJECTIVE
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     Back pain     Breast cyst     Cirrhosis     Depression 01/30/2019    Diabetes mellitus     Disorder of kidney and ureter     Glaucoma     Hyperlipidemia     Hypertension     Trouble in sleeping     Type 2 diabetes mellitus        Past Surgical History:   Procedure Laterality Date    BACK SURGERY      BREAST CYST ASPIRATION  1982    CATARACT EXTRACTION Bilateral     don't remember date    CHOLECYSTECTOMY      COLONOSCOPY N/A 3/1/2018    Procedure: COLONOSCOPY;  Surgeon: Ren Newton MD;  Location: [address]);  Service: Endoscopy;  Laterality: N/A;    CYSTOSCOPY WITH URETEROSCOPY, RETROGRADE PYELOGRAPHY, AND INSERTION OF STENT Left 7/12/2025    Procedure: CYSTOSCOPY, WITH RETROGRADE PYELOGRAM AND URETERAL STENT INSERTION;  Surgeon: Aliya Castañeda MD;  Location: [facility];  Service: Urology;  Laterality: Left;    HYSTERECTOMY  age 70    bleeding    INSERTION, STENT, ARTERY  2022    OOPHORECTOMY      TONSILLECTOMY, ADENOIDECTOMY         Review of patient's allergies indicates:   Allergen Reactions    Pravastatin      Other Reaction(s): Propensity to adverse reactions to drug       No current facility-administered medications on file prior to encounter.     Current Outpatient Medications on File Prior to Encounter   Medication Sig    ALPRAZolam (XANAX) 0.5 MG tablet TAKE 1 TABLET BY MOUTH NIGHTLY AS NEEDED FOR ANXIETY    aspirin 81 MG Chew Take 81 mg by mouth once daily.    atorvastatin (LIPITOR) 20 MG tablet Take 20 mg by mouth.    azelastine (ASTELIN) 137 mcg (0.1 %) nasal spray     calcium carbonate (TUMS) 200 mg calcium (500 mg) chewable tablet Take 1 tablet by mouth once daily.    cholecalciferol, vitamin D3, (VITAMIN D3) 25 mcg (1,000 unit) capsule Take 2,000 Units by mouth once daily.    clopidogreL (PLAVIX) 75 mg tablet Take 1 tablet (75  mg total) by mouth once daily.    coenzyme Q10 100 mg capsule Take 100 mg by mouth once daily.    CONSTULOSE 10 gram/15 mL solution Take 30 mLs by mouth once daily. (Patient not taking: Reported on 3/21/2025)    diclofenac sodium (VOLTAREN ARTHRITIS PAIN) 1 % Gel Apply 2 g topically once daily.    docusate sodium (COLACE) 100 MG capsule Take 1 capsule (100 mg total) by mouth 2 (two) times daily as needed for Constipation.    EScitalopram oxalate (LEXAPRO) 5 MG Tab Take 1 tablet by mouth once daily    fluticasone propionate (FLONASE) 50 mcg/actuation nasal spray 1 spray (50 mcg total) by Each Nostril route once daily.    guaiFENesin 100 mg/5 ml (ROBITUSSIN) 100 mg/5 mL syrup Take 200 mg by mouth 3 (three) times daily as needed for Cough. (Patient not taking: Reported on 3/21/2025)    hydrocortisone 2.5 % cream Apply topically 2 (two) times daily. (Patient not taking: Reported on 3/21/2025)    hydrOXYzine HCL (ATARAX) 10 MG Tab 1-3 tablets every 8 hours as needed for itching    ipratropium (ATROVENT) 42 mcg (0.06 %) nasal spray 2 sprays by Each Nostril route daily as needed.    loratadine (CLARITIN) 10 mg tablet Take 1 tablet (10 mg total) by mouth daily as needed for Allergies (as need d for allergies , sneezing , runny nose, take in am).    metFORMIN (GLUCOPHAGE) 500 MG tablet Take 1 tablet (500 mg total) by mouth 2 (two) times daily with meals.    metoprolol tartrate (LOPRESSOR) 100 MG tablet Take 0.5 tablets (50 mg total) by mouth 2 (two) times daily.    mupirocin (BACTROBAN) 2 % ointment Apply topically 3 (three) times daily.    nitroGLYCERIN (NITROSTAT) 0.4 MG SL tablet Place 0.4 mg under the tongue every 5 (five) minutes as needed for Chest pain.    nystatin (MYCOSTATIN) cream Apply topically 2 (two) times daily.    OMEGA 3-DHA-EPA-FISH OIL ORAL Take 1 capsule by mouth once daily. 700 mg    polyethylene glycol (GLYCOLAX) 17 gram/dose powder Take 17 g by mouth once daily. (Patient not taking: Reported on  3/21/2025)    pulse oximeter (PULSE OXIMETER) device by Apply Externally route 2 (two) times a day. Use twice daily at 8 AM and 3 PM and record the value in MyChart as directed. (Patient not taking: Reported on 3/21/2025)    sucralfate (CARAFATE) 1 gram tablet  (Patient not taking: Reported on 3/21/2025)    sucralfate (CARAFATE) 100 mg/mL suspension Take 10 mLs by mouth 2 (two) times daily.    timolol maleate 0.5% (TIMOPTIC) 0.5 % Drop INSTILL 1 DROP INTO EACH EYE TWICE DAILY    triamcinolone acetonide 0.1% (KENALOG) 0.1 % cream Apply topically 2 (two) times daily.     Family History       Problem Relation (Age of Onset)    Kidney disease Father          Tobacco Use    Smoking status: Never     Passive exposure: Never    Smokeless tobacco: Never   Substance and Sexual Activity    Alcohol use: No    Drug use: No    Sexual activity: Yes     Partners: Male     Comment:      Review of Systems   Gastrointestinal:  Negative for melena.   Genitourinary:  Negative for hematuria.     Objective:     Vital Signs (Most Recent):  Temp: 97.6 °F (36.4 °C) (07/15/25 0710)  Pulse: (!) 146 (07/15/25 0753)  Resp: (!) 23 (07/15/25 0753)  BP: (!) 137/51 (07/14/25 1401)  SpO2: (!) 93 % (07/15/25 0753) Vital Signs (24h Range):  Temp:  [97.5 °F (36.4 °C)-98.1 °F (36.7 °C)] 97.6 °F (36.4 °C)  Pulse:  [] 146  Resp:  [15-30] 23  SpO2:  [91 %-98 %] 93 %  BP: (124-137)/(45-51) 137/51  Arterial Line BP: (108-145)/(41-62) 135/62     Weight: 71.1 kg (156 lb 12 oz)  Body mass index is 26.91 kg/m².    SpO2: (!) 93 %         Intake/Output Summary (Last 24 hours) at 7/15/2025 1042  Last data filed at 7/15/2025 0809  Gross per 24 hour   Intake 558.89 ml   Output 1460 ml   Net -901.11 ml       Lines/Drains/Airways       Central Venous Catheter Line  Duration             Percutaneous Central Line Triple Lumen 07/11/25 2137 Internal Jugular Right 3 days              Drain  Duration                  Ureteral Drain/Stent 07/12/25 1110 Left  ureter 6 Fr. 2 days    Female External Urinary Catheter w/ Suction 07/15/25 1000 <1 day              Peripheral Intravenous Line  Duration             Peripheral IV Single Lumen 07/11/25 1855 20 G Left Antecubital 3 days                     Physical Exam  Constitutional:       General: She is not in acute distress.     Appearance: Normal appearance. She is well-developed and normal weight. She is not ill-appearing, toxic-appearing or diaphoretic.   HENT:      Head: Normocephalic and atraumatic.   Eyes:      General: No scleral icterus.     Extraocular Movements: Extraocular movements intact.      Conjunctiva/sclera: Conjunctivae normal.      Pupils: Pupils are equal, round, and reactive to light.   Neck:      Vascular: No JVD.      Trachea: No tracheal deviation.   Cardiovascular:      Rate and Rhythm: Normal rate and regular rhythm.      Heart sounds: S1 normal and S2 normal. No murmur heard.     No friction rub. No gallop.   Pulmonary:      Effort: Pulmonary effort is normal. No respiratory distress.      Breath sounds: Normal breath sounds. No stridor. No wheezing, rhonchi or rales.   Chest:      Chest wall: No tenderness.   Abdominal:      General: There is no distension.      Palpations: Abdomen is soft.   Musculoskeletal:         General: No swelling or tenderness. Normal range of motion.      Cervical back: Normal range of motion and neck supple. No rigidity.      Right lower leg: No edema.      Left lower leg: No edema.   Skin:     General: Skin is warm and dry.      Coloration: Skin is not jaundiced.      Findings: Bruising present.   Neurological:      General: No focal deficit present.      Mental Status: She is alert and oriented to person, place, and time.      Cranial Nerves: No cranial nerve deficit.   Psychiatric:         Mood and Affect: Mood normal.         Behavior: Behavior normal.          Current Medications:   atorvastatin  20 mg Oral QHS    EScitalopram oxalate  5 mg Oral Daily    meropenem  IV (PEDS and ADULTS)  1 g Intravenous Q8H    mupirocin   Nasal BID      amiodarone in dextrose 5%  1 mg/min Intravenous Continuous 33.3 mL/hr at 07/15/25 0944 1 mg/min at 07/15/25 0944    amiodarone in dextrose 5%  0.5 mg/min Intravenous Continuous        dexmedeTOMIDine (Precedex) infusion (titrating)  0-1.4 mcg/kg/hr Intravenous Continuous   Stopped at 07/13/25 0922    NORepinephrine bitartrate-D5W  0-3 mcg/kg/min Intravenous Continuous   Stopped at 07/14/25 1751       Current Facility-Administered Medications:     0.9%  NaCl infusion (for blood administration), , Intravenous, Q24H PRN    acetaminophen, 650 mg, Oral, Q4H PRN    ALPRAZolam, 0.25 mg, Oral, Nightly PRN    dextrose 50%, 12.5 g, Intravenous, PRN    glucagon (human recombinant), 1 mg, Intramuscular, PRN    hydrALAZINE, 5 mg, Intravenous, Q6H PRN    hydrOXYzine HCL, 10 mg, Oral, TID PRN    insulin aspart U-100, 0-5 Units, Subcutaneous, Q6H PRN    ipratropium, 0.5 mg, Nebulization, Q4H PRN    levalbuterol, 1.25 mg, Nebulization, Q4H PRN    melatonin, 6 mg, Oral, Nightly PRN    ondansetron, 4 mg, Intravenous, Q6H PRN    simethicone, 1 tablet, Oral, QID PRN    sodium chloride 0.9%, 10 mL, Intravenous, Q12H PRN    Laboratory (all labs reviewed):  CBC:  Recent Labs   Lab 07/12/25 0518 07/12/25 2104 07/13/25 0421 07/14/25  0903 07/15/25  0228   WBC 12.59 12.01 13.04 H 6.14 4.40   HGB 10.9 L 10.2 L 9.7 L 8.5 L 8.7 L   HCT 34.0 L 31.6 L 30.2 L 26.2 L 26.9 L   Platelet Count 35 LL 12 LL 27 LL 15 LL 13 LL       CHEMISTRIES:  Recent Labs   Lab 07/11/25 2036 07/12/25 0518 07/12/25 2104 07/13/25 0421 07/14/25  0903 07/15/25  0228   Glucose 127 H 124 H 93 116 H 102 92   Sodium 140 137 137 137 140 142   Potassium 3.3 L 3.6 4.2 4.2 3.3 L 3.1 L   BUN 21 25 H 33 H 36 H 36 H 28 H   Creatinine 1.5 H 1.5 H 1.5 H 1.4 0.8 0.7   eGFR 34 L 34 L 34 L 37 L >60 >60   Calcium 9.5 7.4 L 7.6 L 7.7 L 7.9 L 8.1 L   Magnesium  1.3 L 1.0 L 1.5 L  --  1.9  --        CARDIAC  BIOMARKERS:  Recent Labs   Lab 07/11/25 2036 07/13/25  0421 07/13/25  0700   CPK 42  --   --    Troponin-I 0.023 0.154 H 0.127 H       COAGS:  Recent Labs   Lab 07/12/25  0518   INR 1.6 H       LIPIDS/LFTS:  Recent Labs   Lab 08/09/22  0711 02/08/23  0710 08/11/23  0755 02/01/24  0000 05/07/24  0721 08/19/24  0705 02/19/25  0715 07/10/25  0945 07/11/25 2036 07/14/25  0903 07/15/25  0228   Cholesterol 150 147 127 124  --  111 L 120  --   --   --   --    Triglycerides 202 H 192 H 144 97  --  119 108  --   --   --   --    HDL 35 L 36 L 36 L 47  --  41 42  --   --   --   --    LDL Cholesterol 74.6 72.6 62.2 L  --   --  46.2 L 56.4 L  --   --   --   --    Non-HDL Cholesterol 115 111 91  --   --  70 78  --   --   --   --    Non HDL Chol. (LDL+VLDL)  --   --   --  77  --   --   --   --   --   --   --    AST 21 20 22  --    < > 19 24 23 35 25 23   ALT 14 14 16  --    < > 10 20 15 19 20 16    < > = values in this interval not displayed.       BNP:  Recent Labs   Lab 07/11/25 2036    H       TSH:  Recent Labs   Lab 08/09/22  0711 02/08/23  0710 08/11/23  0755 08/19/24  0705 02/19/25  0715   TSH 2.903 2.063 1.834 1.219 1.814       Free T4:        Diagnostic Results:  ECG (personally reviewed and interpreted tracing(s)):  7/11/25 1955 SR 89, RBBB  7/13/25 1616 , RBBB  7/15/25 0835 , RBBB  7/15/25 1048 SR 94 RBBB    Chest X-Ray (personally reviewed and interpreted image(s)): 7/13/25 low volumes, ?CHF, R IJV TLC    Echo: 7/13/25 (images prev personally reviewed and interpreted)    Left Ventricle: The left ventricle is normal in size. Mildly increased wall thickness. There is mild concentric hypertrophy. There is normal systolic function with a visually estimated ejection fraction of 60 - 65%. Grade I diastolic dysfunction.    Right Ventricle: The right ventricle is normal in size measuring 3.0 cm. Systolic function is normal.    Tricuspid Valve: There is mild to moderate regurgitation.    Pulmonary Artery:  The estimated pulmonary artery systolic pressure is 67 mmHg.    CV testing (Melida Griffin MD note 2/7/24)

## 2025-07-16 LAB
ABSOLUTE EOSINOPHIL (OHS): 0.04 K/UL
ABSOLUTE MONOCYTE (OHS): 0.4 K/UL (ref 0.3–1)
ABSOLUTE NEUTROPHIL COUNT (OHS): 3.67 K/UL (ref 1.8–7.7)
ALBUMIN SERPL BCP-MCNC: 2.2 G/DL (ref 3.5–5.2)
ALP SERPL-CCNC: 57 UNIT/L (ref 40–150)
ALT SERPL W/O P-5'-P-CCNC: 17 UNIT/L (ref 10–44)
ANION GAP (OHS): 9 MMOL/L (ref 8–16)
AST SERPL-CCNC: 22 UNIT/L (ref 11–45)
BASOPHILS # BLD AUTO: 0.01 K/UL
BASOPHILS NFR BLD AUTO: 0.2 %
BILIRUB SERPL-MCNC: 0.6 MG/DL (ref 0.1–1)
BUN SERPL-MCNC: 19 MG/DL (ref 8–23)
CALCIUM SERPL-MCNC: 8.2 MG/DL (ref 8.7–10.5)
CHLORIDE SERPL-SCNC: 110 MMOL/L (ref 95–110)
CO2 SERPL-SCNC: 22 MMOL/L (ref 23–29)
CREAT SERPL-MCNC: 0.6 MG/DL (ref 0.5–1.4)
ERYTHROCYTE [DISTWIDTH] IN BLOOD BY AUTOMATED COUNT: 17.1 % (ref 11.5–14.5)
GFR SERPLBLD CREATININE-BSD FMLA CKD-EPI: >60 ML/MIN/1.73/M2
GLUCOSE SERPL-MCNC: 111 MG/DL (ref 70–110)
HCT VFR BLD AUTO: 26.1 % (ref 37–48.5)
HGB BLD-MCNC: 8.4 GM/DL (ref 12–16)
IMM GRANULOCYTES # BLD AUTO: 0.04 K/UL (ref 0–0.04)
IMM GRANULOCYTES NFR BLD AUTO: 0.9 % (ref 0–0.5)
LYMPHOCYTES # BLD AUTO: 0.49 K/UL (ref 1–4.8)
MCH RBC QN AUTO: 28.9 PG (ref 27–31)
MCHC RBC AUTO-ENTMCNC: 32.2 G/DL (ref 32–36)
MCV RBC AUTO: 90 FL (ref 82–98)
NUCLEATED RBC (/100WBC) (OHS): 0 /100 WBC
PLATELET # BLD AUTO: 32 K/UL (ref 150–450)
PLATELET BLD QL SMEAR: ABNORMAL
PMV BLD AUTO: 11.6 FL (ref 9.2–12.9)
POCT GLUCOSE: 110 MG/DL (ref 70–110)
POCT GLUCOSE: 124 MG/DL (ref 70–110)
POCT GLUCOSE: 160 MG/DL (ref 70–110)
POCT GLUCOSE: 92 MG/DL (ref 70–110)
POTASSIUM SERPL-SCNC: 3.3 MMOL/L (ref 3.5–5.1)
PROT SERPL-MCNC: 5 GM/DL (ref 6–8.4)
RBC # BLD AUTO: 2.91 M/UL (ref 4–5.4)
RELATIVE EOSINOPHIL (OHS): 0.9 %
RELATIVE LYMPHOCYTE (OHS): 10.5 % (ref 18–48)
RELATIVE MONOCYTE (OHS): 8.6 % (ref 4–15)
RELATIVE NEUTROPHIL (OHS): 78.9 % (ref 38–73)
SODIUM SERPL-SCNC: 141 MMOL/L (ref 136–145)
WBC # BLD AUTO: 4.65 K/UL (ref 3.9–12.7)

## 2025-07-16 PROCEDURE — 63600175 PHARM REV CODE 636 W HCPCS: Performed by: STUDENT IN AN ORGANIZED HEALTH CARE EDUCATION/TRAINING PROGRAM

## 2025-07-16 PROCEDURE — 25000003 PHARM REV CODE 250: Performed by: STUDENT IN AN ORGANIZED HEALTH CARE EDUCATION/TRAINING PROGRAM

## 2025-07-16 PROCEDURE — 85025 COMPLETE CBC W/AUTO DIFF WBC: CPT | Performed by: HOSPITALIST

## 2025-07-16 PROCEDURE — 63600175 PHARM REV CODE 636 W HCPCS: Performed by: INTERNAL MEDICINE

## 2025-07-16 PROCEDURE — 94761 N-INVAS EAR/PLS OXIMETRY MLT: CPT

## 2025-07-16 PROCEDURE — 25000003 PHARM REV CODE 250: Performed by: INTERNAL MEDICINE

## 2025-07-16 PROCEDURE — 25000003 PHARM REV CODE 250: Performed by: HOSPITALIST

## 2025-07-16 PROCEDURE — 99232 SBSQ HOSP IP/OBS MODERATE 35: CPT | Mod: ,,, | Performed by: STUDENT IN AN ORGANIZED HEALTH CARE EDUCATION/TRAINING PROGRAM

## 2025-07-16 PROCEDURE — 82040 ASSAY OF SERUM ALBUMIN: CPT | Performed by: HOSPITALIST

## 2025-07-16 PROCEDURE — 99291 CRITICAL CARE FIRST HOUR: CPT | Mod: ,,, | Performed by: INTERNAL MEDICINE

## 2025-07-16 PROCEDURE — 99900035 HC TECH TIME PER 15 MIN (STAT)

## 2025-07-16 PROCEDURE — 11000001 HC ACUTE MED/SURG PRIVATE ROOM

## 2025-07-16 RX ORDER — AMIODARONE HYDROCHLORIDE 200 MG/1
400 TABLET ORAL 2 TIMES DAILY
Status: DISCONTINUED | OUTPATIENT
Start: 2025-07-16 | End: 2025-07-19 | Stop reason: HOSPADM

## 2025-07-16 RX ORDER — TIMOLOL MALEATE 5 MG/ML
1 SOLUTION/ DROPS OPHTHALMIC 2 TIMES DAILY
Status: DISCONTINUED | OUTPATIENT
Start: 2025-07-16 | End: 2025-07-19 | Stop reason: HOSPADM

## 2025-07-16 RX ORDER — POTASSIUM CHLORIDE 14.9 MG/ML
20 INJECTION INTRAVENOUS
Status: COMPLETED | OUTPATIENT
Start: 2025-07-16 | End: 2025-07-16

## 2025-07-16 RX ORDER — FUROSEMIDE 10 MG/ML
20 INJECTION INTRAMUSCULAR; INTRAVENOUS ONCE
Status: COMPLETED | OUTPATIENT
Start: 2025-07-16 | End: 2025-07-16

## 2025-07-16 RX ORDER — AMIODARONE HYDROCHLORIDE 200 MG/1
200 TABLET ORAL DAILY
Status: DISCONTINUED | OUTPATIENT
Start: 2025-07-31 | End: 2025-07-19 | Stop reason: HOSPADM

## 2025-07-16 RX ORDER — AMIODARONE HYDROCHLORIDE 200 MG/1
200 TABLET ORAL 2 TIMES DAILY
Status: DISCONTINUED | OUTPATIENT
Start: 2025-07-23 | End: 2025-07-19 | Stop reason: HOSPADM

## 2025-07-16 RX ADMIN — MUPIROCIN: 20 OINTMENT TOPICAL at 08:07

## 2025-07-16 RX ADMIN — AMIODARONE HYDROCHLORIDE 400 MG: 200 TABLET ORAL at 08:07

## 2025-07-16 RX ADMIN — AMIODARONE HYDROCHLORIDE 0.5 MG/MIN: 1.8 INJECTION, SOLUTION INTRAVENOUS at 03:07

## 2025-07-16 RX ADMIN — ATORVASTATIN CALCIUM 20 MG: 10 TABLET, FILM COATED ORAL at 08:07

## 2025-07-16 RX ADMIN — TIMOLOL MALEATE 1 DROP: 5 SOLUTION OPHTHALMIC at 08:07

## 2025-07-16 RX ADMIN — POTASSIUM CHLORIDE 20 MEQ: 14.9 INJECTION, SOLUTION INTRAVENOUS at 06:07

## 2025-07-16 RX ADMIN — TIMOLOL MALEATE 1 DROP: 5 SOLUTION OPHTHALMIC at 09:07

## 2025-07-16 RX ADMIN — POTASSIUM CHLORIDE 20 MEQ: 14.9 INJECTION, SOLUTION INTRAVENOUS at 04:07

## 2025-07-16 RX ADMIN — FUROSEMIDE 20 MG: 10 INJECTION, SOLUTION INTRAMUSCULAR; INTRAVENOUS at 12:07

## 2025-07-16 RX ADMIN — POTASSIUM BICARBONATE 25 MEQ: 977.5 TABLET, EFFERVESCENT ORAL at 03:07

## 2025-07-16 RX ADMIN — ACETAMINOPHEN 650 MG: 325 TABLET ORAL at 11:07

## 2025-07-16 RX ADMIN — ESCITALOPRAM 5 MG: 5 TABLET, FILM COATED ORAL at 08:07

## 2025-07-16 RX ADMIN — CEFTRIAXONE 2 G: 2 INJECTION, POWDER, FOR SOLUTION INTRAMUSCULAR; INTRAVENOUS at 03:07

## 2025-07-16 NOTE — EICU
Intervention Initiated From:  COR / DESIREEU    Marcos intervened regarding:  Rounding (Video assessment)    VICU Night Rounds Checklist  24H Vital Sign Range:  Temp:  [97.6 °F (36.4 °C)-98.2 °F (36.8 °C)]   Pulse:  []   Resp:  [15-27]   BP: (102-135)/(52-63)   SpO2:  [91 %-97 %]   Arterial Line BP: (108-140)/(41-62)     Video rounds and LDA reconciliation

## 2025-07-16 NOTE — ASSESSMENT & PLAN NOTE
Marine Mo presents with sepsis with Acute kidney injury and shock requiring pressors secondary to Urinary Tract Infection and obstructive uropathy.     Interventions include:    Antibiotics:    cefTRIAXone injection 2 g, Every 24 hours (non-standard times), Intravenous    Fluid Resuscitation:   Ideal Body Weight- The patient is obese (BMI>30) and their ideal body weight of Ideal body weight: 54.7 kg (120 lb 9.5 oz) was used to calculate fluid bolus of 30 ml/kg. This was given at the OSH ED     Labs and Imaging:   Recent Labs   Lab 07/12/25  2104 07/13/25  0421 07/14/25  0011   LACTATE 3.0* 3.3* 2.2   Septic shock with urinary source and bacteremia.  Growing GNR in BCx.  Will stop Vanc/meropenem. ID/S back - transition to IV ceftriaxone while inpatient.  - no aneurysms on imaging, QT ok - plan for levaquin at discharge  - shock resolved

## 2025-07-16 NOTE — ASSESSMENT & PLAN NOTE
S/p L ureteral stent placement due to septic shock, obstructing stone in the left kidney/ UPJ  Discussed due to presence of infection, delayed definitive management of stone is recommended  They will need two weeks of culture directed antibiotics  Discussed they will follow up locally with Dr. Villaseñor in [facility], message sent by Dr Castañeda to facilitate ease of transfer of care  Urine and blood cultures - E coli

## 2025-07-16 NOTE — NURSING
Report called to Ena on Tele unit.  Tele bunker notified of transfer to room 340 and tele box #[id number].  at bedside and notified of transfer to room 340.

## 2025-07-16 NOTE — ASSESSMENT & PLAN NOTE
JAIRON is likely due to post-obstructive d/t kidney stone and acute UTI. Baseline creatinine is <1. Most recent creatinine and eGFR are listed below.  Recent Labs     07/14/25  0903 07/15/25  0228 07/16/25  0318   CREATININE 0.8 0.7 0.6   EGFRNORACEVR >60 >60 >60      Plan  - Avoid nephrotoxins and renally dose meds for GFR listed above  - Monitor urine output, serial BMP, and adjust therapy as needed  -Support MAP>65 and oxygen>94%.   Now resolved.

## 2025-07-16 NOTE — SUBJECTIVE & OBJECTIVE
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     Back pain     Breast cyst     Cirrhosis     Depression 01/30/2019    Diabetes mellitus     Disorder of kidney and ureter     Glaucoma     Hyperlipidemia     Hypertension     Trouble in sleeping     Type 2 diabetes mellitus        Past Surgical History:   Procedure Laterality Date    BACK SURGERY      BREAST CYST ASPIRATION  1982    CATARACT EXTRACTION Bilateral     don't remember date    CHOLECYSTECTOMY      COLONOSCOPY N/A 3/1/2018    Procedure: COLONOSCOPY;  Surgeon: Ren Newton MD;  Location: [address]);  Service: Endoscopy;  Laterality: N/A;    CYSTOSCOPY WITH URETEROSCOPY, RETROGRADE PYELOGRAPHY, AND INSERTION OF STENT Left 7/12/2025    Procedure: CYSTOSCOPY, WITH RETROGRADE PYELOGRAM AND URETERAL STENT INSERTION;  Surgeon: Aliya Castañeda MD;  Location: [facility];  Service: Urology;  Laterality: Left;    HYSTERECTOMY  age 70    bleeding    INSERTION, STENT, ARTERY  2022    OOPHORECTOMY      TONSILLECTOMY, ADENOIDECTOMY         Review of patient's allergies indicates:   Allergen Reactions    Pravastatin      Other Reaction(s): Propensity to adverse reactions to drug       No current facility-administered medications on file prior to encounter.     Current Outpatient Medications on File Prior to Encounter   Medication Sig    ALPRAZolam (XANAX) 0.5 MG tablet TAKE 1 TABLET BY MOUTH NIGHTLY AS NEEDED FOR ANXIETY    aspirin 81 MG Chew Take 81 mg by mouth once daily.    atorvastatin (LIPITOR) 20 MG tablet Take 20 mg by mouth.    azelastine (ASTELIN) 137 mcg (0.1 %) nasal spray     calcium carbonate (TUMS) 200 mg calcium (500 mg) chewable tablet Take 1 tablet by mouth once daily.    cholecalciferol, vitamin D3, (VITAMIN D3) 25 mcg (1,000 unit) capsule Take 2,000 Units by mouth once daily.    clopidogreL (PLAVIX) 75 mg tablet Take 1 tablet (75  mg total) by mouth once daily.    coenzyme Q10 100 mg capsule Take 100 mg by mouth once daily.    CONSTULOSE 10 gram/15 mL solution Take 30 mLs by mouth once daily. (Patient not taking: Reported on 3/21/2025)    diclofenac sodium (VOLTAREN ARTHRITIS PAIN) 1 % Gel Apply 2 g topically once daily.    docusate sodium (COLACE) 100 MG capsule Take 1 capsule (100 mg total) by mouth 2 (two) times daily as needed for Constipation.    EScitalopram oxalate (LEXAPRO) 5 MG Tab Take 1 tablet by mouth once daily    fluticasone propionate (FLONASE) 50 mcg/actuation nasal spray 1 spray (50 mcg total) by Each Nostril route once daily.    guaiFENesin 100 mg/5 ml (ROBITUSSIN) 100 mg/5 mL syrup Take 200 mg by mouth 3 (three) times daily as needed for Cough. (Patient not taking: Reported on 3/21/2025)    hydrocortisone 2.5 % cream Apply topically 2 (two) times daily. (Patient not taking: Reported on 3/21/2025)    hydrOXYzine HCL (ATARAX) 10 MG Tab 1-3 tablets every 8 hours as needed for itching    ipratropium (ATROVENT) 42 mcg (0.06 %) nasal spray 2 sprays by Each Nostril route daily as needed.    loratadine (CLARITIN) 10 mg tablet Take 1 tablet (10 mg total) by mouth daily as needed for Allergies (as need d for allergies , sneezing , runny nose, take in am).    metFORMIN (GLUCOPHAGE) 500 MG tablet Take 1 tablet (500 mg total) by mouth 2 (two) times daily with meals.    metoprolol tartrate (LOPRESSOR) 100 MG tablet Take 0.5 tablets (50 mg total) by mouth 2 (two) times daily.    mupirocin (BACTROBAN) 2 % ointment Apply topically 3 (three) times daily.    nitroGLYCERIN (NITROSTAT) 0.4 MG SL tablet Place 0.4 mg under the tongue every 5 (five) minutes as needed for Chest pain.    nystatin (MYCOSTATIN) cream Apply topically 2 (two) times daily.    OMEGA 3-DHA-EPA-FISH OIL ORAL Take 1 capsule by mouth once daily. 700 mg    polyethylene glycol (GLYCOLAX) 17 gram/dose powder Take 17 g by mouth once daily. (Patient not taking: Reported on  3/21/2025)    pulse oximeter (PULSE OXIMETER) device by Apply Externally route 2 (two) times a day. Use twice daily at 8 AM and 3 PM and record the value in MyChart as directed. (Patient not taking: Reported on 3/21/2025)    sucralfate (CARAFATE) 1 gram tablet  (Patient not taking: Reported on 3/21/2025)    sucralfate (CARAFATE) 100 mg/mL suspension Take 10 mLs by mouth 2 (two) times daily.    timolol maleate 0.5% (TIMOPTIC) 0.5 % Drop INSTILL 1 DROP INTO EACH EYE TWICE DAILY    triamcinolone acetonide 0.1% (KENALOG) 0.1 % cream Apply topically 2 (two) times daily.     Family History       Problem Relation (Age of Onset)    Kidney disease Father          Tobacco Use    Smoking status: Never     Passive exposure: Never    Smokeless tobacco: Never   Substance and Sexual Activity    Alcohol use: No    Drug use: No    Sexual activity: Yes     Partners: Male     Comment:      Review of Systems   Gastrointestinal:  Negative for melena.   Genitourinary:  Negative for hematuria.     Objective:     Vital Signs (Most Recent):  Temp: 97.9 °F (36.6 °C) (07/16/25 1101)  Pulse: 80 (07/16/25 1300)  Resp: (!) 21 (07/16/25 1300)  BP: (!) 117/55 (07/16/25 1300)  SpO2: 97 % (07/16/25 1300) Vital Signs (24h Range):  Temp:  [97.9 °F (36.6 °C)-98.7 °F (37.1 °C)] 97.9 °F (36.6 °C)  Pulse:  [] 80  Resp:  [16-27] 21  SpO2:  [94 %-98 %] 97 %  BP: ()/(52-64) 117/55     Weight: 71.1 kg (156 lb 12 oz)  Body mass index is 26.91 kg/m².    SpO2: 97 %         Intake/Output Summary (Last 24 hours) at 7/16/2025 1347  Last data filed at 7/16/2025 1304  Gross per 24 hour   Intake 883.5 ml   Output 650 ml   Net 233.5 ml       Lines/Drains/Airways       Central Venous Catheter Line  Duration             Percutaneous Central Line Triple Lumen 07/11/25 2137 Internal Jugular Right 4 days              Drain  Duration             Female External Urinary Catheter w/ Suction 07/15/25 1000 1 day              Peripheral Intravenous Line   Duration             Peripheral IV Single Lumen 07/11/25 1855 20 G Left Antecubital 4 days                   Exam unchanged vs 7/15/25  Physical Exam  Constitutional:       General: She is not in acute distress.     Appearance: Normal appearance. She is well-developed and normal weight. She is not ill-appearing, toxic-appearing or diaphoretic.   HENT:      Head: Normocephalic and atraumatic.   Eyes:      General: No scleral icterus.     Extraocular Movements: Extraocular movements intact.      Conjunctiva/sclera: Conjunctivae normal.      Pupils: Pupils are equal, round, and reactive to light.   Neck:      Vascular: No JVD.      Trachea: No tracheal deviation.   Cardiovascular:      Rate and Rhythm: Normal rate and regular rhythm.      Heart sounds: S1 normal and S2 normal. No murmur heard.     No friction rub. No gallop.   Pulmonary:      Effort: Pulmonary effort is normal. No respiratory distress.      Breath sounds: Normal breath sounds. No stridor. No wheezing, rhonchi or rales.   Chest:      Chest wall: No tenderness.   Abdominal:      General: There is no distension.      Palpations: Abdomen is soft.   Musculoskeletal:         General: No swelling or tenderness. Normal range of motion.      Cervical back: Normal range of motion and neck supple. No rigidity.      Right lower leg: No edema.      Left lower leg: No edema.   Skin:     General: Skin is warm and dry.      Coloration: Skin is not jaundiced.      Findings: Bruising present.   Neurological:      General: No focal deficit present.      Mental Status: She is alert and oriented to person, place, and time.      Cranial Nerves: No cranial nerve deficit.   Psychiatric:         Mood and Affect: Mood normal.         Behavior: Behavior normal.          Current Medications:   atorvastatin  20 mg Oral QHS    cefTRIAXone (Rocephin) IV (PEDS and ADULTS)  2 g Intravenous Q24H    EScitalopram oxalate  5 mg Oral Daily    mupirocin   Nasal BID    timolol maleate 0.5%   1 drop Both Eyes BID      amiodarone in dextrose 5%  0.5 mg/min Intravenous Continuous 16.7 mL/hr at 07/16/25 0700 0.5 mg/min at 07/16/25 0700       Current Facility-Administered Medications:     0.9%  NaCl infusion (for blood administration), , Intravenous, Q24H PRN    acetaminophen, 650 mg, Oral, Q4H PRN    ALPRAZolam, 0.25 mg, Oral, Nightly PRN    dextrose 50%, 12.5 g, Intravenous, PRN    glucagon (human recombinant), 1 mg, Intramuscular, PRN    hydrALAZINE, 5 mg, Intravenous, Q6H PRN    hydrOXYzine HCL, 10 mg, Oral, TID PRN    insulin aspart U-100, 0-5 Units, Subcutaneous, Q6H PRN    ipratropium, 0.5 mg, Nebulization, Q4H PRN    levalbuterol, 1.25 mg, Nebulization, Q4H PRN    melatonin, 6 mg, Oral, Nightly PRN    ondansetron, 4 mg, Intravenous, Q6H PRN    simethicone, 1 tablet, Oral, QID PRN    sodium chloride 0.9%, 10 mL, Intravenous, Q12H PRN    Laboratory (all labs reviewed):  CBC:  Recent Labs   Lab 07/12/25  2104 07/13/25  0421 07/14/25  0903 07/15/25  0228 07/16/25  0318   WBC 12.01 13.04 H 6.14 4.40 4.65   HGB 10.2 L 9.7 L 8.5 L 8.7 L 8.4 L   HCT 31.6 L 30.2 L 26.2 L 26.9 L 26.1 L   Platelet Count 12 LL 27 LL 15 LL 13 LL 32 LL       CHEMISTRIES:  Recent Labs   Lab 07/11/25  2036 07/12/25  0518 07/12/25  2104 07/13/25  0421 07/14/25  0903 07/15/25  0228 07/16/25  0318   Glucose 127 H 124 H 93 116 H 102 92 111 H   Sodium 140 137 137 137 140 142 141   Potassium 3.3 L 3.6 4.2 4.2 3.3 L 3.1 L 3.3 L   BUN 21 25 H 33 H 36 H 36 H 28 H 19   Creatinine 1.5 H 1.5 H 1.5 H 1.4 0.8 0.7 0.6   eGFR 34 L 34 L 34 L 37 L >60 >60 >60   Calcium 9.5 7.4 L 7.6 L 7.7 L 7.9 L 8.1 L 8.2 L   Magnesium  1.3 L 1.0 L 1.5 L  --  1.9  --   --        CARDIAC BIOMARKERS:  Recent Labs   Lab 07/11/25  2036 07/13/25  0421 07/13/25  0700   CPK 42  --   --    Troponin-I 0.023 0.154 H 0.127 H       COAGS:  Recent Labs   Lab 07/12/25  0518   INR 1.6 H       LIPIDS/LFTS:  Recent Labs   Lab 08/09/22  0711 02/08/23  0710 08/11/23  0755  02/01/24  0000 05/07/24  0721 08/19/24  0705 02/19/25  0715 07/10/25  0945 07/11/25  2036 07/14/25  0903 07/15/25  0228 07/16/25  0318   Cholesterol 150 147 127 124  --  111 L 120  --   --   --   --   --    Triglycerides 202 H 192 H 144 97  --  119 108  --   --   --   --   --    HDL 35 L 36 L 36 L 47  --  41 42  --   --   --   --   --    LDL Cholesterol 74.6 72.6 62.2 L  --   --  46.2 L 56.4 L  --   --   --   --   --    Non-HDL Cholesterol 115 111 91  --   --  70 78  --   --   --   --   --    Non HDL Chol. (LDL+VLDL)  --   --   --  77  --   --   --   --   --   --   --   --    AST 21 20 22  --    < > 19 24 23 35 25 23 22   ALT 14 14 16  --    < > 10 20 15 19 20 16 17    < > = values in this interval not displayed.       BNP:  Recent Labs   Lab 07/11/25 2036    H       TSH:  Recent Labs   Lab 08/09/22  0711 02/08/23  0710 08/11/23  0755 08/19/24  0705 02/19/25  0715   TSH 2.903 2.063 1.834 1.219 1.814       Free T4:        Diagnostic Results:  ECG (personally reviewed and interpreted tracing(s)):  7/11/25 1955 SR 89, RBBB  7/13/25 1616 , RBBB  7/15/25 0835 , RBBB  7/15/25 1048 SR 94 RBBB    Chest X-Ray (personally reviewed and interpreted image(s)): 7/13/25 low volumes, ?CHF, R IJV TLC    Echo: 7/13/25 (images prev personally reviewed and interpreted)    Left Ventricle: The left ventricle is normal in size. Mildly increased wall thickness. There is mild concentric hypertrophy. There is normal systolic function with a visually estimated ejection fraction of 60 - 65%. Grade I diastolic dysfunction.    Right Ventricle: The right ventricle is normal in size measuring 3.0 cm. Systolic function is normal.    Tricuspid Valve: There is mild to moderate regurgitation.    Pulmonary Artery: The estimated pulmonary artery systolic pressure is 67 mmHg.    CV testing (Melida Griffin MD note 2/7/24)

## 2025-07-16 NOTE — NURSING
[facility], [facility]  Nurses Note -- 4 Eyes      7/15/2025       Skin assessed on: Q Shift      [x] No Pressure Injuries Present    [x]Prevention Measures Documented    [] Yes LDA  for Pressure Injury Previously documented     [] Yes New Pressure Injury Discovered   [] LDA for New Pressure Injury Added      Attending RN:  Ivonne Hunt RN     Second RN:  Kimberli EPSTEIN

## 2025-07-16 NOTE — EICU
EICU Physician Brief Note - Overnight Events    K 3.3, Cre normal.  Plan:  KCL 40 mEq IVPB ordered.  Eicu is available should acute issues arise.

## 2025-07-16 NOTE — PROGRESS NOTES
[facility] Intensive Care  Cardiology  Progress Note    Patient Name: Marine Mo  MRN: [id number]  Admission Date: 7/12/2025  Hospital Length of Stay: 4 days  Code Status: DNR   Attending Physician: Efe Fernandes MD   Primary Care Physician: Serg Hwang MD  Expected Discharge Date:   Principal Problem:Sepsis with acute renal failure and septic shock    Subjective:     Interval Hx: pt seen in ICU, case d/w Dr. Fernandes.  No cp/sob.  Maintaining SR. Unable to anticoag with chr severe thrombocytopenia.    Tele: SR 90s(personally reviewed and interpreted)      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     Back pain     Breast cyst     Cirrhosis     Depression 01/30/2019    Diabetes mellitus     Disorder of kidney and ureter     Glaucoma     Hyperlipidemia     Hypertension     Trouble in sleeping     Type 2 diabetes mellitus        Past Surgical History:   Procedure Laterality Date    BACK SURGERY      BREAST CYST ASPIRATION  1982    CATARACT EXTRACTION Bilateral     don't remember date    CHOLECYSTECTOMY      COLONOSCOPY N/A 3/1/2018    Procedure: COLONOSCOPY;  Surgeon: Ren Newton MD;  Location: [address];  Service: Endoscopy;  Laterality: N/A;    CYSTOSCOPY WITH URETEROSCOPY, RETROGRADE PYELOGRAPHY, AND INSERTION OF STENT Left 7/12/2025    Procedure: CYSTOSCOPY, WITH RETROGRADE PYELOGRAM AND URETERAL STENT INSERTION;  Surgeon: Aliya Castañeda MD;  Location: [facility];  Service: Urology;  Laterality: Left;    HYSTERECTOMY  age 70    bleeding    INSERTION, STENT, ARTERY  2022    OOPHORECTOMY      TONSILLECTOMY, ADENOIDECTOMY         Review of patient's allergies indicates:   Allergen Reactions    Pravastatin      Other Reaction(s): Propensity to adverse reactions to drug       No current facility-administered medications on file prior to encounter.     Current Outpatient  Medications on File Prior to Encounter   Medication Sig    ALPRAZolam (XANAX) 0.5 MG tablet TAKE 1 TABLET BY MOUTH NIGHTLY AS NEEDED FOR ANXIETY    aspirin 81 MG Chew Take 81 mg by mouth once daily.    atorvastatin (LIPITOR) 20 MG tablet Take 20 mg by mouth.    azelastine (ASTELIN) 137 mcg (0.1 %) nasal spray     calcium carbonate (TUMS) 200 mg calcium (500 mg) chewable tablet Take 1 tablet by mouth once daily.    cholecalciferol, vitamin D3, (VITAMIN D3) 25 mcg (1,000 unit) capsule Take 2,000 Units by mouth once daily.    clopidogreL (PLAVIX) 75 mg tablet Take 1 tablet (75 mg total) by mouth once daily.    coenzyme Q10 100 mg capsule Take 100 mg by mouth once daily.    CONSTULOSE 10 gram/15 mL solution Take 30 mLs by mouth once daily. (Patient not taking: Reported on 3/21/2025)    diclofenac sodium (VOLTAREN ARTHRITIS PAIN) 1 % Gel Apply 2 g topically once daily.    docusate sodium (COLACE) 100 MG capsule Take 1 capsule (100 mg total) by mouth 2 (two) times daily as needed for Constipation.    EScitalopram oxalate (LEXAPRO) 5 MG Tab Take 1 tablet by mouth once daily    fluticasone propionate (FLONASE) 50 mcg/actuation nasal spray 1 spray (50 mcg total) by Each Nostril route once daily.    guaiFENesin 100 mg/5 ml (ROBITUSSIN) 100 mg/5 mL syrup Take 200 mg by mouth 3 (three) times daily as needed for Cough. (Patient not taking: Reported on 3/21/2025)    hydrocortisone 2.5 % cream Apply topically 2 (two) times daily. (Patient not taking: Reported on 3/21/2025)    hydrOXYzine HCL (ATARAX) 10 MG Tab 1-3 tablets every 8 hours as needed for itching    ipratropium (ATROVENT) 42 mcg (0.06 %) nasal spray 2 sprays by Each Nostril route daily as needed.    loratadine (CLARITIN) 10 mg tablet Take 1 tablet (10 mg total) by mouth daily as needed for Allergies (as need d for allergies , sneezing , runny nose, take in am).    metFORMIN (GLUCOPHAGE) 500 MG tablet Take 1 tablet (500 mg total) by mouth 2 (two) times daily with meals.     metoprolol tartrate (LOPRESSOR) 100 MG tablet Take 0.5 tablets (50 mg total) by mouth 2 (two) times daily.    mupirocin (BACTROBAN) 2 % ointment Apply topically 3 (three) times daily.    nitroGLYCERIN (NITROSTAT) 0.4 MG SL tablet Place 0.4 mg under the tongue every 5 (five) minutes as needed for Chest pain.    nystatin (MYCOSTATIN) cream Apply topically 2 (two) times daily.    OMEGA 3-DHA-EPA-FISH OIL ORAL Take 1 capsule by mouth once daily. 700 mg    polyethylene glycol (GLYCOLAX) 17 gram/dose powder Take 17 g by mouth once daily. (Patient not taking: Reported on 3/21/2025)    pulse oximeter (PULSE OXIMETER) device by Apply Externally route 2 (two) times a day. Use twice daily at 8 AM and 3 PM and record the value in MyChart as directed. (Patient not taking: Reported on 3/21/2025)    sucralfate (CARAFATE) 1 gram tablet  (Patient not taking: Reported on 3/21/2025)    sucralfate (CARAFATE) 100 mg/mL suspension Take 10 mLs by mouth 2 (two) times daily.    timolol maleate 0.5% (TIMOPTIC) 0.5 % Drop INSTILL 1 DROP INTO EACH EYE TWICE DAILY    triamcinolone acetonide 0.1% (KENALOG) 0.1 % cream Apply topically 2 (two) times daily.     Family History       Problem Relation (Age of Onset)    Kidney disease Father          Tobacco Use    Smoking status: Never     Passive exposure: Never    Smokeless tobacco: Never   Substance and Sexual Activity    Alcohol use: No    Drug use: No    Sexual activity: Yes     Partners: Male     Comment:      Review of Systems   Gastrointestinal:  Negative for melena.   Genitourinary:  Negative for hematuria.     Objective:     Vital Signs (Most Recent):  Temp: 97.9 °F (36.6 °C) (07/16/25 1101)  Pulse: 80 (07/16/25 1300)  Resp: (!) 21 (07/16/25 1300)  BP: (!) 117/55 (07/16/25 1300)  SpO2: 97 % (07/16/25 1300) Vital Signs (24h Range):  Temp:  [97.9 °F (36.6 °C)-98.7 °F (37.1 °C)] 97.9 °F (36.6 °C)  Pulse:  [] 80  Resp:  [16-27] 21  SpO2:  [94 %-98 %] 97 %  BP: ()/(52-64)  117/55     Weight: 71.1 kg (156 lb 12 oz)  Body mass index is 26.91 kg/m².    SpO2: 97 %         Intake/Output Summary (Last 24 hours) at 7/16/2025 1347  Last data filed at 7/16/2025 1304  Gross per 24 hour   Intake 883.5 ml   Output 650 ml   Net 233.5 ml       Lines/Drains/Airways       Central Venous Catheter Line  Duration             Percutaneous Central Line Triple Lumen 07/11/25 2137 Internal Jugular Right 4 days              Drain  Duration             Female External Urinary Catheter w/ Suction 07/15/25 1000 1 day              Peripheral Intravenous Line  Duration             Peripheral IV Single Lumen 07/11/25 1855 20 G Left Antecubital 4 days                   Exam unchanged vs 7/15/25  Physical Exam  Constitutional:       General: She is not in acute distress.     Appearance: Normal appearance. She is well-developed and normal weight. She is not ill-appearing, toxic-appearing or diaphoretic.   HENT:      Head: Normocephalic and atraumatic.   Eyes:      General: No scleral icterus.     Extraocular Movements: Extraocular movements intact.      Conjunctiva/sclera: Conjunctivae normal.      Pupils: Pupils are equal, round, and reactive to light.   Neck:      Vascular: No JVD.      Trachea: No tracheal deviation.   Cardiovascular:      Rate and Rhythm: Normal rate and regular rhythm.      Heart sounds: S1 normal and S2 normal. No murmur heard.     No friction rub. No gallop.   Pulmonary:      Effort: Pulmonary effort is normal. No respiratory distress.      Breath sounds: Normal breath sounds. No stridor. No wheezing, rhonchi or rales.   Chest:      Chest wall: No tenderness.   Abdominal:      General: There is no distension.      Palpations: Abdomen is soft.   Musculoskeletal:         General: No swelling or tenderness. Normal range of motion.      Cervical back: Normal range of motion and neck supple. No rigidity.      Right lower leg: No edema.      Left lower leg: No edema.   Skin:     General: Skin is  warm and dry.      Coloration: Skin is not jaundiced.      Findings: Bruising present.   Neurological:      General: No focal deficit present.      Mental Status: She is alert and oriented to person, place, and time.      Cranial Nerves: No cranial nerve deficit.   Psychiatric:         Mood and Affect: Mood normal.         Behavior: Behavior normal.          Current Medications:   atorvastatin  20 mg Oral QHS    cefTRIAXone (Rocephin) IV (PEDS and ADULTS)  2 g Intravenous Q24H    EScitalopram oxalate  5 mg Oral Daily    mupirocin   Nasal BID    timolol maleate 0.5%  1 drop Both Eyes BID      amiodarone in dextrose 5%  0.5 mg/min Intravenous Continuous 16.7 mL/hr at 07/16/25 0700 0.5 mg/min at 07/16/25 0700       Current Facility-Administered Medications:     0.9%  NaCl infusion (for blood administration), , Intravenous, Q24H PRN    acetaminophen, 650 mg, Oral, Q4H PRN    ALPRAZolam, 0.25 mg, Oral, Nightly PRN    dextrose 50%, 12.5 g, Intravenous, PRN    glucagon (human recombinant), 1 mg, Intramuscular, PRN    hydrALAZINE, 5 mg, Intravenous, Q6H PRN    hydrOXYzine HCL, 10 mg, Oral, TID PRN    insulin aspart U-100, 0-5 Units, Subcutaneous, Q6H PRN    ipratropium, 0.5 mg, Nebulization, Q4H PRN    levalbuterol, 1.25 mg, Nebulization, Q4H PRN    melatonin, 6 mg, Oral, Nightly PRN    ondansetron, 4 mg, Intravenous, Q6H PRN    simethicone, 1 tablet, Oral, QID PRN    sodium chloride 0.9%, 10 mL, Intravenous, Q12H PRN    Laboratory (all labs reviewed):  CBC:  Recent Labs   Lab 07/12/25  2104 07/13/25  0421 07/14/25  0903 07/15/25  0228 07/16/25  0318   WBC 12.01 13.04 H 6.14 4.40 4.65   HGB 10.2 L 9.7 L 8.5 L 8.7 L 8.4 L   HCT 31.6 L 30.2 L 26.2 L 26.9 L 26.1 L   Platelet Count 12 LL 27 LL 15 LL 13 LL 32 LL       CHEMISTRIES:  Recent Labs   Lab 07/11/25 2036 07/12/25  0518 07/12/25  2104 07/13/25  0421 07/14/25  0903 07/15/25  0228 07/16/25  0318   Glucose 127 H 124 H 93 116 H 102 92 111 H   Sodium 140 137 137 137 140 142  141   Potassium 3.3 L 3.6 4.2 4.2 3.3 L 3.1 L 3.3 L   BUN 21 25 H 33 H 36 H 36 H 28 H 19   Creatinine 1.5 H 1.5 H 1.5 H 1.4 0.8 0.7 0.6   eGFR 34 L 34 L 34 L 37 L >60 >60 >60   Calcium 9.5 7.4 L 7.6 L 7.7 L 7.9 L 8.1 L 8.2 L   Magnesium  1.3 L 1.0 L 1.5 L  --  1.9  --   --        CARDIAC BIOMARKERS:  Recent Labs   Lab 07/11/25 2036 07/13/25  0421 07/13/25  0700   CPK 42  --   --    Troponin-I 0.023 0.154 H 0.127 H       COAGS:  Recent Labs   Lab 07/12/25  0518   INR 1.6 H       LIPIDS/LFTS:  Recent Labs   Lab 08/09/22  0711 02/08/23  0710 08/11/23  0755 02/01/24  0000 05/07/24  0721 08/19/24  0705 02/19/25  0715 07/10/25  0945 07/11/25 2036 07/14/25  0903 07/15/25  0228 07/16/25  0318   Cholesterol 150 147 127 124  --  111 L 120  --   --   --   --   --    Triglycerides 202 H 192 H 144 97  --  119 108  --   --   --   --   --    HDL 35 L 36 L 36 L 47  --  41 42  --   --   --   --   --    LDL Cholesterol 74.6 72.6 62.2 L  --   --  46.2 L 56.4 L  --   --   --   --   --    Non-HDL Cholesterol 115 111 91  --   --  70 78  --   --   --   --   --    Non HDL Chol. (LDL+VLDL)  --   --   --  77  --   --   --   --   --   --   --   --    AST 21 20 22  --    < > 19 24 23 35 25 23 22   ALT 14 14 16  --    < > 10 20 15 19 20 16 17    < > = values in this interval not displayed.       BNP:  Recent Labs   Lab 07/11/25 2036    H       TSH:  Recent Labs   Lab 08/09/22  0711 02/08/23  0710 08/11/23  0755 08/19/24  0705 02/19/25  0715   TSH 2.903 2.063 1.834 1.219 1.814       Free T4:        Diagnostic Results:  ECG (personally reviewed and interpreted tracing(s)):  7/11/25 1955 SR 89, RBBB  7/13/25 1616 , RBBB  7/15/25 0835 , RBBB  7/15/25 1048 SR 94 RBBB    Chest X-Ray (personally reviewed and interpreted image(s)): 7/13/25 low volumes, ?CHF, R IJV TLC    Echo: 7/13/25 (images prev personally reviewed and interpreted)    Left Ventricle: The left ventricle is normal in size. Mildly increased wall thickness. There is  mild concentric hypertrophy. There is normal systolic function with a visually estimated ejection fraction of 60 - 65%. Grade I diastolic dysfunction.    Right Ventricle: The right ventricle is normal in size measuring 3.0 cm. Systolic function is normal.    Tricuspid Valve: There is mild to moderate regurgitation.    Pulmonary Artery: The estimated pulmonary artery systolic pressure is 67 mmHg.    CV testing (Melida Griffin MD note 2/7/24)      Assessment and Plan:     * Sepsis with acute renal failure and septic shock  Mgmt per IM/urology  Seems warm/well perfused  EF normal on echo 7/13/25    Acute unilateral obstructive uropathy  Mgmt per urology    JAIRON (acute kidney injury)  Noted on admission  Creat normalized    Thrombocytopenia  Chronic  Not currently receiving antiplat meds (prev on Plavix)    Essential hypertension  Med rx on hold    Coronary artery disease involving native coronary artery of native heart without angina pectoris  Known CAD s/p PCI several yrs ago, MPI 2021 neg.  Antiplt meds on hold with thrombocytopenia  Cont statin  No anginal/CHF sxs noted.  Minimal flat trop elev noted in setting of sepsis/JAIRON, ACS unlikely.  EF normal on echo  No plan for inpat isch eval    Paroxysmal atrial fibrillation with RVR  PAF/RVR, now back in SR on amio infusion  Transition to PO amio, d/c gtt  Consider BBL if BP allows  Unable to start DOAC with chronic severe thrombocytopenia        VTE Risk Mitigation (From admission, onward)           Ordered     IP VTE HIGH RISK PATIENT  Once         07/12/25 0457     Place sequential compression device  Until discontinued         07/12/25 0457     Reason for No Pharmacological VTE Prophylaxis  Once        Comments: Urology plans on cysto with stent placement   Question:  Reasons:  Answer:  Physician Provided (leave comment)    07/12/25 0457                  Pt seen in ICU, case d.w Dr. Fernandes and RN.  Critical care time 35min.  Cardiology will sign off, pls call with  questions.    Javid Saucedo MD  Cardiology  [facility] - Intensive Care

## 2025-07-16 NOTE — ASSESSMENT & PLAN NOTE
Patient's FSGs are controlled on current medication regimen.  Last A1c reviewed-   Lab Results   Component Value Date    HGBA1C 5.0 07/12/2025   Repeat A1c ordered and pending.   Most recent fingerstick glucose reviewed-   Recent Labs   Lab 07/15/25  1612 07/15/25  2106 07/16/25  0729   POCTGLUCOSE 115* 124* 92     Current correctional scale  Low  Maintain anti-hyperglycemic dose as follows-   Antihyperglycemics (From admission, onward)      Start     Stop Route Frequency Ordered    07/12/25 0457  insulin aspart U-100 pen 0-5 Units         -- SubQ Every 6 hours PRN 07/12/25 0457          Hold Oral hypoglycemics while patient is in the hospital: metformin 500mg PO BID but family states that lately she has been having low sugars and this has recently been d/c.

## 2025-07-16 NOTE — SUBJECTIVE & OBJECTIVE
Interval History: in sinus rhythm this am. Bilateral upper exremities with pitting edema. RLE less than left due to elevating. Discussed with family at bedside she has had at least 9 liters of fluids/blood products this hospital stay and likely rsult of volume/poor mobility, low albumin. Discussed possible lasix but ideally therapy and movement will help. Will give low dose lasix today x1 and monitor response.    Review of Systems  Objective:     Vital Signs (Most Recent):  Temp: 97.9 °F (36.6 °C) (07/16/25 1101)  Pulse: 83 (07/16/25 1200)  Resp: (!) 24 (07/16/25 1200)  BP: (!) 115/57 (07/16/25 1200)  SpO2: 96 % (07/16/25 1200) Vital Signs (24h Range):  Temp:  [97.9 °F (36.6 °C)-98.7 °F (37.1 °C)] 97.9 °F (36.6 °C)  Pulse:  [] 83  Resp:  [16-27] 24  SpO2:  [94 %-98 %] 96 %  BP: ()/(52-64) 115/57     Weight: 71.1 kg (156 lb 12 oz)  Body mass index is 26.91 kg/m².    Intake/Output Summary (Last 24 hours) at 7/16/2025 1235  Last data filed at 7/16/2025 0700  Gross per 24 hour   Intake 1003.5 ml   Output 650 ml   Net 353.5 ml         Physical Exam  Vitals and nursing note reviewed.   Constitutional:       Appearance: She is ill-appearing. She is not diaphoretic.   HENT:      Head: Normocephalic and atraumatic.      Mouth/Throat:      Mouth: Mucous membranes are dry.      Pharynx: No oropharyngeal exudate or posterior oropharyngeal erythema.   Cardiovascular:      Rate and Rhythm: Normal rate and regular rhythm.   Pulmonary:      Effort: Pulmonary effort is normal.      Breath sounds: No wheezing.   Abdominal:      General: Bowel sounds are normal.      Palpations: Abdomen is soft.   Musculoskeletal:         General: No deformity or signs of injury.      Comments: Bilateral pitting edema to upper extremities, bruising noted   Skin:     General: Skin is warm and dry.   Neurological:      General: No focal deficit present.      Mental Status: She is alert.               Significant Labs: All pertinent labs  within the past 24 hours have been reviewed.  BMP:   Recent Labs   Lab 07/16/25 0318   *      K 3.3*      CO2 22*   BUN 19   CREATININE 0.6   CALCIUM 8.2*     CBC:   Recent Labs   Lab 07/15/25  0228 07/16/25  0318   WBC 4.40 4.65   HGB 8.7* 8.4*   HCT 26.9* 26.1*   PLT 13* 32*       Significant Imaging: I have reviewed all pertinent imaging results/findings within the past 24 hours.

## 2025-07-16 NOTE — ASSESSMENT & PLAN NOTE
Chronic due to her DILLARD cirrhosis it appears. No acute signs of bleeding but may need transfuse for cysto today. Type and screen ordered and will defer to Urology the need for plt, ffp, or PRBC.   Plts lower than baseline, most likely related to septic shock.  Patient was transfused platelets for procedure and again on 7/15  - platelets stable/improving

## 2025-07-16 NOTE — PROGRESS NOTES
[facility] Care  [facility] Medicine  Progress Note    Patient Name: Marine Mo  MRN: [id number]  Patient Class: IP- Inpatient   Admission Date: 7/12/2025  Length of Stay: 4 days  Attending Physician: Efe Fernandes MD  Primary Care Provider: Serg Hwang MD        Subjective     Principal Problem:Sepsis with acute renal failure and septic shock        HPI:  85 y.o. woman with h/o NIDDM type 2 (A1c 5 in Feb), Essential HTN, HLD, DILLARD cirrhosis with polycystic liver dz and chronic thrombocytopenia, Depression/General Anxiety d/o, CAD (h/o stent in LAD on ASA/Plavix), Anemia presents to the [place] ED with family c/o fevers and chills for the past 1-2 days. Was febrile at 100.1 and hypotensive. Started on IVF, IV vanc/zosyn and eventually started on peripheral levophed for Sepsis.     Labs noted for WBC 5 and stable H/H 11.5/34. plt 42. Initial LA ws 10.6 with a procal of 22. Received 30cc/kg sepsis fluids (3L) and repeat lactic improved to 3.6.   Lytes with hypokalemia, JAIRON with creatinine 1.5 (baseline <1). Bicarb 18. Mg 1.2 and phos 1.2.   UA concerning for UTI.     CT chest/abd/pelvis with IV contrast:   1. Moderate left-sided hydronephrosis secondary to a 1.7 cm calculus at the UPJ.  2. Wall thickening of the left renal pelvis with left perinephric fat stranding, suspicious for overlying infection/pyelitis or pyelonephritis.  Recommend correlation with urinalysis.  3. Hepatic cirrhosis.  4. Innumerable hepatic and renal cystic lesions as above, similar to the prior study.  5. Interlobular septal thickening in the bilateral lungs, can be seen with mild interstitial edema or interstitial pneumonia.  6. Chronic opacities in the right middle lobe and right lower lobe.     transfer center contacted and case discussed with Dr. Castañeda with Urology who recommended cysto with stent placement and did not think that IR will be required. She agreed to see in consult once patient arrives. We  have been consulted for further management. Family denies h/o kidney stones.     Overview/Hospital Course:  85 y.o. woman with h/o NIDDM type 2 (A1c 5 in Feb), Essential HTN, HLD, DILLARD cirrhosis with polycystic liver dz and chronic thrombocytopenia, Depression/General Anxiety d/o, CAD (h/o stent in LAD on ASA/Plavix), Anemia presents to the [place] ED with family c/o fevers and chills for the past 1-2 days. Was febrile at 100.1 and hypotensive. Started on IVF, IV ABx's and eventually started on levophed for septic shock.  Septic shock with urinary source and obstructive uropathy.  Started on Vanc and Meropenem.  Patient remains on Levophed.  Urology consulted.  Underwent cystoscopy with stent placement.  Growing GNR in BCx.  Vanc stopped. E Coli in blood cultures. Antibiotics changed to Ceftriaxone.   Off pressors. Developed Afib RVR on 7/15, started on amiodarone. Cardiology following.    Interval History: in sinus rhythm this am. Bilateral upper exremities with pitting edema. RLE less than left due to elevating. Discussed with family at bedside she has had at least 9 liters of fluids/blood products this hospital stay and likely rsult of volume/poor mobility, low albumin. Discussed possible lasix but ideally therapy and movement will help. Will give low dose lasix today x1 and monitor response.    Review of Systems  Objective:     Vital Signs (Most Recent):  Temp: 97.9 °F (36.6 °C) (07/16/25 1101)  Pulse: 83 (07/16/25 1200)  Resp: (!) 24 (07/16/25 1200)  BP: (!) 115/57 (07/16/25 1200)  SpO2: 96 % (07/16/25 1200) Vital Signs (24h Range):  Temp:  [97.9 °F (36.6 °C)-98.7 °F (37.1 °C)] 97.9 °F (36.6 °C)  Pulse:  [] 83  Resp:  [16-27] 24  SpO2:  [94 %-98 %] 96 %  BP: ()/(52-64) 115/57     Weight: 71.1 kg (156 lb 12 oz)  Body mass index is 26.91 kg/m².    Intake/Output Summary (Last 24 hours) at 7/16/2025 1235  Last data filed at 7/16/2025 0700  Gross per 24 hour   Intake 1003.5 ml   Output 650 ml   Net 353.5  ml         Physical Exam  Vitals and nursing note reviewed.   Constitutional:       Appearance: She is ill-appearing. She is not diaphoretic.   HENT:      Head: Normocephalic and atraumatic.      Mouth/Throat:      Mouth: Mucous membranes are dry.      Pharynx: No oropharyngeal exudate or posterior oropharyngeal erythema.   Cardiovascular:      Rate and Rhythm: Normal rate and regular rhythm.   Pulmonary:      Effort: Pulmonary effort is normal.      Breath sounds: No wheezing.   Abdominal:      General: Bowel sounds are normal.      Palpations: Abdomen is soft.   Musculoskeletal:         General: No deformity or signs of injury.      Comments: Bilateral pitting edema to upper extremities, bruising noted   Skin:     General: Skin is warm and dry.   Neurological:      General: No focal deficit present.      Mental Status: She is alert.               Significant Labs: All pertinent labs within the past 24 hours have been reviewed.  BMP:   Recent Labs   Lab 07/16/25  0318   *      K 3.3*      CO2 22*   BUN 19   CREATININE 0.6   CALCIUM 8.2*     CBC:   Recent Labs   Lab 07/15/25  0228 07/16/25  0318   WBC 4.40 4.65   HGB 8.7* 8.4*   HCT 26.9* 26.1*   PLT 13* 32*       Significant Imaging: I have reviewed all pertinent imaging results/findings within the past 24 hours.      Assessment & Plan  Sepsis with acute renal failure and septic shock  Marine Mo presents with sepsis with Acute kidney injury and shock requiring pressors secondary to Urinary Tract Infection and obstructive uropathy.     Interventions include:    Antibiotics:    cefTRIAXone injection 2 g, Every 24 hours (non-standard times), Intravenous    Fluid Resuscitation:   Ideal Body Weight- The patient is obese (BMI>30) and their ideal body weight of Ideal body weight: 54.7 kg (120 lb 9.5 oz) was used to calculate fluid bolus of 30 ml/kg. This was given at the OSH ED     Labs and Imaging:   Recent Labs   Lab 07/12/25  7799  07/13/25  0421 07/14/25  0011   LACTATE 3.0* 3.3* 2.2   Septic shock with urinary source and bacteremia.  Growing GNR in BCx.  Will stop Vanc/meropenem. ID/S back - transition to IV ceftriaxone while inpatient.  - no aneurysms on imaging, QT ok - plan for levaquin at discharge  - shock resolved      Acute unilateral obstructive uropathy  S/p cystoscopy with stent placement.  - will need definitive treatment of stone as outpatient    JAIRON (acute kidney injury)  JAIRON is likely due to post-obstructive d/t kidney stone and acute UTI. Baseline creatinine is <1. Most recent creatinine and eGFR are listed below.  Recent Labs     07/14/25  0903 07/15/25  0228 07/16/25  0318   CREATININE 0.8 0.7 0.6   EGFRNORACEVR >60 >60 >60      Plan  - Avoid nephrotoxins and renally dose meds for GFR listed above  - Monitor urine output, serial BMP, and adjust therapy as needed  -Support MAP>65 and oxygen>94%.   Now resolved.  Thrombocytopenia  Chronic due to her DILLARD cirrhosis it appears. No acute signs of bleeding but may need transfuse for cysto today. Type and screen ordered and will defer to Urology the need for plt, ffp, or PRBC.   Plts lower than baseline, most likely related to septic shock.  Patient was transfused platelets for procedure and again on 7/15  - platelets stable/improving  Coronary artery disease involving native coronary artery of native heart without angina pectoris  Patient with known CAD s/p stent placement, which is controlled Will continue Statin and monitor for S/Sx of angina/ACS. Continue to monitor on telemetry. Resume ASA and Plavix when cleared by urology.   Holding ASA and Plavix secondary to severe thrombocytopenia.  Type 2 diabetes mellitus, controlled  Patient's FSGs are controlled on current medication regimen.  Last A1c reviewed-   Lab Results   Component Value Date    HGBA1C 5.0 07/12/2025   Repeat A1c ordered and pending.   Most recent fingerstick glucose reviewed-   Recent Labs   Lab 07/15/25  1612  07/15/25  2106 07/16/25  0729   POCTGLUCOSE 115* 124* 92     Current correctional scale  Low  Maintain anti-hyperglycemic dose as follows-   Antihyperglycemics (From admission, onward)      Start     Stop Route Frequency Ordered    07/12/25 0457  insulin aspart U-100 pen 0-5 Units         -- SubQ Every 6 hours PRN 07/12/25 0457          Hold Oral hypoglycemics while patient is in the hospital: metformin 500mg PO BID but family states that lately she has been having low sugars and this has recently been d/c.   Essential hypertension  Patient's blood pressure range in the last 24 hours was: BP  Min: 98/53  Max: 139/60.The patient's inpatient anti-hypertensive regimen is listed below:  Current Antihypertensives  hydrALAZINE injection 5 mg, Every 6 hours PRN, Intravenous  timolol maleate 0.5% ophthalmic solution 1 drop, 2 times daily, Both Eyes  furosemide injection 20 mg, Once, Intravenous for SBP>180 or DBP>90   Medications on hold as patient is in shock.  Left ureteral stone      Paroxysmal atrial fibrillation with RVR  Cardiology consulted.  Was on Amio drip.  Now resolved and off drip.  - developed afib rvr again on 7/15 -- on IV amiodarone  - unable to anticoagulate given platelets    Delirium  Delirium precautions.  Chronically on benzo's.  Resume home medications.    ACP (advance care planning)      VTE Risk Mitigation (From admission, onward)           Ordered     IP VTE HIGH RISK PATIENT  Once         07/12/25 0457     Place sequential compression device  Until discontinued         07/12/25 0457     Reason for No Pharmacological VTE Prophylaxis  Once        Comments: Urology plans on cysto with stent placement   Question:  Reasons:  Answer:  Physician Provided (leave comment)    07/12/25 0457                    Discharge Planning   CHRISTIANNE:      Code Status: DNR   Medical Readiness for Discharge Date:   Discharge Plan A: Home Health              Critical care time spent on the evaluation and treatment of severe  organ dysfunction, review of pertinent labs and imaging studies, discussions with consulting providers and discussions with patient/family: 20 minutes.          Efe Fernandes MD  Department of Hospital Medicine   [facility] - Intensive Care

## 2025-07-16 NOTE — PROGRESS NOTES
[facility] - Telemetry  Urology  Progress Note    Patient Name: Marine Mo  MRN: [id number]  Admission Date: 7/12/2025  Hospital Length of Stay: 4 days  Code Status: DNR   Attending Provider: Efe Fernandes MD   Primary Care Physician: Serg Hwang MD    Subjective:     HPI:  86 yo woman presents from [facility] for septic shock 2/2 to obstructing pyelonephritis from left 1.7cm UPJ stone. Patient somnolent and hard of hearing. Daughter and  at bedside. First time stone event. Patient requiring pressor support to maintain BP.     Interval History: voiding spontaneously    Review of Systems   Constitutional:  Negative for activity change, appetite change, chills, diaphoresis and fever.   HENT:  Negative for hearing loss.    Eyes:  Negative for visual disturbance.   Respiratory:  Negative for cough, shortness of breath and wheezing.    Gastrointestinal:  Negative for abdominal distention, abdominal pain, constipation, nausea and vomiting.   Genitourinary:  Negative for difficulty urinating, dysuria, flank pain and hematuria.   Musculoskeletal:  Negative for neck pain and neck stiffness.   Neurological:  Positive for weakness. Negative for dizziness.     Objective:     Temp:  [97.9 °F (36.6 °C)-98.7 °F (37.1 °C)] 98.5 °F (36.9 °C)  Pulse:  [] 88  Resp:  [16-27] 18  SpO2:  [90 %-98 %] 90 %  BP: ()/(52-64) 124/59     Body mass index is 26.91 kg/m².    Date 07/16/25 0700 - 07/17/25 0659   Shift 3022-3141 1591-5904 1871-1145 24 Hour Total   INTAKE   P.O. 120   120   I.V.(mL/kg) 16.7(0.2)   16.7(0.2)   IV Piggyback 48   48   Shift Total(mL/kg) 184.6(2.6)   184.6(2.6)   OUTPUT   Urine(mL/kg/hr) 900(1.6) 900  1800   Shift Total(mL/kg) 900(12.7) 900(12.7)  1800(25.3)   Weight (kg) 71.1 71.1 71.1 71.1          Drains       Drain  Duration             Female External Urinary Catheter w/ Suction 07/15/25 1000 1 day                     Physical Exam  Nursing note reviewed.   Constitutional:        "Appearance: She is well-developed.   HENT:      Head: Normocephalic and atraumatic.   Eyes:      Conjunctiva/sclera: Conjunctivae normal.   Pulmonary:      Effort: Pulmonary effort is normal. No respiratory distress.   Abdominal:      General: Abdomen is flat.   Skin:     Findings: No rash.   Neurological:      Mental Status: She is alert and oriented to person, place, and time.   Psychiatric:         Behavior: Behavior normal.           Significant Labs:    BMP:  Recent Labs   Lab 07/14/25  0903 07/15/25  0228 07/16/25  0318    142 141   K 3.3* 3.1* 3.3*   * 113* 110   CO2 19* 21* 22*   BUN 36* 28* 19   CREATININE 0.8 0.7 0.6   CALCIUM 7.9* 8.1* 8.2*       CBC:   Recent Labs   Lab 07/14/25  0903 07/15/25  0228 07/16/25  0318   WBC 6.14 4.40 4.65   HGB 8.5* 8.7* 8.4*   HCT 26.2* 26.9* 26.1*   PLT 15* 13* 32*       Blood Culture: No results for input(s): "LABBLOO" in the last 168 hours.  Urine Studies:   Recent Labs   Lab 07/11/25 2026   COLORU Yellow   APPEARANCEUA Hazy*   PHUR 7.0   SPECGRAV 1.015   PROTEINUA 1+*   GLUCUA Trace*   BILIRUBINUA Negative   OCCULTUA 2+*   NITRITE Negative   UROBILINOGEN Negative   LEUKOCYTESUR 1+*   RBCUA 40*   WBCUA 65*   BACTERIA Many*   HYALINECASTS 15*                     Assessment/Plan:     * Sepsis with acute renal failure and septic shock  S/p L ureteral stent placement due to septic shock, obstructing stone in the left kidney/ UPJ  Discussed due to presence of infection, delayed definitive management of stone is recommended  They will need two weeks of culture directed antibiotics  Discussed they will follow up locally with Dr. Villaseñor in [place], message sent by Dr Castañeda to facilitate ease of transfer of care  Urine and blood cultures - E coli      Left ureteral stone  S/p L ureteral stent        VTE Risk Mitigation (From admission, onward)           Ordered     IP VTE HIGH RISK PATIENT  Once         07/12/25 0457     Place sequential compression device  " Until discontinued         07/12/25 0457     Reason for No Pharmacological VTE Prophylaxis  Once        Comments: Urology plans on cysto with stent placement   Question:  Reasons:  Answer:  Physician Provided (leave comment)    07/12/25 0457                    Aliya Castañeda MD  Urology  [facility]

## 2025-07-16 NOTE — SUBJECTIVE & OBJECTIVE
Interval History: voiding spontaneously    Review of Systems   Constitutional:  Negative for activity change, appetite change, chills, diaphoresis and fever.   HENT:  Negative for hearing loss.    Eyes:  Negative for visual disturbance.   Respiratory:  Negative for cough, shortness of breath and wheezing.    Gastrointestinal:  Negative for abdominal distention, abdominal pain, constipation, nausea and vomiting.   Genitourinary:  Negative for difficulty urinating, dysuria, flank pain and hematuria.   Musculoskeletal:  Negative for neck pain and neck stiffness.   Neurological:  Positive for weakness. Negative for dizziness.     Objective:     Temp:  [97.9 °F (36.6 °C)-98.7 °F (37.1 °C)] 98.5 °F (36.9 °C)  Pulse:  [] 88  Resp:  [16-27] 18  SpO2:  [90 %-98 %] 90 %  BP: ()/(52-64) 124/59     Body mass index is 26.91 kg/m².    Date 07/16/25 0700 - 07/17/25 0659   Shift 7150-0242 7109-6623 2848-2644 24 Hour Total   INTAKE   P.O. 120   120   I.V.(mL/kg) 16.7(0.2)   16.7(0.2)   IV Piggyback 48   48   Shift Total(mL/kg) 184.6(2.6)   184.6(2.6)   OUTPUT   Urine(mL/kg/hr) 900(1.6) 900  1800   Shift Total(mL/kg) 900(12.7) 900(12.7)  1800(25.3)   Weight (kg) 71.1 71.1 71.1 71.1          Drains       Drain  Duration             Female External Urinary Catheter w/ Suction 07/15/25 1000 1 day                     Physical Exam  Nursing note reviewed.   Constitutional:       Appearance: She is well-developed.   HENT:      Head: Normocephalic and atraumatic.   Eyes:      Conjunctiva/sclera: Conjunctivae normal.   Pulmonary:      Effort: Pulmonary effort is normal. No respiratory distress.   Abdominal:      General: Abdomen is flat.   Skin:     Findings: No rash.   Neurological:      Mental Status: She is alert and oriented to person, place, and time.   Psychiatric:         Behavior: Behavior normal.           Significant Labs:    BMP:  Recent Labs   Lab 07/14/25  0903 07/15/25  0228 07/16/25  0318    142 141   K 3.3*  "3.1* 3.3*   * 113* 110   CO2 19* 21* 22*   BUN 36* 28* 19   CREATININE 0.8 0.7 0.6   CALCIUM 7.9* 8.1* 8.2*       CBC:   Recent Labs   Lab 07/14/25  0903 07/15/25  0228 07/16/25  0318   WBC 6.14 4.40 4.65   HGB 8.5* 8.7* 8.4*   HCT 26.2* 26.9* 26.1*   PLT 15* 13* 32*       Blood Culture: No results for input(s): "LABBLOO" in the last 168 hours.  Urine Studies:   Recent Labs   Lab 07/11/25 2026   COLORU Yellow   APPEARANCEUA Hazy*   PHUR 7.0   SPECGRAV 1.015   PROTEINUA 1+*   GLUCUA Trace*   BILIRUBINUA Negative   OCCULTUA 2+*   NITRITE Negative   UROBILINOGEN Negative   LEUKOCYTESUR 1+*   RBCUA 40*   WBCUA 65*   BACTERIA Many*   HYALINECASTS 15*                   "

## 2025-07-16 NOTE — PLAN OF CARE
Problem: Adult Inpatient Plan of Care  Goal: Plan of Care Review  Outcome: Progressing  Goal: Patient-Specific Goal (Individualized)  Outcome: Progressing  Goal: Optimal Comfort and Wellbeing  Outcome: Progressing  Goal: Readiness for Transition of Care  Outcome: Progressing     Problem: Infection  Goal: Absence of Infection Signs and Symptoms  Outcome: Progressing     Problem: Fall Injury Risk  Goal: Absence of Fall and Fall-Related Injury  Outcome: Progressing     Problem: Skin Injury Risk Increased  Goal: Skin Health and Integrity  Outcome: Progressing     Problem: Sepsis/Septic Shock  Goal: Absence of Bleeding  Outcome: Progressing  Goal: Blood Glucose Level Within Targeted Range  Outcome: Progressing

## 2025-07-16 NOTE — PROVIDER TRANSFER
Mrs. Mo is a 86 yo F with history of hypertension, cirrhosis, thrombocytopenia and CAD on Plavix and aspirin who was admitted with septic shock and obstructive uropathy.  Placed in ICU.  Urology consulted underwent cystoscopy with stent placement.  Urine and blood cultures growing pansensitive E coli.  Now on ceftriaxone.  Pressors have been weaned.  Developed AFib with RVR, started on IV amiodarone.  Now transitioned to p.o. amiodarone.  Platelets still less than 50,000, not candidate for oral anticoagulation at this time.  Still with bilateral pitting edema, due to volume overload.  P.r.n. Lasix and elevation.  QT within normal limits.  No evidence of aneurysms.  Would plan to discharge home with Levaquin until outpatient follow-up with Urology for definitive stone treatment.    - PT/OT consulted  - follow-up lab work and replace electrolytes  - p.r.n. Lasix as tolerated  - needs Urology follow up as outpatient    Efe Fernandes MD  Department of Hospital Medicine  [facility]

## 2025-07-16 NOTE — ASSESSMENT & PLAN NOTE
Patient's blood pressure range in the last 24 hours was: BP  Min: 98/53  Max: 139/60.The patient's inpatient anti-hypertensive regimen is listed below:  Current Antihypertensives  hydrALAZINE injection 5 mg, Every 6 hours PRN, Intravenous  timolol maleate 0.5% ophthalmic solution 1 drop, 2 times daily, Both Eyes  furosemide injection 20 mg, Once, Intravenous for SBP>180 or DBP>90   Medications on hold as patient is in shock.

## 2025-07-16 NOTE — ASSESSMENT & PLAN NOTE
PAF/RVR, now back in SR on amio infusion  Transition to PO amio, d/c gtt  Consider BBL if BP allows  Unable to start DOAC with chronic severe thrombocytopenia

## 2025-07-16 NOTE — NURSING
Pt arrive to the unit by stretcher accompanied by ICU nurse.  Assisted pt to bed. Tele monitoring initiated.  Admit assessment initiated.  Pt is AAOx4.  NO distress noted.

## 2025-07-17 ENCOUNTER — TELEPHONE (OUTPATIENT)
Dept: UROLOGY | Facility: CLINIC | Age: 85
End: 2025-07-17
Payer: MEDICARE

## 2025-07-17 LAB
ABSOLUTE EOSINOPHIL (OHS): 0.06 K/UL
ABSOLUTE MONOCYTE (OHS): 0.47 K/UL (ref 0.3–1)
ABSOLUTE NEUTROPHIL COUNT (OHS): 5.16 K/UL (ref 1.8–7.7)
ALBUMIN SERPL BCP-MCNC: 2.3 G/DL (ref 3.5–5.2)
ALP SERPL-CCNC: 60 UNIT/L (ref 40–150)
ALT SERPL W/O P-5'-P-CCNC: 13 UNIT/L (ref 10–44)
ANION GAP (OHS): 7 MMOL/L (ref 8–16)
AST SERPL-CCNC: 20 UNIT/L (ref 11–45)
BASOPHILS # BLD AUTO: 0.01 K/UL
BASOPHILS NFR BLD AUTO: 0.2 %
BILIRUB SERPL-MCNC: 0.6 MG/DL (ref 0.1–1)
BUN SERPL-MCNC: 17 MG/DL (ref 8–23)
CALCIUM SERPL-MCNC: 8.3 MG/DL (ref 8.7–10.5)
CHLORIDE SERPL-SCNC: 103 MMOL/L (ref 95–110)
CO2 SERPL-SCNC: 28 MMOL/L (ref 23–29)
CREAT SERPL-MCNC: 0.6 MG/DL (ref 0.5–1.4)
ERYTHROCYTE [DISTWIDTH] IN BLOOD BY AUTOMATED COUNT: 17 % (ref 11.5–14.5)
GFR SERPLBLD CREATININE-BSD FMLA CKD-EPI: >60 ML/MIN/1.73/M2
GLUCOSE SERPL-MCNC: 141 MG/DL (ref 70–110)
HCT VFR BLD AUTO: 27.9 % (ref 37–48.5)
HGB BLD-MCNC: 9.3 GM/DL (ref 12–16)
IMM GRANULOCYTES # BLD AUTO: 0.12 K/UL (ref 0–0.04)
IMM GRANULOCYTES NFR BLD AUTO: 1.9 % (ref 0–0.5)
LYMPHOCYTES # BLD AUTO: 0.63 K/UL (ref 1–4.8)
MCH RBC QN AUTO: 30 PG (ref 27–31)
MCHC RBC AUTO-ENTMCNC: 33.3 G/DL (ref 32–36)
MCV RBC AUTO: 90 FL (ref 82–98)
NUCLEATED RBC (/100WBC) (OHS): 0 /100 WBC
OHS QRS DURATION: 120 MS
OHS QRS DURATION: 124 MS
OHS QTC CALCULATION: 510 MS
OHS QTC CALCULATION: 577 MS
PLATELET # BLD AUTO: 54 K/UL (ref 150–450)
PLATELET BLD QL SMEAR: ABNORMAL
PMV BLD AUTO: 11.3 FL (ref 9.2–12.9)
POCT GLUCOSE: 105 MG/DL (ref 70–110)
POCT GLUCOSE: 112 MG/DL (ref 70–110)
POCT GLUCOSE: 122 MG/DL (ref 70–110)
POCT GLUCOSE: 140 MG/DL (ref 70–110)
POCT GLUCOSE: 164 MG/DL (ref 70–110)
POTASSIUM SERPL-SCNC: 3.4 MMOL/L (ref 3.5–5.1)
PROT SERPL-MCNC: 5.1 GM/DL (ref 6–8.4)
RBC # BLD AUTO: 3.1 M/UL (ref 4–5.4)
RELATIVE EOSINOPHIL (OHS): 0.9 %
RELATIVE LYMPHOCYTE (OHS): 9.8 % (ref 18–48)
RELATIVE MONOCYTE (OHS): 7.3 % (ref 4–15)
RELATIVE NEUTROPHIL (OHS): 79.9 % (ref 38–73)
SODIUM SERPL-SCNC: 138 MMOL/L (ref 136–145)
WBC # BLD AUTO: 6.45 K/UL (ref 3.9–12.7)

## 2025-07-17 PROCEDURE — 97161 PT EVAL LOW COMPLEX 20 MIN: CPT

## 2025-07-17 PROCEDURE — 99233 SBSQ HOSP IP/OBS HIGH 50: CPT | Mod: 25,,, | Performed by: REGISTERED NURSE

## 2025-07-17 PROCEDURE — 99900035 HC TECH TIME PER 15 MIN (STAT)

## 2025-07-17 PROCEDURE — 99497 ADVNCD CARE PLAN 30 MIN: CPT | Mod: 25,,, | Performed by: REGISTERED NURSE

## 2025-07-17 PROCEDURE — 97530 THERAPEUTIC ACTIVITIES: CPT

## 2025-07-17 PROCEDURE — 99498 ADVNCD CARE PLAN ADDL 30 MIN: CPT | Mod: ,,, | Performed by: REGISTERED NURSE

## 2025-07-17 PROCEDURE — 11000001 HC ACUTE MED/SURG PRIVATE ROOM

## 2025-07-17 PROCEDURE — 94761 N-INVAS EAR/PLS OXIMETRY MLT: CPT

## 2025-07-17 PROCEDURE — 99232 SBSQ HOSP IP/OBS MODERATE 35: CPT | Mod: ,,, | Performed by: STUDENT IN AN ORGANIZED HEALTH CARE EDUCATION/TRAINING PROGRAM

## 2025-07-17 PROCEDURE — 82310 ASSAY OF CALCIUM: CPT | Performed by: HOSPITALIST

## 2025-07-17 PROCEDURE — 25000003 PHARM REV CODE 250: Performed by: INTERNAL MEDICINE

## 2025-07-17 PROCEDURE — 97535 SELF CARE MNGMENT TRAINING: CPT

## 2025-07-17 PROCEDURE — 25000003 PHARM REV CODE 250: Performed by: STUDENT IN AN ORGANIZED HEALTH CARE EDUCATION/TRAINING PROGRAM

## 2025-07-17 PROCEDURE — 99232 SBSQ HOSP IP/OBS MODERATE 35: CPT | Mod: ,,, | Performed by: INTERNAL MEDICINE

## 2025-07-17 PROCEDURE — 85025 COMPLETE CBC W/AUTO DIFF WBC: CPT | Performed by: HOSPITALIST

## 2025-07-17 PROCEDURE — 63600175 PHARM REV CODE 636 W HCPCS: Performed by: STUDENT IN AN ORGANIZED HEALTH CARE EDUCATION/TRAINING PROGRAM

## 2025-07-17 PROCEDURE — 97165 OT EVAL LOW COMPLEX 30 MIN: CPT

## 2025-07-17 PROCEDURE — 36415 COLL VENOUS BLD VENIPUNCTURE: CPT | Performed by: HOSPITALIST

## 2025-07-17 PROCEDURE — 94760 N-INVAS EAR/PLS OXIMETRY 1: CPT

## 2025-07-17 RX ORDER — POTASSIUM CHLORIDE 20 MEQ/1
40 TABLET, EXTENDED RELEASE ORAL ONCE
Status: COMPLETED | OUTPATIENT
Start: 2025-07-17 | End: 2025-07-17

## 2025-07-17 RX ADMIN — ESCITALOPRAM 5 MG: 5 TABLET, FILM COATED ORAL at 09:07

## 2025-07-17 RX ADMIN — TIMOLOL MALEATE 1 DROP: 5 SOLUTION OPHTHALMIC at 09:07

## 2025-07-17 RX ADMIN — AMIODARONE HYDROCHLORIDE 400 MG: 200 TABLET ORAL at 09:07

## 2025-07-17 RX ADMIN — ACETAMINOPHEN 650 MG: 325 TABLET ORAL at 10:07

## 2025-07-17 RX ADMIN — POTASSIUM CHLORIDE 40 MEQ: 1500 TABLET, EXTENDED RELEASE ORAL at 11:07

## 2025-07-17 RX ADMIN — CEFTRIAXONE 2 G: 2 INJECTION, POWDER, FOR SOLUTION INTRAMUSCULAR; INTRAVENOUS at 03:07

## 2025-07-17 RX ADMIN — ATORVASTATIN CALCIUM 20 MG: 10 TABLET, FILM COATED ORAL at 09:07

## 2025-07-17 NOTE — ASSESSMENT & PLAN NOTE
JAIRON is likely due to post-obstructive d/t kidney stone and acute UTI. Baseline creatinine is <1. Most recent creatinine and eGFR are listed below.  Recent Labs     07/15/25  0228 07/16/25  0318 07/17/25  0942   CREATININE 0.7 0.6 0.6   EGFRNORACEVR >60 >60 >60      Plan  - Avoid nephrotoxins and renally dose meds for GFR listed above  - Monitor urine output, serial BMP, and adjust therapy as needed  -Support MAP>65 and oxygen>94%.   Now resolved.

## 2025-07-17 NOTE — PT/OT/SLP EVAL
"Occupational Therapy Evaluation     Name: Marine Mo  MRN: [id number]  Admitting Diagnosis: Sepsis with acute renal failure and septic shock  Recent Surgery: Procedure(s) (LRB):  CYSTOSCOPY, WITH RETROGRADE PYELOGRAM AND URETERAL STENT INSERTION (Left) 5 Days Post-Op    Recommendations:     Discharge Recommendations: Low Intensity Therapy  Level of Assistance Recommended: 24 hours light assistance and 24 hours supervision  Discharge Equipment Recommendations: shower chair  Barriers to discharge: None    Assessment:     Marine Mo is a 85 y.o. female with a medical diagnosis of Sepsis with acute renal failure and septic shock. She presents with performance deficits affecting function including weakness, impaired endurance, impaired self care skills, impaired functional mobility, gait instability, impaired balance, impaired cognition, decreased safety awareness. Patient is lethargic and not oriented to situation, so needing some assistance with remembering events. Her  says she is the primary  and cook in the family.     Rehab Prognosis: Good; patient would benefit from acute OT services to address these deficits and reach maximum level of function.    Plan:     Patient to be seen 3 x/week to address the above listed problems via self-care/home management, therapeutic activities, therapeutic exercises  Plan of Care Expires: 07/31/25  Plan of Care Reviewed with: spouse, patient    Subjective     Chief Complaint: "I am tired."   Patient Comments/Goals: patient did not want to walk again in afternoon   Pain/Comfort:  Pain Rating 1: 0/10    Patients cultural, spiritual, Mandaeism conflicts given the current situation: no    Social History:  Living Environment: Patient lives with their spouse in a single story home with number of outside stair(s): 0   Prior Level of Function: Prior to admission, patient was independent and wife to spouse and mother to 2 adult children whom live in " [place]   Roles and Routines: Patient was driving and not working prior to admission.  Equipment Used at Home: rollator, cane, straight  DME owned (not currently used): none  Assistance Upon Discharge: family    Objective:     Communicated with RN, Donald, prior to session. Patient found HOB elevated with telemetry, PureWick, peripheral IV, SCD upon OT entry to room.    General Precautions: Standard, fall ([place] B ears)   Orthopedic Precautions: N/A   Braces: N/A    Respiratory Status: Room air    Occupational Performance    Gait belt applied - Yes    Bed Mobility:   Rolling/Turning to Left with minimum assistance  Scooting anteriorly to EOB to have both feet planted on floor: minimum assistance  Supine to sit from left side of bed with minimum assistance    Functional Mobility/Transfers:  Sit <> Stand Transfer with minimum assistance with rolling walker  Bed <> Chair Transfer using Step Transfer technique with minimum assistance with rolling walker  Functional Mobility: Patient walked slowly with RW with min assist from bed to chair with some lethargy noted     Activities of Daily Living:  Lower Body Dressing: total assistance to don socks seated EOB     Cognitive/Visual Perceptual:  Cognitive/Psychosocial Skills:    -     Oriented to: Person and Place  -     Follows Commands/attention: Follows two-step commands  -     Communication: clear/fluent  -     Memory: Poor immediate recall  -     Safety awareness/insight to disability: impaired  -     Mood/Affect/Coping skills/emotional control: Lethargic  Visual/Perceptual:    -     Intact    Physical Exam:  Balance:    -     Sitting: supervision  -     Standing: contact guard assistance  Postural examination/scapula alignment:    -       Rounded shoulders  -       Forward head  Skin integrity: Visible skin intact  Edema:  Moderate Both arms left> right   Sensation:    -       Intact  Motor Planning: Intact  Dominant hand: Right  Upper Extremity Range of Motion:     -        Right Upper Extremity: WFL  -       Left Upper Extremity: WFL  Upper Extremity Strength:    -       Right Upper Extremity: WFL  -       Left Upper Extremity: WFL   Strength:    -       Right Upper Extremity: WFL  -       Left Upper Extremity: WFL  Fine Motor Coordination:    -       Intact  Gross motor coordination: WFL    AMPAC 6 Click ADL:  AMPAC Total Score: 18    Treatment & Education:  Patient educated on role of OT, POC, and goals for therapy  Patient educated on importance of OOB activities with staff member assistance and sitting OOB majority of the day  Occupational therapy recommending shower chair although they have built in shower seat for safety.     Patient clear to stand pivot transfer with RN/PCT, assist x1 to BSC .    Patient left HOB elevated with all lines intact, call button in reach, RN notified, and spouse present.    GOALS:   Multidisciplinary Problems       Occupational Therapy Goals          Problem: Occupational Therapy    Goal Priority Disciplines Outcome Interventions   Occupational Therapy Goal     OT, PT/OT Progressing    Description: Goals to be met by: 7/31/25       Patient will increase functional independence with ADLs by performing:    UE Dressing with Supervision.  LE Dressing with Supervision.  Grooming while standing at sink with Supervision.  Toileting from toilet with Supervision for hygiene and clothing management.   Sitting in chair  x60  minutes with Supervision.  Toilet transfer to toilet with Supervision.  Upper extremity exercise program x10  reps per handout, with supervision.                         DME Justifications:  No DME recommended requiring DME justifications    History:     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     Back pain     Breast cyst     Cirrhosis     Depression 01/30/2019    Diabetes mellitus     Disorder of kidney and ureter      Glaucoma     Hyperlipidemia     Hypertension     Trouble in sleeping     Type 2 diabetes mellitus          Past Surgical History:   Procedure Laterality Date    BACK SURGERY      BREAST CYST ASPIRATION  1982    CATARACT EXTRACTION Bilateral     don't remember date    CHOLECYSTECTOMY      COLONOSCOPY N/A 3/1/2018    Procedure: COLONOSCOPY;  Surgeon: Ren Newton MD;  Location: [address]);  Service: Endoscopy;  Laterality: N/A;    CYSTOSCOPY WITH URETEROSCOPY, RETROGRADE PYELOGRAPHY, AND INSERTION OF STENT Left 7/12/2025    Procedure: CYSTOSCOPY, WITH RETROGRADE PYELOGRAM AND URETERAL STENT INSERTION;  Surgeon: Aliya Castañeda MD;  Location: [facility];  Service: Urology;  Laterality: Left;    HYSTERECTOMY  age 70    bleeding    INSERTION, STENT, ARTERY  2022    OOPHORECTOMY      TONSILLECTOMY, ADENOIDECTOMY         Time Tracking:     OT Date of Treatment: 07/17/25  OT Start Time: 1156  OT Stop Time: 1223  OT Total Time (min): 27 min    Billable Minutes: Evaluation 15  and Self Care/Home Management 12   Co-eval with PT   7/17/2025

## 2025-07-17 NOTE — ASSESSMENT & PLAN NOTE
- noted, along with sepsis and obstructive uropathy this admission; improved   - cont mgmt per HM

## 2025-07-17 NOTE — PROGRESS NOTES
[facility] - Telemetry  Urology  Progress Note    Patient Name: Marine Mo  MRN: [id number]  Admission Date: 7/12/2025  Hospital Length of Stay: 5 days  Code Status: DNR   Attending Provider: Iker Ibrahmi MD   Primary Care Physician: Serg Hwang MD    Subjective:     HPI:  86 yo woman presents from [facility] for septic shock 2/2 to obstructing pyelonephritis from left 1.7cm UPJ stone. Patient somnolent and hard of hearing. Daughter and  at bedside. First time stone event. Patient requiring pressor support to maintain BP.     Interval History: notified of appt w/ Dr. Villaseñor in  1week. Feeling better. Has yet to ambulate, working with PT.     Review of Systems   Constitutional:  Negative for activity change, appetite change, chills, diaphoresis and fever.   HENT:  Negative for hearing loss.    Eyes:  Negative for visual disturbance.   Respiratory:  Negative for cough, shortness of breath and wheezing.    Gastrointestinal:  Negative for abdominal distention, abdominal pain, constipation, nausea and vomiting.   Genitourinary:  Negative for difficulty urinating, dysuria, flank pain and hematuria.   Musculoskeletal:  Negative for neck pain and neck stiffness.   Neurological:  Positive for weakness. Negative for dizziness.     Objective:     Temp:  [97.9 °F (36.6 °C)-98.7 °F (37.1 °C)] 98.1 °F (36.7 °C)  Pulse:  [75-91] 78  Resp:  [18-24] 18  SpO2:  [90 %-97 %] 95 %  BP: (103-124)/(57-76) 109/59     Body mass index is 26.91 kg/m².    Date 07/17/25 0700 - 07/18/25 0659   Shift 5832-0936 3987-9178 7636-4108 24 Hour Total   INTAKE   P.O. 360   360   Shift Total(mL/kg) 360(5.1)   360(5.1)   OUTPUT   Shift Total(mL/kg)       Weight (kg) 71.1 71.1 71.1 71.1          Drains       Drain  Duration             Female External Urinary Catheter w/ Suction 07/15/25 1000 2 days                     Physical Exam  Constitutional:       Appearance: She is well-developed.   HENT:      Head: Normocephalic and  atraumatic.   Eyes:      Conjunctiva/sclera: Conjunctivae normal.   Pulmonary:      Effort: Pulmonary effort is normal. No respiratory distress.   Abdominal:      General: Abdomen is flat. There is no distension.      Tenderness: There is no abdominal tenderness.   Skin:     Findings: No rash.   Neurological:      Mental Status: She is alert and oriented to person, place, and time.   Psychiatric:         Behavior: Behavior normal.           Significant Labs:    BMP:  Recent Labs   Lab 07/15/25  0228 07/16/25  0318 07/17/25  0942    141 138   K 3.1* 3.3* 3.4*   * 110 103   CO2 21* 22* 28   BUN 28* 19 17   CREATININE 0.7 0.6 0.6   CALCIUM 8.1* 8.2* 8.3*       CBC:   Recent Labs   Lab 07/15/25  0228 07/16/25  0318 07/17/25  0920   WBC 4.40 4.65 6.45   HGB 8.7* 8.4* 9.3*   HCT 26.9* 26.1* 27.9*   PLT 13* 32* 54*                 Assessment/Plan:     * Sepsis with acute renal failure and septic shock  S/p L ureteral stent placement due to septic shock, obstructing stone in the left kidney/ UPJ-  Discussed due to presence of infection, delayed definitive management of stone is recommended  They will need two weeks of culture directed antibiotics  Discussed they will follow up locally with Dr. Villaseñor in [facility], scheduled  7/23/2025  Urine and blood cultures - E coli      Left ureteral stone  S/p L ureteral stent        VTE Risk Mitigation (From admission, onward)           Ordered     IP VTE HIGH RISK PATIENT  Once         07/12/25 0457     Place sequential compression device  Until discontinued         07/12/25 0457     Reason for No Pharmacological VTE Prophylaxis  Once        Comments: Urology plans on cysto with stent placement   Question:  Reasons:  Answer:  Physician Provided (leave comment)    07/12/25 0457                    Aliya Castañeda MD  Urology  [facility] - Telemetry

## 2025-07-17 NOTE — SUBJECTIVE & OBJECTIVE
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     Back pain     Breast cyst     Cirrhosis     Depression 01/30/2019    Diabetes mellitus     Disorder of kidney and ureter     Glaucoma     Hyperlipidemia     Hypertension     Trouble in sleeping     Type 2 diabetes mellitus        Past Surgical History:   Procedure Laterality Date    BACK SURGERY      BREAST CYST ASPIRATION  1982    CATARACT EXTRACTION Bilateral     don't remember date    CHOLECYSTECTOMY      COLONOSCOPY N/A 3/1/2018    Procedure: COLONOSCOPY;  Surgeon: Ren Newton MD;  Location: [address]);  Service: Endoscopy;  Laterality: N/A;    CYSTOSCOPY WITH URETEROSCOPY, RETROGRADE PYELOGRAPHY, AND INSERTION OF STENT Left 7/12/2025    Procedure: CYSTOSCOPY, WITH RETROGRADE PYELOGRAM AND URETERAL STENT INSERTION;  Surgeon: Aliya Castañeda MD;  Location: [facility];  Service: Urology;  Laterality: Left;    HYSTERECTOMY  age 70    bleeding    INSERTION, STENT, ARTERY  2022    OOPHORECTOMY      TONSILLECTOMY, ADENOIDECTOMY         Review of patient's allergies indicates:   Allergen Reactions    Pravastatin      Other Reaction(s): Propensity to adverse reactions to drug       No current facility-administered medications on file prior to encounter.     Current Outpatient Medications on File Prior to Encounter   Medication Sig    ALPRAZolam (XANAX) 0.5 MG tablet TAKE 1 TABLET BY MOUTH NIGHTLY AS NEEDED FOR ANXIETY    aspirin 81 MG Chew Take 81 mg by mouth once daily.    atorvastatin (LIPITOR) 20 MG tablet Take 20 mg by mouth.    azelastine (ASTELIN) 137 mcg (0.1 %) nasal spray     calcium carbonate (TUMS) 200 mg calcium (500 mg) chewable tablet Take 1 tablet by mouth once daily.    cholecalciferol, vitamin D3, (VITAMIN D3) 25 mcg (1,000 unit) capsule Take 2,000 Units by mouth once daily.    clopidogreL (PLAVIX) 75 mg tablet Take 1 tablet (75  mg total) by mouth once daily.    coenzyme Q10 100 mg capsule Take 100 mg by mouth once daily.    CONSTULOSE 10 gram/15 mL solution Take 30 mLs by mouth once daily. (Patient not taking: Reported on 3/21/2025)    diclofenac sodium (VOLTAREN ARTHRITIS PAIN) 1 % Gel Apply 2 g topically once daily.    docusate sodium (COLACE) 100 MG capsule Take 1 capsule (100 mg total) by mouth 2 (two) times daily as needed for Constipation.    EScitalopram oxalate (LEXAPRO) 5 MG Tab Take 1 tablet by mouth once daily    fluticasone propionate (FLONASE) 50 mcg/actuation nasal spray 1 spray (50 mcg total) by Each Nostril route once daily.    guaiFENesin 100 mg/5 ml (ROBITUSSIN) 100 mg/5 mL syrup Take 200 mg by mouth 3 (three) times daily as needed for Cough. (Patient not taking: Reported on 3/21/2025)    hydrocortisone 2.5 % cream Apply topically 2 (two) times daily. (Patient not taking: Reported on 3/21/2025)    hydrOXYzine HCL (ATARAX) 10 MG Tab 1-3 tablets every 8 hours as needed for itching    ipratropium (ATROVENT) 42 mcg (0.06 %) nasal spray 2 sprays by Each Nostril route daily as needed.    loratadine (CLARITIN) 10 mg tablet Take 1 tablet (10 mg total) by mouth daily as needed for Allergies (as need d for allergies , sneezing , runny nose, take in am).    metFORMIN (GLUCOPHAGE) 500 MG tablet Take 1 tablet (500 mg total) by mouth 2 (two) times daily with meals.    metoprolol tartrate (LOPRESSOR) 100 MG tablet Take 0.5 tablets (50 mg total) by mouth 2 (two) times daily.    mupirocin (BACTROBAN) 2 % ointment Apply topically 3 (three) times daily.    nitroGLYCERIN (NITROSTAT) 0.4 MG SL tablet Place 0.4 mg under the tongue every 5 (five) minutes as needed for Chest pain.    nystatin (MYCOSTATIN) cream Apply topically 2 (two) times daily.    OMEGA 3-DHA-EPA-FISH OIL ORAL Take 1 capsule by mouth once daily. 700 mg    polyethylene glycol (GLYCOLAX) 17 gram/dose powder Take 17 g by mouth once daily. (Patient not taking: Reported on  3/21/2025)    pulse oximeter (PULSE OXIMETER) device by Apply Externally route 2 (two) times a day. Use twice daily at 8 AM and 3 PM and record the value in MyChart as directed. (Patient not taking: Reported on 3/21/2025)    sucralfate (CARAFATE) 1 gram tablet  (Patient not taking: Reported on 3/21/2025)    sucralfate (CARAFATE) 100 mg/mL suspension Take 10 mLs by mouth 2 (two) times daily.    timolol maleate 0.5% (TIMOPTIC) 0.5 % Drop INSTILL 1 DROP INTO EACH EYE TWICE DAILY    triamcinolone acetonide 0.1% (KENALOG) 0.1 % cream Apply topically 2 (two) times daily.     Family History       Problem Relation (Age of Onset)    Kidney disease Father          Tobacco Use    Smoking status: Never     Passive exposure: Never    Smokeless tobacco: Never   Substance and Sexual Activity    Alcohol use: No    Drug use: No    Sexual activity: Yes     Partners: Male     Comment:      Review of Systems   Gastrointestinal:  Negative for melena.   Genitourinary:  Negative for hematuria.     Objective:     Vital Signs (Most Recent):  Temp: 97.9 °F (36.6 °C) (07/17/25 0722)  Pulse: 75 (07/17/25 0743)  Resp: 18 (07/17/25 0722)  BP: 116/67 (07/17/25 0722)  SpO2: (!) 94 % (07/17/25 0818) Vital Signs (24h Range):  Temp:  [97.9 °F (36.6 °C)-98.7 °F (37.1 °C)] 97.9 °F (36.6 °C)  Pulse:  [75-91] 75  Resp:  [17-24] 18  SpO2:  [90 %-98 %] 94 %  BP: (101-124)/(52-76) 116/67     Weight: 71.1 kg (156 lb 12 oz)  Body mass index is 26.91 kg/m².    SpO2: (!) 94 %         Intake/Output Summary (Last 24 hours) at 7/17/2025 1036  Last data filed at 7/17/2025 0959  Gross per 24 hour   Intake 480 ml   Output 1800 ml   Net -1320 ml       Lines/Drains/Airways       Drain  Duration             Female External Urinary Catheter w/ Suction 07/15/25 1000 2 days              Peripheral Intravenous Line  Duration             Peripheral IV Single Lumen 07/11/25 1855 20 G Left Antecubital 5 days    Peripheral IV Single Lumen 07/16/25 1440 20 G  Anterior;Proximal;Right Forearm <1 day                   Exam unchanged vs 7/16/25  Physical Exam  Constitutional:       General: She is not in acute distress.     Appearance: Normal appearance. She is well-developed and normal weight. She is not ill-appearing, toxic-appearing or diaphoretic.   HENT:      Head: Normocephalic and atraumatic.   Eyes:      General: No scleral icterus.     Extraocular Movements: Extraocular movements intact.      Conjunctiva/sclera: Conjunctivae normal.      Pupils: Pupils are equal, round, and reactive to light.   Neck:      Vascular: No JVD.      Trachea: No tracheal deviation.   Cardiovascular:      Rate and Rhythm: Normal rate and regular rhythm.      Heart sounds: S1 normal and S2 normal. No murmur heard.     No friction rub. No gallop.   Pulmonary:      Effort: Pulmonary effort is normal. No respiratory distress.      Breath sounds: Normal breath sounds. No stridor. No wheezing, rhonchi or rales.   Chest:      Chest wall: No tenderness.   Abdominal:      General: There is no distension.      Palpations: Abdomen is soft.   Musculoskeletal:         General: No swelling or tenderness. Normal range of motion.      Cervical back: Normal range of motion and neck supple. No rigidity.      Right lower leg: No edema.      Left lower leg: No edema.   Skin:     General: Skin is warm and dry.      Coloration: Skin is not jaundiced.      Findings: Bruising present.   Neurological:      General: No focal deficit present.      Mental Status: She is alert and oriented to person, place, and time.      Cranial Nerves: No cranial nerve deficit.   Psychiatric:         Mood and Affect: Mood normal.         Behavior: Behavior normal.          Current Medications:   amiodarone  400 mg Oral BID    Followed by    [START ON 7/23/2025] amiodarone  200 mg Oral BID    Followed by    [START ON 7/31/2025] amiodarone  200 mg Oral Daily    atorvastatin  20 mg Oral QHS    cefTRIAXone (Rocephin) IV (PEDS and  ADULTS)  2 g Intravenous Q24H    EScitalopram oxalate  5 mg Oral Daily    potassium chloride  40 mEq Oral Once    timolol maleate 0.5%  1 drop Both Eyes BID           Current Facility-Administered Medications:     0.9%  NaCl infusion (for blood administration), , Intravenous, Q24H PRN    acetaminophen, 650 mg, Oral, Q4H PRN    ALPRAZolam, 0.25 mg, Oral, Nightly PRN    dextrose 50%, 12.5 g, Intravenous, PRN    glucagon (human recombinant), 1 mg, Intramuscular, PRN    hydrALAZINE, 5 mg, Intravenous, Q6H PRN    hydrOXYzine HCL, 10 mg, Oral, TID PRN    insulin aspart U-100, 0-5 Units, Subcutaneous, Q6H PRN    ipratropium, 0.5 mg, Nebulization, Q4H PRN    levalbuterol, 1.25 mg, Nebulization, Q4H PRN    melatonin, 6 mg, Oral, Nightly PRN    ondansetron, 4 mg, Intravenous, Q6H PRN    simethicone, 1 tablet, Oral, QID PRN    sodium chloride 0.9%, 10 mL, Intravenous, Q12H PRN    Laboratory (all labs reviewed):  CBC:  Recent Labs   Lab 07/13/25  0421 07/14/25  0903 07/15/25  0228 07/16/25  0318 07/17/25  0920   WBC 13.04 H 6.14 4.40 4.65 6.45   HGB 9.7 L 8.5 L 8.7 L 8.4 L 9.3 L   HCT 30.2 L 26.2 L 26.9 L 26.1 L 27.9 L   Platelet Count 27 LL 15 LL 13 LL 32 LL 54 L       CHEMISTRIES:  Recent Labs   Lab 07/11/25  2036 07/12/25  0518 07/12/25  2104 07/13/25  0421 07/14/25  0903 07/15/25  0228 07/16/25  0318 07/17/25  0942   Glucose 127 H 124 H 93 116 H 102 92 111 H 141 H   Sodium 140 137 137 137 140 142 141 138   Potassium 3.3 L 3.6 4.2 4.2 3.3 L 3.1 L 3.3 L 3.4 L   BUN 21 25 H 33 H 36 H 36 H 28 H 19 17   Creatinine 1.5 H 1.5 H 1.5 H 1.4 0.8 0.7 0.6 0.6   eGFR 34 L 34 L 34 L 37 L >60 >60 >60 >60   Calcium 9.5 7.4 L 7.6 L 7.7 L 7.9 L 8.1 L 8.2 L 8.3 L   Magnesium  1.3 L 1.0 L 1.5 L  --  1.9  --   --   --        CARDIAC BIOMARKERS:  Recent Labs   Lab 07/11/25  2036 07/13/25  0421 07/13/25  0700   CPK 42  --   --    Troponin-I 0.023 0.154 H 0.127 H       COAGS:  Recent Labs   Lab 07/12/25  0518   INR 1.6 H       LIPIDS/LFTS:  Recent  Labs   Lab 08/09/22  0711 02/08/23  0710 08/11/23  0755 02/01/24  0000 05/07/24  0721 08/19/24  0705 02/19/25  0715 07/10/25  0945 07/11/25  2036 07/14/25  0903 07/15/25  0228 07/16/25  0318 07/17/25  0942   Cholesterol 150 147 127 124  --  111 L 120  --   --   --   --   --   --    Triglycerides 202 H 192 H 144 97  --  119 108  --   --   --   --   --   --    HDL 35 L 36 L 36 L 47  --  41 42  --   --   --   --   --   --    LDL Cholesterol 74.6 72.6 62.2 L  --   --  46.2 L 56.4 L  --   --   --   --   --   --    Non-HDL Cholesterol 115 111 91  --   --  70 78  --   --   --   --   --   --    Non HDL Chol. (LDL+VLDL)  --   --   --  77  --   --   --   --   --   --   --   --   --    AST 21 20 22  --    < > 19 24   < > 35 25 23 22 20   ALT 14 14 16  --    < > 10 20   < > 19 20 16 17 13    < > = values in this interval not displayed.       BNP:  Recent Labs   Lab 07/11/25 2036    H       TSH:  Recent Labs   Lab 08/09/22  0711 02/08/23  0710 08/11/23  0755 08/19/24  0705 02/19/25  0715   TSH 2.903 2.063 1.834 1.219 1.814       Free T4:        Diagnostic Results:  ECG (personally reviewed and interpreted tracing(s)):  7/11/25 1955 SR 89, RBBB  7/13/25 1616 , RBBB  7/15/25 0835 , RBBB  7/15/25 1048 SR 94 RBBB    Chest X-Ray (personally reviewed and interpreted image(s)): 7/13/25 low volumes, ?CHF, R IJV TLC    Echo: 7/13/25 (images prev personally reviewed and interpreted)    Left Ventricle: The left ventricle is normal in size. Mildly increased wall thickness. There is mild concentric hypertrophy. There is normal systolic function with a visually estimated ejection fraction of 60 - 65%. Grade I diastolic dysfunction.    Right Ventricle: The right ventricle is normal in size measuring 3.0 cm. Systolic function is normal.    Tricuspid Valve: There is mild to moderate regurgitation.    Pulmonary Artery: The estimated pulmonary artery systolic pressure is 67 mmHg.    CV testing (Melida Griffin MD note  2/7/24)

## 2025-07-17 NOTE — ASSESSMENT & PLAN NOTE
7/17/2025  - interval chart reviewed; pt discussed with MDT   - visited pt and , along with ARIANE Lawrence; therapy team was working with pt at time of initial visit but returned after for GOC/ACP discussion   - pt was able to confirm wishes for DNR/DNI now that she is able to participate in the discussion as well   - GOC/ACP discussion complicated by both pt and  being very hard of hearing as well as Mr. Salas seeming very frustrated by therapy progress so far today and readiness for discharge; this was further complicated by pt occasionally closing eyes falling asleep and her sharing she was scared to go home due to limitations and symptoms prior to admission   - planned for pt to eat lunch and return visit later in the day to re attempt discussion   - again revisited and continued to have barriers for GOC/ACP discussions; discussions and coordination with RN, PT, OT, and  MD to address pt/ concerns with plan for re-attempt at GOC/ACP discussion tomorrow   - attempted to call pt's daughter to discuss, to maybe assist in facilitation GOC/ACP discussion with pt and ; LM with return number   - plan for GOC/ACP discussion with pt and family and completion of LAPOST if agreeable   - plan for recommendation of home palliative medicine     Consult - 7/14/2025  - consult received; interval chart reviewed in detail; discussed pt with MDT during ICU rounds   - met with patient and , Josue, at bedside; introduction to palliative medicine team and role in current care and admission   - pt currently without capacity for GOC/ACP discussion or decision making; , Josue ([phone]), is legal surrogate decision maker   - learned more about pt outside of current admission; she is typically very active and able bodied, aside of use of walker; she is able to perform ADLs and does all household chores, cooking, cleaning, laundry per ; they share 2 children Josue Johnson And Robyn who  both live in [place] as well    - GOC/ACP discussion  - to 's knowledge they have living [place] at home; reviewed prior wishes and GOC discussions   - informed Mr. Salas that the wishes he expressed were consistent with DNR/DNI; discussed this in detail and he confirmed wishes for DNR/DNI; confirmed that a DNR order would be placed in chart (updated RN and HM)   - discussed availability of LAPOST document for these wishes and plan for LAPOST completion when pt is able to participate in the discussion/before discharge   - GOC for pt to be able to return to prior hospitalization abilities/baseline; will require working with PT/OT when able  -  initially lacked insight into severity of pt's condition, seeming shocked that pt was not potentially going home today or tomorrow; reviewed details of pt's care, including pressor requirements and sepsis affects on the body; explained progress that has been made with source identification and control, but explained the necessary steps between now and being ready for discharge   - offered to call pt's daughter who has been involved in admission as well, Mr. Salas wishes to update her when she relieves him this afternoon (she has been staying with pt at night and Mr. Salas has been staying during the day)   - emotional support provided   - Allowed time for questions/concerns; all addressed; expressed availability of myself/palliative team for additional questions/concerns   - updated MDT

## 2025-07-17 NOTE — PLAN OF CARE
Problem: Occupational Therapy  Goal: Occupational Therapy Goal  Description: Goals to be met by: 7/31/25       Patient will increase functional independence with ADLs by performing:    UE Dressing with Supervision.  LE Dressing with Supervision.  Grooming while standing at sink with Supervision.  Toileting from toilet with Supervision for hygiene and clothing management.   Sitting in chair  x60  minutes with Supervision.  Toilet transfer to toilet with Supervision.  Upper extremity exercise program x10  reps per handout, with supervision.    Outcome: Progressing   Patient recommended for low intensive therapy and a shower chair. Occupational therapy to see 4x/week.

## 2025-07-17 NOTE — ASSESSMENT & PLAN NOTE
"Kishan Sánchez is a 55 y.o. male patient.    Temp: 97.7 °F (36.5 °C) (09/02/19 0757)  Pulse: 104 (09/02/19 0757)  Resp: 18 (09/02/19 0757)  BP: (!) 88/60 (09/02/19 0757)  SpO2: 100 % (09/02/19 0757)  Weight: 55.2 kg (121 lb 9.3 oz) (08/28/19 0918)  Height: 5' 4" (162.6 cm) (08/28/19 0930)    PICC  Date/Time: 9/2/2019 11:38 AM  Performed by: Maria Ines Dalal RN  Assisting provider: Cindy Vallejo RN  Consent Done: Yes  Time out: Immediately prior to procedure a time out was called to verify the correct patient, procedure, equipment, support staff and site/side marked as required  Indications: med administration  Anesthesia: local infiltration  Local anesthetic: lidocaine 1% without epinephrine  Anesthetic Total (mL): 2  Preparation: skin prepped with ChloraPrep  Skin prep agent dried: skin prep agent completely dried prior to procedure  Sterile barriers: all five maximum sterile barriers used - cap, mask, sterile gown, sterile gloves, and large sterile sheet  Hand hygiene: hand hygiene performed prior to central venous catheter insertion  Location details: right brachial  Catheter type: double lumen  Catheter size: 5 Fr  Catheter Length: 29cm    Ultrasound guidance: yes  Vessel Caliber: large and patent, compressibility normal  Vascular Doppler: not done  Needle advanced into vessel with real time Ultrasound guidance.  Guidewire confirmed in vessel.  Image recorded and saved.  Sterile sheath used.  Number of attempts: 1  Post-procedure: blood return through all ports, chlorhexidine patch and sterile dressing applied  Technical procedures used: 3CG  Specimens: No  Implants: No  Assessment: placement verified by x-ray  Complications: none          Cindy Vallejo  9/2/2019  " Patient's FSGs are controlled on current medication regimen.  Last A1c reviewed-   Lab Results   Component Value Date    HGBA1C 5.0 07/12/2025   Repeat A1c ordered and pending.   Most recent fingerstick glucose reviewed-   Recent Labs   Lab 07/16/25  1059 07/16/25  1624 07/16/25  2107 07/17/25  0720   POCTGLUCOSE 160* 110 124* 105     Current correctional scale  Low  Maintain anti-hyperglycemic dose as follows-   Antihyperglycemics (From admission, onward)      Start     Stop Route Frequency Ordered    07/12/25 0457  insulin aspart U-100 pen 0-5 Units         -- SubQ Every 6 hours PRN 07/12/25 0457          Hold Oral hypoglycemics while patient is in the hospital: metformin 500mg PO BID but family states that lately she has been having low sugars and this has recently been d/c.

## 2025-07-17 NOTE — PLAN OF CARE
Problem: Physical Therapy  Goal: Physical Therapy Goal  Description: Goals to be met by: 2025     Patient will increase functional independence with mobility by performin. Supine to sit with Modified [place]  2. Sit to supine with Modified [place]  3. Sit to stand transfer with Modified [place]  4. Bed to chair transfer with Modified [place] using Rolling Walker  5. Gait  x 75 feet with Modified [place] using Rolling Walker.     Pt low intensity at this time, requiring Juliana x 1 for bed mobility, transfers and steps. Has assist of spouse at home. Will continue to follow while in house.     Outcome: Progressing

## 2025-07-17 NOTE — TELEPHONE ENCOUNTER
----- Message from Aliya Castañeda MD sent at 7/16/2025  6:05 PM CDT -----  Regarding: stone follow up  Pt transferred from [facility] for septic shock related to kidney stone, she will need follow up for stone treatment, please assist    Dr Castañeda

## 2025-07-17 NOTE — PROGRESS NOTES
[facility]  Progress Note    Patient Name: Marine Mo  MRN: [id number]  Patient Class: IP- Inpatient   Admission Date: 7/12/2025  Length of Stay: 5 days  Attending Physician: Iker Ibrahim MD  Primary Care Provider: Serg Hwang MD        Subjective     Principal Problem:Sepsis with acute renal failure and septic shock        HPI:  85 y.o. woman with h/o NIDDM type 2 (A1c 5 in Feb), Essential HTN, HLD, DILLARD cirrhosis with polycystic liver dz and chronic thrombocytopenia, Depression/General Anxiety d/o, CAD (h/o stent in LAD on ASA/Plavix), Anemia presents to the [place] ED with family c/o fevers and chills for the past 1-2 days. Was febrile at 100.1 and hypotensive. Started on IVF, IV vanc/zosyn and eventually started on peripheral levophed for Sepsis.     Labs noted for WBC 5 and stable H/H 11.5/34. plt 42. Initial LA ws 10.6 with a procal of 22. Received 30cc/kg sepsis fluids (3L) and repeat lactic improved to 3.6.   Lytes with hypokalemia, JAIRON with creatinine 1.5 (baseline <1). Bicarb 18. Mg 1.2 and phos 1.2.   UA concerning for UTI.     CT chest/abd/pelvis with IV contrast:   1. Moderate left-sided hydronephrosis secondary to a 1.7 cm calculus at the UPJ.  2. Wall thickening of the left renal pelvis with left perinephric fat stranding, suspicious for overlying infection/pyelitis or pyelonephritis.  Recommend correlation with urinalysis.  3. Hepatic cirrhosis.  4. Innumerable hepatic and renal cystic lesions as above, similar to the prior study.  5. Interlobular septal thickening in the bilateral lungs, can be seen with mild interstitial edema or interstitial pneumonia.  6. Chronic opacities in the right middle lobe and right lower lobe.     transfer center contacted and case discussed with Dr. Castañeda with Urology who recommended cysto with stent placement and did not think that IR will be required. She agreed to see in consult once patient arrives. We have  been consulted for further management. Family denies h/o kidney stones.     Overview/Hospital Course:  Mrs. Mo is a 86 yo F with history of hypertension, cirrhosis, thrombocytopenia and CAD on Plavix and aspirin who was admitted with septic shock and obstructive uropathy.  Placed in ICU.  Urology consulted underwent cystoscopy with stent placement.  Urine and blood cultures growing pansensitive E coli.  Now on ceftriaxone.  Pressors have been weaned.  Developed AFib with RVR, started on IV amiodarone.  Now transitioned to p.o. amiodarone.  Platelets still less than 50,000, not candidate for oral anticoagulation at this time.  Still with bilateral pitting edema, due to volume overload.  P.r.n. Lasix and elevation.  QT within normal limits.  No evidence of aneurysms.  Would plan to discharge home with Levaquin until outpatient follow-up with Urology for definitive stone treatment.    E.Coli bacteremia, pansensitive on CTX.  PT/OT consulted. Urology and Card follow up as outpatient          Interval History:  NAEON.  No new issues.   CC- Fatigue  All questions answered and updates on care given.       ROS:  General: Negative for fevers   Cardiac: Negative for chest pain   Pulmonary: Negative for wheezing  GI: Negative for abdominal distention      Vitals:    07/17/25 0427 07/17/25 0722 07/17/25 0743 07/17/25 0818   BP: 108/76 116/67     BP Location:  Left arm     Patient Position:  Lying     Pulse: 78 78 75    Resp: 18 18     Temp: 98.7 °F (37.1 °C) 97.9 °F (36.6 °C)     TempSrc:       SpO2: (!) 93% (!) 93%  (!) 94%   Weight:       Height:              Body mass index is 26.91 kg/m².      PHYSICAL EXAM:  GENERAL APPEARANCE: alert and cooperative.     HEAD: NC/AT  CARDIAC: There is no cyanosis or pallor.   LUNGS: No apparent wheezing or stridor.  ABDOMEN: Non-distended. No guarding.  MSK: No joint erythema or tenderness.   PSYCHIATRIC: No tangential speech. No Hyperactive features.        Recent Results (from the  past 24 hours)   POCT glucose    Collection Time: 07/16/25 10:59 AM   Result Value Ref Range    POCT Glucose 160 (H) 70 - 110 mg/dL   POCT glucose    Collection Time: 07/16/25  4:24 PM   Result Value Ref Range    POCT Glucose 110 70 - 110 mg/dL   POCT glucose    Collection Time: 07/16/25  9:07 PM   Result Value Ref Range    POCT Glucose 124 (H) 70 - 110 mg/dL   POCT glucose    Collection Time: 07/17/25  7:20 AM   Result Value Ref Range    POCT Glucose 105 70 - 110 mg/dL   CBC with Differential    Collection Time: 07/17/25  9:20 AM   Result Value Ref Range    WBC 6.45 3.90 - 12.70 K/uL    RBC 3.10 (L) 4.00 - 5.40 M/uL    HGB 9.3 (L) 12.0 - 16.0 gm/dL    HCT 27.9 (L) 37.0 - 48.5 %    MCV 90 82 - 98 fL    MCH 30.0 27.0 - 31.0 pg    MCHC 33.3 32.0 - 36.0 g/dL    RDW 17.0 (H) 11.5 - 14.5 %    Platelet Count 54 (L) 150 - 450 K/uL    MPV 11.3 9.2 - 12.9 fL    Nucleated RBC 0 <=0 /100 WBC    Neut % 79.9 (H) 38 - 73 %    Lymph % 9.8 (L) 18 - 48 %    Mono % 7.3 4 - 15 %    Eos % 0.9 <=8 %    Basophil % 0.2 <=1.9 %    Imm Grans % 1.9 (H) 0.0 - 0.5 %    Neut # 5.16 1.8 - 7.7 K/uL    Lymph # 0.63 (L) 1 - 4.8 K/uL    Mono # 0.47 0.3 - 1 K/uL    Eos # 0.06 <=0.5 K/uL    Baso # 0.01 <=0.2 K/uL    Imm Grans # 0.12 (H) 0.00 - 0.04 K/uL   Platelet Review    Collection Time: 07/17/25  9:20 AM   Result Value Ref Range    Platelet Estimate Decreased (A)     Comprehensive metabolic panel    Collection Time: 07/17/25  9:42 AM   Result Value Ref Range    Sodium 138 136 - 145 mmol/L    Potassium 3.4 (L) 3.5 - 5.1 mmol/L    Chloride 103 95 - 110 mmol/L    CO2 28 23 - 29 mmol/L    Glucose 141 (H) 70 - 110 mg/dL    BUN 17 8 - 23 mg/dL    Creatinine 0.6 0.5 - 1.4 mg/dL    Calcium 8.3 (L) 8.7 - 10.5 mg/dL    Protein Total 5.1 (L) 6.0 - 8.4 gm/dL    Albumin 2.3 (L) 3.5 - 5.2 g/dL    Bilirubin Total 0.6 0.1 - 1.0 mg/dL    ALP 60 40 - 150 unit/L    AST 20 11 - 45 unit/L    ALT 13 10 - 44 unit/L    Anion Gap 7 (L) 8 - 16 mmol/L    eGFR >60 >60  mL/min/1.73/m2       Microbiology Results (last 7 days)       Procedure Component Value Units Date/Time    Urine Culture High Risk [[id number]] Collected: 07/12/25 1207    Order Status: Completed Specimen: Urine, Catheterized Updated: 07/14/25 0733     Urine Culture No Growth                          Assessment & Plan  Sepsis with acute renal failure and septic shock  Marine Mo presents with sepsis with Acute kidney injury and shock requiring pressors secondary to Urinary Tract Infection and obstructive uropathy.     Interventions include:    Antibiotics:    cefTRIAXone injection 2 g, Every 24 hours (non-standard times), Intravenous    Fluid Resuscitation:   Ideal Body Weight- The patient is obese (BMI>30) and their ideal body weight of Ideal body weight: 54.7 kg (120 lb 9.5 oz) was used to calculate fluid bolus of 30 ml/kg. This was given at the OSH ED     Labs and Imaging:   Recent Labs   Lab 07/12/25  2104 07/13/25  0421 07/14/25  0011   LACTATE 3.0* 3.3* 2.2   Septic shock with urinary source and bacteremia.  Growing GNR in BCx.  Will stop Vanc/meropenem. ID/S back - transition to IV ceftriaxone while inpatient.  - no aneurysms on imaging, QT ok - plan for levaquin at discharge  - shock resolved      Acute unilateral obstructive uropathy  S/p cystoscopy with stent placement.  - will need definitive treatment of stone as outpatient    JAIRON (acute kidney injury)  JAIRON is likely due to post-obstructive d/t kidney stone and acute UTI. Baseline creatinine is <1. Most recent creatinine and eGFR are listed below.  Recent Labs     07/15/25  0228 07/16/25  0318 07/17/25  0942   CREATININE 0.7 0.6 0.6   EGFRNORACEVR >60 >60 >60      Plan  - Avoid nephrotoxins and renally dose meds for GFR listed above  - Monitor urine output, serial BMP, and adjust therapy as needed  -Support MAP>65 and oxygen>94%.   Now resolved.  Thrombocytopenia  Chronic due to her DILLARD cirrhosis it appears. No acute signs of bleeding but  may need transfuse for cysto today. Type and screen ordered and will defer to Urology the need for plt, ffp, or PRBC.   Plts lower than baseline, most likely related to septic shock.  Patient was transfused platelets for procedure and again on 7/15  - platelets stable/improving  Coronary artery disease involving native coronary artery of native heart without angina pectoris  Patient with known CAD s/p stent placement, which is controlled Will continue Statin and monitor for S/Sx of angina/ACS. Continue to monitor on telemetry. Resume ASA and Plavix when cleared by urology.   Holding ASA and Plavix secondary to severe thrombocytopenia.  Type 2 diabetes mellitus, controlled  Patient's FSGs are controlled on current medication regimen.  Last A1c reviewed-   Lab Results   Component Value Date    HGBA1C 5.0 07/12/2025   Repeat A1c ordered and pending.   Most recent fingerstick glucose reviewed-   Recent Labs   Lab 07/16/25  1059 07/16/25  1624 07/16/25  2107 07/17/25  0720   POCTGLUCOSE 160* 110 124* 105     Current correctional scale  Low  Maintain anti-hyperglycemic dose as follows-   Antihyperglycemics (From admission, onward)      Start     Stop Route Frequency Ordered    07/12/25 0457  insulin aspart U-100 pen 0-5 Units         -- SubQ Every 6 hours PRN 07/12/25 0457          Hold Oral hypoglycemics while patient is in the hospital: metformin 500mg PO BID but family states that lately she has been having low sugars and this has recently been d/c.   Essential hypertension  Patient's blood pressure range in the last 24 hours was: BP  Min: 101/52  Max: 124/58.The patient's inpatient anti-hypertensive regimen is listed below:  Current Antihypertensives  hydrALAZINE injection 5 mg, Every 6 hours PRN, Intravenous  timolol maleate 0.5% ophthalmic solution 1 drop, 2 times daily, Both Eyes for SBP>180 or DBP>90   Medications on hold as patient is in shock.  Left ureteral stone      Paroxysmal atrial fibrillation with  RVR  Cardiology consulted.  Was on Amio drip.  Now resolved and off drip.  - developed afib rvr again on 7/15 -- on IV amiodarone  - unable to anticoagulate given platelets    Delirium  Delirium precautions.  Chronically on benzo's.  Resume home medications.    ACP (advance care planning)      VTE Risk Mitigation (From admission, onward)           Ordered     IP VTE HIGH RISK PATIENT  Once         07/12/25 0457     Place sequential compression device  Until discontinued         07/12/25 0457     Reason for No Pharmacological VTE Prophylaxis  Once        Comments: Urology plans on cysto with stent placement   Question:  Reasons:  Answer:  Physician Provided (leave comment)    07/12/25 0457                    Discharge Planning   CHRISTIANNE:      Code Status: DNR   Medical Readiness for Discharge Date:   Discharge Plan A: Home Health                  Please place Justification for DME      Iker Ibrahim MD  Department of Hospital Medicine   [facility]

## 2025-07-17 NOTE — ASSESSMENT & PLAN NOTE
- noted; requiring ICU level care, pressors, and IV Abx this admission; now improved   - renal source s/p cysto/stent placement; urology following   - cont mgmt per HM

## 2025-07-17 NOTE — ASSESSMENT & PLAN NOTE
S/p L ureteral stent placement due to septic shock, obstructing stone in the left kidney/ UPJ-  Discussed due to presence of infection, delayed definitive management of stone is recommended  They will need two weeks of culture directed antibiotics  Discussed they will follow up locally with Dr. Villaseñor in [facility], scheduled  7/23/2025  Urine and blood cultures - E coli

## 2025-07-17 NOTE — NURSING
[facility]  Nurses Note -- 4 Eyes      7/17/2025       Skin assessed on: Q Shift      [x] No Pressure Injuries Present    [x]Prevention Measures Documented    [] Yes LDA  for Pressure Injury Previously documented     [] Yes New Pressure Injury Discovered   [] LDA for New Pressure Injury Added      Attending RN:  Donald Dawkins, RN     Second RN:  Vernell BRITO RN

## 2025-07-17 NOTE — PROGRESS NOTES
[place] [facility]  [facility]alliative Medicine  Progress Note    Patient Name: Marine Mo  MRN: [id number]  Admission Date: 7/12/2025  Hospital Length of Stay: 5 days  Code Status: DNR   Attending Provider: Iker Ibrahim MD  Consulting Provider: Casi Murillo NP  Primary Care Physician: Serg Hwang MD  Principal Problem:Sepsis with acute renal failure and septic shock    Patient information was obtained from patient, spouse/SO, and primary team.      Assessment/Plan:     Neuro  Delirium  - noted; sepsis, ICU admission, and long term alprazolam 0.25 mg HS use that was stopped on admission and then restarted at higher dose than pt normally takes (prescribed 0.5 mg but only takes half typically) all contributing factors   - would recommend OP palliative involvement to discuss/assist with transition from regular benzo use when in better health   - cont mgmt per      Cardiac/Vascular  Coronary artery disease involving native coronary artery of native heart without angina pectoris  - chronic cardiac conditions noted; echo this admission with preserved EF, G1DD and pul HTN also present   - cont mgmt per  and cardiology     Renal/  AJIRON (acute kidney injury)  - noted, along with sepsis and obstructive uropathy this admission; improved   - cont mgmt per      ID  * Sepsis with acute renal failure and septic shock  - noted; requiring ICU level care, pressors, and IV Abx this admission; now improved   - renal source s/p cysto/stent placement; urology following   - cont mgmt per     Hematology  Thrombocytopenia  - chronic condition noted; DILLARD cirrhosis   - no acute bleeding concerns per MDT; cont mgmt per      Palliative Care  ACP (advance care planning)  7/17/2025  - interval chart reviewed; pt discussed with MDT   - visited pt and , along with ARIANE Lawrence; therapy team was working with pt at time of initial visit but returned after for GOC/ACP discussion   - pt was able to confirm wishes  for DNR/DNI now that she is able to participate in the discussion as well   - GOC/ACP discussion complicated by both pt and  being very hard of hearing as well as Mr. Salas seeming very frustrated by therapy progress so far today and readiness for discharge; this was further complicated by pt occasionally closing eyes falling asleep and her sharing she was scared to go home due to limitations and symptoms prior to admission   - planned for pt to eat lunch and return visit later in the day to re attempt discussion   - again revisited and continued to have barriers for GOC/ACP discussions; discussions and coordination with RN, PT, OT, and HM MD to address pt/ concerns with plan for re-attempt at GOC/ACP discussion tomorrow   - attempted to call pt's daughter to discuss, to maybe assist in facilitation GOC/ACP discussion with pt and ; LM with return number   - plan for GOC/ACP discussion with pt and family and completion of LAPOST if agreeable   - plan for recommendation of home palliative medicine     Consult - 7/14/2025  - consult received; interval chart reviewed in detail; discussed pt with MDT during ICU rounds   - met with patient and , Josue, at bedside; introduction to palliative medicine team and role in current care and admission   - pt currently without capacity for GOC/ACP discussion or decision making; , Josue ([phone]), is legal surrogate decision maker   - learned more about pt outside of current admission; she is typically very active and able bodied, aside of use of walker; she is able to perform ADLs and does all household chores, cooking, cleaning, laundry per ; they share 2 children Josue  And Robyn who both live in [place] as well    - GOC/ACP discussion  - to 's knowledge they have living morton at home; reviewed prior wishes and GOC discussions   - informed Mr. Salas that the wishes he expressed were consistent with DNR/DNI; discussed  "this in detail and he confirmed wishes for DNR/DNI; confirmed that a DNR order would be placed in chart (updated RN and HM)   - discussed availability of LAPOST document for these wishes and plan for LAPOST completion when pt is able to participate in the discussion/before discharge   - GOC for pt to be able to return to prior hospitalization abilities/baseline; will require working with PT/OT when able  -  initially lacked insight into severity of pt's condition, seeming shocked that pt was not potentially going home today or tomorrow; reviewed details of pt's care, including pressor requirements and sepsis affects on the body; explained progress that has been made with source identification and control, but explained the necessary steps between now and being ready for discharge   - offered to call pt's daughter who has been involved in admission as well, Mr. Salas wishes to update her when she relieves him this afternoon (she has been staying with pt at night and Mr. Salas has been staying during the day)   - emotional support provided   - Allowed time for questions/concerns; all addressed; expressed availability of myself/palliative team for additional questions/concerns   - updated MDT        I will follow-up with patient. Please contact us if you have any additional questions.    Subjective:     Chief Complaint: No chief complaint on file.      HPI:   From H&P: "85 y.o. woman with h/o NIDDM type 2 (A1c 5 in Feb), Essential HTN, HLD, DILLARD cirrhosis with polycystic liver dz and chronic thrombocytopenia, Depression/General Anxiety d/o, CAD (h/o stent in LAD on ASA/Plavix), Anemia presents to the [facility] ED with family c/o fevers and chills for the past 1-2 days. Was febrile at 100.1 and hypotensive. Started on IVF, IV vanc/zosyn and eventually started on peripheral levophed for Sepsis. "    Palliative medicine consulted for goals of care discussion and advance care planning; for details of visit, see " advance care planning section of plan.       Hospital Course:  No notes on file    Medications:  Continuous Infusions:  Scheduled Meds:   amiodarone  400 mg Oral BID    Followed by    [START ON 7/23/2025] amiodarone  200 mg Oral BID    Followed by    [START ON 7/31/2025] amiodarone  200 mg Oral Daily    atorvastatin  20 mg Oral QHS    cefTRIAXone (Rocephin) IV (PEDS and ADULTS)  2 g Intravenous Q24H    EScitalopram oxalate  5 mg Oral Daily    timolol maleate 0.5%  1 drop Both Eyes BID     PRN Meds:  Current Facility-Administered Medications:     0.9%  NaCl infusion (for blood administration), , Intravenous, Q24H PRN    acetaminophen, 650 mg, Oral, Q4H PRN    ALPRAZolam, 0.25 mg, Oral, Nightly PRN    dextrose 50%, 12.5 g, Intravenous, PRN    glucagon (human recombinant), 1 mg, Intramuscular, PRN    hydrALAZINE, 5 mg, Intravenous, Q6H PRN    hydrOXYzine HCL, 10 mg, Oral, TID PRN    insulin aspart U-100, 0-5 Units, Subcutaneous, Q6H PRN    ipratropium, 0.5 mg, Nebulization, Q4H PRN    levalbuterol, 1.25 mg, Nebulization, Q4H PRN    melatonin, 6 mg, Oral, Nightly PRN    ondansetron, 4 mg, Intravenous, Q6H PRN    simethicone, 1 tablet, Oral, QID PRN    sodium chloride 0.9%, 10 mL, Intravenous, Q12H PRN    Objective:     Vital Signs (Most Recent):  Temp: 98.1 °F (36.7 °C) (07/17/25 1108)  Pulse: 78 (07/17/25 1200)  Resp: 18 (07/17/25 1108)  BP: (!) 109/59 (07/17/25 1108)  SpO2: 95 % (07/17/25 1108) Vital Signs (24h Range):  Temp:  [97.9 °F (36.6 °C)-98.7 °F (37.1 °C)] 98.1 °F (36.7 °C)  Pulse:  [75-91] 78  Resp:  [18-24] 18  SpO2:  [90 %-97 %] 95 %  BP: (103-124)/(55-76) 109/59     Weight: 71.1 kg (156 lb 12 oz)  Body mass index is 26.91 kg/m².       Physical Exam  Vitals and nursing note reviewed.   Constitutional:       General: She is awake. She is not in acute distress.     Comments: [place]; more awake, alert, and able to participate in discussions but very quickly dozes off if not stimulated    HENT:      Head:  Normocephalic and atraumatic.   Pulmonary:      Effort: Pulmonary effort is normal. No respiratory distress.   Neurological:      Mental Status: She is alert and oriented to person, place, and time.      Comments: Difficulty with detail and complex discussions, but potentially related to hearing    Psychiatric:         Behavior: Behavior is cooperative.       Advance Care Planning   Advance Directives:   Living Will: No    LaPOST: No (plan for completion prior to discharge)    Do Not Resuscitate Status: Yes    Medical Power of : No ( Josue is legal surrogate decision maker)      Decision Making:  Family answered questions  Goals of Care: What is most important right now is to focus on spending time at home, avoiding the hospital, remaining as independent as possible, symptom/pain control. Accordingly, we have decided that the best plan to meet the patient's goals includes continuing with treatment.       Significant Labs: All pertinent labs within the past 24 hours have been reviewed.  CBC:   Recent Labs   Lab 07/17/25  0920   WBC 6.45   HGB 9.3*   HCT 27.9*   MCV 90   PLT 54*     BMP:  Recent Labs   Lab 07/17/25  0942   *      K 3.4*      CO2 28   BUN 17   CREATININE 0.6   CALCIUM 8.3*     LFT:  Lab Results   Component Value Date    AST 20 07/17/2025    GGT 29 10/19/2020    ALKPHOS 60 07/17/2025    BILITOT 0.6 07/17/2025     Albumin:   Albumin   Date Value Ref Range Status   07/17/2025 2.3 (L) 3.5 - 5.2 g/dL Final   02/19/2025 3.7 3.5 - 5.2 g/dL Final     Protein:   Protein Total   Date Value Ref Range Status   07/17/2025 5.1 (L) 6.0 - 8.4 gm/dL Final     Total Protein   Date Value Ref Range Status   02/19/2025 7.1 6.0 - 8.4 g/dL Final     Lactic acid:   Lab Results   Component Value Date    LACTATE 2.2 07/14/2025    LACTATE 3.3 (H) 07/13/2025     Significant Imaging: I have reviewed all pertinent imaging results/findings within the past 24 hours.    Total visit time: 85  minutes    35 min visit time including: face to face time in discussion of symptom assessment, and exploring options and burdens of offered treatments.  This also includes non-face to face time preparing to see the patient including chart review, obtaining and/or reviewing separately obtained history, documenting clinical information in the electronic or other health record, independently interpreting results and communicating results to the patient/family/caregiver, family discussions by phone if not able to be present, coordination of care with other specialists, and discharge planning.     50 min ACP time spent: goals of care, advanced care planning, emotional support, formulating and communicating prognosis, exploring burden/ benefit of various approaches of treatment.     Casi Murillo, NP  Palliative Medicine  [facility] [place] [facility]

## 2025-07-17 NOTE — SUBJECTIVE & OBJECTIVE
Interval History: notified of appt w/ Dr. Villaseñor in  1week. Feeling better. Has yet to ambulate, working with PT.     Review of Systems   Constitutional:  Negative for activity change, appetite change, chills, diaphoresis and fever.   HENT:  Negative for hearing loss.    Eyes:  Negative for visual disturbance.   Respiratory:  Negative for cough, shortness of breath and wheezing.    Gastrointestinal:  Negative for abdominal distention, abdominal pain, constipation, nausea and vomiting.   Genitourinary:  Negative for difficulty urinating, dysuria, flank pain and hematuria.   Musculoskeletal:  Negative for neck pain and neck stiffness.   Neurological:  Positive for weakness. Negative for dizziness.     Objective:     Temp:  [97.9 °F (36.6 °C)-98.7 °F (37.1 °C)] 98.1 °F (36.7 °C)  Pulse:  [75-91] 78  Resp:  [18-24] 18  SpO2:  [90 %-97 %] 95 %  BP: (103-124)/(57-76) 109/59     Body mass index is 26.91 kg/m².    Date 07/17/25 0700 - 07/18/25 0659   Shift 1141-4913 5635-5199 9445-4238 24 Hour Total   INTAKE   P.O. 360   360   Shift Total(mL/kg) 360(5.1)   360(5.1)   OUTPUT   Shift Total(mL/kg)       Weight (kg) 71.1 71.1 71.1 71.1          Drains       Drain  Duration             Female External Urinary Catheter w/ Suction 07/15/25 1000 2 days                     Physical Exam  Constitutional:       Appearance: She is well-developed.   HENT:      Head: Normocephalic and atraumatic.   Eyes:      Conjunctiva/sclera: Conjunctivae normal.   Pulmonary:      Effort: Pulmonary effort is normal. No respiratory distress.   Abdominal:      General: Abdomen is flat. There is no distension.      Tenderness: There is no abdominal tenderness.   Skin:     Findings: No rash.   Neurological:      Mental Status: She is alert and oriented to person, place, and time.   Psychiatric:         Behavior: Behavior normal.           Significant Labs:    BMP:  Recent Labs   Lab 07/15/25  0228 07/16/25  0318 07/17/25  0942    141 138   K 3.1*  3.3* 3.4*   * 110 103   CO2 21* 22* 28   BUN 28* 19 17   CREATININE 0.7 0.6 0.6   CALCIUM 8.1* 8.2* 8.3*       CBC:   Recent Labs   Lab 07/15/25  0228 07/16/25 0318 07/17/25  0920   WBC 4.40 4.65 6.45   HGB 8.7* 8.4* 9.3*   HCT 26.9* 26.1* 27.9*   PLT 13* 32* 54*

## 2025-07-17 NOTE — SUBJECTIVE & OBJECTIVE
Medications:  Continuous Infusions:  Scheduled Meds:   amiodarone  400 mg Oral BID    Followed by    [START ON 7/23/2025] amiodarone  200 mg Oral BID    Followed by    [START ON 7/31/2025] amiodarone  200 mg Oral Daily    atorvastatin  20 mg Oral QHS    cefTRIAXone (Rocephin) IV (PEDS and ADULTS)  2 g Intravenous Q24H    EScitalopram oxalate  5 mg Oral Daily    timolol maleate 0.5%  1 drop Both Eyes BID     PRN Meds:  Current Facility-Administered Medications:     0.9%  NaCl infusion (for blood administration), , Intravenous, Q24H PRN    acetaminophen, 650 mg, Oral, Q4H PRN    ALPRAZolam, 0.25 mg, Oral, Nightly PRN    dextrose 50%, 12.5 g, Intravenous, PRN    glucagon (human recombinant), 1 mg, Intramuscular, PRN    hydrALAZINE, 5 mg, Intravenous, Q6H PRN    hydrOXYzine HCL, 10 mg, Oral, TID PRN    insulin aspart U-100, 0-5 Units, Subcutaneous, Q6H PRN    ipratropium, 0.5 mg, Nebulization, Q4H PRN    levalbuterol, 1.25 mg, Nebulization, Q4H PRN    melatonin, 6 mg, Oral, Nightly PRN    ondansetron, 4 mg, Intravenous, Q6H PRN    simethicone, 1 tablet, Oral, QID PRN    sodium chloride 0.9%, 10 mL, Intravenous, Q12H PRN    Objective:     Vital Signs (Most Recent):  Temp: 98.1 °F (36.7 °C) (07/17/25 1108)  Pulse: 78 (07/17/25 1200)  Resp: 18 (07/17/25 1108)  BP: (!) 109/59 (07/17/25 1108)  SpO2: 95 % (07/17/25 1108) Vital Signs (24h Range):  Temp:  [97.9 °F (36.6 °C)-98.7 °F (37.1 °C)] 98.1 °F (36.7 °C)  Pulse:  [75-91] 78  Resp:  [18-24] 18  SpO2:  [90 %-97 %] 95 %  BP: (103-124)/(55-76) 109/59     Weight: 71.1 kg (156 lb 12 oz)  Body mass index is 26.91 kg/m².       Physical Exam  Vitals and nursing note reviewed.   Constitutional:       General: She is awake. She is not in acute distress.     Comments: [place]; more awake, alert, and able to participate in discussions but very quickly dozes off if not stimulated    HENT:      Head: Normocephalic and atraumatic.   Pulmonary:      Effort: Pulmonary effort is normal. No  respiratory distress.   Neurological:      Mental Status: She is alert and oriented to person, place, and time.      Comments: Difficulty with detail and complex discussions, but potentially related to hearing    Psychiatric:         Behavior: Behavior is cooperative.       Advance Care Planning   Advance Directives:   Living Will: No    LaPOST: No (plan for completion prior to discharge)    Do Not Resuscitate Status: Yes    Medical Power of : No ( Josue is legal surrogate decision maker)      Decision Making:  Family answered questions  Goals of Care: What is most important right now is to focus on spending time at home, avoiding the hospital, remaining as independent as possible, symptom/pain control. Accordingly, we have decided that the best plan to meet the patient's goals includes continuing with treatment.       Significant Labs: All pertinent labs within the past 24 hours have been reviewed.  CBC:   Recent Labs   Lab 07/17/25  0920   WBC 6.45   HGB 9.3*   HCT 27.9*   MCV 90   PLT 54*     BMP:  Recent Labs   Lab 07/17/25  0942   *      K 3.4*      CO2 28   BUN 17   CREATININE 0.6   CALCIUM 8.3*     LFT:  Lab Results   Component Value Date    AST 20 07/17/2025    GGT 29 10/19/2020    ALKPHOS 60 07/17/2025    BILITOT 0.6 07/17/2025     Albumin:   Albumin   Date Value Ref Range Status   07/17/2025 2.3 (L) 3.5 - 5.2 g/dL Final   02/19/2025 3.7 3.5 - 5.2 g/dL Final     Protein:   Protein Total   Date Value Ref Range Status   07/17/2025 5.1 (L) 6.0 - 8.4 gm/dL Final     Total Protein   Date Value Ref Range Status   02/19/2025 7.1 6.0 - 8.4 g/dL Final     Lactic acid:   Lab Results   Component Value Date    LACTATE 2.2 07/14/2025    LACTATE 3.3 (H) 07/13/2025     Significant Imaging: I have reviewed all pertinent imaging results/findings within the past 24 hours.

## 2025-07-17 NOTE — ASSESSMENT & PLAN NOTE
Patient's blood pressure range in the last 24 hours was: BP  Min: 101/52  Max: 124/58.The patient's inpatient anti-hypertensive regimen is listed below:  Current Antihypertensives  hydrALAZINE injection 5 mg, Every 6 hours PRN, Intravenous  timolol maleate 0.5% ophthalmic solution 1 drop, 2 times daily, Both Eyes for SBP>180 or DBP>90   Medications on hold as patient is in shock.

## 2025-07-17 NOTE — ASSESSMENT & PLAN NOTE
PAF/RVR, now back in SR  Cont PO amio load  Consider BBL if BP allows  Unable to start DOAC with chronic severe thrombocytopenia

## 2025-07-17 NOTE — NURSING
[facility] [place]  Nurses Note -- 4 Eyes      7/16/2025       Skin assessed on: Q Shift      [x] No Pressure Injuries Present    [x]Prevention Measures Documented    [] Yes LDA  for Pressure Injury Previously documented     [] Yes New Pressure Injury Discovered   [] LDA for New Pressure Injury Added      Attending RN:  Vernell De RN     Second RN:  TETE Pope        Abnormal surgical wound, initial encounter

## 2025-07-17 NOTE — ASSESSMENT & PLAN NOTE
- noted; sepsis, ICU admission, and long term alprazolam 0.25 mg HS use that was stopped on admission and then restarted at higher dose than pt normally takes (prescribed 0.5 mg but only takes half typically) all contributing factors   - would recommend OP palliative involvement to discuss/assist with transition from regular benzo use when in better health   - cont mgmt per HM

## 2025-07-17 NOTE — PT/OT/SLP EVAL
"Physical Therapy Evaluation    Patient Name:  Marine Mo   MRN:  [id number]    Recommendations:     Discharge Recommendations: Low Intensity Therapy (with 24 hr supervision/assist of spouse)   Discharge Equipment Recommendations: none   Barriers to discharge: None    Assessment:     Marine Mo is a 85 y.o. female admitted with a medical diagnosis of Sepsis with acute renal failure and septic shock.  She presents with the following impairments/functional limitations: weakness, impaired balance, impaired self care skills, impaired functional mobility, gait instability. Pt mildly lethargic during session but willing to participate with PT.  present and supportive.     Rehab Prognosis: Good; patient would benefit from acute skilled PT services to address these deficits and reach maximum level of function.    Recent Surgery: Procedure(s) (LRB):  CYSTOSCOPY, WITH RETROGRADE PYELOGRAM AND URETERAL STENT INSERTION (Left) 5 Days Post-Op    Plan:     During this hospitalization, patient to be seen 4 x/week to address the identified rehab impairments via gait training, therapeutic activities, therapeutic exercises, neuromuscular re-education and progress toward the following goals:    Plan of Care Expires:  07/31/25    Subjective     Chief Complaint: None stated   Patient/Family Comments/goals: "Do they have any salt?"  Pain/Comfort:  Pain Rating 1: 0/10    Patients cultural, spiritual, Tenriism conflicts given the current situation: no    Living Environment:  Pt lives with spouse in a [facility] with 0 TEAGAN.  Prior to admission, patients level of function was modified independent with use of cane and rollator; drives.  Equipment used at home: rollator, cane, straight.  DME owned (not currently used): none.  Upon discharge, patient will have assistance from spouse.    Objective:     Communicated with RN prior to session.  Patient found HOB elevated with telemetry, PureWick, peripheral IV, SCD  upon PT " entry to room.    General Precautions: Standard, fall ([place])  Orthopedic Precautions:N/A   Braces: N/A  Respiratory Status: Room air    Exams:  Cognitive Exam:  Patient is oriented to Person, Place, Time, and Situation, requiring increased time to answer questions but answered appropriately, more likely due to [place]  RLE Strength: At least 3+/5 based on functional mobility testing   LLE Strength: At least 3+/5 based on functional mobility testing    Functional Mobility:  Bed Mobility:     Supine to Sit: minimum assistance  Sit to Supine: NT, pt in chair at end of session   Transfers:     Sit to Stand:  minimum assistance with rolling walker, verbal cues for hand placement   Bed to Chair: minimum assistance with rolling walker using Step Transfer; mild delay with taking steps but able to perform with step by step cuing       AM-PAC 6 CLICK MOBILITY  Total Score:17       Treatment & Education:  Educated on purpose of PT and POC.     Patient left up in chair with all lines intact, call button in reach, RN notified, and spouse present.    GOALS:   Multidisciplinary Problems       Physical Therapy Goals          Problem: Physical Therapy    Goal Priority Disciplines Outcome Interventions   Physical Therapy Goal     PT, PT/OT Progressing    Description: Goals to be met by: 2025     Patient will increase functional independence with mobility by performin. Supine to sit with Modified Lee  2. Sit to supine with Modified Lee  3. Sit to stand transfer with Modified Lee  4. Bed to chair transfer with Modified Lee using Rolling Walker  5. Gait  x 75 feet with Modified Lee using Rolling Walker.     Pt low intensity at this time, requiring Juliana x 1 for bed mobility, transfers and steps. Has assist of spouse at home. Will continue to follow while in house.                          DME Justifications:  No DME recommended requiring DME justifications    History:     Past Medical  History:   Diagnosis Date    Anemia 02/27/2018    Anxiety     Arthritis     Atherosclerotic heart disease native coronary artery w/angina pectoris     Back pain     Breast cyst     Cirrhosis     Depression 01/30/2019    Diabetes mellitus     Disorder of kidney and ureter     Glaucoma     Hyperlipidemia     Hypertension     Trouble in sleeping     Type 2 diabetes mellitus        Past Surgical History:   Procedure Laterality Date    BACK SURGERY      BREAST CYST ASPIRATION  1982    CATARACT EXTRACTION Bilateral     don't remember date    CHOLECYSTECTOMY      COLONOSCOPY N/A 3/1/2018    Procedure: COLONOSCOPY;  Surgeon: Ren Newton MD;  Location: [address]);  Service: Endoscopy;  Laterality: N/A;    CYSTOSCOPY WITH URETEROSCOPY, RETROGRADE PYELOGRAPHY, AND INSERTION OF STENT Left 7/12/2025    Procedure: CYSTOSCOPY, WITH RETROGRADE PYELOGRAM AND URETERAL STENT INSERTION;  Surgeon: Aliya Castañeda MD;  Location: [facility];  Service: Urology;  Laterality: Left;    HYSTERECTOMY  age 70    bleeding    INSERTION, STENT, ARTERY  2022    OOPHORECTOMY      TONSILLECTOMY, ADENOIDECTOMY         Time Tracking:     PT Received On: 07/17/25  PT Start Time: 1155     PT Stop Time: 1213  PT Total Time (min): 18 min     Billable Minutes: Evaluation 10 min and Therapeutic Activity 8 min  Co-eval with OT    07/17/2025

## 2025-07-17 NOTE — PLAN OF CARE
Problem: Diabetes Comorbidity  Goal: Blood Glucose Level Within Targeted Range  Outcome: Progressing     Problem: Adult Inpatient Plan of Care  Goal: Plan of Care Review  Outcome: Progressing     Problem: Fall Injury Risk  Goal: Absence of Fall and Fall-Related Injury  Outcome: Progressing     Problem: Skin Injury Risk Increased  Goal: Skin Health and Integrity  Outcome: Progressing     Problem: Coping Ineffective  Goal: Effective Coping  Outcome: Progressing

## 2025-07-17 NOTE — PLAN OF CARE
Changes in medical condition or discharge plan:    Patient admitted in 07/12/25 continues to require medical care. Patient is an ICU stepdown. Patient was consulted by Urology, Cardiology and Palliative Care.     It was noted by palliative that patient did not have capacity and her legal decision maker is her spouse, Josue [phone].     Patient was consulted by PT/OT and home health was recommended and will be set up upon discharge .     Does patient need new DME? None    Follow up appts needed: PCP, Urology and Cardio    Medically stable: Patient is not medically stable for discharge     Estimated Discharge Date: 07/19-25 07/17/25 1610   Discharge Reassessment   Assessment Type Discharge Planning Reassessment   Did the patient's condition or plan change since previous assessment? Yes   Discharge Plan discussed with: Spouse/sig other   Name(s) and Number(s) Josue oM (Spouse)  [phone] ([place])   Discharge Plan A Home Health   Discharge Plan B Home with family   DME Needed Upon Discharge  none   Transition of Care Barriers None   Why the patient remains in the hospital Requires continued medical care   Post-Acute Status   Post-Acute Authorization Home Health   Discharge Delays None known at this time

## 2025-07-18 ENCOUNTER — DOCUMENTATION ONLY (OUTPATIENT)
Dept: PALLIATIVE MEDICINE | Facility: HOSPITAL | Age: 85
End: 2025-07-18
Payer: MEDICARE

## 2025-07-18 LAB
ABSOLUTE EOSINOPHIL (OHS): 0.09 K/UL
ABSOLUTE MONOCYTE (OHS): 0.52 K/UL (ref 0.3–1)
ABSOLUTE NEUTROPHIL COUNT (OHS): 5.31 K/UL (ref 1.8–7.7)
ALBUMIN SERPL BCP-MCNC: 2.4 G/DL (ref 3.5–5.2)
ALP SERPL-CCNC: 101 UNIT/L (ref 40–150)
ALT SERPL W/O P-5'-P-CCNC: 13 UNIT/L (ref 10–44)
ANION GAP (OHS): 8 MMOL/L (ref 8–16)
AST SERPL-CCNC: 27 UNIT/L (ref 11–45)
BASOPHILS # BLD AUTO: 0.02 K/UL
BASOPHILS NFR BLD AUTO: 0.3 %
BILIRUB SERPL-MCNC: 0.7 MG/DL (ref 0.1–1)
BUN SERPL-MCNC: 17 MG/DL (ref 8–23)
CALCIUM SERPL-MCNC: 8.3 MG/DL (ref 8.7–10.5)
CHLORIDE SERPL-SCNC: 104 MMOL/L (ref 95–110)
CO2 SERPL-SCNC: 26 MMOL/L (ref 23–29)
CREAT SERPL-MCNC: 0.6 MG/DL (ref 0.5–1.4)
ERYTHROCYTE [DISTWIDTH] IN BLOOD BY AUTOMATED COUNT: 16.9 % (ref 11.5–14.5)
GFR SERPLBLD CREATININE-BSD FMLA CKD-EPI: >60 ML/MIN/1.73/M2
GLUCOSE SERPL-MCNC: 91 MG/DL (ref 70–110)
HCT VFR BLD AUTO: 29.3 % (ref 37–48.5)
HGB BLD-MCNC: 9.1 GM/DL (ref 12–16)
IMM GRANULOCYTES # BLD AUTO: 0.12 K/UL (ref 0–0.04)
IMM GRANULOCYTES NFR BLD AUTO: 1.7 % (ref 0–0.5)
LYMPHOCYTES # BLD AUTO: 0.92 K/UL (ref 1–4.8)
MCH RBC QN AUTO: 28.3 PG (ref 27–31)
MCHC RBC AUTO-ENTMCNC: 31.1 G/DL (ref 32–36)
MCV RBC AUTO: 91 FL (ref 82–98)
NUCLEATED RBC (/100WBC) (OHS): 0 /100 WBC
PLATELET # BLD AUTO: 66 K/UL (ref 150–450)
PMV BLD AUTO: 10.9 FL (ref 9.2–12.9)
POCT GLUCOSE: 102 MG/DL (ref 70–110)
POCT GLUCOSE: 106 MG/DL (ref 70–110)
POCT GLUCOSE: 141 MG/DL (ref 70–110)
POCT GLUCOSE: 99 MG/DL (ref 70–110)
POTASSIUM SERPL-SCNC: 4.1 MMOL/L (ref 3.5–5.1)
PROT SERPL-MCNC: 5.3 GM/DL (ref 6–8.4)
RBC # BLD AUTO: 3.22 M/UL (ref 4–5.4)
RELATIVE EOSINOPHIL (OHS): 1.3 %
RELATIVE LYMPHOCYTE (OHS): 13.2 % (ref 18–48)
RELATIVE MONOCYTE (OHS): 7.4 % (ref 4–15)
RELATIVE NEUTROPHIL (OHS): 76.1 % (ref 38–73)
SODIUM SERPL-SCNC: 138 MMOL/L (ref 136–145)
WBC # BLD AUTO: 6.98 K/UL (ref 3.9–12.7)

## 2025-07-18 PROCEDURE — 97535 SELF CARE MNGMENT TRAINING: CPT

## 2025-07-18 PROCEDURE — 80053 COMPREHEN METABOLIC PANEL: CPT | Performed by: HOSPITALIST

## 2025-07-18 PROCEDURE — 97116 GAIT TRAINING THERAPY: CPT | Mod: CQ

## 2025-07-18 PROCEDURE — 63600175 PHARM REV CODE 636 W HCPCS: Performed by: STUDENT IN AN ORGANIZED HEALTH CARE EDUCATION/TRAINING PROGRAM

## 2025-07-18 PROCEDURE — 99900035 HC TECH TIME PER 15 MIN (STAT)

## 2025-07-18 PROCEDURE — 25000003 PHARM REV CODE 250: Performed by: INTERNAL MEDICINE

## 2025-07-18 PROCEDURE — 11000001 HC ACUTE MED/SURG PRIVATE ROOM

## 2025-07-18 PROCEDURE — 97110 THERAPEUTIC EXERCISES: CPT | Mod: CQ

## 2025-07-18 PROCEDURE — 97530 THERAPEUTIC ACTIVITIES: CPT | Mod: CQ

## 2025-07-18 PROCEDURE — 97530 THERAPEUTIC ACTIVITIES: CPT

## 2025-07-18 PROCEDURE — 36415 COLL VENOUS BLD VENIPUNCTURE: CPT | Performed by: HOSPITALIST

## 2025-07-18 PROCEDURE — 99498 ADVNCD CARE PLAN ADDL 30 MIN: CPT | Mod: ,,, | Performed by: REGISTERED NURSE

## 2025-07-18 PROCEDURE — 99497 ADVNCD CARE PLAN 30 MIN: CPT | Mod: ,,, | Performed by: REGISTERED NURSE

## 2025-07-18 PROCEDURE — 94761 N-INVAS EAR/PLS OXIMETRY MLT: CPT

## 2025-07-18 PROCEDURE — 99232 SBSQ HOSP IP/OBS MODERATE 35: CPT | Mod: ,,, | Performed by: UROLOGY

## 2025-07-18 PROCEDURE — 85025 COMPLETE CBC W/AUTO DIFF WBC: CPT | Performed by: HOSPITALIST

## 2025-07-18 RX ADMIN — TIMOLOL MALEATE 1 DROP: 5 SOLUTION OPHTHALMIC at 09:07

## 2025-07-18 RX ADMIN — AMIODARONE HYDROCHLORIDE 400 MG: 200 TABLET ORAL at 08:07

## 2025-07-18 RX ADMIN — ATORVASTATIN CALCIUM 20 MG: 10 TABLET, FILM COATED ORAL at 09:07

## 2025-07-18 RX ADMIN — TIMOLOL MALEATE 1 DROP: 5 SOLUTION OPHTHALMIC at 08:07

## 2025-07-18 RX ADMIN — AMIODARONE HYDROCHLORIDE 400 MG: 200 TABLET ORAL at 09:07

## 2025-07-18 RX ADMIN — CEFTRIAXONE 2 G: 2 INJECTION, POWDER, FOR SOLUTION INTRAMUSCULAR; INTRAVENOUS at 02:07

## 2025-07-18 RX ADMIN — ESCITALOPRAM 5 MG: 5 TABLET, FILM COATED ORAL at 08:07

## 2025-07-18 NOTE — SUBJECTIVE & OBJECTIVE
Interval History: Tolerating stent. Planning for discharge. Voiding.     Review of Systems   Constitutional:  Negative for activity change, appetite change, chills, diaphoresis and fever.   HENT:  Negative for hearing loss.    Eyes:  Negative for visual disturbance.   Respiratory:  Negative for cough, shortness of breath and wheezing.    Gastrointestinal:  Negative for abdominal distention, abdominal pain, constipation, nausea and vomiting.   Genitourinary:  Negative for difficulty urinating, dysuria, flank pain and hematuria.   Musculoskeletal:  Negative for neck pain and neck stiffness.   Neurological:  Positive for weakness. Negative for dizziness.     Objective:     Temp:  [97.6 °F (36.4 °C)-99 °F (37.2 °C)] 99 °F (37.2 °C)  Pulse:  [70-82] 79  Resp:  [18] 18  SpO2:  [93 %-95 %] 95 %  BP: (108-126)/(59-73) 126/60     Body mass index is 26.91 kg/m².           Drains       Drain  Duration             Female External Urinary Catheter w/ Suction 07/15/25 1000 2 days                     Physical Exam  Constitutional:       Appearance: She is well-developed.   HENT:      Head: Normocephalic and atraumatic.   Eyes:      Conjunctiva/sclera: Conjunctivae normal.   Pulmonary:      Effort: Pulmonary effort is normal. No respiratory distress.   Abdominal:      General: Abdomen is flat. There is no distension.      Tenderness: There is no abdominal tenderness.   Skin:     Findings: No rash.   Neurological:      Mental Status: She is alert and oriented to person, place, and time.   Psychiatric:         Behavior: Behavior normal.           Significant Labs:    BMP:  Recent Labs   Lab 07/15/25  0228 07/16/25 0318 07/17/25  0942    141 138   K 3.1* 3.3* 3.4*   * 110 103   CO2 21* 22* 28   BUN 28* 19 17   CREATININE 0.7 0.6 0.6   CALCIUM 8.1* 8.2* 8.3*       CBC:   Recent Labs   Lab 07/16/25  0318 07/17/25  0920 07/18/25  0438   WBC 4.65 6.45 6.98   HGB 8.4* 9.3* 9.1*   HCT 26.1* 27.9* 29.3*   PLT 32* 54* 66*  "      Blood Culture: No results for input(s): "LABBLOO" in the last 168 hours.  Urine Culture:   Recent Labs   Lab 07/11/25 2026 07/12/25  1207   LABURIN >100,000 cfu/ml Escherichia coli* No Growth     Urine Studies:   Recent Labs   Lab 07/11/25 2026   COLORU Yellow   APPEARANCEUA Hazy*   PHUR 7.0   SPECGRAV 1.015   PROTEINUA 1+*   GLUCUA Trace*   BILIRUBINUA Negative   OCCULTUA 2+*   NITRITE Negative   UROBILINOGEN Negative   LEUKOCYTESUR 1+*   RBCUA 40*   WBCUA 65*   BACTERIA Many*   HYALINECASTS 15*       Significant Imaging:  All pertinent imaging results/findings from the past 24 hours have been reviewed.          "

## 2025-07-18 NOTE — PLAN OF CARE
Problem: Diabetes Comorbidity  Goal: Blood Glucose Level Within Targeted Range  Outcome: Progressing  Intervention: Monitor and Manage Glycemia  Flowsheets (Taken 7/18/2025 0406)  Glycemic Management: blood glucose monitored     Problem: Adult Inpatient Plan of Care  Goal: Plan of Care Review  Outcome: Progressing     Problem: Infection  Goal: Absence of Infection Signs and Symptoms  Outcome: Progressing     Problem: Fall Injury Risk  Goal: Absence of Fall and Fall-Related Injury  Outcome: Progressing     Problem: Skin Injury Risk Increased  Goal: Skin Health and Integrity  Outcome: Progressing     Problem: Sepsis/Septic Shock  Goal: Optimal Coping  Outcome: Progressing     Problem: Acute Kidney Injury/Impairment  Goal: Fluid and Electrolyte Balance  Outcome: Progressing     Problem: Delirium  Goal: Optimal Coping  Outcome: Progressing  Intervention: Optimize Psychosocial Adjustment to Delirium  Flowsheets (Taken 7/18/2025 0406)  Supportive Measures: active listening utilized  Goal: Improved Sleep  Outcome: Progressing     Problem: Coping Ineffective  Goal: Effective Coping  Outcome: Progressing  Patient is AAOx4. On room air. Tele maintained. No falls or injuries reported during shift, safety precautions maintained. Patient complain of headache tylenol given

## 2025-07-18 NOTE — ASSESSMENT & PLAN NOTE
Patient's FSGs are controlled on current medication regimen.  Last A1c reviewed-   Lab Results   Component Value Date    HGBA1C 5.0 07/12/2025   Repeat A1c ordered and pending.   Most recent fingerstick glucose reviewed-   Recent Labs   Lab 07/17/25  1107 07/17/25  1638 07/17/25  1939/25  0723   POCTGLUCOSE 122* 112* 164* 99     Current correctional scale  Low  Maintain anti-hyperglycemic dose as follows-   Antihyperglycemics (From admission, onward)      Start     Stop Route Frequency Ordered    07/12/25 0457  insulin aspart U-100 pen 0-5 Units         -- SubQ Every 6 hours PRN 07/12/25 0457          Hold Oral hypoglycemics while patient is in the hospital: metformin 500mg PO BID but family states that lately she has been having low sugars and this has recently been d/c.

## 2025-07-18 NOTE — PROGRESS NOTES
[facility] Medicine  Progress Note    Patient Name: Marine Mo  MRN: [id number]  Patient Class: IP- Inpatient   Admission Date: 7/12/2025  Length of Stay: 6 days  Attending Physician: Iker Ibrahim MD  Primary Care Provider: Serg Hwang MD        Subjective     Principal Problem:Sepsis with acute renal failure and septic shock        HPI:  85 y.o. woman with h/o NIDDM type 2 (A1c 5 in Feb), Essential HTN, HLD, DILLARD cirrhosis with polycystic liver dz and chronic thrombocytopenia, Depression/General Anxiety d/o, CAD (h/o stent in LAD on ASA/Plavix), Anemia presents to the [facility] ED with family c/o fevers and chills for the past 1-2 days. Was febrile at 100.1 and hypotensive. Started on IVF, IV vanc/zosyn and eventually started on peripheral levophed for Sepsis.     Labs noted for WBC 5 and stable H/H 11.5/34. plt 42. Initial LA ws 10.6 with a procal of 22. Received 30cc/kg sepsis fluids (3L) and repeat lactic improved to 3.6.   Lytes with hypokalemia, JAIRON with creatinine 1.5 (baseline <1). Bicarb 18. Mg 1.2 and phos 1.2.   UA concerning for UTI.     CT chest/abd/pelvis with IV contrast:   1. Moderate left-sided hydronephrosis secondary to a 1.7 cm calculus at the UPJ.  2. Wall thickening of the left renal pelvis with left perinephric fat stranding, suspicious for overlying infection/pyelitis or pyelonephritis.  Recommend correlation with urinalysis.  3. Hepatic cirrhosis.  4. Innumerable hepatic and renal cystic lesions as above, similar to the prior study.  5. Interlobular septal thickening in the bilateral lungs, can be seen with mild interstitial edema or interstitial pneumonia.  6. Chronic opacities in the right middle lobe and right lower lobe.     transfer center contacted and case discussed with Dr. Castañeda with Urology who recommended cysto with stent placement and did not think that IR will be required. She agreed to see in consult once patient arrives. We have  been consulted for further management. Family denies h/o kidney stones.     Overview/Hospital Course:  Mrs. Mo is a 86 yo F with history of hypertension, cirrhosis, thrombocytopenia and CAD on Plavix and aspirin who was admitted with septic shock and obstructive uropathy.  Placed in ICU.  Urology consulted underwent cystoscopy with stent placement.  Urine and blood cultures growing pansensitive E coli.  Now on ceftriaxone.  Pressors have been weaned.  Developed AFib with RVR, started on IV amiodarone.  Now transitioned to p.o. amiodarone.  Platelets still less than 50,000, not candidate for oral anticoagulation at this time.  Still with bilateral pitting edema, due to volume overload.  P.r.n. Lasix and elevation.  QT within normal limits.  No evidence of aneurysms.  Would plan to discharge home with Levaquin until outpatient follow-up with Urology for definitive stone treatment.    E.Coli bacteremia, pansensitive on CTX.  PT/OT consulted. Urology and Card follow up as outpatient        PT OT rec HH    Interval History:  NAEON.  No new issues.   CC- Fatigue  All questions answered and updates on care given.       ROS:  General: Negative for fevers   Cardiac: Negative for chest pain   Pulmonary: Negative for wheezing  GI: Negative for abdominal distention      Vitals:    07/17/25 2352 07/18/25 0442 07/18/25 0725 07/18/25 0809   BP: 108/62 121/73 126/60    BP Location: Left arm Left arm Left arm    Patient Position: Lying Lying Lying    Pulse: 76 70 71 79   Resp: 18 18 18    Temp: 98.4 °F (36.9 °C) 97.6 °F (36.4 °C) 99 °F (37.2 °C)    TempSrc: Oral Oral Oral    SpO2: (!) 94% (!) 93% 95%    Weight:       Height:              Body mass index is 26.91 kg/m².      PHYSICAL EXAM:  GENERAL APPEARANCE: alert and cooperative.     HEAD: NC/AT  CARDIAC: There is no cyanosis or pallor.   LUNGS: No apparent wheezing or stridor.  ABDOMEN: Non-distended. No guarding.  MSK: No joint erythema or tenderness.   PSYCHIATRIC: No  tangential speech. No Hyperactive features.        Recent Results (from the past 24 hours)   CBC with Differential    Collection Time: 07/17/25  9:20 AM   Result Value Ref Range    WBC 6.45 3.90 - 12.70 K/uL    RBC 3.10 (L) 4.00 - 5.40 M/uL    HGB 9.3 (L) 12.0 - 16.0 gm/dL    HCT 27.9 (L) 37.0 - 48.5 %    MCV 90 82 - 98 fL    MCH 30.0 27.0 - 31.0 pg    MCHC 33.3 32.0 - 36.0 g/dL    RDW 17.0 (H) 11.5 - 14.5 %    Platelet Count 54 (L) 150 - 450 K/uL    MPV 11.3 9.2 - 12.9 fL    Nucleated RBC 0 <=0 /100 WBC    Neut % 79.9 (H) 38 - 73 %    Lymph % 9.8 (L) 18 - 48 %    Mono % 7.3 4 - 15 %    Eos % 0.9 <=8 %    Basophil % 0.2 <=1.9 %    Imm Grans % 1.9 (H) 0.0 - 0.5 %    Neut # 5.16 1.8 - 7.7 K/uL    Lymph # 0.63 (L) 1 - 4.8 K/uL    Mono # 0.47 0.3 - 1 K/uL    Eos # 0.06 <=0.5 K/uL    Baso # 0.01 <=0.2 K/uL    Imm Grans # 0.12 (H) 0.00 - 0.04 K/uL   Platelet Review    Collection Time: 07/17/25  9:20 AM   Result Value Ref Range    Platelet Estimate Decreased (A)     Comprehensive metabolic panel    Collection Time: 07/17/25  9:42 AM   Result Value Ref Range    Sodium 138 136 - 145 mmol/L    Potassium 3.4 (L) 3.5 - 5.1 mmol/L    Chloride 103 95 - 110 mmol/L    CO2 28 23 - 29 mmol/L    Glucose 141 (H) 70 - 110 mg/dL    BUN 17 8 - 23 mg/dL    Creatinine 0.6 0.5 - 1.4 mg/dL    Calcium 8.3 (L) 8.7 - 10.5 mg/dL    Protein Total 5.1 (L) 6.0 - 8.4 gm/dL    Albumin 2.3 (L) 3.5 - 5.2 g/dL    Bilirubin Total 0.6 0.1 - 1.0 mg/dL    ALP 60 40 - 150 unit/L    AST 20 11 - 45 unit/L    ALT 13 10 - 44 unit/L    Anion Gap 7 (L) 8 - 16 mmol/L    eGFR >60 >60 mL/min/1.73/m2   POCT glucose    Collection Time: 07/17/25 11:07 AM   Result Value Ref Range    POCT Glucose 122 (H) 70 - 110 mg/dL   POCT glucose    Collection Time: 07/17/25  4:38 PM   Result Value Ref Range    POCT Glucose 112 (H) 70 - 110 mg/dL   POCT glucose    Collection Time: 07/17/25  7:39 PM   Result Value Ref Range    POCT Glucose 164 (H) 70 - 110 mg/dL   CBC with Differential     Collection Time: 07/18/25  4:38 AM   Result Value Ref Range    WBC 6.98 3.90 - 12.70 K/uL    RBC 3.22 (L) 4.00 - 5.40 M/uL    HGB 9.1 (L) 12.0 - 16.0 gm/dL    HCT 29.3 (L) 37.0 - 48.5 %    MCV 91 82 - 98 fL    MCH 28.3 27.0 - 31.0 pg    MCHC 31.1 (L) 32.0 - 36.0 g/dL    RDW 16.9 (H) 11.5 - 14.5 %    Platelet Count 66 (L) 150 - 450 K/uL    MPV 10.9 9.2 - 12.9 fL    Nucleated RBC 0 <=0 /100 WBC    Neut % 76.1 (H) 38 - 73 %    Lymph % 13.2 (L) 18 - 48 %    Mono % 7.4 4 - 15 %    Eos % 1.3 <=8 %    Basophil % 0.3 <=1.9 %    Imm Grans % 1.7 (H) 0.0 - 0.5 %    Neut # 5.31 1.8 - 7.7 K/uL    Lymph # 0.92 (L) 1 - 4.8 K/uL    Mono # 0.52 0.3 - 1 K/uL    Eos # 0.09 <=0.5 K/uL    Baso # 0.02 <=0.2 K/uL    Imm Grans # 0.12 (H) 0.00 - 0.04 K/uL   POCT glucose    Collection Time: 07/18/25  7:23 AM   Result Value Ref Range    POCT Glucose 99 70 - 110 mg/dL       Microbiology Results (last 7 days)       Procedure Component Value Units Date/Time    Urine Culture High Risk [[id number]] Collected: 07/12/25 1207    Order Status: Completed Specimen: Urine, Catheterized Updated: 07/14/25 0760     Urine Culture No Growth                          Assessment & Plan  Sepsis with acute renal failure and septic shock  Marine Mo presents with sepsis with Acute kidney injury and shock requiring pressors secondary to Urinary Tract Infection and obstructive uropathy.     Interventions include:    Antibiotics:    cefTRIAXone injection 2 g, Every 24 hours (non-standard times), Intravenous    Fluid Resuscitation:   Ideal Body Weight- The patient is obese (BMI>30) and their ideal body weight of Ideal body weight: 54.7 kg (120 lb 9.5 oz) was used to calculate fluid bolus of 30 ml/kg. This was given at the OSH ED     Labs and Imaging:   Recent Labs   Lab 07/12/25  2104 07/13/25  0421 07/14/25  0011   LACTATE 3.0* 3.3* 2.2   Septic shock with urinary source and bacteremia.  Growing GNR in BCx.  Will stop Vanc/meropenem. ID/S back -  transition to IV ceftriaxone while inpatient.  - no aneurysms on imaging, QT ok - plan for levaquin at discharge  - shock resolved      Acute unilateral obstructive uropathy  S/p cystoscopy with stent placement.  - will need definitive treatment of stone as outpatient    JAIRON (acute kidney injury)  JAIRON is likely due to post-obstructive d/t kidney stone and acute UTI. Baseline creatinine is <1. Most recent creatinine and eGFR are listed below.  Recent Labs     07/16/25  0318 07/17/25  0942   CREATININE 0.6 0.6   EGFRNORACEVR >60 >60      Plan  - Avoid nephrotoxins and renally dose meds for GFR listed above  - Monitor urine output, serial BMP, and adjust therapy as needed  -Support MAP>65 and oxygen>94%.   Now resolved.  Thrombocytopenia  Chronic due to her DILLARD cirrhosis it appears. No acute signs of bleeding but may need transfuse for cysto today. Type and screen ordered and will defer to Urology the need for plt, ffp, or PRBC.   Plts lower than baseline, most likely related to septic shock.  Patient was transfused platelets for procedure and again on 7/15  - platelets stable/improving  Coronary artery disease involving native coronary artery of native heart without angina pectoris  Patient with known CAD s/p stent placement, which is controlled Will continue Statin and monitor for S/Sx of angina/ACS. Continue to monitor on telemetry. Resume ASA and Plavix when cleared by urology.   Holding ASA and Plavix secondary to severe thrombocytopenia.  Type 2 diabetes mellitus, controlled  Patient's FSGs are controlled on current medication regimen.  Last A1c reviewed-   Lab Results   Component Value Date    HGBA1C 5.0 07/12/2025   Repeat A1c ordered and pending.   Most recent fingerstick glucose reviewed-   Recent Labs   Lab 07/17/25  1107 07/17/25  1638 07/17/25  1939/25  0723   POCTGLUCOSE 122* 112* 164* 99     Current correctional scale  Low  Maintain anti-hyperglycemic dose as follows-   Antihyperglycemics (From  admission, onward)      Start     Stop Route Frequency Ordered    07/12/25 0457  insulin aspart U-100 pen 0-5 Units         -- SubQ Every 6 hours PRN 07/12/25 0457          Hold Oral hypoglycemics while patient is in the hospital: metformin 500mg PO BID but family states that lately she has been having low sugars and this has recently been d/c.   Essential hypertension  Patient's blood pressure range in the last 24 hours was: BP  Min: 108/62  Max: 126/60.The patient's inpatient anti-hypertensive regimen is listed below:  Current Antihypertensives  hydrALAZINE injection 5 mg, Every 6 hours PRN, Intravenous  timolol maleate 0.5% ophthalmic solution 1 drop, 2 times daily, Both Eyes for SBP>180 or DBP>90   Medications on hold as patient is in shock.  Left ureteral stone      Paroxysmal atrial fibrillation with RVR  Cardiology consulted.  Was on Amio drip.  Now resolved and off drip.  - developed afib rvr again on 7/15 -- on IV amiodarone  - unable to anticoagulate given platelets    Delirium  Delirium precautions.  Chronically on benzo's.  Resume home medications.    ACP (advance care planning)        VTE Risk Mitigation (From admission, onward)           Ordered     IP VTE HIGH RISK PATIENT  Once         07/12/25 0457     Place sequential compression device  Until discontinued         07/12/25 0457     Reason for No Pharmacological VTE Prophylaxis  Once        Comments: Urology plans on cysto with stent placement   Question:  Reasons:  Answer:  Physician Provided (leave comment)    07/12/25 0457                    Discharge Planning   CHRISTIANNE: 7/19/2025     Code Status: DNR   Medical Readiness for Discharge Date:   Discharge Plan A: Home Health   Discharge Delays: None known at this time              Please place Justification for DME      Iker Ibrahim MD  Department of Hospital Medicine   [facility]

## 2025-07-18 NOTE — ASSESSMENT & PLAN NOTE
JAIRON is likely due to post-obstructive d/t kidney stone and acute UTI. Baseline creatinine is <1. Most recent creatinine and eGFR are listed below.  Recent Labs     07/16/25  0318 07/17/25  0942   CREATININE 0.6 0.6   EGFRNORACEVR >60 >60      Plan  - Avoid nephrotoxins and renally dose meds for GFR listed above  - Monitor urine output, serial BMP, and adjust therapy as needed  -Support MAP>65 and oxygen>94%.   Now resolved.

## 2025-07-18 NOTE — ACP (ADVANCE CARE PLANNING)
Palliative Medicine Follow Up    Date: 7/18/2025   Advance Care Planning     GOC/ACP discussion with pt and  at bedside on multiple visits during the day today.  Also called and spoke with pt's daughter, Robyn, by phone on her way back home to [facility], who has been staying overnight with pt every night.  (Pt's  has insisted not leaving pt alone in hospital, although daughter shares pt has slept well and done well each night).  She shared readiness for discharge when appropriate for pt as the back and forth has been hard on her.  She also shares great insight into pt's condition, and prior encouragement of ACP discussions as her daughter works for EMS.  Pt and  have previously been resistant to formalizing wishes/ACP so she was happy to hear that they have allowed for these discussions and were willing to complete documentation.      Pt and  were a little nervous about pt's physical abilities before working with PT/OT.     Per therapy team pt would benefit from home therapy as well, which pt and family were agreeable to.      Coordinated completion of LAPOST with ARIANE Lawrence.      Coordinated with IMLKA and Yanira with [facility] for plans for home palliative care which pt and family are agreeable to.  Yanira to meet with pt and  today.     Emotional support provided.  Allowed time for questions/concerns.  All addressed.   Updated MDT.      50 min ACP time spent: goals of care, advanced care planning, emotional support, formulating and communicating prognosis, exploring burden/ benefit of various approaches of treatment, coordination of care with other specialists, and discharge planning.     Casi Murillo NP  Palliative Medicine  [facility]

## 2025-07-18 NOTE — PLAN OF CARE
Met with patient and spouse to review discharge recommendation of home health and Palliative and is agreeable to plan    Patient/family provided list of facilities in-network with patient's payor plan. Providers that are owned, operated, or affiliated with [facility] are included on the list.     Patient has declined to select a preferred provider and elects placement with the first accepting in network provider that is available to provide services as ordered by the physician       If an additional preferred facility not listed above is identified, additional referral to be sent. If above facilities unable to accept, will send additional referrals to in-network providers.      [facility] has accepted patient.     1:13 pm CM discussed Home Palliative Care with patients spouse who is currently at bedside. Agreed to Palliative.     1:15 pm CM spoke with Yanira with [facility] for Palliative Care only. Yanira is at bedside with family.     2:44 pm Family will discharge with [facility] (Palliative Care Only)

## 2025-07-18 NOTE — NURSING
[facility]  Nurses Note -- 4 Eyes      7/18/2025       Skin assessed on: Q Shift      [x] No Pressure Injuries Present    [x]Prevention Measures Documented    [] Yes LDA  for Pressure Injury Previously documented     [] Yes New Pressure Injury Discovered   [] LDA for New Pressure Injury Added      Attending RN:  Gustavo Villegas RN     Second RN:  Donald EPSTEIN

## 2025-07-18 NOTE — PT/OT/SLP PROGRESS
Occupational Therapy   Treatment    Name: Marine Mo  MRN: [id number]  Admitting Diagnosis:  Sepsis with acute renal failure and septic shock  6 Days Post-Op    Recommendations:     Discharge Recommendations: Low Intensity Therapy  Discharge Equipment Recommendations:  shower chair  Barriers to discharge:  None    Assessment:     Marine Mo is a 85 y.o. female with a medical diagnosis of Sepsis with acute renal failure and septic shock.  She presents with deconditioning but improving endurance for OOB activity. Performance deficits affecting function are weakness, impaired endurance, impaired self care skills, impaired functional mobility, gait instability, impaired balance, decreased lower extremity function, decreased upper extremity function, impaired cognition.     Rehab Prognosis:  Good; patient would benefit from acute skilled OT services to address these deficits and reach maximum level of function.       Plan:     Patient to be seen 3 x/week to address the above listed problems via self-care/home management, therapeutic activities, therapeutic exercises  Plan of Care Expires: 07/31/25  Plan of Care Reviewed with: patient, spouse    Subjective     Chief Complaint: Pt reports some fear with OOB activity  Patient/Family Comments/goals: To return to PLOF  Pain/Comfort:  Pain Rating 1: 0/10    Objective:     Communicated with: nsg prior to session.  Patient found HOB elevated with bed alarm, telemetry, peripheral IV, SCD, PureWick upon OT entry to room.    General Precautions: Standard, fall ([place] B ears)    Orthopedic Precautions:N/A  Braces: N/A  Respiratory Status: Room air     Occupational Performance:     Bed Mobility:    Patient completed Rolling/Turning to Left with  stand by assistance  Patient completed Scooting/Bridging with stand by assistance  Patient completed Supine to Sit with stand by assistance     Functional Mobility/Transfers:  Patient completed Sit <> Stand Transfer with  stand by assistance and contact guard assistance  with  rolling walker   Patient completed Bed <> Chair Transfer using Step Transfer technique with stand by assistance and contact guard assistance with rolling walker  Functional Mobility: Pt with fair to fair- dynamic seated and standing balance.  Min A sit<>stand from bedside chair 2/2 low height of chair but performed well     Activities of Daily Living:  Upper Body Dressing: minimum assistance to don gown as robe seated EOB  Lower Body Dressing: moderate assistance and maximal assistance to adjust B socks seated EOB      AMPAC 6 Click ADL: 18    Treatment & Education:  Pt educated on role of OT and POC.   Pt performing skills as listed above.     Patient left up in chair with all lines intact, call button in reach, nsg notified, and sposue present    GOALS:   Multidisciplinary Problems       Occupational Therapy Goals          Problem: Occupational Therapy    Goal Priority Disciplines Outcome Interventions   Occupational Therapy Goal     OT, PT/OT Progressing    Description: Goals to be met by: 7/31/25       Patient will increase functional independence with ADLs by performing:    UE Dressing with Supervision.  LE Dressing with Supervision.  Grooming while standing at sink with Supervision.  Toileting from toilet with Supervision for hygiene and clothing management.   Sitting in chair  x60  minutes with Supervision.  Toilet transfer to toilet with Supervision.  Upper extremity exercise program x10  reps per handout, with supervision.                         Time Tracking:     OT Date of Treatment: 07/18/25  OT Start Time: 1249  OT Stop Time: 1315  OT Total Time (min): 26 min    Billable Minutes:Self Care/Home Management 13  Therapeutic Activity 13    OT/RAFFI: OT     Number of RAFFI visits since last OT visit: 0    7/18/2025

## 2025-07-18 NOTE — ASSESSMENT & PLAN NOTE
Patient's blood pressure range in the last 24 hours was: BP  Min: 108/62  Max: 126/60.The patient's inpatient anti-hypertensive regimen is listed below:  Current Antihypertensives  hydrALAZINE injection 5 mg, Every 6 hours PRN, Intravenous  timolol maleate 0.5% ophthalmic solution 1 drop, 2 times daily, Both Eyes for SBP>180 or DBP>90   Medications on hold as patient is in shock.

## 2025-07-18 NOTE — PLAN OF CARE
Problem: Diabetes Comorbidity  Goal: Blood Glucose Level Within Targeted Range  Outcome: Progressing     Problem: Adult Inpatient Plan of Care  Goal: Plan of Care Review  Outcome: Progressing  Goal: Patient-Specific Goal (Individualized)  Outcome: Progressing  Goal: Absence of Hospital-Acquired Illness or Injury  Outcome: Progressing  Goal: Optimal Comfort and Wellbeing  Outcome: Progressing  Goal: Readiness for Transition of Care  Outcome: Progressing     Problem: Skin Injury Risk Increased  Goal: Skin Health and Integrity  Outcome: Progressing     Problem: Coping Ineffective  Goal: Effective Coping  Outcome: Progressing

## 2025-07-18 NOTE — PLAN OF CARE
07/18/25 1444   Rounds   Attendance Provider;   Discharge Plan A Home Health   Why the patient remains in the hospital Requires continued medical care   Transition of Care Barriers None

## 2025-07-18 NOTE — PT/OT/SLP PROGRESS
Physical Therapy Treatment    Patient Name:  Marine Mo   MRN:  [id number]    Recommendations:     Discharge Recommendations: Low Intensity Therapy (with 24 hr supervision/assist of spouse)  Discharge Equipment Recommendations: none  Barriers to discharge: None    Assessment:     Marine Mo is a 85 y.o. female admitted with a medical diagnosis of Sepsis with acute renal failure and septic shock.  She presents with the following impairments/functional limitations: weakness, impaired endurance, impaired balance, impaired functional mobility, gait instability, decreased coordination, decreased lower extremity function, decreased ROM, pain, decreased safety awareness .    Rehab Prognosis: Good; patient would benefit from acute skilled PT services to address these deficits and reach maximum level of function.    Recent Surgery: Procedure(s) (LRB):  CYSTOSCOPY, WITH RETROGRADE PYELOGRAM AND URETERAL STENT INSERTION (Left) 6 Days Post-Op    Plan:     During this hospitalization, patient to be seen 4 x/week to address the identified rehab impairments via gait training, therapeutic activities, therapeutic exercises, neuromuscular re-education and progress toward the following goals:    Plan of Care Expires:  07/31/25    Subjective     Chief Complaint: weakness   Patient/Family Comments/goals: pt is pleasant and agreeable to therapy , supportive spouse present throughout treatment.   Pain/Comfort:  Pain Rating 1: 0/10  Pain Rating Post-Intervention 1: 0/10      Objective:     Communicated with nurse prior to session.  Patient found HOB elevated with telemetry, peripheral IV, bed alarm, PureWick upon PT entry to room.     General Precautions: Standard, fall  Orthopedic Precautions: N/A  Braces: N/A  Respiratory Status: Room air     Functional Mobility:  Bed Mobility:     Rolling Left:  stand by assistance  Scooting: stand by assistance  Supine to Sit: stand by assistance  Transfers:  V/T cues for safety,  proper technique and walker management.     Sit to Stand:  stand by assistance/ CGA from bed and bedside commode and min A from lower bedside chair with rolling walker  Chair <>bedside commode : SBA/CGA using RW, steps transfer   Gait:  Patient ambulated ~ 60 ft in the hallway and ~ 20 ft in room  on level tile with Rolling Walker with CGA/SBA  (chair followed) .  Pt with decreased nohemy, increased time in double stance, decreased velocity of limb motion, decreased step length, decreased swing-to-stance ratio, decreased toe-to-floor clearance and decreased weight-shifting ability.Impairments contributing to gait deviations include impaired balance, decreased flexibility, decreased ROM and decreased strength. V/T cues for safety technique and walker management.   Balance:  good in sitting, fair+ in standing with RW       AM-PAC 6 CLICK MOBILITY  Turning over in bed (including adjusting bedclothes, sheets and blankets)?: 4  Sitting down on and standing up from a chair with arms (e.g., wheelchair, bedside commode, etc.): 3  Moving from lying on back to sitting on the side of the bed?: 4  Moving to and from a bed to a chair (including a wheelchair)?: 3  Need to walk in hospital room?: 3  Climbing 3-5 steps with a railing?: 3  Basic Mobility Total Score: 20       Treatment & Education:  Pt performed seated BLE 10 reps :   AP(10 reps x 2) , LAQ, HS,  Hip abd/add with pillow , Hip flexion. V/T's cues for technique and sequence. Pt tolerated well. Encouraged pt to perform BLE ex's throughout the day, pt verbalized understanding.   Educated pt on safety awareness with all OOB mobility , transfer and gait training.     Patient left up in chair with all lines intact, call button in reach, nurse notified, and spouse  present..    GOALS:   Multidisciplinary Problems       Physical Therapy Goals          Problem: Physical Therapy    Goal Priority Disciplines Outcome Interventions   Physical Therapy Goal     PT, PT/OT  Progressing    Description: Goals to be met by: 2025     Patient will increase functional independence with mobility by performin. Supine to sit with Modified [place]  2. Sit to supine with Modified [place]  3. Sit to stand transfer with Modified [place]  4. Bed to chair transfer with Modified [place] using Rolling Walker  5. Gait  x 75 feet with Modified [place] using Rolling Walker.     Pt low intensity at this time, requiring Juliana x 1 for bed mobility, transfers and steps. Has assist of spouse at home. Will continue to follow while in house.                          DME Justifications:      Time Tracking:     PT Received On: 25  PT Start Time: 1249     PT Stop Time: 1328  PT Total Time (min): 39 min     Billable Minutes: Gait Training 16, Therapeutic Activity 12, and Therapeutic Exercise 11partial co-treat with OT     Treatment Type: Treatment  PT/PTA: PTA     Number of PTA visits since last PT visit: 2025

## 2025-07-18 NOTE — PROGRESS NOTES
[facility] - Telemetry  Urology  Progress Note    Patient Name: Marine Mo  MRN: [id number]  Admission Date: 7/12/2025  Hospital Length of Stay: 6 days  Code Status: DNR   Attending Provider: Iker Ibrahim MD   Primary Care Physician: Serg Hwang MD    Subjective:     HPI:  86 yo woman presents from [facility] for septic shock 2/2 to obstructing pyelonephritis from left 1.7cm UPJ stone. Patient somnolent and hard of hearing. Daughter and  at bedside. First time stone event. Patient requiring pressor support to maintain BP.     Interval History: Tolerating stent. Planning for discharge. Voiding.     Review of Systems   Constitutional:  Negative for activity change, appetite change, chills, diaphoresis and fever.   HENT:  Negative for hearing loss.    Eyes:  Negative for visual disturbance.   Respiratory:  Negative for cough, shortness of breath and wheezing.    Gastrointestinal:  Negative for abdominal distention, abdominal pain, constipation, nausea and vomiting.   Genitourinary:  Negative for difficulty urinating, dysuria, flank pain and hematuria.   Musculoskeletal:  Negative for neck pain and neck stiffness.   Neurological:  Positive for weakness. Negative for dizziness.     Objective:     Temp:  [97.6 °F (36.4 °C)-99 °F (37.2 °C)] 99 °F (37.2 °C)  Pulse:  [70-82] 79  Resp:  [18] 18  SpO2:  [93 %-95 %] 95 %  BP: (108-126)/(59-73) 126/60     Body mass index is 26.91 kg/m².           Drains       Drain  Duration             Female External Urinary Catheter w/ Suction 07/15/25 1000 2 days                     Physical Exam  Constitutional:       Appearance: She is well-developed.   HENT:      Head: Normocephalic and atraumatic.   Eyes:      Conjunctiva/sclera: Conjunctivae normal.   Pulmonary:      Effort: Pulmonary effort is normal. No respiratory distress.   Abdominal:      General: Abdomen is flat. There is no distension.      Tenderness: There is no abdominal tenderness.   Skin:     Findings:  "No rash.   Neurological:      Mental Status: She is alert and oriented to person, place, and time.   Psychiatric:         Behavior: Behavior normal.           Significant Labs:    BMP:  Recent Labs   Lab 07/15/25  0228 07/16/25 0318 07/17/25  0942    141 138   K 3.1* 3.3* 3.4*   * 110 103   CO2 21* 22* 28   BUN 28* 19 17   CREATININE 0.7 0.6 0.6   CALCIUM 8.1* 8.2* 8.3*       CBC:   Recent Labs   Lab 07/16/25 0318 07/17/25  0920 07/18/25  0438   WBC 4.65 6.45 6.98   HGB 8.4* 9.3* 9.1*   HCT 26.1* 27.9* 29.3*   PLT 32* 54* 66*       Blood Culture: No results for input(s): "LABBLOO" in the last 168 hours.  Urine Culture:   Recent Labs   Lab 07/11/25 2026 07/12/25  1207   LABURIN >100,000 cfu/ml Escherichia coli* No Growth     Urine Studies:   Recent Labs   Lab 07/11/25 2026   COLORU Yellow   APPEARANCEUA Hazy*   PHUR 7.0   SPECGRAV 1.015   PROTEINUA 1+*   GLUCUA Trace*   BILIRUBINUA Negative   OCCULTUA 2+*   NITRITE Negative   UROBILINOGEN Negative   LEUKOCYTESUR 1+*   RBCUA 40*   WBCUA 65*   BACTERIA Many*   HYALINECASTS 15*       Significant Imaging:  All pertinent imaging results/findings from the past 24 hours have been reviewed.            Assessment/Plan:     * Sepsis with acute renal failure and septic shock  S/p L ureteral stent placement due to septic shock, obstructing stone in the left kidney/ UPJ  Discussed due to presence of infection, delayed definitive management of stone is recommended  Abx per primary  Discussed they will follow up locally with Dr. Villaseñor in [facility], scheduled 7/23/2025  Urine and blood cultures - E coli      Left ureteral stone  S/p L ureteral stent    Okay for discharge from  standpoint.       VTE Risk Mitigation (From admission, onward)           Ordered     IP VTE HIGH RISK PATIENT  Once         07/12/25 0457     Place sequential compression device  Until discontinued         07/12/25 0457     Reason for No Pharmacological VTE Prophylaxis  Once        Comments: " Urology plans on cysto with stent placement   Question:  Reasons:  Answer:  Physician Provided (leave comment)    07/12/25 7936                    Reshma Tom MD  Urology  [facility]

## 2025-07-18 NOTE — PLAN OF CARE
07/18/25 1057   Medicare Message   Important Message from Medicare regarding Discharge Appeal Rights Given to patient/caregiver;Explained to patient/caregiver;Signed/date by patient/caregiver   Date IMM was signed 07/18/25   Time IMM was signed 1029

## 2025-07-18 NOTE — NURSING
Report received from massimo Chen RN. Patient resting quietly, no apparent distress noted at this time. Wheels locked in lowest position, call light within reach, Telemetry monitoring in place.    [facility] [place]  Nurses Note -- 4 Eyes      7/18/2025       Skin assessed on: Q Shift      [x] No Pressure Injuries Present    [x]Prevention Measures Documented    [] Yes LDA  for Pressure Injury Previously documented     [] Yes New Pressure Injury Discovered   [] LDA for New Pressure Injury Added      Attending RN:  Annalisa Patrick LPN     Second RN:  TETE Chen

## 2025-07-19 ENCOUNTER — NURSE TRIAGE (OUTPATIENT)
Dept: ADMINISTRATIVE | Facility: CLINIC | Age: 85
End: 2025-07-19
Payer: MEDICARE

## 2025-07-19 VITALS
WEIGHT: 156.75 LBS | HEIGHT: 64 IN | DIASTOLIC BLOOD PRESSURE: 65 MMHG | TEMPERATURE: 98 F | SYSTOLIC BLOOD PRESSURE: 124 MMHG | HEART RATE: 72 BPM | BODY MASS INDEX: 26.76 KG/M2 | OXYGEN SATURATION: 96 % | RESPIRATION RATE: 18 BRPM

## 2025-07-19 LAB
POCT GLUCOSE: 118 MG/DL (ref 70–110)
POCT GLUCOSE: 90 MG/DL (ref 70–110)

## 2025-07-19 PROCEDURE — 63600175 PHARM REV CODE 636 W HCPCS: Performed by: STUDENT IN AN ORGANIZED HEALTH CARE EDUCATION/TRAINING PROGRAM

## 2025-07-19 PROCEDURE — 93010 ELECTROCARDIOGRAM REPORT: CPT | Mod: ,,, | Performed by: INTERNAL MEDICINE

## 2025-07-19 PROCEDURE — 87040 BLOOD CULTURE FOR BACTERIA: CPT | Performed by: STUDENT IN AN ORGANIZED HEALTH CARE EDUCATION/TRAINING PROGRAM

## 2025-07-19 PROCEDURE — 93005 ELECTROCARDIOGRAM TRACING: CPT

## 2025-07-19 PROCEDURE — 36415 COLL VENOUS BLD VENIPUNCTURE: CPT | Performed by: STUDENT IN AN ORGANIZED HEALTH CARE EDUCATION/TRAINING PROGRAM

## 2025-07-19 PROCEDURE — 97116 GAIT TRAINING THERAPY: CPT | Mod: CQ

## 2025-07-19 PROCEDURE — 25000003 PHARM REV CODE 250: Performed by: STUDENT IN AN ORGANIZED HEALTH CARE EDUCATION/TRAINING PROGRAM

## 2025-07-19 PROCEDURE — 97110 THERAPEUTIC EXERCISES: CPT | Mod: CQ

## 2025-07-19 RX ORDER — AMIODARONE HYDROCHLORIDE 200 MG/1
TABLET ORAL
Qty: 370 TABLET | Refills: 0 | Status: SHIPPED | OUTPATIENT
Start: 2025-07-19 | End: 2026-07-19

## 2025-07-19 RX ORDER — CIPROFLOXACIN 500 MG/1
500 TABLET, FILM COATED ORAL 2 TIMES DAILY
Qty: 14 TABLET | Refills: 0 | Status: SHIPPED | OUTPATIENT
Start: 2025-07-20 | End: 2025-07-27

## 2025-07-19 RX ORDER — CEFTRIAXONE 2 G/1
2 INJECTION, POWDER, FOR SOLUTION INTRAMUSCULAR; INTRAVENOUS
Status: DISCONTINUED | OUTPATIENT
Start: 2025-07-19 | End: 2025-07-19 | Stop reason: HOSPADM

## 2025-07-19 RX ADMIN — TIMOLOL MALEATE 1 DROP: 5 SOLUTION OPHTHALMIC at 08:07

## 2025-07-19 RX ADMIN — ESCITALOPRAM 5 MG: 5 TABLET, FILM COATED ORAL at 08:07

## 2025-07-19 RX ADMIN — CEFTRIAXONE SODIUM 2 G: 2 INJECTION, POWDER, FOR SOLUTION INTRAMUSCULAR; INTRAVENOUS at 12:07

## 2025-07-19 RX ADMIN — AMIODARONE HYDROCHLORIDE 400 MG: 200 TABLET ORAL at 08:07

## 2025-07-19 NOTE — ASSESSMENT & PLAN NOTE
Patient's FSGs are controlled on current medication regimen.  Last A1c reviewed-   Lab Results   Component Value Date    HGBA1C 5.0 07/12/2025   Repeat A1c ordered and pending.   Most recent fingerstick glucose reviewed-   Recent Labs   Lab 07/18/25  1611 07/18/25 2022 07/19/25  0830 07/19/25  1134   POCTGLUCOSE 106 141* 90 118*     Current correctional scale  Low  Maintain anti-hyperglycemic dose as follows-   Antihyperglycemics (From admission, onward)      Start     Stop Route Frequency Ordered    07/12/25 0457  insulin aspart U-100 pen 0-5 Units         -- SubQ Every 6 hours PRN 07/12/25 0457          Hold Oral hypoglycemics while patient is in the hospital: metformin 500mg PO BID but family states that lately she has been having low sugars and this has recently been d/c.

## 2025-07-19 NOTE — NURSING
Report received from massimo Chen RN. Patient resting quietly, no apparent distress noted at this time. Wheels locked in lowest position, call light within reach, Telemetry monitoring in place.    [facility] [place]  Nurses Note -- 4 Eyes      7/19/2025       Skin assessed on: Q Shift      [x] No Pressure Injuries Present    [x]Prevention Measures Documented    [] Yes LDA  for Pressure Injury Previously documented     [] Yes New Pressure Injury Discovered   [] LDA for New Pressure Injury Added      Attending RN:  Annalisa Patrick LPN     Second RN:  TETE Chen

## 2025-07-19 NOTE — ASSESSMENT & PLAN NOTE
JAIRON is likely due to post-obstructive d/t kidney stone and acute UTI. Baseline creatinine is <1. Most recent creatinine and eGFR are listed below.  Recent Labs     07/17/25  0942 07/18/25  0438   CREATININE 0.6 0.6   EGFRNORACEVR >60 >60      Plan  - Avoid nephrotoxins and renally dose meds for GFR listed above  - Monitor urine output, serial BMP, and adjust therapy as needed  -Support MAP>65 and oxygen>94%.   Now resolved.

## 2025-07-19 NOTE — PLAN OF CARE
Case Management Final Discharge Note    Discharge Disposition: Per [facility], [facility] and [facility] home willing to accept patient.     New DME ordered / company name: none    Relevant SDOH / Transition of Care Barriers:  none    Person available to provide assistance at home when needed and their contact information:     Scheduled followup appointment: Urology 7/23/25; Internal medicine 7/28/25, added to wait list for sooner visit.     Referrals placed: Internal medicine and cardiology- In basket message for cardiology clinic to contact patient to schedule.     Transportation: Private vehicle/ family to provide         Patient and family educated on discharge services and updated on DC plan. Bedside RN notified, patient clear to discharge from Case Management Perspective.      07/19/25 1248   Final Note   Assessment Type Final Discharge Note   Anticipated Discharge Disposition Home-Health   Hospital Resources/Appts/Education Provided Appointments scheduled and added to AVS   Post-Acute Status   Post-Acute Authorization Home Health   Home Health Status Set-up Complete/Auth obtained   Discharge Delays None known at this time

## 2025-07-19 NOTE — ASSESSMENT & PLAN NOTE
Marine Mo presents with sepsis with Acute kidney injury and shock requiring pressors secondary to Urinary Tract Infection and obstructive uropathy.     Interventions include:    Antibiotics:    cefTRIAXone injection 2 g, Every 24 hours (non-standard times), Intravenous  ciprofloxacin (CIPRO) tablet, 2 times daily, Oral    Fluid Resuscitation:   Ideal Body Weight- The patient is obese (BMI>30) and their ideal body weight of Ideal body weight: 54.7 kg (120 lb 9.5 oz) was used to calculate fluid bolus of 30 ml/kg. This was given at the OSH ED     Labs and Imaging:   Recent Labs   Lab 07/12/25  2104 07/13/25  0421 07/14/25  0011   LACTATE 3.0* 3.3* 2.2   Septic shock with urinary source and bacteremia.  Growing GNR in BCx.  Will stop Vanc/meropenem. ID/S back - transition to IV ceftriaxone while inpatient.  - no aneurysms on imaging, QT ok - plan for levaquin at discharge  - shock resolved

## 2025-07-19 NOTE — PLAN OF CARE
Problem: Diabetes Comorbidity  Goal: Blood Glucose Level Within Targeted Range  Outcome: Met     Problem: Adult Inpatient Plan of Care  Goal: Plan of Care Review  Outcome: Met  Goal: Patient-Specific Goal (Individualized)  Outcome: Met  Goal: Absence of Hospital-Acquired Illness or Injury  Outcome: Met  Goal: Optimal Comfort and Wellbeing  Outcome: Met  Goal: Readiness for Transition of Care  Outcome: Met     Problem: Infection  Goal: Absence of Infection Signs and Symptoms  Outcome: Met     Problem: Fall Injury Risk  Goal: Absence of Fall and Fall-Related Injury  Outcome: Met     Problem: Skin Injury Risk Increased  Goal: Skin Health and Integrity  Outcome: Met     Problem: Sepsis/Septic Shock  Goal: Optimal Coping  Outcome: Met  Goal: Absence of Bleeding  Outcome: Met  Goal: Blood Glucose Level Within Targeted Range  Outcome: Met  Goal: Absence of Infection Signs and Symptoms  Outcome: Met  Goal: Optimal Nutrition Intake  Outcome: Met     Problem: Acute Kidney Injury/Impairment  Goal: Fluid and Electrolyte Balance  Outcome: Met  Goal: Improved Oral Intake  Outcome: Met  Goal: Effective Renal Function  Outcome: Met     Problem: Delirium  Goal: Optimal Coping  Outcome: Met  Goal: Improved Behavioral Control  Outcome: Met  Goal: Improved Attention and Thought Clarity  Outcome: Met  Goal: Improved Sleep  Outcome: Met     Problem: Coping Ineffective  Goal: Effective Coping  Outcome: Met     Problem: Physical Therapy  Goal: Physical Therapy Goal  Description: Goals to be met by: 2025     Patient will increase functional independence with mobility by performin. Supine to sit with Modified [place]  2. Sit to supine with Modified [place]  3. Sit to stand transfer with Modified [place]  4. Bed to chair transfer with Modified [place] using Rolling Walker  5. Gait  x 75 feet with Modified [place] using Rolling Walker.     Pt low intensity at this time, requiring Juliana x 1 for bed  mobility, transfers and steps. Has assist of spouse at home. Will continue to follow while in house.     Outcome: Met     Problem: Occupational Therapy  Goal: Occupational Therapy Goal  Description: Goals to be met by: 7/31/25       Patient will increase functional independence with ADLs by performing:    UE Dressing with Supervision.  LE Dressing with Supervision.  Grooming while standing at sink with Supervision.  Toileting from toilet with Supervision for hygiene and clothing management.   Sitting in chair  x60  minutes with Supervision.  Toilet transfer to toilet with Supervision.  Upper extremity exercise program x10  reps per handout, with supervision.    Outcome: Met

## 2025-07-19 NOTE — PT/OT/SLP PROGRESS
Physical Therapy Treatment    Patient Name:  Marine Mo   MRN:  [id number]    Recommendations:     Discharge Recommendations: Low Intensity Therapy (with 24 hr supervision/assist of spouse)  Discharge Equipment Recommendations: none  Barriers to discharge: None    Assessment:     Marine Mo is a 85 y.o. female admitted with a medical diagnosis of Sepsis with acute renal failure and septic shock.  She presents with the following impairments/functional limitations: weakness, impaired endurance, gait instability, impaired balance, decreased lower extremity function, decreased ROM, decreased safety awareness, impaired functional mobility, edema .    Rehab Prognosis: Good; patient would benefit from acute skilled PT services to address these deficits and reach maximum level of function.    Recent Surgery: Procedure(s) (LRB):  CYSTOSCOPY, WITH RETROGRADE PYELOGRAM AND URETERAL STENT INSERTION (Left) 7 Days Post-Op    Plan:     During this hospitalization, patient to be seen 4 x/week to address the identified rehab impairments via gait training, therapeutic activities, therapeutic exercises, neuromuscular re-education and progress toward the following goals:    Plan of Care Expires:  07/31/25    Subjective     Chief Complaint: didn't sleep well last night   Patient/Family Comments/goals: pt is pleasant and agreeable to therapy   Pain/Comfort:  Pain Rating 1: 0/10  Pain Rating Post-Intervention 1: 0/10      Objective:     Communicated with nurse prior to session.  Patient found HOB elevated with telemetry, peripheral IV, bed alarm, PureWick upon PT entry to room.     General Precautions: Standard, fall  Orthopedic Precautions: N/A  Braces: N/A  Respiratory Status: Room air     Functional Mobility:  Bed Mobility:     Scooting: supervision   Supine to Sit: supervision   Transfers:  V/T cues for safety, proper technique and walker management.     Sit to Stand:  stand by assistance and chair with rolling  walker  Gait:  Patient ambulated 100 ft x 2 trials   on level tile with Rolling Walker with CGA/SBA  (chair followed) . Pt required 1 seated rest break 2* to fatigue.  Pt with decreased nohemy, increased time in double stance, decreased velocity of limb motion, decreased step length, decreased swing-to-stance ratio, decreased toe-to-floor clearance and decreased weight-shifting ability.Impairments contributing to gait deviations include impaired balance, decreased flexibility, decreased ROM and decreased strength. V/T cues for safety technique and walker management.   Balance:  good in sitting, fair+ in standing with RW       AM-PAC 6 CLICK MOBILITY  Turning over in bed (including adjusting bedclothes, sheets and blankets)?: 4  Sitting down on and standing up from a chair with arms (e.g., wheelchair, bedside commode, etc.): 4  Moving from lying on back to sitting on the side of the bed?: 4  Moving to and from a bed to a chair (including a wheelchair)?: 3  Need to walk in hospital room?: 3  Climbing 3-5 steps with a railing?: 2  Basic Mobility Total Score: 20       Treatment & Education:  Lower Extremity Exercises.    Patient educated on: Purpose and Benefits of Therapeutic Exercise(s).    Patient verbalized acceptance/understanding of instruction(s), expectation(s), and limitation(s) (for safety).  Patient performed: 15 reps (each) of B LE Ther Ex: AP, LAQ, Hip abd/add, Hip flexion while sitting up on EOB/ chair .       Patient required  verbal cues/tactile cues to ensure correct sequence, to maintain proper form, and to allow for self-correction.  Pt reported no complaints or problems with exercise activity.     Patient required increase Reaction Time to initiate movements and a Sitting Rest Break between set(s) to recover.      Patient left up in chair with BLE elevated, heels offloading  all lines intact, call button in reach, nurse notified, and spouse  present..    GOALS:   Multidisciplinary Problems        Physical Therapy Goals          Problem: Physical Therapy    Goal Priority Disciplines Outcome Interventions   Physical Therapy Goal     PT, PT/OT Progressing    Description: Goals to be met by: 2025     Patient will increase functional independence with mobility by performin. Supine to sit with Modified [place]  2. Sit to supine with Modified [place]  3. Sit to stand transfer with Modified [place]  4. Bed to chair transfer with Modified [place] using Rolling Walker  5. Gait  x 75 feet with Modified [place] using Rolling Walker.     Pt low intensity at this time, requiring Juliana x 1 for bed mobility, transfers and steps. Has assist of spouse at home. Will continue to follow while in house.                          DME Justifications:      Time Tracking:     PT Received On: 25  PT Start Time: 1029     PT Stop Time: 1059  PT Total Time (min): 30 min     Billable Minutes: Gait Training 18 and Therapeutic Exercise 12    Treatment Type: Treatment  PT/PTA: PTA     Number of PTA visits since last PT visit: 2     2025

## 2025-07-19 NOTE — PROGRESS NOTES
[facility] Medicine  Progress Note    Patient Name: Marine Mo  MRN: [id number]  Patient Class: IP- Inpatient   Admission Date: 7/12/2025  Length of Stay: 7 days  Attending Physician: Iker Ibrahim MD  Primary Care Provider: Serg Hwang MD        Subjective     Principal Problem:Sepsis with acute renal failure and septic shock        HPI:  85 y.o. woman with h/o NIDDM type 2 (A1c 5 in Feb), Essential HTN, HLD, DILLARD cirrhosis with polycystic liver dz and chronic thrombocytopenia, Depression/General Anxiety d/o, CAD (h/o stent in LAD on ASA/Plavix), Anemia presents to the [place] ED with family c/o fevers and chills for the past 1-2 days. Was febrile at 100.1 and hypotensive. Started on IVF, IV vanc/zosyn and eventually started on peripheral levophed for Sepsis.     Labs noted for WBC 5 and stable H/H 11.5/34. plt 42. Initial LA ws 10.6 with a procal of 22. Received 30cc/kg sepsis fluids (3L) and repeat lactic improved to 3.6.   Lytes with hypokalemia, JAIRON with creatinine 1.5 (baseline <1). Bicarb 18. Mg 1.2 and phos 1.2.   UA concerning for UTI.     CT chest/abd/pelvis with IV contrast:   1. Moderate left-sided hydronephrosis secondary to a 1.7 cm calculus at the UPJ.  2. Wall thickening of the left renal pelvis with left perinephric fat stranding, suspicious for overlying infection/pyelitis or pyelonephritis.  Recommend correlation with urinalysis.  3. Hepatic cirrhosis.  4. Innumerable hepatic and renal cystic lesions as above, similar to the prior study.  5. Interlobular septal thickening in the bilateral lungs, can be seen with mild interstitial edema or interstitial pneumonia.  6. Chronic opacities in the right middle lobe and right lower lobe.     transfer center contacted and case discussed with Dr. Castañeda with Urology who recommended cysto with stent placement and did not think that IR will be required. She agreed to see in consult once patient arrives. We have  been consulted for further management. Family denies h/o kidney stones.     Overview/Hospital Course:  Mrs. Mo is a 86 yo F with history of hypertension, cirrhosis, thrombocytopenia and CAD on Plavix and aspirin who was admitted with septic shock and obstructive uropathy.  Placed in ICU.  Urology consulted underwent cystoscopy with stent placement.  Urine and blood cultures growing pansensitive E coli.  Now on ceftriaxone.  Pressors have been weaned.  Developed AFib with RVR, started on IV amiodarone.  Now transitioned to p.o. amiodarone.  Platelets still less than 50,000, not candidate for oral anticoagulation at this time.  Still with bilateral pitting edema, due to volume overload.  P.r.n. Lasix and elevation.  QT within normal limits.  No evidence of aneurysms.  Would plan to discharge home with Levaquin until outpatient follow-up with Urology for definitive stone treatment.    E.Coli bacteremia, pansensitive on CTX.  PT/OT consulted. Urology and Card follow up as outpatient        PT OT rec HH, but needed a couple more days, given 86yo and lives with  only. Suspect dc over weekend.     Interval History:  NAEON.  No new issues.   CC- Fatigue  All questions answered and updates on care given.       ROS:  General: Negative for fevers   Cardiac: Negative for chest pain   Pulmonary: Negative for wheezing  GI: Negative for abdominal distention      Vitals:    07/19/25 0003 07/19/25 0448 07/19/25 0731 07/19/25 0817   BP:  124/66  137/72   BP Location:  Left arm     Patient Position:  Lying     Pulse: 73 72 68 74   Resp:  18  18   Temp:  98.5 °F (36.9 °C)  98.2 °F (36.8 °C)   TempSrc:  Oral  Oral   SpO2:  97%  (!) 94%   Weight:       Height:              Body mass index is 26.91 kg/m².      PHYSICAL EXAM:  GENERAL APPEARANCE: alert and cooperative.     HEAD: NC/AT  CARDIAC: There is no cyanosis or pallor.   LUNGS: No apparent wheezing or stridor.  ABDOMEN: Non-distended. No guarding.  MSK: No joint erythema  or tenderness.   PSYCHIATRIC: No tangential speech. No Hyperactive features.        Recent Results (from the past 24 hours)   POCT glucose    Collection Time: 07/18/25 11:57 AM   Result Value Ref Range    POCT Glucose 102 70 - 110 mg/dL   POCT glucose    Collection Time: 07/18/25  4:11 PM   Result Value Ref Range    POCT Glucose 106 70 - 110 mg/dL   POCT glucose    Collection Time: 07/18/25  8:22 PM   Result Value Ref Range    POCT Glucose 141 (H) 70 - 110 mg/dL   POCT glucose    Collection Time: 07/19/25  8:30 AM   Result Value Ref Range    POCT Glucose 90 70 - 110 mg/dL       Microbiology Results (last 7 days)       Procedure Component Value Units Date/Time    Urine Culture High Risk [[id number]] Collected: 07/12/25 1207    Order Status: Completed Specimen: Urine, Catheterized Updated: 07/14/25 0733     Urine Culture No Growth                          Assessment & Plan  Sepsis with acute renal failure and septic shock  Marine Mo presents with sepsis with Acute kidney injury and shock requiring pressors secondary to Urinary Tract Infection and obstructive uropathy.     Interventions include:    Antibiotics:    cefTRIAXone injection 2 g, Every 24 hours (non-standard times), Intravenous    Fluid Resuscitation:   Ideal Body Weight- The patient is obese (BMI>30) and their ideal body weight of Ideal body weight: 54.7 kg (120 lb 9.5 oz) was used to calculate fluid bolus of 30 ml/kg. This was given at the OSH ED     Labs and Imaging:   Recent Labs   Lab 07/12/25  2104 07/13/25  0421 07/14/25  0011   LACTATE 3.0* 3.3* 2.2   Septic shock with urinary source and bacteremia.  Growing GNR in BCx.  Will stop Vanc/meropenem. ID/S back - transition to IV ceftriaxone while inpatient.  - no aneurysms on imaging, QT ok - plan for levaquin at discharge  - shock resolved      Acute unilateral obstructive uropathy  S/p cystoscopy with stent placement.  - will need definitive treatment of stone as outpatient    JAIRON (acute  kidney injury)  JAIRON is likely due to post-obstructive d/t kidney stone and acute UTI. Baseline creatinine is <1. Most recent creatinine and eGFR are listed below.  Recent Labs     07/17/25  0942 07/18/25  0438   CREATININE 0.6 0.6   EGFRNORACEVR >60 >60      Plan  - Avoid nephrotoxins and renally dose meds for GFR listed above  - Monitor urine output, serial BMP, and adjust therapy as needed  -Support MAP>65 and oxygen>94%.   Now resolved.  Thrombocytopenia  Chronic due to her DILLARD cirrhosis it appears. No acute signs of bleeding but may need transfuse for cysto today. Type and screen ordered and will defer to Urology the need for plt, ffp, or PRBC.   Plts lower than baseline, most likely related to septic shock.  Patient was transfused platelets for procedure and again on 7/15  - platelets stable/improving  Coronary artery disease involving native coronary artery of native heart without angina pectoris  Patient with known CAD s/p stent placement, which is controlled Will continue Statin and monitor for S/Sx of angina/ACS. Continue to monitor on telemetry. Resume ASA and Plavix when cleared by urology.   Holding ASA and Plavix secondary to severe thrombocytopenia.  Type 2 diabetes mellitus, controlled  Patient's FSGs are controlled on current medication regimen.  Last A1c reviewed-   Lab Results   Component Value Date    HGBA1C 5.0 07/12/2025   Repeat A1c ordered and pending.   Most recent fingerstick glucose reviewed-   Recent Labs   Lab 07/18/25  1157 07/18/25  1611 07/18/25  2022 07/19/25  0830   POCTGLUCOSE 102 106 141* 90     Current correctional scale  Low  Maintain anti-hyperglycemic dose as follows-   Antihyperglycemics (From admission, onward)      Start     Stop Route Frequency Ordered    07/12/25 0457  insulin aspart U-100 pen 0-5 Units         -- SubQ Every 6 hours PRN 07/12/25 0457          Hold Oral hypoglycemics while patient is in the hospital: metformin 500mg PO BID but family states that lately she  has been having low sugars and this has recently been d/c.   Essential hypertension  Patient's blood pressure range in the last 24 hours was: BP  Min: 124/66  Max: 139/63.The patient's inpatient anti-hypertensive regimen is listed below:  Current Antihypertensives  hydrALAZINE injection 5 mg, Every 6 hours PRN, Intravenous  timolol maleate 0.5% ophthalmic solution 1 drop, 2 times daily, Both Eyes for SBP>180 or DBP>90   Medications on hold as patient is in shock.  Left ureteral stone      Paroxysmal atrial fibrillation with RVR  Cardiology consulted.  Was on Amio drip.  Now resolved and off drip.  - developed afib rvr again on 7/15 -- on IV amiodarone  - unable to anticoagulate given platelets    Delirium  Delirium precautions.  Chronically on benzo's.  Resume home medications.    ACP (advance care planning)        VTE Risk Mitigation (From admission, onward)           Ordered     IP VTE HIGH RISK PATIENT  Once         07/12/25 0457     Place sequential compression device  Until discontinued         07/12/25 0457     Reason for No Pharmacological VTE Prophylaxis  Once        Comments: Urology plans on cysto with stent placement   Question:  Reasons:  Answer:  Physician Provided (leave comment)    07/12/25 0457                    Discharge Planning   CHRISTIANNE: 7/19/2025     Code Status: DNR   Medical Readiness for Discharge Date:   Discharge Plan A: Home Health   Discharge Delays: None known at this time              Please place Justification for DME      Iker Ibrahim MD  Department of Hospital Medicine   [facility]

## 2025-07-19 NOTE — TELEPHONE ENCOUNTER
Pts daughter calling. Pt was just discharged today. They had 2 rx's to . Caller states the pharmacy did not want to dispense the meds because of the possible interactions between cipro and amiodarone. The pharmacist did ultimately give them the meds, but only because caller assured she'd get in touch with the provider to confirm they're ok to take together before giving them to the pt.     Dispo- call provider now. Spoke with [facility] hospitalist, Dr. Rosado who plans on contacting the caller directly to further discuss. Caller aware. No further assistance needed.  Reason for Disposition   [1] Caller has URGENT medicine question about med that primary care doctor (or NP/PA) or specialist prescribed AND [2] triager unable to answer question    Additional Information   Negative: [1] Intentional drug overdose AND [2] suicidal thoughts or ideas   Negative: MORE THAN A DOUBLE DOSE of a prescription or over-the-counter (OTC) drug   Negative: [1] DOUBLE DOSE (an extra dose or lesser amount) of prescription drug AND [2] any symptoms (e.g., dizziness, nausea, pain, sleepiness)   Negative: [1] DOUBLE DOSE (an extra dose or lesser amount) of over-the-counter (OTC) drug AND [2] any symptoms (e.g., dizziness, nausea, pain, sleepiness)   Negative: [1] DOUBLE DOSE (an extra dose or lesser amount) of prescription drug AND [2] NO symptoms  (Exception: A double dose of antibiotics.)   Negative: Took another person's prescription drug   Negative: Diabetes drug error or overdose (e.g., took wrong type of insulin or took extra dose)   Negative: [1] Prescription not at pharmacy AND [2] was prescribed by doctor (or NP/PA) recently  (Exception: Triager has access to EMR and prescription is recorded there. Go to Home Care and confirm for pharmacy.)   Negative: [1] Pharmacy calling with prescription question AND [2] triager unable to answer question    Protocols used: Medication Question Call-A-

## 2025-07-19 NOTE — ASSESSMENT & PLAN NOTE
Patient's blood pressure range in the last 24 hours was: BP  Min: 124/66  Max: 139/63.The patient's inpatient anti-hypertensive regimen is listed below:  Current Antihypertensives  hydrALAZINE injection 5 mg, Every 6 hours PRN, Intravenous  timolol maleate 0.5% ophthalmic solution 1 drop, 2 times daily, Both Eyes for SBP>180 or DBP>90   Medications on hold as patient is in shock.

## 2025-07-19 NOTE — ASSESSMENT & PLAN NOTE
Patient's FSGs are controlled on current medication regimen.  Last A1c reviewed-   Lab Results   Component Value Date    HGBA1C 5.0 07/12/2025   Repeat A1c ordered and pending.   Most recent fingerstick glucose reviewed-   Recent Labs   Lab 07/18/25  1157 07/18/25  1611 07/18/25 2022 07/19/25  0830   POCTGLUCOSE 102 106 141* 90     Current correctional scale  Low  Maintain anti-hyperglycemic dose as follows-   Antihyperglycemics (From admission, onward)      Start     Stop Route Frequency Ordered    07/12/25 0457  insulin aspart U-100 pen 0-5 Units         -- SubQ Every 6 hours PRN 07/12/25 0457          Hold Oral hypoglycemics while patient is in the hospital: metformin 500mg PO BID but family states that lately she has been having low sugars and this has recently been d/c.

## 2025-07-19 NOTE — DISCHARGE INSTRUCTIONS
Medication changes, please see list  STOP taking metoprolol   START amiodarone   Continue taking daily Aspirin 81 mg  STOP Plavix  You might be able to take apixaban 2.5 mg twice daily, if your platelets remain above 50k for a couple weeks- please discuss with your PCP and Cardiologist. However, aspirin alone may be sufficient in your case.   TAKE Ciprofloxacin for another week, starting tomorrow (7/20/25)  Will set up home health for physical therapy  F/U PCP, Urology, and Cardiology    Future Appointments   Date Time Provider Department Center   7/23/2025 10:30 AM Javid Villaseñor MD [facility] UROLOGY [facility]   7/28/2025  1:00 PM Serg Hwang MD [facility]   8/14/2025  9:00 AM LAB, [facility] LAB [facility]   8/21/2025  8:45 AM Serg Hwang MD [facility]       Thank you for trusting [facility] and me with your care.  We are honored that you entrusted us with your healthcare needs. Your satisfaction is very important to us and we hope you have been very pleased with your experience at [facility]. After your discharge you may receive a survey asking you to rate your hospital experience- Please help us by completing this survey. We hope that you have received the very best care possible during your hospitalization at [facility], as your satisfaction is our top priority.    Let me know if there is anything more I can do!!              Iker Ibrahim MD        Medical Director        Section Head of         Board-Certified IM Attending      PATIENT GENERAL DISCHARGE INFORMATION   Things that YOU are responsible for to Manage Your Care At Home:  1. Getting your prescriptions filled.  2. Taking you medications as directed. (DO NOT MISS ANY DOSES!)  3. Going to your follow-up doctor appointments.                 *This is important because it allows the doctor to monitor your progress and make changes.      If no one calls you for your  appointments, please call ([phone]) and reschedule this appointment.   After discharge, if you need assistance, you can call [facility] On Call Nurse Care Line for 24/7 assistance at [phone]  If you are experience any signs or symptoms, Call your doctor or Call 911 and come to your nearest Emergency Room.    You should receive a call from [facility] Discharge Department within 48-72 hours to help manage your care after discharge.   Please try to make sure that you answer your phone for this important phone call.                 Our goal at [facility] is to always give you outstanding care and exceptional service. You may receive a survey from [employer] by mail, text or e-mail in the next 24-48 hours asking about the care you received with us. The survey should only take 5-10 minutes to complete and is very important to us.     Your feedback provides us with a way to recognize our staff who work tirelessly to provide the best care! Also, your responses help us learn how to improve when your experience was below our aspiration of excellence. We are always looking for ways to improve your stay. We WILL use your feedback to continue making improvements to help us provide the highest quality care. We keep your personal information and feedback confidential. We appreciate your time completing this survey and can't wait to hear from you!!!    We look forward to your continued care with us! Thanks so much for choosing [facility] for your healthcare needs!\

## 2025-07-19 NOTE — PROGRESS NOTES
AVS virtually reviewed with patient and  in its entirety with emphasis on diet, medications, follow-up appointments and reasons to return to the ED. Patient also encouraged to utilize their patient portal. Ease and convenience of use reiterated. Education complete and patient voiced understanding. All questions answered. Discharge teaching complete.

## 2025-07-19 NOTE — PLAN OF CARE
[facility] Telemetry      HOME HEALTH ORDERS  FACE TO FACE ENCOUNTER    Patient Name: Marine Mo  YOB: 1940    PCP: Serg Hwang MD   PCP Address: [address] [place] [id number]  PCP Phone Number: [phone]  PCP Fax: [phone]    Encounter Date: 7/12/25    Admit to Home Health    Diagnoses:  Active Hospital Problems    Diagnosis  POA    *Sepsis with acute renal failure and septic shock [A41.9, R65.21, N17.9]  Yes    ACP (advance care planning) [Z71.89]  Not Applicable    Paroxysmal atrial fibrillation with RVR [I48.0]  No    Delirium [R41.0]  No    Acute unilateral obstructive uropathy [N13.9]  Yes    JAIRON (acute kidney injury) [N17.9]  Yes    Left ureteral stone [N20.1]  Yes    Coronary artery disease involving native coronary artery of native heart without angina pectoris [I25.10]  Yes    Essential hypertension [I10]  Yes    Type 2 diabetes mellitus, controlled [E11.9]  Yes     Noted since 2013  Metformin   Lab Results   Component Value Date    HGBA1C 5.2 02/01/2024           Thrombocytopenia [D69.6]  Yes     Chronic - noted since 2013  Associated with chronic liver disease   Lab Results   Component Value Date    WBC 2.90 (L) 08/11/2023    HGB 12.3 08/11/2023    HCT 37.1 08/11/2023    MCV 89 08/11/2023    PLT 65 (L) 08/11/2023     Also taking plavix           Resolved Hospital Problems   No resolved problems to display.       Follow Up Appointments:  Future Appointments   Date Time Provider Department Center   7/23/2025 10:30 AM Javid Villaseñor MD [facility] UROLOGY [facility]   7/28/2025  1:00 PM Serg Hwang MD [facility]   8/14/2025  9:00 AM LAB, [facility] LAB [facility]   8/21/2025  8:45 AM Serg Hwang MD [facility]       Allergies:  Review of patient's allergies indicates:   Allergen Reactions    Pravastatin      Other Reaction(s): Propensity to adverse reactions to drug       Medications: Review discharge medications  with patient and family and provide education.    Current Medications[1]     Medication List        START taking these medications      amiodarone 200 MG Tab  Commonly known as: PACERONE  Take 1 tablet (200 mg total) by mouth 2 (two) times daily for 5 days, THEN 1 tablet (200 mg total) once daily.  Start taking on: July 19, 2025     ciprofloxacin HCl 500 MG tablet  Commonly known as: CIPRO  Take 1 tablet (500 mg total) by mouth 2 (two) times daily. for 7 days  Start taking on: July 20, 2025            CONTINUE taking these medications      ALPRAZolam 0.5 MG tablet  Commonly known as: XANAX  TAKE 1 TABLET BY MOUTH NIGHTLY AS NEEDED FOR ANXIETY     aspirin 81 MG Chew  Take 81 mg by mouth once daily.     atorvastatin 20 MG tablet  Commonly known as: LIPITOR  Take 20 mg by mouth.     calcium carbonate 200 mg calcium (500 mg) chewable tablet  Commonly known as: TUMS  Take 1 tablet by mouth once daily.     cholecalciferol (vitamin D3) 25 mcg (1,000 unit) capsule  Commonly known as: VITAMIN D3  Take 2,000 Units by mouth once daily.     coenzyme Q10 100 mg capsule  Take 100 mg by mouth once daily.     diclofenac sodium 1 % Gel  Commonly known as: VOLTAREN ARTHRITIS PAIN  Apply 2 g topically once daily.     EScitalopram oxalate 5 MG Tab  Commonly known as: LEXAPRO  Take 1 tablet by mouth once daily     hydrOXYzine HCL 10 MG Tab  Commonly known as: ATARAX  1-3 tablets every 8 hours as needed for itching     nitroGLYCERIN 0.4 MG SL tablet  Commonly known as: NITROSTAT  Place 0.4 mg under the tongue every 5 (five) minutes as needed for Chest pain.     timolol maleate 0.5% 0.5 % Drop  Commonly known as: TIMOPTIC  INSTILL 1 DROP INTO EACH EYE TWICE DAILY            STOP taking these medications      azelastine 137 mcg (0.1 %) nasal spray  Commonly known as: ASTELIN     clopidogreL 75 mg tablet  Commonly known as: PLAVIX     CONSTULOSE 10 gram/15 mL solution  Generic drug: lactulose     docusate sodium 100 MG capsule  Commonly  known as: COLACE     fluticasone propionate 50 mcg/actuation nasal spray  Commonly known as: FLONASE     guaiFENesin 100 mg/5 ml 100 mg/5 mL syrup  Commonly known as: ROBITUSSIN     hydrocortisone 2.5 % cream     ipratropium 42 mcg (0.06 %) nasal spray  Commonly known as: ATROVENT     loratadine 10 mg tablet  Commonly known as: CLARITIN     metFORMIN 500 MG tablet  Commonly known as: GLUCOPHAGE     metoprolol tartrate 100 MG tablet  Commonly known as: LOPRESSOR     [facility] medical supply Kit     mupirocin 2 % ointment  Commonly known as: BACTROBAN     nystatin cream  Commonly known as: MYCOSTATIN     OMEGA 3-DHA-EPA-FISH OIL ORAL     polyethylene glycol 17 gram/dose powder  Commonly known as: GLYCOLAX     sucralfate 1 gram tablet  Commonly known as: CARAFATE     sucralfate 100 mg/mL suspension  Commonly known as: CARAFATE     triamcinolone acetonide 0.1% 0.1 % cream  Commonly known as: KENALOG                I have seen and examined this patient within the last 30 days. My clinical findings that support the need for the home health skilled services and home bound status are the following:no   Weakness/numbness causing balance and gait disturbance due to Infection and Weakness/Debility making it taxing to leave home.  Requiring assistive device to leave home due to unsteady gait caused by  Infection and Weakness/Debility.         Referrals/ Consults  Physical Therapy to evaluate and treat. Evaluate for home safety and equipment needs; Establish/upgrade home exercise program. Perform / instruct on therapeutic exercises, gait training, transfer training, and Range of Motion.  Occupational Therapy to evaluate and treat. Evaluate home environment for safety and equipment needs. Perform/Instruct on transfers, ADL training, ROM, and therapeutic exercises.   to evaluate for community resources/long-range planning.  Aide to provide assistance with personal care, ADLs, and vital signs.    Activities:    activity as tolerated    Nursing:   Agency to admit patient within 24 hours of hospital discharge unless specified on physician order or at patient request    SN to complete comprehensive assessment including routine vital signs. Instruct on disease process and s/s of complications to report to MD. Review/verify medication list sent home with the patient at time of discharge  and instruct patient/caregiver as needed. Frequency may be adjusted depending on start of care date.     Skilled nurse to perform up to 3 visits PRN for symptoms related to diagnosis    Notify MD if SBP > 160 or < 90; DBP > 90 or < 50; HR > 120 or < 50; Temp > 101; O2 < 88%; Other:       Ok to schedule additional visits based on staff availability and patient request on consecutive days within the home health episode.    When multiple disciplines ordered:    Start of Care occurs on Sunday - Wednesday schedule remaining discipline evaluations as ordered on separate consecutive days following the start of care.    Thursday SOC -schedule subsequent evaluations Friday and Monday the following week.     Friday - Saturday SOC - schedule subsequent discipline evaluations on consecutive days starting Monday of the following week.    For all post-discharge communication, lab results, vitals, and subsequent orders- please contact patient's PCP.  If unable to get ahold of PCP, and question is about blood pressure, diuresis, or arrhythmia attempt to contact cardiologist.  If unable to get ahold of PCP, and question is about dialysis, please attempt to contact nephrologist.  If unable to get ahold of PCP, and question is about antibiotics or wound care, please contact infectious disease doctor.  If unable to get ahold of PCP, and question is about oxygen titration, CPAP or BIPAP, please contact pulmonologist.   If unable to get ahold of PCP or the above physicians in a reasonable time, please contact  on-call for clarification.    Home Health  Aide:  Nursing Twice weekly  Physical Therapy   Occupational Therapy   Medical Social Work Weekly  Home Health Aide Twice weekly    Wound Care Orders  No      I certify that this patient is confined to her home and needs intermittent skilled nursing care, physical therapy, and occupational therapy.              Iker Ibrahim MD        Medical Director        [facility] of [facility]        Board-Certified Internal Medicine Attending             [1]   Current Facility-Administered Medications   Medication Dose Route Frequency Provider Last Rate Last Admin    0.9%  NaCl infusion (for blood administration)   Intravenous Q24H PRN Efe Fernandes MD        acetaminophen tablet 650 mg  650 mg Oral Q4H PRN Efe Fernandes MD   650 mg at 07/17/25 2219    ALPRAZolam tablet 0.25 mg  0.25 mg Oral Nightly PRN Efe Fernandes MD        amiodarone tablet 400 mg  400 mg Oral BID Efe Fernandes MD   400 mg at 07/19/25 0845    Followed by    [START ON 7/23/2025] amiodarone tablet 200 mg  200 mg Oral BID Efe Fernandes MD        Followed by    [START ON 7/31/2025] amiodarone tablet 200 mg  200 mg Oral Daily Efe Fernandes MD        atorvastatin tablet 20 mg  20 mg Oral QHS Efe Fernandes MD   20 mg at 07/18/25 2150    cefTRIAXone injection 2 g  2 g Intravenous Q24H Iker Ibrahim MD   2 g at 07/19/25 1208    dextrose 50% injection 12.5 g  12.5 g Intravenous PRN Efe Fernandes MD   12.5 g at 07/12/25 1758    EScitalopram oxalate tablet 5 mg  5 mg Oral Daily Efe Fernandes MD   5 mg at 07/19/25 0845    glucagon (human recombinant) injection 1 mg  1 mg Intramuscular PRN Efe Fernandes MD        hydrALAZINE injection 5 mg  5 mg Intravenous Q6H PRN Efe Fernandes MD        hydrOXYzine HCL tablet 10 mg  10 mg Oral TID PRN Efe Fernandes MD        insulin aspart U-100 pen 0-5 Units  0-5 Units Subcutaneous Q6H PRN Efe Fernandes MD        ipratropium 0.02 % nebulizer solution 0.5 mg  0.5 mg Nebulization Q4H PRN  Efe Fernandes MD        levalbuterol nebulizer solution 1.25 mg  1.25 mg Nebulization Q4H PRN Efe Fernandes MD        melatonin tablet 6 mg  6 mg Oral Nightly PRN Efe Fernandes MD   6 mg at 07/13/25 2110    ondansetron injection 4 mg  4 mg Intravenous Q6H PRN Efe Fernandes MD   4 mg at 07/13/25 2100    simethicone chewable tablet 80 mg  1 tablet Oral QID PRN Efe Fernandes MD        sodium chloride 0.9% flush 10 mL  10 mL Intravenous Q12H PRN Efe Fernandes MD        timolol maleate 0.5% ophthalmic solution 1 drop  1 drop Both Eyes BID Efe Fernandes MD   1 drop at 07/19/25 0845

## 2025-07-19 NOTE — DISCHARGE SUMMARY
[facility] Medicine  Discharge Summary      Patient Name: Marine Mo  MRN: [id number]  LILIA: [id number]  Patient Class: IP- Inpatient  Admission Date: 7/12/2025  Hospital Length of Stay: 7 days  Discharge Date and Time: 07/19/2025 12:17 PM  Attending Physician: Iker Ibrahim MD   Discharging Provider: Iker Ibrahim MD  Primary Care Provider: Serg Hwang MD    Primary Care Team: Networked reference to record PCT     HPI:   85 y.o. woman with h/o NIDDM type 2 (A1c 5 in Feb), Essential HTN, HLD, DILLARD cirrhosis with polycystic liver dz and chronic thrombocytopenia, Depression/General Anxiety d/o, CAD (h/o stent in LAD on ASA/Plavix), Anemia presents to the [place] ED with family c/o fevers and chills for the past 1-2 days. Was febrile at 100.1 and hypotensive. Started on IVF, IV vanc/zosyn and eventually started on peripheral levophed for Sepsis.     Labs noted for WBC 5 and stable H/H 11.5/34. plt 42. Initial LA ws 10.6 with a procal of 22. Received 30cc/kg sepsis fluids (3L) and repeat lactic improved to 3.6.   Lytes with hypokalemia, JAIRON with creatinine 1.5 (baseline <1). Bicarb 18. Mg 1.2 and phos 1.2.   UA concerning for UTI.     CT chest/abd/pelvis with IV contrast:   1. Moderate left-sided hydronephrosis secondary to a 1.7 cm calculus at the UPJ.  2. Wall thickening of the left renal pelvis with left perinephric fat stranding, suspicious for overlying infection/pyelitis or pyelonephritis.  Recommend correlation with urinalysis.  3. Hepatic cirrhosis.  4. Innumerable hepatic and renal cystic lesions as above, similar to the prior study.  5. Interlobular septal thickening in the bilateral lungs, can be seen with mild interstitial edema or interstitial pneumonia.  6. Chronic opacities in the right middle lobe and right lower lobe.     transfer center contacted and case discussed with Dr. Castañeda with Urology who recommended cysto with stent placement and did not think that  IR will be required. She agreed to see in consult once patient arrives. We have been consulted for further management. Family denies h/o kidney stones.     Procedure(s) (LRB):  CYSTOSCOPY, WITH RETROGRADE PYELOGRAM AND URETERAL STENT INSERTION (Left)      Hospital Course:   Mrs. Mo is a 84 yo F with history of hypertension, cirrhosis, thrombocytopenia and CAD on Plavix and aspirin who was admitted with septic shock and obstructive uropathy.  Placed in ICU.  Urology consulted underwent cystoscopy with stent placement.  Urine and blood cultures growing pansensitive E coli.  Now on ceftriaxone.  Pressors have been weaned.  Developed AFib with RVR, started on IV amiodarone.  Now transitioned to p.o. amiodarone.  Platelets still less than 50,000, not candidate for oral anticoagulation at this time.  Still with bilateral pitting edema, due to volume overload.  P.r.n. Lasix and elevation.  QT within normal limits.  No evidence of aneurysms.  Would plan to discharge home with Levaquin until outpatient follow-up with Urology for definitive stone treatment.    E.Coli bacteremia, pansensitive on CTX.  PT/OT consulted. Urology and Card follow up as outpatient        Given chronic thrombocytopenia, GIBs, and Stent placement back in 2022, will stop plavix in this 84yo. Her platelets have recovered to greater than 50K, so will resume aspirin. Will hold off on Apix 2.5 mg bid initiation until platelets prove to remain high enough outpt. Back in NSR now, and suspect aspirin alone will be best for now. F/u Cards, urology and PCP. Will be cautious in amio load with cipro (QT), will do 5 days of 200 mg bid then go down to 200 mg daily. Cipro one week starting ernie, will give another IV CTX today before going home. Qtc under 500 now, on upper end of normal but a bit falsely long anyhow with that RBBB. No aneurysms.        Medication changes, please see list  STOP taking metoprolol   START amiodarone   Continue taking daily Aspirin 81  mg  STOP Plavix  You might be able to take apixaban 2.5 mg twice daily, if your platelets remain above 50k for a couple weeks- please discuss with your PCP and Cardiologist. However, aspirin alone may be sufficient in your case.   TAKE Ciprofloxacin for another week, starting tomorrow (7/20/25)  Will set up home health for physical therapy  F/U PCP, Urology, and Cardiology    Future Appointments   Date Time Provider Department Center   7/23/2025 10:30 AM Javid Villaseñor MD [facility] UROLOGY [facility]   7/28/2025  1:00 PM Serg Hwang MD [facility]   8/14/2025  9:00 AM LAB, [facility] STA LAB [facility]   8/21/2025  8:45 AM Serg Hwang MD [facility]       Thank you for trusting [facility] and me with your care.  We are honored that you entrusted us with your healthcare needs. Your satisfaction is very important to us and we hope you have been very pleased with your experience at [facility]. After your discharge you may receive a survey asking you to rate your hospital experience- Please help us by completing this survey. We hope that you have received the very best care possible during your hospitalization at [facility], as your satisfaction is our top priority.    Let me know if there is anything more I can do!!              Iker Ibrahim MD        Medical Director        Section Head of         Board-Certified IM Attending    Goals of Care Treatment Preferences:  Code Status: DNR          What is most important right now is to focus on spending time at home, avoiding the hospital, remaining as independent as possible, symptom/pain control.  Accordingly, we have decided that the best plan to meet the patient's goals includes continuing with treatment.         Consults:   Consults (From admission, onward)          Status Ordering Provider     Inpatient consult to Palliative Care  Once        Provider:  Casi Murillo, NP    Completed  JENNIFER ANDREW.     Inpatient consult to Cardiology  Once        Provider:  Javid Saucedo MD    Completed JORJE MONK     Inpatient consult to Urology  Once        Provider:  Aliya Castañeda MD    Completed JORJE MONK            Assessment & Plan  Sepsis with acute renal failure and septic shock  Marine Mo presents with sepsis with Acute kidney injury and shock requiring pressors secondary to Urinary Tract Infection and obstructive uropathy.     Interventions include:    Antibiotics:    cefTRIAXone injection 2 g, Every 24 hours (non-standard times), Intravenous  ciprofloxacin (CIPRO) tablet, 2 times daily, Oral    Fluid Resuscitation:   Ideal Body Weight- The patient is obese (BMI>30) and their ideal body weight of Ideal body weight: 54.7 kg (120 lb 9.5 oz) was used to calculate fluid bolus of 30 ml/kg. This was given at the OSH ED     Labs and Imaging:   Recent Labs   Lab 07/12/25  2104 07/13/25  0421 07/14/25  0011   LACTATE 3.0* 3.3* 2.2   Septic shock with urinary source and bacteremia.  Growing GNR in BCx.  Will stop Vanc/meropenem. ID/S back - transition to IV ceftriaxone while inpatient.  - no aneurysms on imaging, QT ok - plan for levaquin at discharge  - shock resolved      Acute unilateral obstructive uropathy  S/p cystoscopy with stent placement.  - will need definitive treatment of stone as outpatient    JAIRON (acute kidney injury)  JAIRON is likely due to post-obstructive d/t kidney stone and acute UTI. Baseline creatinine is <1. Most recent creatinine and eGFR are listed below.  Recent Labs     07/17/25  0942 07/18/25  0438   CREATININE 0.6 0.6   EGFRNORACEVR >60 >60      Plan  - Avoid nephrotoxins and renally dose meds for GFR listed above  - Monitor urine output, serial BMP, and adjust therapy as needed  -Support MAP>65 and oxygen>94%.   Now resolved.  Thrombocytopenia  Chronic due to her DILLARD cirrhosis it appears. No acute signs of bleeding but may need transfuse  for cysto today. Type and screen ordered and will defer to Urology the need for plt, ffp, or PRBC.   Plts lower than baseline, most likely related to septic shock.  Patient was transfused platelets for procedure and again on 7/15  - platelets stable/improving  Coronary artery disease involving native coronary artery of native heart without angina pectoris  Patient with known CAD s/p stent placement, which is controlled Will continue Statin and monitor for S/Sx of angina/ACS. Continue to monitor on telemetry. Resume ASA and Plavix when cleared by urology.   Holding ASA and Plavix secondary to severe thrombocytopenia.  Type 2 diabetes mellitus, controlled  Patient's FSGs are controlled on current medication regimen.  Last A1c reviewed-   Lab Results   Component Value Date    HGBA1C 5.0 07/12/2025   Repeat A1c ordered and pending.   Most recent fingerstick glucose reviewed-   Recent Labs   Lab 07/18/25  1611 07/18/25 2022 07/19/25  0830 07/19/25  1134   POCTGLUCOSE 106 141* 90 118*     Current correctional scale  Low  Maintain anti-hyperglycemic dose as follows-   Antihyperglycemics (From admission, onward)      Start     Stop Route Frequency Ordered    07/12/25 0457  insulin aspart U-100 pen 0-5 Units         -- SubQ Every 6 hours PRN 07/12/25 0457          Hold Oral hypoglycemics while patient is in the hospital: metformin 500mg PO BID but family states that lately she has been having low sugars and this has recently been d/c.   Essential hypertension  Patient's blood pressure range in the last 24 hours was: BP  Min: 124/66  Max: 139/63.The patient's inpatient anti-hypertensive regimen is listed below:  Current Antihypertensives  hydrALAZINE injection 5 mg, Every 6 hours PRN, Intravenous  timolol maleate 0.5% ophthalmic solution 1 drop, 2 times daily, Both Eyes for SBP>180 or DBP>90   Medications on hold as patient is in shock.  Left ureteral stone      Paroxysmal atrial fibrillation with RVR  Cardiology  consulted.  Was on Amio drip.  Now resolved and off drip.  - developed afib rvr again on 7/15 -- on IV amiodarone  - unable to anticoagulate given platelets    Delirium  Delirium precautions.  Chronically on benzo's.  Resume home medications.    ACP (advance care planning)      Final Active Diagnoses:    Diagnosis Date Noted POA    PRINCIPAL PROBLEM:  Sepsis with acute renal failure and septic shock [A41.9, R65.21, N17.9] 07/12/2025 Yes    ACP (advance care planning) [Z71.89] 07/14/2025 Not Applicable    Paroxysmal atrial fibrillation with RVR [I48.0] 07/13/2025 No    Delirium [R41.0] 07/13/2025 No    Acute unilateral obstructive uropathy [N13.9] 07/12/2025 Yes    JAIRON (acute kidney injury) [N17.9] 07/12/2025 Yes    Left ureteral stone [N20.1] 07/12/2025 Yes    Coronary artery disease involving native coronary artery of native heart without angina pectoris [I25.10] 02/15/2022 Yes    Essential hypertension [I10] 07/31/2015 Yes    Type 2 diabetes mellitus, controlled [E11.9] 06/17/2013 Yes    Thrombocytopenia [D69.6] 06/17/2013 Yes      Problems Resolved During this Admission:       Discharged Condition: good    Disposition:     Follow Up:   Contact information for follow-up providers       Javid Villaseñor MD Follow up in 7 day(s).    Specialty: Urology  Contact information:  [address] [address]  [phone]                       Contact information for after-discharge care       Home Medical Care       HOSPICE [facility] .    Service: Home Hospice  Contact information:  [address]  [address]  [facility] [id number] [phone]             [facility] .    Services: Home Rehabilitation, Home Nursing, Home Living Aide Services  Contact information:  [address]  [facility] [address] [phone]                                 Patient Instructions:      Ambulatory referral/consult to Internal Medicine   Standing Status:  Future   Referral Priority: Routine Referral Type: Consultation   Referral Reason: Specialty Services Required   Requested Specialty: Internal Medicine   Number of Visits Requested: 1     Ambulatory referral/consult to Cardiology   Standing Status: Future   Referral Priority: Routine Referral Type: Consultation   Referral Reason: Specialty Services Required   Requested Specialty: Cardiology   Number of Visits Requested: 1     Prepare Platelets 1 Dose   Standing Status: Future Standing Exp. Date: 08/15/26     Order Specific Question Answer Comments   Number of Units 1 Dose    Purpose of Preparation: Pending Transfusion        Significant Diagnostic Studies:   Recent Results (from the past 100 hours)   EKG 12-lead    Collection Time: 07/15/25  8:35 AM   Result Value Ref Range    QRS Duration 120 ms    [facility] QTC Calculation 577 ms   EKG 12-lead    Collection Time: 07/15/25 10:47 AM   Result Value Ref Range    QRS Duration 124 ms    [facility] QTC Calculation 510 ms   POCT glucose    Collection Time: 07/15/25 11:06 AM   Result Value Ref Range    POCT Glucose 140 (H) 70 - 110 mg/dL   POCT glucose    Collection Time: 07/15/25  4:12 PM   Result Value Ref Range    POCT Glucose 115 (H) 70 - 110 mg/dL   POCT glucose    Collection Time: 07/15/25  9:06 PM   Result Value Ref Range    POCT Glucose 124 (H) 70 - 110 mg/dL   Comprehensive metabolic panel    Collection Time: 07/16/25  3:18 AM   Result Value Ref Range    Sodium 141 136 - 145 mmol/L    Potassium 3.3 (L) 3.5 - 5.1 mmol/L    Chloride 110 95 - 110 mmol/L    CO2 22 (L) 23 - 29 mmol/L    Glucose 111 (H) 70 - 110 mg/dL    BUN 19 8 - 23 mg/dL    Creatinine 0.6 0.5 - 1.4 mg/dL    Calcium 8.2 (L) 8.7 - 10.5 mg/dL    Protein Total 5.0 (L) 6.0 - 8.4 gm/dL    Albumin 2.2 (L) 3.5 - 5.2 g/dL    Bilirubin Total 0.6 0.1 - 1.0 mg/dL    ALP 57 40 - 150 unit/L    AST 22 11 - 45 unit/L    ALT 17 10 - 44 unit/L    Anion Gap 9 8 - 16 mmol/L    eGFR >60 >60 mL/min/1.73/m2   CBC with Differential     Collection Time: 07/16/25  3:18 AM   Result Value Ref Range    WBC 4.65 3.90 - 12.70 K/uL    RBC 2.91 (L) 4.00 - 5.40 M/uL    HGB 8.4 (L) 12.0 - 16.0 gm/dL    HCT 26.1 (L) 37.0 - 48.5 %    MCV 90 82 - 98 fL    MCH 28.9 27.0 - 31.0 pg    MCHC 32.2 32.0 - 36.0 g/dL    RDW 17.1 (H) 11.5 - 14.5 %    Platelet Count 32 (LL) 150 - 450 K/uL    MPV 11.6 9.2 - 12.9 fL    Nucleated RBC 0 <=0 /100 WBC    Neut % 78.9 (H) 38 - 73 %    Lymph % 10.5 (L) 18 - 48 %    Mono % 8.6 4 - 15 %    Eos % 0.9 <=8 %    Basophil % 0.2 <=1.9 %    Imm Grans % 0.9 (H) 0.0 - 0.5 %    Neut # 3.67 1.8 - 7.7 K/uL    Lymph # 0.49 (L) 1 - 4.8 K/uL    Mono # 0.40 0.3 - 1 K/uL    Eos # 0.04 <=0.5 K/uL    Baso # 0.01 <=0.2 K/uL    Imm Grans # 0.04 0.00 - 0.04 K/uL   Platelet Review    Collection Time: 07/16/25  3:18 AM   Result Value Ref Range    Platelet Estimate Decreased (A)     POCT glucose    Collection Time: 07/16/25  7:29 AM   Result Value Ref Range    POCT Glucose 92 70 - 110 mg/dL   POCT glucose    Collection Time: 07/16/25 10:59 AM   Result Value Ref Range    POCT Glucose 160 (H) 70 - 110 mg/dL   POCT glucose    Collection Time: 07/16/25  4:24 PM   Result Value Ref Range    POCT Glucose 110 70 - 110 mg/dL   POCT glucose    Collection Time: 07/16/25  9:07 PM   Result Value Ref Range    POCT Glucose 124 (H) 70 - 110 mg/dL   POCT glucose    Collection Time: 07/17/25  7:20 AM   Result Value Ref Range    POCT Glucose 105 70 - 110 mg/dL   CBC with Differential    Collection Time: 07/17/25  9:20 AM   Result Value Ref Range    WBC 6.45 3.90 - 12.70 K/uL    RBC 3.10 (L) 4.00 - 5.40 M/uL    HGB 9.3 (L) 12.0 - 16.0 gm/dL    HCT 27.9 (L) 37.0 - 48.5 %    MCV 90 82 - 98 fL    MCH 30.0 27.0 - 31.0 pg    MCHC 33.3 32.0 - 36.0 g/dL    RDW 17.0 (H) 11.5 - 14.5 %    Platelet Count 54 (L) 150 - 450 K/uL    MPV 11.3 9.2 - 12.9 fL    Nucleated RBC 0 <=0 /100 WBC    Neut % 79.9 (H) 38 - 73 %    Lymph % 9.8 (L) 18 - 48 %    Mono % 7.3 4 - 15 %    Eos % 0.9 <=8 %     Basophil % 0.2 <=1.9 %    Imm Grans % 1.9 (H) 0.0 - 0.5 %    Neut # 5.16 1.8 - 7.7 K/uL    Lymph # 0.63 (L) 1 - 4.8 K/uL    Mono # 0.47 0.3 - 1 K/uL    Eos # 0.06 <=0.5 K/uL    Baso # 0.01 <=0.2 K/uL    Imm Grans # 0.12 (H) 0.00 - 0.04 K/uL   Platelet Review    Collection Time: 07/17/25  9:20 AM   Result Value Ref Range    Platelet Estimate Decreased (A)     Comprehensive metabolic panel    Collection Time: 07/17/25  9:42 AM   Result Value Ref Range    Sodium 138 136 - 145 mmol/L    Potassium 3.4 (L) 3.5 - 5.1 mmol/L    Chloride 103 95 - 110 mmol/L    CO2 28 23 - 29 mmol/L    Glucose 141 (H) 70 - 110 mg/dL    BUN 17 8 - 23 mg/dL    Creatinine 0.6 0.5 - 1.4 mg/dL    Calcium 8.3 (L) 8.7 - 10.5 mg/dL    Protein Total 5.1 (L) 6.0 - 8.4 gm/dL    Albumin 2.3 (L) 3.5 - 5.2 g/dL    Bilirubin Total 0.6 0.1 - 1.0 mg/dL    ALP 60 40 - 150 unit/L    AST 20 11 - 45 unit/L    ALT 13 10 - 44 unit/L    Anion Gap 7 (L) 8 - 16 mmol/L    eGFR >60 >60 mL/min/1.73/m2   POCT glucose    Collection Time: 07/17/25 11:07 AM   Result Value Ref Range    POCT Glucose 122 (H) 70 - 110 mg/dL   POCT glucose    Collection Time: 07/17/25  4:38 PM   Result Value Ref Range    POCT Glucose 112 (H) 70 - 110 mg/dL   POCT glucose    Collection Time: 07/17/25  7:39 PM   Result Value Ref Range    POCT Glucose 164 (H) 70 - 110 mg/dL   Comprehensive metabolic panel    Collection Time: 07/18/25  4:38 AM   Result Value Ref Range    Sodium 138 136 - 145 mmol/L    Potassium 4.1 3.5 - 5.1 mmol/L    Chloride 104 95 - 110 mmol/L    CO2 26 23 - 29 mmol/L    Glucose 91 70 - 110 mg/dL    BUN 17 8 - 23 mg/dL    Creatinine 0.6 0.5 - 1.4 mg/dL    Calcium 8.3 (L) 8.7 - 10.5 mg/dL    Protein Total 5.3 (L) 6.0 - 8.4 gm/dL    Albumin 2.4 (L) 3.5 - 5.2 g/dL    Bilirubin Total 0.7 0.1 - 1.0 mg/dL     40 - 150 unit/L    AST 27 11 - 45 unit/L    ALT 13 10 - 44 unit/L    Anion Gap 8 8 - 16 mmol/L    eGFR >60 >60 mL/min/1.73/m2   CBC with Differential    Collection Time:  07/18/25  4:38 AM   Result Value Ref Range    WBC 6.98 3.90 - 12.70 K/uL    RBC 3.22 (L) 4.00 - 5.40 M/uL    HGB 9.1 (L) 12.0 - 16.0 gm/dL    HCT 29.3 (L) 37.0 - 48.5 %    MCV 91 82 - 98 fL    MCH 28.3 27.0 - 31.0 pg    MCHC 31.1 (L) 32.0 - 36.0 g/dL    RDW 16.9 (H) 11.5 - 14.5 %    Platelet Count 66 (L) 150 - 450 K/uL    MPV 10.9 9.2 - 12.9 fL    Nucleated RBC 0 <=0 /100 WBC    Neut % 76.1 (H) 38 - 73 %    Lymph % 13.2 (L) 18 - 48 %    Mono % 7.4 4 - 15 %    Eos % 1.3 <=8 %    Basophil % 0.3 <=1.9 %    Imm Grans % 1.7 (H) 0.0 - 0.5 %    Neut # 5.31 1.8 - 7.7 K/uL    Lymph # 0.92 (L) 1 - 4.8 K/uL    Mono # 0.52 0.3 - 1 K/uL    Eos # 0.09 <=0.5 K/uL    Baso # 0.02 <=0.2 K/uL    Imm Grans # 0.12 (H) 0.00 - 0.04 K/uL   POCT glucose    Collection Time: 07/18/25  7:23 AM   Result Value Ref Range    POCT Glucose 99 70 - 110 mg/dL   POCT glucose    Collection Time: 07/18/25 11:57 AM   Result Value Ref Range    POCT Glucose 102 70 - 110 mg/dL   POCT glucose    Collection Time: 07/18/25  4:11 PM   Result Value Ref Range    POCT Glucose 106 70 - 110 mg/dL   POCT glucose    Collection Time: 07/18/25  8:22 PM   Result Value Ref Range    POCT Glucose 141 (H) 70 - 110 mg/dL   POCT glucose    Collection Time: 07/19/25  8:30 AM   Result Value Ref Range    POCT Glucose 90 70 - 110 mg/dL   POCT glucose    Collection Time: 07/19/25 11:34 AM   Result Value Ref Range    POCT Glucose 118 (H) 70 - 110 mg/dL       Microbiology Results (last 7 days)       Procedure Component Value Units Date/Time    Blood culture [[id number]] Collected: 07/19/25 1216    Order Status: Sent Specimen: Blood     Urine Culture High Risk [[id number]] Collected: 07/12/25 1207    Order Status: Completed Specimen: Urine, Catheterized Updated: 07/14/25 0733     Urine Culture No Growth                    Pending Diagnostic Studies:       Procedure Component Value Units Date/Time    EKG 12-lead [[id number]]     Order Status: Sent Lab Status: No result             Medications:  Reconciled Home Medications:      Medication List        START taking these medications      amiodarone 200 MG Tab  Commonly known as: PACERONE  Take 1 tablet (200 mg total) by mouth 2 (two) times daily for 5 days, THEN 1 tablet (200 mg total) once daily.  Start taking on: July 19, 2025     ciprofloxacin HCl 500 MG tablet  Commonly known as: CIPRO  Take 1 tablet (500 mg total) by mouth 2 (two) times daily. for 7 days  Start taking on: July 20, 2025            CONTINUE taking these medications      ALPRAZolam 0.5 MG tablet  Commonly known as: XANAX  TAKE 1 TABLET BY MOUTH NIGHTLY AS NEEDED FOR ANXIETY     aspirin 81 MG Chew  Take 81 mg by mouth once daily.     atorvastatin 20 MG tablet  Commonly known as: LIPITOR  Take 20 mg by mouth.     calcium carbonate 200 mg calcium (500 mg) chewable tablet  Commonly known as: TUMS  Take 1 tablet by mouth once daily.     cholecalciferol (vitamin D3) 25 mcg (1,000 unit) capsule  Commonly known as: VITAMIN D3  Take 2,000 Units by mouth once daily.     coenzyme Q10 100 mg capsule  Take 100 mg by mouth once daily.     diclofenac sodium 1 % Gel  Commonly known as: VOLTAREN ARTHRITIS PAIN  Apply 2 g topically once daily.     EScitalopram oxalate 5 MG Tab  Commonly known as: LEXAPRO  Take 1 tablet by mouth once daily     hydrOXYzine HCL 10 MG Tab  Commonly known as: ATARAX  1-3 tablets every 8 hours as needed for itching     nitroGLYCERIN 0.4 MG SL tablet  Commonly known as: NITROSTAT  Place 0.4 mg under the tongue every 5 (five) minutes as needed for Chest pain.     timolol maleate 0.5% 0.5 % Drop  Commonly known as: TIMOPTIC  INSTILL 1 DROP INTO EACH EYE TWICE DAILY            STOP taking these medications      azelastine 137 mcg (0.1 %) nasal spray  Commonly known as: ASTELIN     clopidogreL 75 mg tablet  Commonly known as: PLAVIX     CONSTULOSE 10 gram/15 mL solution  Generic drug: lactulose     docusate sodium 100 MG capsule  Commonly known as: COLACE      fluticasone propionate 50 mcg/actuation nasal spray  Commonly known as: FLONASE     guaiFENesin 100 mg/5 ml 100 mg/5 mL syrup  Commonly known as: ROBITUSSIN     hydrocortisone 2.5 % cream     ipratropium 42 mcg (0.06 %) nasal spray  Commonly known as: ATROVENT     loratadine 10 mg tablet  Commonly known as: CLARITIN     metFORMIN 500 MG tablet  Commonly known as: GLUCOPHAGE     metoprolol tartrate 100 MG tablet  Commonly known as: LOPRESSOR     [facility]     mupirocin 2 % ointment  Commonly known as: BACTROBAN     nystatin cream  Commonly known as: MYCOSTATIN     OMEGA 3-DHA-EPA-FISH OIL ORAL     polyethylene glycol 17 gram/dose powder  Commonly known as: GLYCOLAX     sucralfate 1 gram tablet  Commonly known as: CARAFATE     sucralfate 100 mg/mL suspension  Commonly known as: CARAFATE     triamcinolone acetonide 0.1% 0.1 % cream  Commonly known as: KENALOG              Indwelling Lines/Drains at time of discharge:   Lines/Drains/Airways       Drain  Duration             Female External Urinary Catheter w/ Suction 07/15/25 1000 4 days                        Time spent on the discharge of patient: 35 minutes         Iker Ibrahim MD  Department of Hospital Medicine  [facility]

## 2025-07-19 NOTE — PLAN OF CARE
Problem: Diabetes Comorbidity  Goal: Blood Glucose Level Within Targeted Range  Outcome: Progressing     Problem: Infection  Goal: Absence of Infection Signs and Symptoms  Outcome: Progressing     Problem: Skin Injury Risk Increased  Goal: Skin Health and Integrity  Outcome: Progressing     Problem: Sepsis/Septic Shock  Goal: Optimal Coping  Outcome: Progressing  Goal: Absence of Bleeding  Outcome: Progressing  Goal: Blood Glucose Level Within Targeted Range  Outcome: Progressing     Problem: Acute Kidney Injury/Impairment  Goal: Fluid and Electrolyte Balance  Outcome: Progressing     Problem: Delirium  Goal: Optimal Coping  Outcome: Progressing     Problem: Coping Ineffective  Goal: Effective Coping  Outcome: Progressing

## 2025-07-21 ENCOUNTER — PATIENT OUTREACH (OUTPATIENT)
Dept: ADMINISTRATIVE | Facility: CLINIC | Age: 85
End: 2025-07-21
Payer: MEDICARE

## 2025-07-21 LAB
OHS QRS DURATION: 130 MS
OHS QTC CALCULATION: 492 MS

## 2025-07-21 NOTE — PROGRESS NOTES
C3 nurse spoke with Marine Tatiana Mo's  Josue for a TCC post hospital discharge follow up call. The patient has a scheduled [facility] appointment with Serg Hwang on 07/28/25 @ 1300.

## 2025-07-23 ENCOUNTER — TELEPHONE (OUTPATIENT)
Dept: UROLOGY | Facility: CLINIC | Age: 85
End: 2025-07-23

## 2025-07-23 ENCOUNTER — OFFICE VISIT (OUTPATIENT)
Dept: UROLOGY | Facility: CLINIC | Age: 85
End: 2025-07-23
Attending: SPECIALIST
Payer: MEDICARE

## 2025-07-23 VITALS
BODY MASS INDEX: 24.99 KG/M2 | DIASTOLIC BLOOD PRESSURE: 58 MMHG | SYSTOLIC BLOOD PRESSURE: 122 MMHG | HEIGHT: 64 IN | WEIGHT: 146.38 LBS

## 2025-07-23 DIAGNOSIS — N20.0 KIDNEY STONE: Primary | ICD-10-CM

## 2025-07-23 PROCEDURE — 99999 PR PBB SHADOW E&M-EST. PATIENT-LVL V: CPT | Mod: PBBFAC,,, | Performed by: SPECIALIST

## 2025-07-23 RX ORDER — NITROFURANTOIN 25; 75 MG/1; MG/1
100 CAPSULE ORAL ONCE
Qty: 30 CAPSULE | Refills: 0 | Status: SHIPPED | OUTPATIENT
Start: 2025-07-23 | End: 2025-07-23

## 2025-07-23 NOTE — TELEPHONE ENCOUNTER
----- Message from Sue sent at 2025  1:53 PM CDT -----  Contact: DAUGHTER - CHARLES Mo  MRN: [id number]  : 1940  PCP: Serg Hwang  Home Phone      Not on file.  Work Phone      [phone]  Mobile          [phone]      MESSAGE: Daughter would like a return call regarding the patients office visit this morning.  She says that the patient did not understand.          Phone: [phone]

## 2025-07-23 NOTE — TELEPHONE ENCOUNTER
Returned call to patient's daughter Robyn and notified her of Dr. Villaseñor's plan for upcoming surgery. Placed an order for urine culture at [place], states she will try and get her mom to go tomorrow. Robyn v/apryl.

## 2025-07-24 LAB — BACTERIA BLD CULT: NORMAL

## 2025-07-25 ENCOUNTER — LAB VISIT (OUTPATIENT)
Dept: LAB | Facility: HOSPITAL | Age: 85
End: 2025-07-25
Attending: INTERNAL MEDICINE
Payer: MEDICARE

## 2025-07-25 DIAGNOSIS — K76.6 PORTAL HYPERTENSION: ICD-10-CM

## 2025-07-25 DIAGNOSIS — R80.9 CONTROLLED TYPE 2 DIABETES MELLITUS WITH MICROALBUMINURIA, WITHOUT LONG-TERM CURRENT USE OF INSULIN: ICD-10-CM

## 2025-07-25 DIAGNOSIS — E78.2 MIXED HYPERLIPIDEMIA: ICD-10-CM

## 2025-07-25 DIAGNOSIS — F51.04 CHRONIC INSOMNIA: ICD-10-CM

## 2025-07-25 DIAGNOSIS — N20.0 KIDNEY STONE: ICD-10-CM

## 2025-07-25 DIAGNOSIS — D61.818 OTHER PANCYTOPENIA: ICD-10-CM

## 2025-07-25 DIAGNOSIS — E11.29 CONTROLLED TYPE 2 DIABETES MELLITUS WITH MICROALBUMINURIA, WITHOUT LONG-TERM CURRENT USE OF INSULIN: ICD-10-CM

## 2025-07-25 LAB
ABSOLUTE EOSINOPHIL (OHS): 0.09 K/UL
ABSOLUTE MONOCYTE (OHS): 0.35 K/UL (ref 0.3–1)
ABSOLUTE NEUTROPHIL COUNT (OHS): 2.3 K/UL (ref 1.8–7.7)
ALBUMIN SERPL BCP-MCNC: 3.2 G/DL (ref 3.5–5.2)
ALBUMIN/CREAT UR: 275.2 UG/MG
ALP SERPL-CCNC: 50 UNIT/L (ref 40–150)
ALT SERPL W/O P-5'-P-CCNC: 14 UNIT/L (ref 10–44)
ANION GAP (OHS): 7 MMOL/L (ref 8–16)
AST SERPL-CCNC: 26 UNIT/L (ref 11–45)
BASOPHILS # BLD AUTO: 0.02 K/UL
BASOPHILS NFR BLD AUTO: 0.5 %
BILIRUB SERPL-MCNC: 0.8 MG/DL (ref 0.1–1)
BUN SERPL-MCNC: 8 MG/DL (ref 8–23)
CALCIUM SERPL-MCNC: 9.1 MG/DL (ref 8.7–10.5)
CHLORIDE SERPL-SCNC: 104 MMOL/L (ref 95–110)
CHOLEST SERPL-MCNC: 105 MG/DL (ref 120–199)
CHOLEST/HDLC SERPL: 3.2 {RATIO} (ref 2–5)
CO2 SERPL-SCNC: 28 MMOL/L (ref 23–29)
CREAT SERPL-MCNC: 0.9 MG/DL (ref 0.5–1.4)
CREAT UR-MCNC: 51.6 MG/DL (ref 15–325)
EAG (OHS): 97 MG/DL (ref 68–131)
ERYTHROCYTE [DISTWIDTH] IN BLOOD BY AUTOMATED COUNT: 16.8 % (ref 11.5–14.5)
GFR SERPLBLD CREATININE-BSD FMLA CKD-EPI: >60 ML/MIN/1.73/M2
GLUCOSE SERPL-MCNC: 107 MG/DL (ref 70–110)
HBA1C MFR BLD: 5 % (ref 4–5.6)
HCT VFR BLD AUTO: 31.8 % (ref 37–48.5)
HDLC SERPL-MCNC: 33 MG/DL (ref 40–75)
HDLC SERPL: 31.4 % (ref 20–50)
HGB BLD-MCNC: 10.3 GM/DL (ref 12–16)
IMM GRANULOCYTES # BLD AUTO: 0.01 K/UL (ref 0–0.04)
IMM GRANULOCYTES NFR BLD AUTO: 0.3 % (ref 0–0.5)
LDLC SERPL CALC-MCNC: 53.6 MG/DL (ref 63–159)
LYMPHOCYTES # BLD AUTO: 0.94 K/UL (ref 1–4.8)
MCH RBC QN AUTO: 28.9 PG (ref 27–31)
MCHC RBC AUTO-ENTMCNC: 32.4 G/DL (ref 32–36)
MCV RBC AUTO: 89 FL (ref 82–98)
MICROALBUMIN UR-MCNC: 142 UG/ML (ref ?–5000)
NONHDLC SERPL-MCNC: 72 MG/DL
NUCLEATED RBC (/100WBC) (OHS): 0 /100 WBC
PLATELET # BLD AUTO: 171 K/UL (ref 150–450)
PMV BLD AUTO: 8.8 FL (ref 9.2–12.9)
POTASSIUM SERPL-SCNC: 4.5 MMOL/L (ref 3.5–5.1)
PROT SERPL-MCNC: 6.5 GM/DL (ref 6–8.4)
RBC # BLD AUTO: 3.56 M/UL (ref 4–5.4)
RELATIVE EOSINOPHIL (OHS): 2.4 %
RELATIVE LYMPHOCYTE (OHS): 25.3 % (ref 18–48)
RELATIVE MONOCYTE (OHS): 9.4 % (ref 4–15)
RELATIVE NEUTROPHIL (OHS): 62.1 % (ref 38–73)
SODIUM SERPL-SCNC: 139 MMOL/L (ref 136–145)
T4 FREE SERPL-MCNC: 1.14 NG/DL (ref 0.71–1.51)
TRIGL SERPL-MCNC: 92 MG/DL (ref 30–150)
TSH SERPL-ACNC: 5.01 UIU/ML (ref 0.4–4)
WBC # BLD AUTO: 3.71 K/UL (ref 3.9–12.7)

## 2025-07-25 PROCEDURE — 87086 URINE CULTURE/COLONY COUNT: CPT

## 2025-07-25 PROCEDURE — 36415 COLL VENOUS BLD VENIPUNCTURE: CPT

## 2025-07-25 PROCEDURE — 85025 COMPLETE CBC W/AUTO DIFF WBC: CPT

## 2025-07-25 PROCEDURE — 82565 ASSAY OF CREATININE: CPT

## 2025-07-25 PROCEDURE — 83036 HEMOGLOBIN GLYCOSYLATED A1C: CPT

## 2025-07-25 PROCEDURE — 84443 ASSAY THYROID STIM HORMONE: CPT

## 2025-07-25 PROCEDURE — 82570 ASSAY OF URINE CREATININE: CPT

## 2025-07-25 PROCEDURE — 80061 LIPID PANEL: CPT

## 2025-07-25 NOTE — PROGRESS NOTES
[facility] - Urology   Clinic Note    SUBJECTIVE:     Chief Complaint   Patient presents with    Nephrolithiasis     Was recently admitted for kidney stone, no pain        Referral from: Aliya Castañeda MD.    History of Present Illness:  Marine Mo is a 85 y.o. female who presents to clinic for left kidney stone.    Patient is a pleasant 85-year-old female width left flank pain.  Recent CT scan demonstrated a 1.7 cm left UPJ stone.  Patient is having a moderate degree of pain and despite her advanced age she would like to proceed with surgical intervention.  We discussed ureteroscopy, ESWL, and PCNL.  I do not see a recent urine culture on EMR.    Patient endorses no additional complaints at this time.    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     Back pain     Breast cyst     Cirrhosis     Depression 01/30/2019    Diabetes mellitus     Disorder of kidney and ureter     Glaucoma     Hyperlipidemia     Hypertension     Trouble in sleeping     Type 2 diabetes mellitus        Past Surgical History:   Procedure Laterality Date    BACK SURGERY      BREAST CYST ASPIRATION  1982    CATARACT EXTRACTION Bilateral     don't remember date    CHOLECYSTECTOMY      COLONOSCOPY N/A 3/1/2018    Procedure: COLONOSCOPY;  Surgeon: Ren Newton MD;  [facility] ([address] [place] [address]);  Service: Endoscopy;  Laterality: N/A;    CYSTOSCOPY WITH URETEROSCOPY, RETROGRADE PYELOGRAPHY, AND INSERTION OF STENT Left 7/12/2025    Procedure: CYSTOSCOPY, WITH RETROGRADE PYELOGRAM AND URETERAL STENT INSERTION;  Surgeon: Aliya Castañeda MD;  Location: [facility];  Service: Urology;  Laterality: Left;    HYSTERECTOMY  age 70    bleeding    INSERTION, STENT, ARTERY  2022    OOPHORECTOMY      TONSILLECTOMY, ADENOIDECTOMY         Family History   Problem Relation Name Age of Onset    Kidney disease Father  "Cachorro     Heart attack Neg Hx      Heart disease Neg Hx      Breast cancer Neg Hx      Colon cancer Neg Hx      Ovarian cancer Neg Hx         Social History[1]    Medications Ordered Prior to Encounter[2]    Review of patient's allergies indicates:   Allergen Reactions    Pravastatin      Other Reaction(s): Propensity to adverse reactions to drug       ROS     Review of Systems:  A review of 10+ systems was conducted with pertinent positive and negative findings documented in HPI with all other systems reviewed and negative.    OBJECTIVE:     Estimated body mass index is 25.13 kg/m² as calculated from the following:    Height as of this encounter: 5' 4" (1.626 m).    Weight as of this encounter: 66.4 kg (146 lb 6.2 oz).    Vital Signs (Most Recent)  Vitals:    07/23/25 1023   BP: (!) 122/58       Physical Exam:    Physical Exam     GENERAL: patient sitting comfortably  HEENT: normocephalic  NECK: supple, no JVD  PULM: normal chest rise, no increased WOB  HEART: non-diaphoretic  ABDO: soft, nondistended, nontender  BACK: no CVA tenderness bilaterally  SKIN: warm, dry, well perfused  EXT: no bruising or edema  NEURO: grossly normal with no focal deficits  PSYCH: appropriate mood and affect    Genitourinary Exam:  deferred      LABS:     Lab Results   Component Value Date    BUN 17 07/18/2025    CREATININE 0.6 07/18/2025    WBC 6.98 07/18/2025    HGB 9.1 (L) 07/18/2025    HCT 29.3 (L) 07/18/2025    PLT 66 (L) 07/18/2025    AST 27 07/18/2025    ALT 13 07/18/2025    ALKPHOS 101 07/18/2025    ALBUMIN 2.4 (L) 07/18/2025    HGBA1C 5.0 07/12/2025    INR 1.6 (H) 07/12/2025        Urinalysis:   No results found for: "UAREFLEX"       Imaging:  I have personally reviewed all relevant imaging studies.    Results for orders placed or performed during the hospital encounter of 07/11/25 (from the past 2160 hours)   CT Chest Abdomen Pelvis With IV Contrast (XPD) NO Oral Contrast    Narrative    EXAMINATION:  CT CHEST ABDOMEN PELVIS " WITH IV CONTRAST (XPD)    CLINICAL HISTORY:  Sepsis;    TECHNIQUE:  Low dose axial images were obtained from the thoracic inlet to the pubic symphysis following the administration of 75 mL of Omnipaque 350 intravenous contrast.  Sagittal and coronal reformats were provided.    COMPARISON:  CT chest from 07/15/2023.  CT abdomen and pelvis from 01/22/2021.    FINDINGS:  The examination is limited by motion artifact.    Lungs: The lungs are hypoexpanded.  There is coarse interlobular septal thickening.  Chronic opacity again noted in the right lower lobe.  There is bronchiectasis in the right middle lobe, medial segment.    Airways: The large airways are clear.    Pleura: No fluid or thickening.  No pneumothorax.    Lymph nodes: No evidence of mediastinal, hilar, or axillary lymphadenopathy.    Esophagus: Normal.    Heart: Heart size is normal.  No pericardial effusion.      Chest wall: Normal.    Liver: There are innumerable peripherally calcified cystic lesions throughout the liver.  The liver demonstrates a nodular contour, with enlargement of the caudate lobe, suspicious for hepatic cirrhosis.    Biliary tract: No intra or extrahepatic biliary ductal dilatation.    Gallbladder: No radiodense gallstone.  No wall thickening or pericholecystic fluid.    Pancreas: Normal. No pancreatic ductal dilation.    Spleen: Normal in size without focal lesion.    Adrenals: Normal.    Kidneys and urinary collecting systems: There is moderate left-sided hydronephrosis secondary to a 1.7 cm calculus at the UPJ.  There is wall thickening and enhancement of the left renal pelvis, with left perinephric fat stranding, suspicious for overlying infection.  There are numerous simple cysts in the bilateral kidneys.  There are numerous too small to characterize hypodensities in the bilateral kidneys.  No hydronephrosis or urolithiasis.    Stomach and bowel: No bowel obstruction or abnormal bowel wall thickening.  The appendix is  normal.    Peritoneum and mesentery: There is trace perihepatic ascites.  There is no focal fluid collection within the abdomen or pelvis.  There is no free air.    Lymph nodes: No evidence of mesenteric or retroperitoneal lymphadenopathy.    Vasculature: There is moderate atherosclerosis.  There is no aneurysm.  There is a right internal jugular central venous catheter with the tip in the low SVC.  There is no evidence of a large central pulmonary embolism seen.    Urinary bladder: Normal.    Reproductive organs: The uterus is surgically absent    Body wall: No abnormality.    Musculoskeletal: There is no acute fracture or dislocation.  There is no aggressive osseous lesion.  There are degenerative changes of the spine.  There are postop changes of L3 through L5 laminectomies.      Impression    1. Moderate left-sided hydronephrosis secondary to a 1.7 cm calculus at the UPJ.  2. Wall thickening of the left renal pelvis with left perinephric fat stranding, suspicious for overlying infection/pyelitis or pyelonephritis.  Recommend correlation with urinalysis.  3. Hepatic cirrhosis.  4. Innumerable hepatic and renal cystic lesions as above, similar to the prior study.  5. Interlobular septal thickening in the bilateral lungs, can be seen with mild interstitial edema or interstitial pneumonia.  6. Chronic opacities in the right middle lobe and right lower lobe.      Electronically signed by: Steve Quarles  Date:    07/12/2025  Time:    00:27     No results found for this or any previous visit (from the past 2160 hours).  Echo Saline Bubble? No    Left Ventricle: The left ventricle is normal in size. Mildly increased   wall thickness. There is mild concentric hypertrophy. There is normal   systolic function with a visually estimated ejection fraction of 60 - 65%.   Grade I diastolic dysfunction.    Right Ventricle: The right ventricle is normal in size measuring 3.0   cm. Systolic function is normal.    Tricuspid Valve:  There is mild to moderate regurgitation.    Pulmonary Artery: The estimated pulmonary artery systolic pressure is   67 mmHg.  X-Ray Chest AP Portable  Narrative: EXAMINATION:  XR CHEST AP PORTABLE    CLINICAL HISTORY:  sob;    TECHNIQUE:  Single frontal view of the chest was performed.    COMPARISON:  07/12/2025.    FINDINGS:  No significant change in cardiopulmonary status from previous exam.  There is mild right basilar atelectasis/consolidation with possible small pleural effusion.  Right-sided central venous catheter is in stable position.  No pneumothorax.  Impression: No significant change.    Electronically signed by: Jud Polo MD  Date:    07/13/2025  Time:    01:01         ASSESSMENT     1. Kidney stone        PLAN:     Patient is willing to accept anesthetic risks for proceeding with surgery.  We favor ureteroscopy over ESWL or PCNL.  I explained that she may require a staged procedure.  I recommend clearance for anesthesia by Cardiology.  I will empirically start her on nitrofurantoin and send a urine for culture.  Plan is to follow up in one-week.    Javid Villaseñor MD  Urology  [facility]     Disclaimer: This note has been generated using voice-recognition software. There may be typographical errors that have been missed during proof-reading.          [1]   Social History  Tobacco Use    Smoking status: Never     Passive exposure: Never    Smokeless tobacco: Never   Substance Use Topics    Alcohol use: No    Drug use: No   [2]   Current Outpatient Medications on File Prior to Visit   Medication Sig Dispense Refill    ALPRAZolam (XANAX) 0.5 MG tablet TAKE 1 TABLET BY MOUTH NIGHTLY AS NEEDED FOR ANXIETY 30 tablet 0    amiodarone (PACERONE) 200 MG Tab Take 1 tablet (200 mg total) by mouth 2 (two) times daily for 5 days, THEN 1 tablet (200 mg total) once daily. 370 tablet 0    aspirin 81 MG Chew Take 81 mg by mouth once daily.      atorvastatin (LIPITOR) 20 MG tablet Take 20 mg by mouth.       calcium carbonate (TUMS) 200 mg calcium (500 mg) chewable tablet Take 1 tablet by mouth once daily.      cholecalciferol, vitamin D3, (VITAMIN D3) 25 mcg (1,000 unit) capsule Take 2,000 Units by mouth once daily.      ciprofloxacin HCl (CIPRO) 500 MG tablet Take 1 tablet (500 mg total) by mouth 2 (two) times daily. for 7 days 14 tablet 0    EScitalopram oxalate (LEXAPRO) 5 MG Tab Take 1 tablet by mouth once daily 90 tablet 3    hydrOXYzine HCL (ATARAX) 10 MG Tab 1-3 tablets every 8 hours as needed for itching 45 tablet 0    nitroGLYCERIN (NITROSTAT) 0.4 MG SL tablet Place 0.4 mg under the tongue every 5 (five) minutes as needed for Chest pain.      timolol maleate 0.5% (TIMOPTIC) 0.5 % Drop INSTILL 1 DROP INTO EACH EYE TWICE DAILY      coenzyme Q10 100 mg capsule Take 100 mg by mouth once daily. (Patient not taking: Reported on 7/23/2025)      diclofenac sodium (VOLTAREN ARTHRITIS PAIN) 1 % Gel Apply 2 g topically once daily. (Patient not taking: Reported on 7/23/2025) 100 g 1     No current facility-administered medications on file prior to visit.

## 2025-07-26 LAB — BACTERIA UR CULT: NO GROWTH

## 2025-07-28 ENCOUNTER — OFFICE VISIT (OUTPATIENT)
Dept: INTERNAL MEDICINE | Facility: CLINIC | Age: 85
End: 2025-07-28
Payer: MEDICARE

## 2025-07-28 VITALS
WEIGHT: 138.44 LBS | HEART RATE: 66 BPM | DIASTOLIC BLOOD PRESSURE: 60 MMHG | BODY MASS INDEX: 23.64 KG/M2 | HEIGHT: 64 IN | OXYGEN SATURATION: 98 % | RESPIRATION RATE: 19 BRPM | SYSTOLIC BLOOD PRESSURE: 126 MMHG

## 2025-07-28 DIAGNOSIS — E11.40 TYPE 2 DIABETES MELLITUS WITH DIABETIC NEUROPATHY, WITHOUT LONG-TERM CURRENT USE OF INSULIN: ICD-10-CM

## 2025-07-28 DIAGNOSIS — I27.20 PULMONARY HYPERTENSION: ICD-10-CM

## 2025-07-28 DIAGNOSIS — B96.20 E COLI BACTEREMIA: ICD-10-CM

## 2025-07-28 DIAGNOSIS — D69.6 THROMBOCYTOPENIA: ICD-10-CM

## 2025-07-28 DIAGNOSIS — I48.0 PAROXYSMAL ATRIAL FIBRILLATION: ICD-10-CM

## 2025-07-28 DIAGNOSIS — Z09 HOSPITAL DISCHARGE FOLLOW-UP: Primary | ICD-10-CM

## 2025-07-28 DIAGNOSIS — R78.81 E COLI BACTEREMIA: ICD-10-CM

## 2025-07-28 PROCEDURE — 1160F RVW MEDS BY RX/DR IN RCRD: CPT | Mod: CPTII,S$GLB,, | Performed by: INTERNAL MEDICINE

## 2025-07-28 PROCEDURE — 99999 PR PBB SHADOW E&M-EST. PATIENT-LVL IV: CPT | Mod: PBBFAC,,, | Performed by: INTERNAL MEDICINE

## 2025-07-28 PROCEDURE — 1159F MED LIST DOCD IN RCRD: CPT | Mod: CPTII,S$GLB,, | Performed by: INTERNAL MEDICINE

## 2025-07-28 PROCEDURE — 99496 TRANSJ CARE MGMT HIGH F2F 7D: CPT | Mod: S$GLB,,, | Performed by: INTERNAL MEDICINE

## 2025-07-28 PROCEDURE — 1101F PT FALLS ASSESS-DOCD LE1/YR: CPT | Mod: CPTII,S$GLB,, | Performed by: INTERNAL MEDICINE

## 2025-07-28 PROCEDURE — 3288F FALL RISK ASSESSMENT DOCD: CPT | Mod: CPTII,S$GLB,, | Performed by: INTERNAL MEDICINE

## 2025-07-28 PROCEDURE — 1111F DSCHRG MED/CURRENT MED MERGE: CPT | Mod: CPTII,S$GLB,, | Performed by: INTERNAL MEDICINE

## 2025-07-28 PROCEDURE — 1126F AMNT PAIN NOTED NONE PRSNT: CPT | Mod: CPTII,S$GLB,, | Performed by: INTERNAL MEDICINE

## 2025-07-28 RX ORDER — NITROFURANTOIN 25; 75 MG/1; MG/1
100 CAPSULE ORAL ONCE
COMMUNITY
Start: 2025-07-23

## 2025-07-28 NOTE — PROGRESS NOTES
"Subjective:   History of Present Illness    CHIEF COMPLAINT:  Marine presents today for follow up after hospitalization for septic shock.    RECENT HOSPITALIZATION:  She was hospitalized from 7/11-7/12 at [facility] for septic shock requiring IV fluids and blood pressure medications. She had a urinary tract infection complicated by E. coli bacteremia. She describes the condition as nearly fatal.    GENITOURINARY:  She currently has an indwelling ureteral stent for management of kidney stones. She has a scheduled procedure on the 13th for stent removal. The kidney stone is too large for lithotripsy, requiring a scope procedure instead.    CURRENT SYMPTOMS:  She reports feeling "all right" with fatigue as the primary current symptom.    MEDICATIONS:  She is currently taking Macrobid 100mg prescribed by urologist. She is not currently taking Lexapro for depression.    LABS:  Laboratory results from 7/25 show WBC 3.7, hemoglobin improved from 8.4 to 10, platelets improved from 32 to 171, sodium 139, potassium 4.5, normal kidney and liver function, cholesterol 105 with LDL 53, A1C 5, and slightly abnormal thyroid with normal T4.       Review of Systems   Constitutional:  Positive for fatigue. Negative for chills and fever.   HENT:  Negative for congestion, hearing loss, sinus pressure and sore throat.    Eyes:  Negative for photophobia.   Respiratory:  Negative for cough, choking, chest tightness and wheezing.    Cardiovascular:  Negative for chest pain and palpitations.   Gastrointestinal:  Negative for blood in stool, nausea and vomiting.   Genitourinary:  Negative for dysuria and hematuria.   Musculoskeletal:  Negative for arthralgias and myalgias.   Skin:  Negative for pallor.   Neurological:  Negative for dizziness and numbness.   Hematological:  Does not bruise/bleed easily.   Psychiatric/Behavioral:  Negative for confusion and suicidal ideas. The patient is not nervous/anxious.       Objective: "   Physical Exam  Vitals and nursing note reviewed.   Constitutional:       Appearance: She is well-developed.   HENT:      Head: Normocephalic and atraumatic.      Right Ear: External ear normal.      Left Ear: External ear normal.   Eyes:      Conjunctiva/sclera: Conjunctivae normal.      Pupils: Pupils are equal, round, and reactive to light.   Neck:      Thyroid: No thyromegaly.      Vascular: No JVD.      Trachea: No tracheal deviation.   Cardiovascular:      Rate and Rhythm: Normal rate and regular rhythm.      Heart sounds: Normal heart sounds.   Pulmonary:      Effort: Pulmonary effort is normal. No respiratory distress.      Breath sounds: Normal breath sounds. No wheezing or rales.   Chest:      Chest wall: No tenderness.   Abdominal:      General: Bowel sounds are normal. There is no distension.      Palpations: Abdomen is soft. There is no mass.      Tenderness: There is no abdominal tenderness. There is no guarding or rebound.   Musculoskeletal:         General: Normal range of motion.      Cervical back: Normal range of motion and neck supple.   Lymphadenopathy:      Cervical: No cervical adenopathy.   Skin:     General: Skin is warm and dry.   Neurological:      Mental Status: She is alert and oriented to person, place, and time.      Cranial Nerves: No cranial nerve deficit.      Motor: No abnormal muscle tone.      Coordination: Coordination normal.      Deep Tendon Reflexes: Reflexes are normal and symmetric. Reflexes normal.        Assessment/Plan:     1. Hospital discharge follow-up        2. Paroxysmal atrial fibrillation  Ambulatory referral/consult to Internal Medicine      3. E coli bacteremia  Ambulatory referral/consult to Internal Medicine   She finished her Cipro.  Urology has her on nitrofurantoin   4. Thrombocytopenia        5. Type 2 diabetes mellitus with diabetic neuropathy, without long-term current use of insulin  CBC Auto Differential    Comprehensive Metabolic Panel    Lipid Panel     Hemoglobin A1C    Microalbumin/Creatinine Ratio, Urine   She is off of her metformin.  A1c in good shape.   6. Pulmonary hypertension   Pulmonary Artery The estimated pulmonary artery systolic pressure is 67 mmHg.    Echo reviewed .    Left Ventricle: The left ventricle is normal in size. Mildly increased wall thickness. There is mild concentric hypertrophy. There is normal systolic function with a visually estimated ejection fraction of 60 - 65%. Grade I diastolic dysfunction.    Right Ventricle: The right ventricle is normal in size measuring 3.0 cm. Systolic function is normal.    Tricuspid Valve: There is mild to moderate regurgitation.    Pulmonary Artery: The estimated pulmonary artery systolic pressure is 67 mmHg.     Vitals    Height Weight BMI (Calculated) BSA (Calculated - sq m) BP Pulse       135/57 82     Study Details A complete echo was performed using complete 2D, color flow Doppler and spectral Doppler. During the study, the apical, parasternal and subcostal views were captured.  Overall the study quality was adequate. There was no prior echocardiogram study performed.This was a portable study performed at the patient's bedside.     Echocardiography Findings    Left Ventricle The left ventricle is normal in size. Mildly increased wall thickness. There is mild concentric hypertrophy. Normal wall motion. There is normal systolic function with a visually estimated ejection fraction of 60 - 65%. Grade I diastolic dysfunction.   Right Ventricle The right ventricle is normal in size measuring 3.0 cm. Systolic function is normal.   Left Atrium The left atrium is normal in size   Right Atrium The right atrium is normal in size.   Aortic Valve The aortic valve is structurally normal. There is normal leaflet mobility. There is no stenosis. Aortic valve peak velocity is 1.8 m/s. Mean gradient is 6 mmHg. There is trace aortic regurgitation.   Mitral Valve The mitral valve is structurally normal. There is  normal leaflet mobility. There is no stenosis. The mean pressure gradient across the mitral valve is 4 mmHg at a heart rate of  bpm. There is trace regurgitation.   Tricuspid Valve The tricuspid valve is structurally normal. There is normal leaflet mobility. There is no stenosis. There is mild to moderate regurgitation.   Pulmonic Valve Not well visualized due to poor acoustic window.   IVC/SVC Elevated venous pressure at 15 mmHg.   Ascending Aorta The aortic root is normal in size measuring 3.4 cm. The proximal ascending aorta is normal in size measuring 3.3 cm.   Pericardium and Other Findings There is no pericardial effusion. Left pleural effusion.   Pulmonary Artery The estimated pulmonary artery systolic pressure is 67 mmHg.            Assessment & Plan    R65.21 Severe sepsis with septic shock  A41.51 Sepsis due to Escherichia coli [E. coli]  N39.0 Urinary tract infection, site not specified  N20.0 Calculus of kidney  E03.9 Hypothyroidism, unspecified  R53.83 Other fatigue      SEVERE SEPSIS WITH SEPTIC SHOCK DUE TO E. COLI:  - Monitored patient's recovery from septic shock due to E. coli infection that originated in the urinary tract and spread to the bloodstream.  - Lab results show significant improvement: WBC 3.7, Hgb 10 (up from 8.4), platelets 177 (up from 32), with normal kidney and liver function.  - Marine was previously treated with intravenous fluids and appropriate antibiotics during hospitalization.    URINARY TRACT INFECTION:  - Monitored recovery from urinary tract infection that had progressed to bloodstream infection.  - Evaluated resolution of urinary symptoms following antibiotic treatment.  - Continuing nitrofurantoin (Macrobid) as antibiotic therapy.    KIDNEY STONES:  - Monitored kidney stones for which a stent was previously placed.  - Scheduled an upcoming procedure to remove the stent and address the nephrolithiasis.    HYPOTHYROIDISM:  - Monitored thyroid function, noting TSH is  slightly abnormal while T4 remains within normal limits.    FATIGUE:  - Evaluated patient's reported fatigue symptoms, considering potential causes including recent sepsis and ongoing recovery.    DEPRESSION:  - Evaluated current mental health status, noting absence of depressive symptoms.  - Discontinued Lexapro (escitalopram) as patient does not appear depressed and denies current need for antidepressant therapy.  - Instructed patient to contact the office if depression symptoms worsen or if they feel they need to restart Lexapro.    LONG-TERM USE OF ASPIRIN:  - Evaluated appropriateness of ongoing aspirin therapy and continued Christiano aspirin as part of patient's medication regimen.    OTHER CHRONIC CONDITIONS AND FOLLOW-UP:  - Lab results show A1C 5% and cholesterol 105 with LDL 53, all within normal limits.  - Discontinued atorvastatin for cholesterol.  - Continued amiodarone for heart rhythm.  - Marine to continue current exercise regimen of walking around the house with a walker 3 times daily and monitor BP at home.  - Ordered follow-up lab work in 3 months to reassess diabetes, cholesterol, and other chronic conditions.  - Follow up appointment scheduled in 3 months.

## 2025-07-30 ENCOUNTER — TELEPHONE (OUTPATIENT)
Dept: UROLOGY | Facility: HOSPITAL | Age: 85
End: 2025-07-30
Payer: MEDICARE

## 2025-07-31 NOTE — TELEPHONE ENCOUNTER
Tati,  Please inform patient that due to the large size of her stone, the potential need for more than one trip to the OR to render her stone free, as well as risks of complications, AND my upcoming [facility].. I wish to refer her out to Dr. Gillespie.

## 2025-08-04 PROBLEM — Z09 HOSPITAL DISCHARGE FOLLOW-UP: Status: RESOLVED | Noted: 2025-07-28 | Resolved: 2025-08-04

## 2025-08-06 ENCOUNTER — OFFICE VISIT (OUTPATIENT)
Dept: CARDIOLOGY | Facility: CLINIC | Age: 85
End: 2025-08-06
Payer: MEDICARE

## 2025-08-06 VITALS
DIASTOLIC BLOOD PRESSURE: 58 MMHG | HEART RATE: 82 BPM | SYSTOLIC BLOOD PRESSURE: 114 MMHG | WEIGHT: 140 LBS | BODY MASS INDEX: 23.9 KG/M2 | HEIGHT: 64 IN

## 2025-08-06 DIAGNOSIS — N20.0 KIDNEY STONE: ICD-10-CM

## 2025-08-06 DIAGNOSIS — I48.0 PAROXYSMAL ATRIAL FIBRILLATION WITH RVR: ICD-10-CM

## 2025-08-06 DIAGNOSIS — I25.10 CORONARY ARTERY DISEASE INVOLVING NATIVE CORONARY ARTERY OF NATIVE HEART WITHOUT ANGINA PECTORIS: ICD-10-CM

## 2025-08-06 DIAGNOSIS — E78.5 HYPERLIPIDEMIA, UNSPECIFIED HYPERLIPIDEMIA TYPE: ICD-10-CM

## 2025-08-06 DIAGNOSIS — Z01.818 PREOPERATIVE CLEARANCE: Primary | ICD-10-CM

## 2025-08-06 DIAGNOSIS — I27.20 PULMONARY HYPERTENSION: ICD-10-CM

## 2025-08-06 DIAGNOSIS — I70.0 AORTIC ATHEROSCLEROSIS: ICD-10-CM

## 2025-08-06 PROCEDURE — 99999 PR PBB SHADOW E&M-EST. PATIENT-LVL III: CPT | Mod: PBBFAC,,, | Performed by: INTERNAL MEDICINE

## 2025-08-06 NOTE — ASSESSMENT & PLAN NOTE
New onset AFib in setting of sepsis and urinary infection.  Chemical conversion to sinus rhythm with amiodarone.  She remains on oral 200 mg amiodarone daily.    She is off anticoagulation due to genitourinary bleeding.  Long-term, I plan on withdrawing amiodarone and recommended anticoagulation.

## 2025-08-06 NOTE — PROGRESS NOTES
[facility] Cardiology     Subjective:    Patient ID:  Marine Mo is a 85 y.o. female who presents for evaluation of Preoperative clearance, Atrial Fibrillation, Coronary Artery Disease, and Hyperlipidemia    Review of patient's allergies indicates:   Allergen Reactions    Pravastatin      Other Reaction(s): Propensity to adverse reactions to drug     She has upcoming surgical management for kidney stone.  In July she got sick with sepsis.  She was treated at an [facility] where she developed indications for cardiac consultation.  She had new onset AFib.  There was atrial flutter as well.  Her echo showed normal ejection fraction.  The echo showed pulmonary hypertension and moderate tricuspid regurgitation.    She converted to sinus rhythm with IV amiodarone.  She is on 200 mg daily.  Her BNP was 600 her troponins were minimally elevated and flat.    She has a history of coronary stenting in 2022.  She states she did not have angina or NSTEMI preceding her stent.  She takes aspirin.  She is on atorvastatin with LDL 54 mg %, HDL 33, triglycerides 92 mg%.  She has no medications for hypertension currently.  Her Electrocardiogram shows right bundle branch block.    She looks comfortable in my office.  She does not have a lot of insight into her arrhythmia problem that recently occurred.  She is not on DOAC therapy.  She has no chest pain complaints are palpitation complaints.    Cis records reviewed.  No cardiac catheterization report sent.  Imaging studies include venous ultrasound legs, MICHAEL study legs, carotid ultrasound, Electrocardiogram is, cardiac echocardiogram reviewed.  PA pressures by echo were normal.  No venous reflux of significance confirmed.  Echo shows 1-2 +aortic regurgitation.  No obstructive carotid disease confirmed.  MICHAEL study showed calcinosis.         Review of Systems   Constitutional: Negative for chills,  decreased appetite, diaphoresis, fever, malaise/fatigue, night sweats, weight gain and weight loss.   HENT:  Negative for congestion, ear discharge, ear pain, hearing loss, hoarse voice, nosebleeds, odynophagia, sore throat, stridor and tinnitus.    Eyes:  Negative for blurred vision, discharge, double vision, pain, photophobia, redness, vision loss in left eye, vision loss in right eye, visual disturbance and visual halos.   Cardiovascular:  Negative for chest pain, claudication, cyanosis, dyspnea on exertion, irregular heartbeat, leg swelling, near-syncope, orthopnea, palpitations, paroxysmal nocturnal dyspnea and syncope.   Respiratory:  Negative for cough, hemoptysis, shortness of breath, sleep disturbances due to breathing, snoring, sputum production and wheezing.    Endocrine: Negative for cold intolerance, heat intolerance, polydipsia, polyphagia and polyuria.   Hematologic/Lymphatic: Negative for adenopathy and bleeding problem. Does not bruise/bleed easily.   Skin:  Negative for color change, dry skin, flushing, itching, nail changes, poor wound healing, rash, skin cancer, suspicious lesions and unusual hair distribution.   Musculoskeletal:  Negative for arthritis, back pain, falls, gout, joint pain, joint swelling, muscle cramps, muscle weakness, myalgias, neck pain and stiffness.        Leg cramps   Gastrointestinal:  Negative for bloating, abdominal pain, anorexia, change in bowel habit, bowel incontinence, constipation, diarrhea, dysphagia, excessive appetite, flatus, heartburn, hematemesis, hematochezia, hemorrhoids, jaundice, melena, nausea and vomiting.   Genitourinary:  Negative for bladder incontinence, decreased libido, dysuria, flank pain, frequency, genital sores, hematuria, hesitancy, incomplete emptying, nocturia and urgency.   Neurological:  Negative for aphonia, brief paralysis, difficulty with concentration, disturbances in coordination, excessive daytime sleepiness, dizziness, focal  "weakness, headaches, light-headedness, loss of balance, numbness, paresthesias, seizures, sensory change, tremors, vertigo and weakness.   Psychiatric/Behavioral:  Negative for altered mental status, depression, hallucinations, memory loss, substance abuse, suicidal ideas and thoughts of violence. The patient does not have insomnia and is not nervous/anxious.    Allergic/Immunologic: Negative for hives and persistent infections.        Objective:       Vitals:    08/06/25 0818   BP: (!) 114/58   Pulse: 82   Weight: 63.5 kg (140 lb)   Height: 5' 4" (1.626 m)    Physical Exam  Constitutional:       General: She is not in acute distress.     Appearance: She is well-developed. She is not diaphoretic.   HENT:      Head: Normocephalic and atraumatic.      Nose: Nose normal.   Eyes:      General: No scleral icterus.        Right eye: No discharge.      Conjunctiva/sclera: Conjunctivae normal.      Pupils: Pupils are equal, round, and reactive to light.   Neck:      Thyroid: No thyromegaly.      Vascular: No JVD.      Trachea: No tracheal deviation.   Cardiovascular:      Rate and Rhythm: Normal rate and regular rhythm.      Pulses:           Carotid pulses are 2+ on the right side and 2+ on the left side.       Radial pulses are 2+ on the right side and 2+ on the left side.        Dorsalis pedis pulses are 2+ on the right side and 2+ on the left side.        Posterior tibial pulses are 2+ on the right side and 2+ on the left side.      Heart sounds: Normal heart sounds. No murmur heard.     No friction rub. No gallop.   Pulmonary:      Effort: Pulmonary effort is normal. No respiratory distress.      Breath sounds: Normal breath sounds. No stridor. No wheezing or rales.   Chest:      Chest wall: No tenderness.   Abdominal:      General: Bowel sounds are normal. There is no distension.      Palpations: Abdomen is soft. There is no mass.      Tenderness: There is no abdominal tenderness. There is no guarding or rebound. "   Musculoskeletal:         General: No tenderness. Normal range of motion.      Cervical back: Normal range of motion and neck supple.   Lymphadenopathy:      Cervical: No cervical adenopathy.   Skin:     General: Skin is warm and dry.      Coloration: Skin is not pale.      Findings: No erythema or rash.   Neurological:      Mental Status: She is alert and oriented to person, place, and time.      Cranial Nerves: No cranial nerve deficit.      Coordination: Coordination normal.   Psychiatric:         Behavior: Behavior normal.         Thought Content: Thought content normal.         Judgment: Judgment normal.           Assessment:       1. Preoperative clearance    2. Kidney stone    3. Pulmonary hypertension    4. Paroxysmal atrial fibrillation with RVR    5. Hyperlipidemia, unspecified hyperlipidemia type    6. Coronary artery disease involving native coronary artery of native heart without angina pectoris    7. Aortic atherosclerosis      Results for orders placed or performed in visit on 07/25/25   Comprehensive Metabolic Panel    Collection Time: 07/25/25  7:19 AM   Result Value Ref Range    Sodium 139 136 - 145 mmol/L    Potassium 4.5 3.5 - 5.1 mmol/L    Chloride 104 95 - 110 mmol/L    CO2 28 23 - 29 mmol/L    Glucose 107 70 - 110 mg/dL    BUN 8 8 - 23 mg/dL    Creatinine 0.9 0.5 - 1.4 mg/dL    Calcium 9.1 8.7 - 10.5 mg/dL    Protein Total 6.5 6.0 - 8.4 gm/dL    Albumin 3.2 (L) 3.5 - 5.2 g/dL    Bilirubin Total 0.8 0.1 - 1.0 mg/dL    ALP 50 40 - 150 unit/L    AST 26 11 - 45 unit/L    ALT 14 10 - 44 unit/L    Anion Gap 7 (L) 8 - 16 mmol/L    eGFR >60 >60 mL/min/1.73/m2   Lipid Panel    Collection Time: 07/25/25  7:19 AM   Result Value Ref Range    Cholesterol Total 105 (L) 120 - 199 mg/dL    Triglyceride 92 30 - 150 mg/dL    HDL Cholesterol 33 (L) 40 - 75 mg/dL    LDL Cholesterol 53.6 (L) 63.0 - 159.0 mg/dL    HDL/Cholesterol Ratio 31.4 20.0 - 50.0 %    Cholesterol/HDL Ratio 3.2 2.0 - 5.0    Non HDL  Cholesterol 72 mg/dL   TSH    Collection Time: 07/25/25  7:19 AM   Result Value Ref Range    TSH 5.007 (H) 0.400 - 4.000 uIU/mL    Free T4 1.14 0.71 - 1.51 ng/dL   Hemoglobin A1C    Collection Time: 07/25/25  7:19 AM   Result Value Ref Range    Hemoglobin A1c 5.0 4.0 - 5.6 %    Estimated Average Glucose 97 68 - 131 mg/dL   Microalbumin/Creatinine Ratio, Urine    Collection Time: 07/25/25  7:19 AM   Result Value Ref Range    Urine Microalbumin 142.0   ug/mL    Urine Creatinine 51.6 15.0 - 325.0 mg/dL    Microalbumin/Creatinine Ratio Urine 275.2 (H) <=30.0 ug/mg   CBC with Differential    Collection Time: 07/25/25  7:19 AM   Result Value Ref Range    WBC 3.71 (L) 3.90 - 12.70 K/uL    RBC 3.56 (L) 4.00 - 5.40 M/uL    HGB 10.3 (L) 12.0 - 16.0 gm/dL    HCT 31.8 (L) 37.0 - 48.5 %    MCV 89 82 - 98 fL    MCH 28.9 27.0 - 31.0 pg    MCHC 32.4 32.0 - 36.0 g/dL    RDW 16.8 (H) 11.5 - 14.5 %    Platelet Count 171 150 - 450 K/uL    MPV 8.8 (L) 9.2 - 12.9 fL    Nucleated RBC 0 <=0 /100 WBC    Neut % 62.1 38 - 73 %    Lymph % 25.3 18 - 48 %    Mono % 9.4 4 - 15 %    Eos % 2.4 <=8 %    Basophil % 0.5 <=1.9 %    Imm Grans % 0.3 0.0 - 0.5 %    Neut # 2.30 1.8 - 7.7 K/uL    Lymph # 0.94 (L) 1 - 4.8 K/uL    Mono # 0.35 0.3 - 1 K/uL    Eos # 0.09 <=0.5 K/uL    Baso # 0.02 <=0.2 K/uL    Imm Grans # 0.01 0.00 - 0.04 K/uL   Urine Culture High Risk ($$)    Collection Time: 07/25/25  7:23 AM    Specimen: Urine, Clean Catch   Result Value Ref Range    Urine Culture No Growth        Current Medications[1]     Lab Results   Component Value Date    WBC 3.71 (L) 07/25/2025    RBC 3.56 (L) 07/25/2025    HGB 10.3 (L) 07/25/2025    HCT 31.8 (L) 07/25/2025    MCV 89 07/25/2025    MCH 28.9 07/25/2025    MCHC 32.4 07/25/2025    RDW 16.8 (H) 07/25/2025     07/25/2025    MPV 8.8 (L) 07/25/2025    GRAN 3.7 07/15/2025    LYMPH 25.3 07/25/2025    LYMPH 0.94 (L) 07/25/2025    MONO 9.4 07/25/2025    MONO 0.35 07/25/2025    EOS 2.4 07/25/2025    EOS  0.09 07/25/2025    BASO 0.01 02/19/2025    EOSINOPHIL 1.0 07/15/2025    BASOPHIL 0.5 07/25/2025    BASOPHIL 0.02 07/25/2025    MG 1.9 07/14/2025        CMP  Lab Results   Component Value Date     07/25/2025    K 4.5 07/25/2025     07/25/2025    CO2 28 07/25/2025     07/25/2025    BUN 8 07/25/2025    CREATININE 0.9 07/25/2025    CALCIUM 9.1 07/25/2025    PROT 6.5 07/25/2025    ALBUMIN 3.2 (L) 07/25/2025    BILITOT 0.8 07/25/2025    ALKPHOS 50 07/25/2025    AST 26 07/25/2025    ALT 14 07/25/2025    ANIONGAP 7 (L) 07/25/2025    ESTGFRAFRICA >60 02/04/2022    EGFRNONAA >60 02/04/2022        Lab Results   Component Value Date    LABURIN No Growth 07/25/2025            Results for orders placed or performed during the hospital encounter of 07/12/25   EKG 12-lead    Collection Time: 07/19/25 12:23 PM   Result Value Ref Range    QRS Duration 130 ms    [facility] QTC Calculation 492 ms    Narrative    Test Reason : qtc measure    Vent. Rate :  72 BPM     Atrial Rate :  72 BPM     P-R Int : 186 ms          QRS Dur : 130 ms      QT Int : 450 ms       P-R-T Axes : -10  28  17 degrees    QTcB Int : 492 ms    Normal sinus rhythm  Right bundle branch block  Abnormal ECG  When compared with ECG of 15-Jul-2025 10:47,  Significant changes have occurred  Confirmed by Jyoti Taveras ([id number]) on 7/21/2025 8:59:16 PM    Referred By: CRUZ URBANO           Confirmed By: Jyoti Taveras        Echo Saline Bubble? No 07/13/2025 [id number] Final   X-Ray Chest AP Portable 07/13/2025 [id number] Final   X-Ray Chest 1 View 07/12/2025 [id number] Final             Plan:       Problem List Items Addressed This Visit          Cardiac/Vascular    Hyperlipidemia    Atorvastatin 20 mg to continue.  Most recent lipid profile LDL 54, HDL 33, triglycerides 92 mg% by labs July 2025.         Aortic atherosclerosis    Condition stable.         Coronary artery disease involving native coronary artery of native heart without angina pectoris    Previous stent 2022.   There is a 2021 nuclear stress test which was normal.  She states she has never had angina.  She is on aspirin.  Most recent echo confirmed normal ejection fraction and wall motion.         Paroxysmal atrial fibrillation with RVR    New onset AFib in setting of sepsis and urinary infection.  Chemical conversion to sinus rhythm with amiodarone.  She remains on oral 200 mg amiodarone daily.    She is off anticoagulation due to genitourinary bleeding.  Long-term, I plan on withdrawing amiodarone and recommended anticoagulation.         Pulmonary hypertension    Noted on most recent echo, PA pressures 60s.  She had sepsis picture at that time as well as AFib.  Likely related to acute volume overload.  BNP was 600 range.    Her previous echoes in the past showed normal PA pressures.    She denies shortness of breath symptoms at this time.            Other    Preoperative clearance - Primary    The patient is cleared for planned urologic procedure without further testing.          Other Visit Diagnoses         Kidney stone                   The patient is cleared for her surgical procedure.    I will see her back in a month.    She is clinically stable on exam today.             Iker Ibanez MD  08/06/2025   8:54 AM               [1]   Current Outpatient Medications:     amiodarone (PACERONE) 200 MG Tab, Take 1 tablet (200 mg total) by mouth 2 (two) times daily for 5 days, THEN 1 tablet (200 mg total) once daily., Disp: 370 tablet, Rfl: 0    aspirin 81 MG Chew, Take 81 mg by mouth once daily., Disp: , Rfl:     cholecalciferol, vitamin D3, (VITAMIN D3) 25 mcg (1,000 unit) capsule, Take 2,000 Units by mouth once daily. Patient taking 5000 IU, Disp: , Rfl:     nitrofurantoin, macrocrystal-monohydrate, (MACROBID) 100 MG capsule, Take 100 mg by mouth once., Disp: , Rfl:     ALPRAZolam (XANAX) 0.5 MG tablet, TAKE 1 TABLET BY MOUTH NIGHTLY AS NEEDED FOR ANXIETY (Patient not taking: Reported on 8/6/2025), Disp: 30 tablet,  Rfl: 0    atorvastatin (LIPITOR) 20 MG tablet, Take 20 mg by mouth. (Patient not taking: Reported on 8/6/2025), Disp: , Rfl:     calcium carbonate (TUMS) 200 mg calcium (500 mg) chewable tablet, Take 1 tablet by mouth once daily. (Patient not taking: Reported on 8/6/2025), Disp: , Rfl:     coenzyme Q10 100 mg capsule, Take 100 mg by mouth once daily. (Patient not taking: Reported on 8/6/2025), Disp: , Rfl:     diclofenac sodium (VOLTAREN ARTHRITIS PAIN) 1 % Gel, Apply 2 g topically once daily. (Patient not taking: Reported on 8/6/2025), Disp: 100 g, Rfl: 1    hydrOXYzine HCL (ATARAX) 10 MG Tab, 1-3 tablets every 8 hours as needed for itching (Patient not taking: Reported on 8/6/2025), Disp: 45 tablet, Rfl: 0    nitroGLYCERIN (NITROSTAT) 0.4 MG SL tablet, Place 0.4 mg under the tongue every 5 (five) minutes as needed for Chest pain. (Patient not taking: Reported on 8/6/2025), Disp: , Rfl:     timolol maleate 0.5% (TIMOPTIC) 0.5 % Drop, INSTILL 1 DROP INTO EACH EYE TWICE DAILY (Patient not taking: Reported on 8/6/2025), Disp: , Rfl:

## 2025-08-06 NOTE — ASSESSMENT & PLAN NOTE
Atorvastatin 20 mg to continue.  Most recent lipid profile LDL 54, HDL 33, triglycerides 92 mg% by labs July 2025.

## 2025-08-06 NOTE — ASSESSMENT & PLAN NOTE
Noted on most recent echo, PA pressures 60s.  She had sepsis picture at that time as well as AFib.  Likely related to acute volume overload.  BNP was 600 range.    Her previous echoes in the past showed normal PA pressures.    She denies shortness of breath symptoms at this time.

## 2025-08-06 NOTE — ASSESSMENT & PLAN NOTE
Previous stent 2022.  There is a 2021 nuclear stress test which was normal.  She states she has never had angina.  She is on aspirin.  Most recent echo confirmed normal ejection fraction and wall motion.

## 2025-08-08 ENCOUNTER — TELEPHONE (OUTPATIENT)
Dept: UROLOGY | Facility: CLINIC | Age: 85
End: 2025-08-08
Payer: MEDICARE

## 2025-08-08 DIAGNOSIS — N20.0 KIDNEY STONE: Primary | ICD-10-CM

## 2025-08-11 ENCOUNTER — TELEPHONE (OUTPATIENT)
Dept: INTERNAL MEDICINE | Facility: CLINIC | Age: 85
End: 2025-08-11
Payer: MEDICARE

## 2025-08-11 ENCOUNTER — TELEPHONE (OUTPATIENT)
Dept: UROLOGY | Facility: CLINIC | Age: 85
End: 2025-08-11
Payer: MEDICARE

## 2025-08-12 RX ORDER — TRIAMCINOLONE ACETONIDE 1 MG/G
CREAM TOPICAL 2 TIMES DAILY
Qty: 28.4 G | Refills: 1 | Status: SHIPPED | OUTPATIENT
Start: 2025-08-12

## 2025-08-22 ENCOUNTER — TELEPHONE (OUTPATIENT)
Dept: CARDIOLOGY | Facility: CLINIC | Age: 85
End: 2025-08-22
Payer: MEDICARE

## 2025-08-29 ENCOUNTER — TELEPHONE (OUTPATIENT)
Dept: INTERNAL MEDICINE | Facility: CLINIC | Age: 85
End: 2025-08-29
Payer: MEDICARE

## (undated) DEVICE — BLANKET WARMING UPPER BODY

## (undated) DEVICE — GLOVE SIGNATURE ESSNTL LTX 6.5

## (undated) DEVICE — CONTAINER SPECIMEN OR STER 4OZ

## (undated) DEVICE — Device

## (undated) DEVICE — SYR 10CC LUER LOCK

## (undated) DEVICE — SNAP CAP

## (undated) DEVICE — SUPPORT ULNA NERVE PROTECTOR

## (undated) DEVICE — WIRE GUIDE .035 150CM.

## (undated) DEVICE — SYR ONLY LUER LOCK 20CC

## (undated) DEVICE — BAG PRESSURE INFUSER 3000CC

## (undated) DEVICE — ADAPT UROLOGICAL 2-WAY STOPCK

## (undated) DEVICE — MAT QUICK 40X30 FLOOR FLUID LF

## (undated) DEVICE — SENSOR DUAL FLEX STR 150CM

## (undated) DEVICE — SOL WATER STERILE IRR 500ML

## (undated) DEVICE — TRAY CATH 1-LYR URIMTR 16FR

## (undated) DEVICE — CATH URTRL OPEN END STR TIP 5F

## (undated) DEVICE — SOL IRR NACL .9% 3000ML

## (undated) DEVICE — CANISTER SUCTION RIGID 2000CC

## (undated) DEVICE — CAP SELF SEAL GYNEOCLOGY F4

## (undated) DEVICE — GOWN NONREINF SET-IN SLV XL